# Patient Record
Sex: MALE | Race: WHITE | Employment: FULL TIME | URBAN - NONMETROPOLITAN AREA
[De-identification: names, ages, dates, MRNs, and addresses within clinical notes are randomized per-mention and may not be internally consistent; named-entity substitution may affect disease eponyms.]

---

## 2017-07-03 RX ORDER — ASPIRIN 81 MG
TABLET, DELAYED RELEASE (ENTERIC COATED) ORAL
Qty: 30 TABLET | Refills: 0 | Status: SHIPPED | OUTPATIENT
Start: 2017-07-03 | End: 2018-05-17 | Stop reason: SDUPTHER

## 2018-05-10 RX ORDER — LISINOPRIL 10 MG/1
TABLET ORAL
Qty: 90 TABLET | Refills: 0 | OUTPATIENT
Start: 2018-05-10

## 2018-05-10 RX ORDER — ATORVASTATIN CALCIUM 20 MG/1
TABLET, FILM COATED ORAL
Qty: 90 TABLET | Refills: 0 | OUTPATIENT
Start: 2018-05-10

## 2018-05-17 ENCOUNTER — OFFICE VISIT (OUTPATIENT)
Dept: FAMILY MEDICINE CLINIC | Age: 58
End: 2018-05-17
Payer: COMMERCIAL

## 2018-05-17 VITALS
RESPIRATION RATE: 12 BRPM | SYSTOLIC BLOOD PRESSURE: 150 MMHG | HEIGHT: 72 IN | WEIGHT: 267 LBS | DIASTOLIC BLOOD PRESSURE: 72 MMHG | HEART RATE: 91 BPM | BODY MASS INDEX: 36.16 KG/M2 | TEMPERATURE: 97.9 F

## 2018-05-17 DIAGNOSIS — Z12.5 SCREENING FOR PROSTATE CANCER: ICD-10-CM

## 2018-05-17 DIAGNOSIS — I10 ESSENTIAL HYPERTENSION: Primary | ICD-10-CM

## 2018-05-17 DIAGNOSIS — C43.59 MALIGNANT MELANOMA OF TORSO EXCLUDING BREAST (HCC): ICD-10-CM

## 2018-05-17 DIAGNOSIS — E78.00 PURE HYPERCHOLESTEROLEMIA: ICD-10-CM

## 2018-05-17 PROCEDURE — 4004F PT TOBACCO SCREEN RCVD TLK: CPT | Performed by: NURSE PRACTITIONER

## 2018-05-17 PROCEDURE — G8427 DOCREV CUR MEDS BY ELIG CLIN: HCPCS | Performed by: NURSE PRACTITIONER

## 2018-05-17 PROCEDURE — G8417 CALC BMI ABV UP PARAM F/U: HCPCS | Performed by: NURSE PRACTITIONER

## 2018-05-17 PROCEDURE — 3017F COLORECTAL CA SCREEN DOC REV: CPT | Performed by: NURSE PRACTITIONER

## 2018-05-17 PROCEDURE — 99213 OFFICE O/P EST LOW 20 MIN: CPT | Performed by: NURSE PRACTITIONER

## 2018-05-17 RX ORDER — LISINOPRIL 10 MG/1
TABLET ORAL
Qty: 90 TABLET | Refills: 3 | Status: SHIPPED | OUTPATIENT
Start: 2018-05-17 | End: 2019-02-12 | Stop reason: SDUPTHER

## 2018-05-17 RX ORDER — ATORVASTATIN CALCIUM 20 MG/1
TABLET, FILM COATED ORAL
Qty: 90 TABLET | Refills: 3 | Status: SHIPPED | OUTPATIENT
Start: 2018-05-17 | End: 2019-02-12 | Stop reason: SDUPTHER

## 2018-05-17 RX ORDER — ASPIRIN 81 MG/1
TABLET ORAL
Qty: 90 TABLET | Refills: 3 | Status: SHIPPED | OUTPATIENT
Start: 2018-05-17 | End: 2021-05-06 | Stop reason: SDUPTHER

## 2018-05-17 ASSESSMENT — PATIENT HEALTH QUESTIONNAIRE - PHQ9
SUM OF ALL RESPONSES TO PHQ QUESTIONS 1-9: 0
2. FEELING DOWN, DEPRESSED OR HOPELESS: 0
SUM OF ALL RESPONSES TO PHQ9 QUESTIONS 1 & 2: 0
1. LITTLE INTEREST OR PLEASURE IN DOING THINGS: 0

## 2018-05-17 ASSESSMENT — ENCOUNTER SYMPTOMS
COLOR CHANGE: 1
RESPIRATORY NEGATIVE: 1

## 2018-06-11 ENCOUNTER — TELEPHONE (OUTPATIENT)
Dept: FAMILY MEDICINE CLINIC | Age: 58
End: 2018-06-11

## 2018-06-11 NOTE — TELEPHONE ENCOUNTER
Concerning referral to gastroenterology -    [facility] have attempted to reach the patient on 5/21/18 to schedule colonoscopy. The also mailed a letter on 6/7/18 due to no c/b. Contacted patient today 6/11/18) and left an urgent message letting him know that [facility] is trying to reach him for scheduling.  Left phone numbers for [facility] for him to contact for an appt and also our number in case he wants to cancel referral.

## 2018-06-25 ENCOUNTER — TELEPHONE (OUTPATIENT)
Dept: FAMILY MEDICINE CLINIC | Age: 58
End: 2018-06-25

## 2018-07-10 ENCOUNTER — TELEPHONE (OUTPATIENT)
Dept: FAMILY MEDICINE CLINIC | Age: 58
End: 2018-07-10

## 2018-07-10 NOTE — TELEPHONE ENCOUNTER
2nd attempt to contact the pt re:overdue labs JS ordered on 5/17/18. HIPAA form is up to date, orders mailed.

## 2019-01-31 ENCOUNTER — TELEPHONE (OUTPATIENT)
Dept: FAMILY MEDICINE CLINIC | Age: 59
End: 2019-01-31

## 2019-02-12 ENCOUNTER — OFFICE VISIT (OUTPATIENT)
Dept: FAMILY MEDICINE CLINIC | Age: 59
End: 2019-02-12
Payer: COMMERCIAL

## 2019-02-12 VITALS
HEIGHT: 71 IN | DIASTOLIC BLOOD PRESSURE: 88 MMHG | TEMPERATURE: 98.4 F | WEIGHT: 260 LBS | SYSTOLIC BLOOD PRESSURE: 138 MMHG | BODY MASS INDEX: 36.4 KG/M2 | HEART RATE: 88 BPM | RESPIRATION RATE: 14 BRPM

## 2019-02-12 DIAGNOSIS — E78.00 PURE HYPERCHOLESTEROLEMIA: ICD-10-CM

## 2019-02-12 DIAGNOSIS — I10 ESSENTIAL HYPERTENSION: Primary | ICD-10-CM

## 2019-02-12 DIAGNOSIS — C43.8 MALIGNANT MELANOMA OF OVERLAPPING SITES (HCC): ICD-10-CM

## 2019-02-12 DIAGNOSIS — Z85.820 HISTORY OF MELANOMA: ICD-10-CM

## 2019-02-12 DIAGNOSIS — Z12.11 SCREENING FOR COLON CANCER: ICD-10-CM

## 2019-02-12 DIAGNOSIS — R35.1 NOCTURIA: ICD-10-CM

## 2019-02-12 DIAGNOSIS — R73.01 ELEVATED FASTING BLOOD SUGAR: ICD-10-CM

## 2019-02-12 LAB — HBA1C MFR BLD: 7.4 %

## 2019-02-12 PROCEDURE — 83036 HEMOGLOBIN GLYCOSYLATED A1C: CPT | Performed by: NURSE PRACTITIONER

## 2019-02-12 PROCEDURE — 99213 OFFICE O/P EST LOW 20 MIN: CPT | Performed by: NURSE PRACTITIONER

## 2019-02-12 RX ORDER — ATORVASTATIN CALCIUM 20 MG/1
TABLET, FILM COATED ORAL
Qty: 90 TABLET | Refills: 3 | Status: SHIPPED | OUTPATIENT
Start: 2019-02-12 | End: 2019-10-16 | Stop reason: SDUPTHER

## 2019-02-12 RX ORDER — LISINOPRIL 5 MG/1
TABLET ORAL
Qty: 90 TABLET | Refills: 3 | Status: SHIPPED | OUTPATIENT
Start: 2019-02-12 | End: 2019-10-16 | Stop reason: SDUPTHER

## 2019-02-12 ASSESSMENT — PATIENT HEALTH QUESTIONNAIRE - PHQ9
SUM OF ALL RESPONSES TO PHQ QUESTIONS 1-9: 0
1. LITTLE INTEREST OR PLEASURE IN DOING THINGS: 0
SUM OF ALL RESPONSES TO PHQ QUESTIONS 1-9: 0
SUM OF ALL RESPONSES TO PHQ9 QUESTIONS 1 & 2: 0
2. FEELING DOWN, DEPRESSED OR HOPELESS: 0

## 2019-02-12 ASSESSMENT — ENCOUNTER SYMPTOMS
NAUSEA: 0
BACK PAIN: 0
ABDOMINAL PAIN: 0
ABDOMINAL DISTENTION: 0
RESPIRATORY NEGATIVE: 1
VOMITING: 0
DIARRHEA: 0
CONSTIPATION: 0

## 2019-02-13 ENCOUNTER — TELEPHONE (OUTPATIENT)
Dept: FAMILY MEDICINE CLINIC | Age: 59
End: 2019-02-13

## 2019-02-13 DIAGNOSIS — R73.01 ELEVATED FASTING BLOOD SUGAR: Primary | ICD-10-CM

## 2019-02-13 DIAGNOSIS — E11.9 TYPE 2 DIABETES MELLITUS WITHOUT COMPLICATION, WITHOUT LONG-TERM CURRENT USE OF INSULIN (HCC): ICD-10-CM

## 2019-02-13 RX ORDER — GLUCOSAMINE HCL/CHONDROITIN SU 500-400 MG
CAPSULE ORAL
Qty: 180 STRIP | Refills: 3 | Status: SHIPPED | OUTPATIENT
Start: 2019-02-13 | End: 2019-10-16 | Stop reason: SDUPTHER

## 2019-02-13 RX ORDER — LANCETS
1 EACH MISCELLANEOUS 2 TIMES DAILY
Qty: 180 EACH | Refills: 3 | Status: SHIPPED | OUTPATIENT
Start: 2019-02-13 | End: 2019-10-16 | Stop reason: SDUPTHER

## 2019-03-05 ENCOUNTER — TELEPHONE (OUTPATIENT)
Dept: FAMILY MEDICINE CLINIC | Age: 59
End: 2019-03-05

## 2019-03-12 ENCOUNTER — TELEPHONE (OUTPATIENT)
Dept: FAMILY MEDICINE CLINIC | Age: 59
End: 2019-03-12

## 2019-09-26 ENCOUNTER — TELEPHONE (OUTPATIENT)
Dept: FAMILY MEDICINE CLINIC | Age: 59
End: 2019-09-26

## 2019-09-26 NOTE — TELEPHONE ENCOUNTER
1st attempt to contact pt about overdue lab from 2/12/19. HIPAA not UTD. Neither phone's VM is set up. Contact letter sent.

## 2019-10-16 ENCOUNTER — OFFICE VISIT (OUTPATIENT)
Dept: FAMILY MEDICINE CLINIC | Age: 59
End: 2019-10-16
Payer: COMMERCIAL

## 2019-10-16 ENCOUNTER — TELEPHONE (OUTPATIENT)
Dept: FAMILY MEDICINE CLINIC | Age: 59
End: 2019-10-16

## 2019-10-16 VITALS
BODY MASS INDEX: 36.26 KG/M2 | SYSTOLIC BLOOD PRESSURE: 101 MMHG | RESPIRATION RATE: 16 BRPM | HEIGHT: 71 IN | TEMPERATURE: 98.2 F | DIASTOLIC BLOOD PRESSURE: 70 MMHG | HEART RATE: 88 BPM

## 2019-10-16 DIAGNOSIS — I10 ESSENTIAL HYPERTENSION: ICD-10-CM

## 2019-10-16 DIAGNOSIS — R80.9 MICROALBUMINURIA: ICD-10-CM

## 2019-10-16 DIAGNOSIS — Z85.820 HISTORY OF MELANOMA: ICD-10-CM

## 2019-10-16 DIAGNOSIS — E78.00 PURE HYPERCHOLESTEROLEMIA: ICD-10-CM

## 2019-10-16 DIAGNOSIS — N52.9 ERECTILE DYSFUNCTION, UNSPECIFIED ERECTILE DYSFUNCTION TYPE: ICD-10-CM

## 2019-10-16 DIAGNOSIS — Z12.11 SCREENING FOR COLON CANCER: ICD-10-CM

## 2019-10-16 DIAGNOSIS — R73.01 ELEVATED FASTING BLOOD SUGAR: ICD-10-CM

## 2019-10-16 DIAGNOSIS — Z23 NEEDS FLU SHOT: ICD-10-CM

## 2019-10-16 DIAGNOSIS — E11.9 TYPE 2 DIABETES MELLITUS WITHOUT COMPLICATION, WITHOUT LONG-TERM CURRENT USE OF INSULIN (HCC): ICD-10-CM

## 2019-10-16 LAB
CREATININE URINE POCT: ABNORMAL
CREATININE, URINE: 100.6 MG/DL
HBA1C MFR BLD: 5.8 %
MICROALBUMIN UR-MCNC: 7.81 MG/DL
MICROALBUMIN/CREAT 24H UR: ABNORMAL MG/G{CREAT}
MICROALBUMIN/CREAT UR-RTO: 78 MG/G (ref 0–30)
MICROALBUMIN/CREAT UR-RTO: ABNORMAL

## 2019-10-16 PROCEDURE — 82044 UR ALBUMIN SEMIQUANTITATIVE: CPT | Performed by: NURSE PRACTITIONER

## 2019-10-16 PROCEDURE — 99214 OFFICE O/P EST MOD 30 MIN: CPT | Performed by: NURSE PRACTITIONER

## 2019-10-16 PROCEDURE — 83036 HEMOGLOBIN GLYCOSYLATED A1C: CPT | Performed by: NURSE PRACTITIONER

## 2019-10-16 RX ORDER — ATORVASTATIN CALCIUM 20 MG/1
TABLET, FILM COATED ORAL
Qty: 90 TABLET | Refills: 3 | Status: SHIPPED | OUTPATIENT
Start: 2019-10-16 | End: 2020-04-24 | Stop reason: SDUPTHER

## 2019-10-16 RX ORDER — SILDENAFIL 50 MG/1
TABLET, FILM COATED ORAL
Qty: 10 TABLET | Refills: 0 | Status: SHIPPED | OUTPATIENT
Start: 2019-10-16

## 2019-10-16 RX ORDER — LANCETS
1 EACH MISCELLANEOUS 2 TIMES DAILY
Qty: 180 EACH | Refills: 3 | Status: SHIPPED | OUTPATIENT
Start: 2019-10-16

## 2019-10-16 RX ORDER — LISINOPRIL 5 MG/1
TABLET ORAL
Qty: 90 TABLET | Refills: 3 | Status: SHIPPED | OUTPATIENT
Start: 2019-10-16 | End: 2020-04-24 | Stop reason: SDUPTHER

## 2019-10-16 RX ORDER — GLUCOSAMINE HCL/CHONDROITIN SU 500-400 MG
CAPSULE ORAL
Qty: 180 STRIP | Refills: 3 | Status: SHIPPED | OUTPATIENT
Start: 2019-10-16

## 2019-10-17 PROCEDURE — 90471 IMMUNIZATION ADMIN: CPT | Performed by: NURSE PRACTITIONER

## 2019-10-17 PROCEDURE — 90686 IIV4 VACC NO PRSV 0.5 ML IM: CPT | Performed by: NURSE PRACTITIONER

## 2019-10-21 ENCOUNTER — TELEPHONE (OUTPATIENT)
Dept: FAMILY MEDICINE CLINIC | Age: 59
End: 2019-10-21

## 2019-10-21 RX ORDER — TADALAFIL 5 MG/1
5 TABLET ORAL PRN
Qty: 30 TABLET | Refills: 3 | Status: ON HOLD | OUTPATIENT
Start: 2019-10-21 | End: 2021-09-17

## 2019-10-25 ENCOUNTER — NURSE ONLY (OUTPATIENT)
Dept: LAB | Age: 59
End: 2019-10-25

## 2019-10-25 DIAGNOSIS — E11.9 TYPE 2 DIABETES MELLITUS WITHOUT COMPLICATION, WITHOUT LONG-TERM CURRENT USE OF INSULIN (HCC): ICD-10-CM

## 2019-10-25 DIAGNOSIS — I10 ESSENTIAL HYPERTENSION: ICD-10-CM

## 2019-10-25 LAB
CHOLESTEROL, TOTAL: 163 MG/DL (ref 100–199)
HDLC SERPL-MCNC: 35 MG/DL
LDL CHOLESTEROL CALCULATED: 87 MG/DL
TRIGL SERPL-MCNC: 203 MG/DL (ref 0–199)

## 2019-10-28 ENCOUNTER — TELEPHONE (OUTPATIENT)
Dept: FAMILY MEDICINE CLINIC | Age: 59
End: 2019-10-28

## 2019-11-18 ENCOUNTER — TELEPHONE (OUTPATIENT)
Dept: FAMILY MEDICINE CLINIC | Age: 59
End: 2019-11-18

## 2020-04-16 ENCOUNTER — TELEPHONE (OUTPATIENT)
Dept: FAMILY MEDICINE CLINIC | Age: 60
End: 2020-04-16

## 2020-04-16 NOTE — TELEPHONE ENCOUNTER
Please contact pt and see if he wants to change his visit today to a video visit or doxy. me thanks!

## 2020-04-22 ENCOUNTER — VIRTUAL VISIT (OUTPATIENT)
Dept: FAMILY MEDICINE CLINIC | Age: 60
End: 2020-04-22
Payer: COMMERCIAL

## 2020-04-22 VITALS
BODY MASS INDEX: 34.17 KG/M2 | TEMPERATURE: 97.9 F | SYSTOLIC BLOOD PRESSURE: 132 MMHG | WEIGHT: 245 LBS | DIASTOLIC BLOOD PRESSURE: 72 MMHG | OXYGEN SATURATION: 98 % | HEART RATE: 71 BPM | RESPIRATION RATE: 16 BRPM

## 2020-04-22 PROCEDURE — 99213 OFFICE O/P EST LOW 20 MIN: CPT | Performed by: NURSE PRACTITIONER

## 2020-04-22 ASSESSMENT — ENCOUNTER SYMPTOMS
GASTROINTESTINAL NEGATIVE: 1
RESPIRATORY NEGATIVE: 1

## 2020-04-22 NOTE — PROGRESS NOTES
2020    TELEHEALTH EVALUATION -- Audio/Visual (During GTVOY-89 public health emergency)    HPI:  Visit conducted with pt at home and provider Madhavi Merrill CNP in home office. Pama Schilder (:  1960) has requested an audio/video evaluation for the following concern(s):    F/u    Diabetes controlled am fasting sugars in 113 range, last A1C 2019 5.9, following a diabetic diet    Taking cholesterol meds  Continues to go to [place] every 3 weeks for keytruda infusion for skin cancer. They draw his labs. We did review his  last labs, they are stable. Denies any concerns. Review of Systems   Constitutional: Negative for chills, fatigue and fever. Respiratory: Negative. Cardiovascular: Negative. Gastrointestinal: Negative. Skin: Negative. Neurological: Negative for dizziness, weakness and headaches. Psychiatric/Behavioral: Negative for self-injury, sleep disturbance and suicidal ideas. Prior to Visit Medications    Medication Sig Taking? Authorizing Provider   tadalafil (CIALIS) 5 MG tablet Take 1 tablet by mouth as needed for Erectile Dysfunction  Heddie Grams, APRN - CNP   ACCU-CHEK SOFTCLIX LANCETS MISC 1 Device by Does not apply route 2 times daily  Santiago Davidson APRN - CNP   atorvastatin (LIPITOR) 20 MG tablet TAKE 1 TABLET BY MOUTH EVERY DAY  Heddie Grams, APRN - CNP   blood glucose monitor kit and supplies Test once a day & as needed for symptoms of irregular blood glucose. Please dispense all supplies strips and lancets  Heddie Grams, APRN - CNP   blood glucose monitor strips Test bid & as needed for symptoms of irregular blood glucose.   Heddie Grams, APRN - CNP   lisinopril (PRINIVIL;ZESTRIL) 5 MG tablet TAKE 1 TABLET BY MOUTH EVERY DAY  Heddie Grams, APRN - CNP   metFORMIN (GLUCOPHAGE) 500 MG tablet Take 1 tablet by mouth 2 times daily (with meals)  Heddie Grams, APRN - CNP   sildenafil (VIAGRA) 50 MG tablet Take 1/2 tablet one hour prior to

## 2020-04-23 ENCOUNTER — TELEPHONE (OUTPATIENT)
Dept: FAMILY MEDICINE CLINIC | Age: 60
End: 2020-04-23

## 2020-04-24 RX ORDER — LISINOPRIL 5 MG/1
TABLET ORAL
Qty: 90 TABLET | Refills: 3 | Status: SHIPPED | OUTPATIENT
Start: 2020-04-24 | End: 2021-05-06 | Stop reason: SDUPTHER

## 2020-04-24 RX ORDER — ATORVASTATIN CALCIUM 20 MG/1
TABLET, FILM COATED ORAL
Qty: 90 TABLET | Refills: 3 | Status: SHIPPED | OUTPATIENT
Start: 2020-04-24 | End: 2021-05-06 | Stop reason: SDUPTHER

## 2020-06-16 ENCOUNTER — TELEPHONE (OUTPATIENT)
Dept: FAMILY MEDICINE CLINIC | Age: 60
End: 2020-06-16

## 2020-11-20 ENCOUNTER — TELEPHONE (OUTPATIENT)
Dept: FAMILY MEDICINE CLINIC | Age: 60
End: 2020-11-20

## 2020-11-20 ENCOUNTER — HOSPITAL ENCOUNTER (OUTPATIENT)
Age: 60
Setting detail: SPECIMEN
Discharge: HOME | End: 2020-11-20
Payer: COMMERCIAL

## 2020-11-20 PROCEDURE — U0003 INFECTIOUS AGENT DETECTION BY NUCLEIC ACID (DNA OR RNA); SEVERE ACUTE RESPIRATORY SYNDROME CORONAVIRUS 2 (SARS-COV-2) (CORONAVIRUS DISEASE [COVID-19]), AMPLIFIED PROBE TECHNIQUE, MAKING USE OF HIGH THROUGHPUT TECHNOLOGIES AS DESCRIBED BY CMS-2020-01-R: HCPCS

## 2020-11-20 NOTE — TELEPHONE ENCOUNTER
Patient calling states that his family would like for him to get tested for COVID. Patient has been exposed to son-in-law who tested + 2 weeks ago and 2 of his buddies who were both tested + 1.5 weeks ago.   Patient is experiencing runny nose and coughing    Please advise

## 2020-11-22 LAB — SARS-COV-2: DETECTED

## 2020-11-23 ENCOUNTER — TELEPHONE (OUTPATIENT)
Dept: FAMILY MEDICINE CLINIC | Age: 60
End: 2020-11-23

## 2020-11-23 NOTE — TELEPHONE ENCOUNTER
Pt notified and said he is feeling fine. Just a runny nose and slight cough but that's it.  He will let us know if symptoms get worse

## 2020-11-23 NOTE — TELEPHONE ENCOUNTER
----- Message from DEBBIE Oleary CNP sent at 11/23/2020  8:00 AM EST -----  Let pt know his covid test is positive. Ask how he is feeling, he does need to quarantine for at least 10 days, health dept should also be contacting him.

## 2021-03-24 ENCOUNTER — IMMUNIZATION (OUTPATIENT)
Dept: PRIMARY CARE CLINIC | Age: 61
End: 2021-03-24
Payer: COMMERCIAL

## 2021-03-24 PROCEDURE — 0001A COVID-19, PFIZER VACCINE 30MCG/0.3ML DOSE: CPT | Performed by: PHARMACIST

## 2021-03-24 PROCEDURE — 91300 COVID-19, PFIZER VACCINE 30MCG/0.3ML DOSE: CPT | Performed by: PHARMACIST

## 2021-04-14 ENCOUNTER — IMMUNIZATION (OUTPATIENT)
Dept: PRIMARY CARE CLINIC | Age: 61
End: 2021-04-14
Payer: COMMERCIAL

## 2021-04-14 PROCEDURE — 0002A COVID-19, PFIZER VACCINE 30MCG/0.3ML DOSE: CPT | Performed by: FAMILY MEDICINE

## 2021-04-14 PROCEDURE — 91300 COVID-19, PFIZER VACCINE 30MCG/0.3ML DOSE: CPT | Performed by: FAMILY MEDICINE

## 2021-05-04 DIAGNOSIS — E78.00 PURE HYPERCHOLESTEROLEMIA: ICD-10-CM

## 2021-05-04 DIAGNOSIS — I10 ESSENTIAL HYPERTENSION: ICD-10-CM

## 2021-05-04 RX ORDER — LISINOPRIL 5 MG/1
TABLET ORAL
Qty: 90 TABLET | Refills: 3 | OUTPATIENT
Start: 2021-05-04

## 2021-05-04 RX ORDER — ATORVASTATIN CALCIUM 20 MG/1
TABLET, FILM COATED ORAL
Qty: 90 TABLET | Refills: 3 | OUTPATIENT
Start: 2021-05-04

## 2021-05-06 ENCOUNTER — OFFICE VISIT (OUTPATIENT)
Dept: FAMILY MEDICINE CLINIC | Age: 61
End: 2021-05-06
Payer: COMMERCIAL

## 2021-05-06 VITALS
HEART RATE: 83 BPM | TEMPERATURE: 98.3 F | RESPIRATION RATE: 12 BRPM | WEIGHT: 249 LBS | HEIGHT: 71 IN | OXYGEN SATURATION: 97 % | DIASTOLIC BLOOD PRESSURE: 64 MMHG | BODY MASS INDEX: 34.86 KG/M2 | SYSTOLIC BLOOD PRESSURE: 122 MMHG

## 2021-05-06 DIAGNOSIS — Z12.11 SCREEN FOR COLON CANCER: ICD-10-CM

## 2021-05-06 DIAGNOSIS — I10 ESSENTIAL HYPERTENSION: Primary | ICD-10-CM

## 2021-05-06 DIAGNOSIS — E11.9 TYPE 2 DIABETES MELLITUS WITHOUT COMPLICATION, WITHOUT LONG-TERM CURRENT USE OF INSULIN (HCC): ICD-10-CM

## 2021-05-06 DIAGNOSIS — E78.00 PURE HYPERCHOLESTEROLEMIA: ICD-10-CM

## 2021-05-06 DIAGNOSIS — Z12.5 SCREENING FOR PROSTATE CANCER: ICD-10-CM

## 2021-05-06 LAB — HBA1C MFR BLD: 5.8 % (ref 4.3–5.7)

## 2021-05-06 PROCEDURE — 99214 OFFICE O/P EST MOD 30 MIN: CPT | Performed by: NURSE PRACTITIONER

## 2021-05-06 RX ORDER — LISINOPRIL 5 MG/1
TABLET ORAL
Qty: 90 TABLET | Refills: 4 | Status: SHIPPED | OUTPATIENT
Start: 2021-05-06 | End: 2022-05-09 | Stop reason: SDUPTHER

## 2021-05-06 RX ORDER — ATORVASTATIN CALCIUM 20 MG/1
TABLET, FILM COATED ORAL
Qty: 90 TABLET | Refills: 4 | Status: SHIPPED | OUTPATIENT
Start: 2021-05-06 | End: 2022-05-09 | Stop reason: SDUPTHER

## 2021-05-06 RX ORDER — ASPIRIN 81 MG/1
TABLET ORAL
Qty: 90 TABLET | Refills: 4 | OUTPATIENT
Start: 2021-05-06

## 2021-05-06 SDOH — ECONOMIC STABILITY: TRANSPORTATION INSECURITY
IN THE PAST 12 MONTHS, HAS LACK OF TRANSPORTATION KEPT YOU FROM MEETINGS, WORK, OR FROM GETTING THINGS NEEDED FOR DAILY LIVING?: NOT ASKED

## 2021-05-06 ASSESSMENT — ENCOUNTER SYMPTOMS
VOMITING: 0
SINUS PAIN: 0
COLOR CHANGE: 1
ABDOMINAL DISTENTION: 0
RESPIRATORY NEGATIVE: 1
CONSTIPATION: 0
ABDOMINAL PAIN: 0
SINUS PRESSURE: 0
DIARRHEA: 0
RHINORRHEA: 0

## 2021-05-06 ASSESSMENT — PATIENT HEALTH QUESTIONNAIRE - PHQ9: SUM OF ALL RESPONSES TO PHQ QUESTIONS 1-9: 0

## 2021-05-06 NOTE — PROGRESS NOTES
[facility]  [address] [place] [facility] [address]  Dept: [phone]  Dept Fax: [phone]  Loc: Koby Castro is a 61 y.o. O[place] presents today for his medical conditions/complaints as noted below. Jaquan Tomlinson c/o of Follow-up (with no concerns ) and Health Maintenance (colonoscopy )      HPI:      HTN    Does patient check BP regularly at home? - No  Current Medication regimen - lisinopril 5 mg daily   Tolerating medications well? - yes    Shortness of breath or chest pain? No  Headache or visual complaints? No  Neurologic changes like confusion? No  Extremity edema? No    BP Readings from Last 3 Encounters:  05/06/21 : 122/64  04/22/20 : 132/72  10/16/19 : 101/70    Pt with a history of melanoma, is in treatment with [facility] for this on keytruda, has had melanoma for 25 years off and on. Sees dermatology locally every 3 months      Gets up to two times a night to urinate. Diabetes Type 2    Glucose control:   Does patient check blood glucoses at home? No  Report of hypoglycemia: no  Lab Results       Component                Value               Date                       LABA1C                   5.8 (H)             05/06/2021            No results found for: EAG    Symptoms  Polyuria, Polydipsia or Polyphagia? No  Chest Pain, SOB, or Palpitations? -  No  New Vision complaints? No  Paresthesias of the extremities? No    Medications  Current medication were reviewed. Compliant with medications? yes  Medication side effects? No  On ACE-I or ARB? Yes  On antiplatelet therapy? Yes  On Statin? Yes    Last Diabetic Eye Exam: has been a year    Exercise  Exercise? Yes  Wt Readings from Last 3 Encounters:  05/06/21 : 249 lb (112.9 kg)  04/22/20 : 245 lb (111.1 kg)  02/12/19 : 260 lb (117.9 kg)      Diet discipline?:  Low salt, fat, sugar diet?   yes    Blood pressure control:  BP Readings from Last 3 Encounters: 05/06/21 : 122/64  04/22/20 : 132/72  10/16/19 : 101/70      Lab Results       Component                Value               Date                       LABMICR                  7.81                10/16/2019              Lab Results       Component                Value               Date                       LDLCALC                  87                  10/25/2019            Lab Results       Component                Value               Date                       CHOL                     163                 10/25/2019                 CHOL                     194                 09/22/2015                 CHOL                     189                 01/28/2014            Lab Results       Component                Value               Date                       TRIG                     203 (H)             10/25/2019                 TRIG                     89                  09/22/2015                 TRIG                     385 (H)             01/28/2014            Lab Results       Component                Value               Date                       HDL                      35                  10/25/2019                 HDL                      38                  09/22/2015                 HDL                      37                  01/28/2014            Lab Results       Component                Value               Date                       LDLCALC                  87                  10/25/2019                 LDLCALC                  138                 09/22/2015                 LDLCALC                  75                  01/28/2014            No results found for: LABVLDL, VLDL  No results found for: CHOLHDLRATIO will repeat labs            Current Outpatient Medications   Medication Sig Dispense Refill    aspirin (ASPIRIN LOW DOSE) 81 MG EC tablet TAKE 1 TABLET BY MOUTH DAILY 90 tablet 4    metFORMIN (GLUCOPHAGE) 500 MG tablet Take 1 tablet by mouth 2 times daily (with meals) 180 tablet 4    8/7/2010)    Flu vaccine (Season Ended) 09/01/2021    A1C test (Diabetic or Prediabetic)  05/06/2022    COVID-19 Vaccine  Completed    Hepatitis C screen  Addressed    HIV screen  Addressed    Hepatitis A vaccine  Aged Out    Hib vaccine  Aged Out    Meningococcal (ACWY) vaccine  Aged Out       Subjective:      Review of Systems   Constitutional: Negative for chills, fatigue and fever. HENT: Negative for congestion, rhinorrhea, sinus pressure and sinus pain. Respiratory: Negative. Cardiovascular: Negative. Gastrointestinal: Negative for abdominal distention, abdominal pain, constipation, diarrhea and vomiting. Genitourinary: Negative for difficulty urinating and dysuria. Musculoskeletal: Negative. Skin: Positive for color change. Melanoma, had an area removed form behind his left ear last week. Pathology pending    Neurological: Negative for dizziness, facial asymmetry, weakness and headaches. Psychiatric/Behavioral: Negative for self-injury, sleep disturbance and suicidal ideas. The patient is not nervous/anxious. Objective:      /64   Pulse 83   Temp 98.3 °F (36.8 °C) (Oral)   Resp 12   Ht 5' 11\" (1.803 m)   Wt 249 lb (112.9 kg)   SpO2 97%   BMI 34.73 kg/m²      Physical Exam  Vitals signs and nursing note reviewed. Constitutional:       Appearance: He is not ill-appearing. HENT:      Right Ear: Tympanic membrane, ear canal and external ear normal.      Left Ear: Tympanic membrane, ear canal and external ear normal.      Mouth/Throat:      Mouth: Mucous membranes are moist.   Cardiovascular:      Rate and Rhythm: Normal rate and regular rhythm. Pulses: Normal pulses. Heart sounds: Normal heart sounds. No murmur. Pulmonary:      Effort: Pulmonary effort is normal. No respiratory distress. Breath sounds: Normal breath sounds. No wheezing. Abdominal:      General: Abdomen is flat. Bowel sounds are normal. There is no distension. Palpations: Abdomen is soft. Tenderness: There is no abdominal tenderness. Musculoskeletal: Normal range of motion. Skin:     General: Skin is warm and dry. Capillary Refill: Capillary refill takes less than 2 seconds. Neurological:      General: No focal deficit present. Mental Status: He is alert. Psychiatric:         Mood and Affect: Mood normal.         Thought Content: Thought content normal.         Judgment: Judgment normal.          Assessment/Plan:           1. Essential hypertension  Controlled  Continue current meds  - aspirin (ASPIRIN LOW DOSE) 81 MG EC tablet; TAKE 1 TABLET BY MOUTH DAILY  Dispense: 90 tablet; Refill: 4  - CBC With Auto Differential; Future  - Lipid Panel; Future  - Comprehensive Metabolic Panel; Future  - lisinopril (PRINIVIL;ZESTRIL) 5 MG tablet; TAKE 1 TABLET BY MOUTH EVERY DAY  Dispense: 90 tablet; Refill: 4    2. Pure hypercholesterolemia    - atorvastatin (LIPITOR) 20 MG tablet; TAKE 1 TABLET BY MOUTH EVERY DAY  Dispense: 90 tablet; Refill: 4    3. Screening for prostate cancer    - PSA Prostatic Specific Antigen; Future    4. Screen for colon cancer    - [facility] - Francheska Blue MD, Gastroenterology, [facility]    5. Type II diabetes  Controlled   Continue current meds  Return in about 1 year (around 5/6/2022) for physical exam.    Reccommended tobaccocessation options including pharmacologic methods, counseled great than 3 minutesduring this visit:  Yes[]  No  []  Pt in agreement with plan      Patient given educational materials -see patient instructions. Discussed use, benefit, and side effects of prescribedmedications. All patient questions answered. Pt voiced understanding. Reviewedhealth maintenance. Instructed to continue current medications, diet and exercise. Patient agreed with treatment plan. Follow up as directed.        Electronicallysigned by DEBBIE Ahumada - CNP on 5/6/2021 at 3:37 PM

## 2021-06-02 ENCOUNTER — TELEPHONE (OUTPATIENT)
Dept: FAMILY MEDICINE CLINIC | Age: 61
End: 2021-06-02

## 2021-06-02 NOTE — TELEPHONE ENCOUNTER
EXTERNAL REFERRAL TO GASTROENTEROLOGY-    Referral placed 5/6/21. On 5/6/21 - Referral faxed to Dr Ken León scanned to chart and pt is aware they will contact her with appt details    On 6/2/21 - Per [facility], screening colonoscopy forms were mailed 5/10/21 and have not been returned.     Ok to cancel referral?

## 2021-08-04 LAB
ABSOLUTE BASO #: 0 X10E9/L (ref 0–0.2)
ABSOLUTE EOS #: 0.5 X10E9/L (ref 0–0.4)
ABSOLUTE LYMPH #: 1.4 X10E9/L (ref 1–3.5)
ABSOLUTE MONO #: 0.4 X10E9/L (ref 0–0.9)
ABSOLUTE NEUT #: 4.1 X10E9/L (ref 1.5–6.6)
ALBUMIN SERPL-MCNC: 4.6 G/DL (ref 3.2–5.3)
ALK PHOSPHATASE: 56 U/L (ref 39–130)
ALT SERPL-CCNC: 22 U/L (ref 0–40)
ANION GAP SERPL CALCULATED.3IONS-SCNC: 7 MMOL/L (ref 5–15)
AST SERPL-CCNC: 20 U/L (ref 0–41)
BASOPHILS RELATIVE PERCENT: 0.6 %
BILIRUB SERPL-MCNC: 0.7 MG/DL (ref 0.3–1.2)
BUN BLDV-MCNC: 15 MG/DL (ref 5–23)
CALCIUM SERPL-MCNC: 9.4 MG/DL (ref 8.5–10.5)
CHLORIDE BLD-SCNC: 103 MMOL/L (ref 98–109)
CHOLESTEROL/HDL RATIO: 4 (ref 1–5)
CHOLESTEROL: 136 MG/DL (ref 150–200)
CO2: 31 MMOL/L (ref 22–32)
CREAT SERPL-MCNC: 0.66 MG/DL (ref 0.6–1.3)
EGFR AFRICAN AMERICAN: >60 ML/MIN/1.73SQ.M
EGFR IF NONAFRICAN AMERICAN: >60 ML/MIN/1.73SQ.M
EOSINOPHILS RELATIVE PERCENT: 7.5 %
GLUCOSE: 101 MG/DL (ref 65–99)
HCT VFR BLD CALC: 40.8 % (ref 39–49)
HDLC SERPL-MCNC: 34 MG/DL
HEMOGLOBIN: 14.1 G/DL (ref 13–17)
LDL CHOLESTEROL CALCULATED: 63 MG/DL
LDL/HDL RATIO: 1.9
LYMPHOCYTE %: 21.6 %
MCH RBC QN AUTO: 29.5 PG (ref 27–34)
MCHC RBC AUTO-ENTMCNC: 34.5 G/DL (ref 32–36)
MCV RBC AUTO: 85 FL (ref 80–100)
MONOCYTES # BLD: 6 %
NEUTROPHILS RELATIVE PERCENT: 64.3 %
PDW BLD-RTO: 14 % (ref 11.5–15)
PLATELETS: 263 X10E9/L (ref 150–450)
PMV BLD AUTO: 7.6 FL (ref 7–12)
POTASSIUM SERPL-SCNC: 4.8 MMOL/L (ref 3.5–5)
PSA, ULTRASENSITIVE: 1.08 NG/ML (ref 0–4)
RBC: 4.79 X10E12/L (ref 4.1–5.7)
SODIUM BLD-SCNC: 141 MMOL/L (ref 134–146)
TOTAL PROTEIN: 7.3 G/DL (ref 6–8)
TRIGL SERPL-MCNC: 196 MG/DL (ref 27–150)
VLDLC SERPL CALC-MCNC: 39 MG/DL (ref 0–30)
WBC: 6.4 X10E9/L (ref 4–11)

## 2021-08-05 ENCOUNTER — TELEPHONE (OUTPATIENT)
Dept: FAMILY MEDICINE CLINIC | Age: 61
End: 2021-08-05

## 2021-08-05 NOTE — TELEPHONE ENCOUNTER
Called to inform pt of normal lab results from 8/4/21 he verbalized understanding. He also states that he has not had his colon screening done yet, he misplaced the paperwork but found it and sent it in a few days ago. He states he hasn't heard anything from gi yet.

## 2021-08-05 NOTE — TELEPHONE ENCOUNTER
----- Message from DEBBIE Carmona - CNP sent at 8/5/2021  8:38 AM EDT -----  Let pt  know all labs look good, continue current meds, also we placed a GI referral for him in May for screening colon did he have it done?  thanks

## 2021-08-31 ENCOUNTER — HOSPITAL ENCOUNTER (OUTPATIENT)
Age: 61
Discharge: HOME | End: 2021-08-31
Payer: COMMERCIAL

## 2021-08-31 ENCOUNTER — HOSPITAL ENCOUNTER (OUTPATIENT)
Dept: GENERAL RADIOLOGY | Age: 61
Discharge: HOME | End: 2021-08-31
Payer: COMMERCIAL

## 2021-08-31 DIAGNOSIS — Z01.818 PRE-OP TESTING: ICD-10-CM

## 2021-08-31 LAB
ANION GAP SERPL CALCULATED.3IONS-SCNC: 9 MEQ/L (ref 8–16)
BUN BLDV-MCNC: 15 MG/DL (ref 7–22)
CALCIUM SERPL-MCNC: 9.4 MG/DL (ref 8.5–10.5)
CHLORIDE BLD-SCNC: 100 MEQ/L (ref 98–111)
CO2: 31 MEQ/L (ref 23–33)
CREAT SERPL-MCNC: 0.6 MG/DL (ref 0.4–1.2)
EKG ATRIAL RATE: 79 BPM
EKG P AXIS: 60 DEGREES
EKG P-R INTERVAL: 198 MS
EKG Q-T INTERVAL: 384 MS
EKG QRS DURATION: 98 MS
EKG QTC CALCULATION (BAZETT): 440 MS
EKG R AXIS: 43 DEGREES
EKG T AXIS: 41 DEGREES
EKG VENTRICULAR RATE: 79 BPM
ERYTHROCYTE [DISTWIDTH] IN BLOOD BY AUTOMATED COUNT: 13.3 % (ref 11.5–14.5)
ERYTHROCYTE [DISTWIDTH] IN BLOOD BY AUTOMATED COUNT: 43.8 FL (ref 35–45)
GFR SERPL CREATININE-BSD FRML MDRD: > 90 ML/MIN/1.73M2
GLUCOSE BLD-MCNC: 143 MG/DL (ref 70–108)
HCT VFR BLD CALC: 44.6 % (ref 42–52)
HEMOGLOBIN: 14.8 GM/DL (ref 14–18)
MCH RBC QN AUTO: 29.8 PG (ref 26–33)
MCHC RBC AUTO-ENTMCNC: 33.2 GM/DL (ref 32.2–35.5)
MCV RBC AUTO: 89.7 FL (ref 80–94)
PLATELET # BLD: 253 THOU/MM3 (ref 130–400)
PMV BLD AUTO: 9.5 FL (ref 9.4–12.4)
POTASSIUM SERPL-SCNC: 4.6 MEQ/L (ref 3.5–5.2)
RBC # BLD: 4.97 MILL/MM3 (ref 4.7–6.1)
SODIUM BLD-SCNC: 140 MEQ/L (ref 135–145)
WBC # BLD: 6.1 THOU/MM3 (ref 4.8–10.8)

## 2021-08-31 PROCEDURE — 93010 ELECTROCARDIOGRAM REPORT: CPT | Performed by: NUCLEAR MEDICINE

## 2021-08-31 PROCEDURE — 80048 BASIC METABOLIC PNL TOTAL CA: CPT

## 2021-08-31 PROCEDURE — 85027 COMPLETE CBC AUTOMATED: CPT

## 2021-08-31 PROCEDURE — 93005 ELECTROCARDIOGRAM TRACING: CPT | Performed by: SPECIALIST

## 2021-08-31 PROCEDURE — 71046 X-RAY EXAM CHEST 2 VIEWS: CPT

## 2021-08-31 PROCEDURE — 36415 COLL VENOUS BLD VENIPUNCTURE: CPT

## 2021-09-09 NOTE — PROGRESS NOTES
EKG clearance form given to Dr. Lidya Maguire for review. OK to proceed with surgery as planned at surgery center.

## 2021-09-10 NOTE — PROGRESS NOTES
NPO after midnight except for sip of water with heart/BP meds  Follow instructions given by surgeon including medications to hold   Bring insurance card and photo ID  Shower morning of surgery with liquid antibacterial soap  Wear loose comfortable clothing  Remove jewelry and do not bring valuables  Bring list of medications with dosages and how often taken if not reviewed with PAT    needed at discharge at ase 25years old  Call PAT at [phone] for questions

## 2021-09-15 ENCOUNTER — OFFICE VISIT (OUTPATIENT)
Dept: UROLOGY | Age: 61
End: 2021-09-15
Payer: COMMERCIAL

## 2021-09-15 VITALS — HEIGHT: 72 IN | WEIGHT: 235 LBS | BODY MASS INDEX: 31.83 KG/M2 | RESPIRATION RATE: 16 BRPM

## 2021-09-15 DIAGNOSIS — R39.89 ABNORMAL PROSTATE EXAM: Primary | ICD-10-CM

## 2021-09-15 DIAGNOSIS — N52.9 ERECTILE DYSFUNCTION, UNSPECIFIED ERECTILE DYSFUNCTION TYPE: ICD-10-CM

## 2021-09-15 PROCEDURE — 99203 OFFICE O/P NEW LOW 30 MIN: CPT | Performed by: UROLOGY

## 2021-09-15 NOTE — PROGRESS NOTES
PAU Alexander MD        [facility] [address] [facility] [id number]  Dept: [phone]  Dept Fax: [phone]: [address] [address] Urology Office Note -     Patient:  Savanna Mcdonnell  YOB: 1960  Date: 9/15/2021    The patient is a 64 y.o. male who presents today for evaluation of the following problems:   Chief Complaint   Patient presents with    New Patient     abnormal prostate exam. last psa 1.08. denies family hx of prostate cancer. nocturia x1     Erectile Dysfunction     on viagra     referred/consultation requested by DEBBIE Bernal CNP. HISTORY OF PRESENT ILLNESS:     abnormal prostate exam  Had colonscopy a few weeks ago and Dr Lydia Barcenas noted abnormal prostate exam  Reviewed PSA nl    ED  Sildenafil PRN    BPH  Mild LUTS  Mild bother    Requested/reviewed records from DEBBIE Baez CNP office and/or outside physician/EMR    (Patient's old records have been requested, reviewed and pertinent findings summarized in today's note.)    Procedures Today: N/A      Last several PSA's:  No results found for: PSA    Last total testosterone:  No results found for: TESTOSTERONE    Urinalysis today:  No results found for this visit on 09/15/21. Last BUN and creatinine:  Lab Results   Component Value Date    BUN 15 08/31/2021     Lab Results   Component Value Date    CREATININE 0.6 08/31/2021         Imaging Reviewed during this Office Visit:   Garth Snell MD independently reviewed the images and verified the radiology reports from:    XR CHEST (2 VW)    Result Date: 8/31/2021  PROCEDURE: XR CHEST (2 VW) CLINICAL INFORMATION: Pre-op testing. COMPARISON: Chest x-ray 10/15/2013. TECHNIQUE: One PA and 2 lateral views of the chest were obtained. FINDINGS: Surgical clips project over the left axillary region.  There is a right-sided chest wall infusion port with the tip of the catheter in the SVC. The lungs are clear. The cardiac silhouette and pulmonary vasculature are within normal limits. There is no significant pleural effusion or pneumothorax. Visualized portions of the upper abdomen are within normal limits. The osseous structures are intact. No acute fractures or suspicious osseous lesions. There is no acute intrathoracic process. **This report has been created using voice recognition software. It may contain minor errors which are inherent in voice recognition technology. ** Final report electronically signed by Dr Vita Colin on 8/31/2021 12:31 PM      PAST MEDICAL, FAMILY AND SOCIAL HISTORY:  Past Medical History:   Diagnosis Date    Hyperlipidemia     Hypertension     Melanoma ([address]      Past Surgical History:   Procedure Laterality Date    DOPPLER ECHOCARDIOGRAPHY      MOHS SURGERY       Family History   Problem Relation Age of Onset    High Blood Pressure Mother     Cancer Father     Other Sister         MS    Cancer Brother     Other Brother         melanoma     Outpatient Medications Marked as Taking for the 9/15/21 encounter (Office Visit) with Miguel Parr MD   Medication Sig Dispense Refill    aspirin (ASPIRIN LOW DOSE) 81 MG EC tablet TAKE 1 TABLET BY MOUTH DAILY 90 tablet 4    metFORMIN (GLUCOPHAGE) 500 MG tablet Take 1 tablet by mouth 2 times daily (with meals) 180 tablet 4    lisinopril (PRINIVIL;ZESTRIL) 5 MG tablet TAKE 1 TABLET BY MOUTH EVERY DAY 90 tablet 4    atorvastatin (LIPITOR) 20 MG tablet TAKE 1 TABLET BY MOUTH EVERY DAY 90 tablet 4    ACCU-CHEK SOFTCLIX LANCETS MISC 1 Device by Does not apply route 2 times daily 180 each 3    blood glucose monitor kit and supplies Test once a day & as needed for symptoms of irregular blood glucose. Please dispense all supplies strips and lancets 1 kit 0    blood glucose monitor strips Test bid & as needed for symptoms of irregular blood glucose.  180 strip 3    sildenafil (VIAGRA) 50 MG tablet Take 1/2 tablet one hour prior to sexual activity 10 tablet 0    pembrolizumab (KEYTRUDA) 50 MG SOLR chemo injection Infuse intravenously         Patient has no known allergies. Social History     Tobacco Use   Smoking Status Never Smoker   Smokeless Tobacco Current User    Types: Chew      (If patient a smoker, smoking cessation counseling offered)   Social History     Substance and Sexual Activity   Alcohol Use Yes    Alcohol/week: 0.0 standard drinks    Comment: once per month       REVIEW OF SYSTEMS:  Constitutional: negative  Eyes: negative  Respiratory: negative  Cardiovascular: negative  Gastrointestinal: negative  Genitourinary: see HPI  Musculoskeletal: negative  Skin: negative   Neurological: negative  Hematological/Lymphatic: negative  Psychological: negative        Physical Exam:    This a 64 y.o. male  Vitals:    09/15/21 1524   Resp: 16     Body mass index is 31.87 kg/m². Constitutional: Patient in no acute distress;     Assessment and Plan        1. Abnormal prostate exam    2. Erectile dysfunction, unspecified erectile dysfunction type               Plan:        RACHEL today did not demonstrate any abnormalities-- will need annual PSA and RACHEL  Follow up next year for prostate check      Prescriptions Ordered:  No orders of the defined types were placed in this encounter. Orders Placed:  No orders of the defined types were placed in this encounter.            Do Wyatt MD

## 2021-09-17 ENCOUNTER — ANESTHESIA (OUTPATIENT)
Dept: OPERATING ROOM | Age: 61
End: 2021-09-17
Payer: COMMERCIAL

## 2021-09-17 ENCOUNTER — HOSPITAL ENCOUNTER (OUTPATIENT)
Age: 61
Setting detail: OUTPATIENT SURGERY
Discharge: HOME | End: 2021-09-17
Attending: SPECIALIST | Admitting: SPECIALIST
Payer: COMMERCIAL

## 2021-09-17 ENCOUNTER — ANESTHESIA EVENT (OUTPATIENT)
Dept: OPERATING ROOM | Age: 61
End: 2021-09-17
Payer: COMMERCIAL

## 2021-09-17 VITALS
BODY MASS INDEX: 32.59 KG/M2 | SYSTOLIC BLOOD PRESSURE: 104 MMHG | HEART RATE: 69 BPM | RESPIRATION RATE: 18 BRPM | HEIGHT: 72 IN | TEMPERATURE: 97 F | OXYGEN SATURATION: 95 % | WEIGHT: 240.6 LBS | DIASTOLIC BLOOD PRESSURE: 59 MMHG

## 2021-09-17 VITALS
TEMPERATURE: 96.8 F | DIASTOLIC BLOOD PRESSURE: 52 MMHG | SYSTOLIC BLOOD PRESSURE: 75 MMHG | OXYGEN SATURATION: 98 % | RESPIRATION RATE: 8 BRPM

## 2021-09-17 LAB — GLUCOSE BLD-MCNC: 120 MG/DL (ref 70–108)

## 2021-09-17 PROCEDURE — 3700000000 HC ANESTHESIA ATTENDED CARE: Performed by: SPECIALIST

## 2021-09-17 PROCEDURE — 6360000002 HC RX W HCPCS: Performed by: NURSE ANESTHETIST, CERTIFIED REGISTERED

## 2021-09-17 PROCEDURE — 7100000011 HC PHASE II RECOVERY - ADDTL 15 MIN: Performed by: SPECIALIST

## 2021-09-17 PROCEDURE — 6360000002 HC RX W HCPCS: Performed by: SPECIALIST

## 2021-09-17 PROCEDURE — 3600000012 HC SURGERY LEVEL 2 ADDTL 15MIN: Performed by: SPECIALIST

## 2021-09-17 PROCEDURE — 2580000003 HC RX 258: Performed by: SPECIALIST

## 2021-09-17 PROCEDURE — 2500000003 HC RX 250 WO HCPCS: Performed by: NURSE ANESTHETIST, CERTIFIED REGISTERED

## 2021-09-17 PROCEDURE — 2709999900 HC NON-CHARGEABLE SUPPLY: Performed by: SPECIALIST

## 2021-09-17 PROCEDURE — 2500000003 HC RX 250 WO HCPCS: Performed by: SPECIALIST

## 2021-09-17 PROCEDURE — 7100000010 HC PHASE II RECOVERY - FIRST 15 MIN: Performed by: SPECIALIST

## 2021-09-17 PROCEDURE — 3600000002 HC SURGERY LEVEL 2 BASE: Performed by: SPECIALIST

## 2021-09-17 PROCEDURE — 3700000001 HC ADD 15 MINUTES (ANESTHESIA): Performed by: SPECIALIST

## 2021-09-17 PROCEDURE — 82948 REAGENT STRIP/BLOOD GLUCOSE: CPT

## 2021-09-17 RX ORDER — ONDANSETRON 2 MG/ML
INJECTION INTRAMUSCULAR; INTRAVENOUS PRN
Status: DISCONTINUED | OUTPATIENT
Start: 2021-09-17 | End: 2021-09-17 | Stop reason: SDUPTHER

## 2021-09-17 RX ORDER — LIDOCAINE HYDROCHLORIDE AND EPINEPHRINE BITARTRATE 20; .01 MG/ML; MG/ML
INJECTION, SOLUTION SUBCUTANEOUS PRN
Status: DISCONTINUED | OUTPATIENT
Start: 2021-09-17 | End: 2021-09-17 | Stop reason: ALTCHOICE

## 2021-09-17 RX ORDER — PROPOFOL 10 MG/ML
INJECTION, EMULSION INTRAVENOUS CONTINUOUS PRN
Status: DISCONTINUED | OUTPATIENT
Start: 2021-09-17 | End: 2021-09-17 | Stop reason: SDUPTHER

## 2021-09-17 RX ORDER — LIDOCAINE HYDROCHLORIDE 20 MG/ML
INJECTION, SOLUTION EPIDURAL; INFILTRATION; INTRACAUDAL; PERINEURAL PRN
Status: DISCONTINUED | OUTPATIENT
Start: 2021-09-17 | End: 2021-09-17 | Stop reason: SDUPTHER

## 2021-09-17 RX ORDER — CEFAZOLIN SODIUM 2 G/100ML
2000 INJECTION, SOLUTION INTRAVENOUS
Status: COMPLETED | OUTPATIENT
Start: 2021-09-17 | End: 2021-09-17

## 2021-09-17 RX ORDER — PROPOFOL 10 MG/ML
INJECTION, EMULSION INTRAVENOUS PRN
Status: DISCONTINUED | OUTPATIENT
Start: 2021-09-17 | End: 2021-09-17 | Stop reason: SDUPTHER

## 2021-09-17 RX ORDER — SODIUM CHLORIDE 9 MG/ML
INJECTION, SOLUTION INTRAVENOUS CONTINUOUS
Status: DISCONTINUED | OUTPATIENT
Start: 2021-09-17 | End: 2021-09-17 | Stop reason: HOSPADM

## 2021-09-17 RX ORDER — FENTANYL CITRATE 50 UG/ML
INJECTION, SOLUTION INTRAMUSCULAR; INTRAVENOUS PRN
Status: DISCONTINUED | OUTPATIENT
Start: 2021-09-17 | End: 2021-09-17 | Stop reason: SDUPTHER

## 2021-09-17 RX ADMIN — FENTANYL CITRATE 100 MCG: 50 INJECTION, SOLUTION INTRAMUSCULAR; INTRAVENOUS at 08:49

## 2021-09-17 RX ADMIN — PROPOFOL 40 MG: 10 INJECTION, EMULSION INTRAVENOUS at 08:50

## 2021-09-17 RX ADMIN — PROPOFOL 70 MCG/KG/MIN: 10 INJECTION, EMULSION INTRAVENOUS at 08:54

## 2021-09-17 RX ADMIN — LIDOCAINE HYDROCHLORIDE 40 MG: 20 INJECTION, SOLUTION EPIDURAL; INFILTRATION; INTRACAUDAL; PERINEURAL at 08:50

## 2021-09-17 RX ADMIN — PHENYLEPHRINE HYDROCHLORIDE 100 MCG: 10 INJECTION INTRAVENOUS at 09:29

## 2021-09-17 RX ADMIN — PROPOFOL 20 MG: 10 INJECTION, EMULSION INTRAVENOUS at 09:02

## 2021-09-17 RX ADMIN — SODIUM CHLORIDE: 9 INJECTION, SOLUTION INTRAVENOUS at 08:45

## 2021-09-17 RX ADMIN — ONDANSETRON HYDROCHLORIDE 4 MG: 4 INJECTION, SOLUTION INTRAMUSCULAR; INTRAVENOUS at 09:31

## 2021-09-17 RX ADMIN — CEFAZOLIN SODIUM 2000 MG: 2 INJECTION, SOLUTION INTRAVENOUS at 08:55

## 2021-09-17 RX ADMIN — PROPOFOL 30 MG: 10 INJECTION, EMULSION INTRAVENOUS at 08:52

## 2021-09-17 ASSESSMENT — PULMONARY FUNCTION TESTS
PIF_VALUE: 0
PIF_VALUE: 1
PIF_VALUE: 0

## 2021-09-17 ASSESSMENT — PAIN - FUNCTIONAL ASSESSMENT: PAIN_FUNCTIONAL_ASSESSMENT: 0-10

## 2021-09-17 NOTE — ANESTHESIA POSTPROCEDURE EVALUATION
Department of Anesthesiology  Postprocedure Note    Patient: Lang Joseph  MRN: [id number]  YOB: 1960  Date of evaluation: 9/17/2021  Time:  12:16 PM     Procedure Summary     Date: 09/17/21 Room / Location: [address] [place] [address] [facility]    Anesthesia Start: Halbert Flood Anesthesia Stop: 5882    Procedure: MOHS REPAIR BCC RIGHT PARANASAL (Right Face) Diagnosis: [facility] RIGHT PARANASAL)    Surgeons: Brittany Magallanes MD Responsible Provider: Chito Grove MD    Anesthesia Type: MAC ASA Status: 2          Anesthesia Type: MAC    Walt Phase I:      Walt Phase II: Walt Score: 10    Last vitals: Reviewed and per EMR flowsheets.        Anesthesia Post Evaluation    Complications: no  Cardiovascular status: hemodynamically stable  Respiratory status: acceptable

## 2021-09-17 NOTE — ANESTHESIA PRE PROCEDURE
Department of Anesthesiology  Preprocedure Note       Name:  Dashawn Briggs   Age:  64 y.o.  :  1960                                          MRN:  [id number]         Date:  2021      Surgeon: Liana Ford):  Rene Hamilton MD    Procedure: Procedure(s):  MOHS REPAIR BCC RIGHT PARANASAL    Medications prior to admission:   Prior to Admission medications    Medication Sig Start Date End Date Taking? Authorizing Provider   metFORMIN (GLUCOPHAGE) 500 MG tablet Take 1 tablet by mouth 2 times daily (with meals) 21  Yes DEBBIE Rodriguez CNP   lisinopril (PRINIVIL;ZESTRIL) 5 MG tablet TAKE 1 TABLET BY MOUTH EVERY DAY 21  Yes DEBBIE Rodriguez CNP   atorvastatin (LIPITOR) 20 MG tablet TAKE 1 TABLET BY MOUTH EVERY DAY 21  Yes DEBBIE Rodriguez CNP   aspirin (ASPIRIN LOW DOSE) 81 MG EC tablet TAKE 1 TABLET BY MOUTH DAILY 21   DEBBIE Rodriguez CNP   ACCU-CHEK SOFTCLIX LANCETS MISC 1 Device by Does not apply route 2 times daily 10/16/19   DEBBIE Rodriguez CNP   blood glucose monitor kit and supplies Test once a day & as needed for symptoms of irregular blood glucose. Please dispense all supplies strips and lancets 10/16/19   DEBBIE Rodriguez CNP   blood glucose monitor strips Test bid & as needed for symptoms of irregular blood glucose.  10/16/19   DEBBIE Rodriguez CNP   sildenafil (VIAGRA) 50 MG tablet Take 1/2 tablet one hour prior to sexual activity 10/16/19   DEBBIE Rodriguez CNP   pembrolizumab [facility]) 50 MG SOLR chemo injection Infuse intravenously    Historical Provider, MD       Current medications:    Current Facility-Administered Medications   Medication Dose Route Frequency Provider Last Rate Last Admin    0.9 % sodium chloride infusion   IntraVENous Continuous Rene Hamilton MD        ceFAZolin (ANCEF) 2000 mg in dextrose 4 % 100 mL IVPB (premix)  2,000 mg IntraVENous 60 Min Pre-Op Rene Hamilton MD           Allergies:  No Known Allergies    Problem List:    Patient Active Problem List   Diagnosis Code    Hypertension I10    Hyperlipidemia E78.5    History of melanoma Z85.820       Past Medical History:        Diagnosis Date    Hyperlipidemia     Hypertension     Melanoma ([address]        Past Surgical History:        Procedure Laterality Date    DOPPLER ECHOCARDIOGRAPHY      MOHS SURGERY         Social History:    Social History     Tobacco Use    Smoking status: Never Smoker    Smokeless tobacco: Current User     Types: Chew   Substance Use Topics    Alcohol use: Yes     Alcohol/week: 0.0 standard drinks     Comment: once per month                                Ready to quit: Not Answered  Counseling given: Not Answered      Vital Signs (Current):   Vitals:    09/10/21 0950 09/17/21 0734 09/17/21 0738   BP:  (!) 140/85    Pulse:  82    Resp:  16    Temp:  97.8 °F (36.6 °C) 97.8 °F (36.6 °C)   TempSrc:  Temporal    SpO2:  97%    Weight: 235 lb (106.6 kg) 240 lb 9.6 oz (109.1 kg)    Height: 6' (1.829 m) 6' (1.829 m)                                               BP Readings from Last 3 Encounters:   09/17/21 (!) 140/85   05/06/21 122/64   04/22/20 132/72       NPO Status: Time of last liquid consumption: 1900                        Time of last solid consumption: 1900                        Date of last liquid consumption: 09/16/21                        Date of last solid food consumption: 09/16/21    BMI:   Wt Readings from Last 3 Encounters:   09/17/21 240 lb 9.6 oz (109.1 kg)   09/15/21 235 lb (106.6 kg)   05/06/21 249 lb (112.9 kg)     Body mass index is 32.63 kg/m².     CBC:   Lab Results   Component Value Date    WBC 6.1 08/31/2021    RBC 4.97 08/31/2021    RBC 4.79 08/04/2021    HGB 14.8 08/31/2021    HCT 44.6 08/31/2021    MCV 89.7 08/31/2021    RDW 14.0 08/04/2021     08/31/2021       CMP:   Lab Results   Component Value Date     08/31/2021    K 4.6 08/31/2021     08/31/2021    CO2 31 08/31/2021    BUN 15 08/31/2021    CREATININE 0.6 08/31/2021    LABGLOM >90 08/31/2021    GLUCOSE 143 08/31/2021    GLUCOSE 101 08/04/2021    PROT 7.3 08/04/2021    CALCIUM 9.4 08/31/2021    BILITOT 0.7 08/04/2021    ALKPHOS 56 08/04/2021    ALKPHOS 64 10/15/2013    AST 20 08/04/2021    ALT 22 08/04/2021       POC Tests: No results for input(s): POCGLU, POCNA, POCK, POCCL, POCBUN, POCHEMO, POCHCT in the last 72 hours. Coags: No results found for: PROTIME, INR, APTT    HCG (If Applicable): No results found for: PREGTESTUR, PREGSERUM, HCG, HCGQUANT     ABGs: No results found for: PHART, PO2ART, UED5PAV, UHS7LJJ, BEART, N0FGMPFR     Type & Screen (If Applicable):  No results found for: LABABO, LABRH    Drug/Infectious Status (If Applicable):  No results found for: HIV, HEPCAB    COVID-19 Screening (If Applicable):   Lab Results   Component Value Date    COVID19 Detected 11/20/2020           Anesthesia Evaluation    Airway: Mallampati: II        Dental:          Pulmonary:                              Cardiovascular:    (+) hypertension:,                   Neuro/Psych:               GI/Hepatic/Renal:             Endo/Other:                     Abdominal:             Vascular: Other Findings:             Anesthesia Plan      MAC     ASA 2             Anesthetic plan and risks discussed with patient. Plan discussed with CRNA.                   Mau Hugo MD   9/17/2021

## 2021-09-17 NOTE — OP NOTE
Operative Note    Patient name: Xavier Santiago             Medical Record Number: [id number]    Primary Care Physician: Loki IvisrraelDEBBIE - CNP     1960    Date of Procedure: 2021    Pre-operative Diagnosis: 2cm2 defect of right paranasal area s/p MOHS for basal cell carcinoma    Post-operative Diagnosis: Same    Procedure Performed: Repair of right paranasal defect with an adjacent tissue transfer (6 cm2) (CPT 86977)    Surgeons/Assistants: MD Tony Mckinney DPM    Estimated Blood Loss: 3ml     Complications: none immediately appreciated    Procedure: With the patient lying in the supine position and under adequate anesthesia per the anesthesia team, the area was anesthetized with a total of 11 ml of 1% Lidocaine 1:100,000 with epinephrine solution. The area was then prepped and draped in the standard surgical fashion. There was a 2cm2 defect, which could not be closed primarily without distortion of the right lower eyelid and nose. Therefore, a 6cm2 defect sum of defect/adjacent tissue transfer (advancement flap) was then designed, elevated, back cut and inset with 4-0 Monocryl suture placed in interrupted buried fashion. The Burrow's triangles were resected and final closure was completed using 5-0 fast absorbing suture with benzoin/steristrips. The patient tolerated the procedure quite well and remained hemodynamically stable throughout the procedure and was quite comfortable throughout the operative course.     Clinical staging for cancer cases:  Sofya Bey MD  Electronically signed by me on 2021 at 9:35 AM  Operative Note      Patient: Xavier Santiago  YOB: 1960  MRN: [id number]    Date of Procedure: 2021    Pre-Op Diagnosis: BCC RIGHT PARANASAL    Post-Op Diagnosis: Same       Procedure(s):  MOHS REPAIR BCC RIGHT PARANASAL    Surgeon(s):  Paradise Be MD    Assistant:   * No surgical staff found *    Anesthesia: Monitor Anesthesia Care    Estimated Blood Loss (mL): Minimal    Complications: None    Specimens:   * No specimens in log *    Implants:  * No implants in log *      Drains: * No LDAs found *    Findings: 2cm2 defect of right paranasal area s/p MOHS for basal cell carcinoma    Detailed Description of Procedure:    Repair of right paranasal defect with an adjacent tissue transfer (6 cm2) (CPT 13981)    Electronically signed by Hola Moore MD on 9/17/2021 at 9:35 AM

## 2022-05-09 ENCOUNTER — OFFICE VISIT (OUTPATIENT)
Dept: FAMILY MEDICINE CLINIC | Age: 62
End: 2022-05-09
Payer: COMMERCIAL

## 2022-05-09 VITALS
RESPIRATION RATE: 14 BRPM | SYSTOLIC BLOOD PRESSURE: 110 MMHG | BODY MASS INDEX: 32.94 KG/M2 | TEMPERATURE: 97.8 F | WEIGHT: 243.2 LBS | HEART RATE: 96 BPM | HEIGHT: 72 IN | DIASTOLIC BLOOD PRESSURE: 78 MMHG | OXYGEN SATURATION: 98 %

## 2022-05-09 DIAGNOSIS — E78.00 PURE HYPERCHOLESTEROLEMIA: ICD-10-CM

## 2022-05-09 DIAGNOSIS — Z13.6 SCREENING FOR CARDIOVASCULAR CONDITION: ICD-10-CM

## 2022-05-09 DIAGNOSIS — Z13.31 DEPRESSION SCREENING NEGATIVE: ICD-10-CM

## 2022-05-09 DIAGNOSIS — I10 ESSENTIAL HYPERTENSION: ICD-10-CM

## 2022-05-09 DIAGNOSIS — E11.9 TYPE 2 DIABETES MELLITUS WITHOUT COMPLICATION, WITHOUT LONG-TERM CURRENT USE OF INSULIN (HCC): ICD-10-CM

## 2022-05-09 DIAGNOSIS — Z00.00 ENCOUNTER FOR WELL ADULT EXAM WITHOUT ABNORMAL FINDINGS: Primary | ICD-10-CM

## 2022-05-09 LAB — HBA1C MFR BLD: 5.8 % (ref 4.3–5.7)

## 2022-05-09 PROCEDURE — G0446 INTENS BEHAVE THER CARDIO DX: HCPCS | Performed by: NURSE PRACTITIONER

## 2022-05-09 PROCEDURE — 99396 PREV VISIT EST AGE 40-64: CPT | Performed by: NURSE PRACTITIONER

## 2022-05-09 RX ORDER — ATORVASTATIN CALCIUM 20 MG/1
TABLET, FILM COATED ORAL
Qty: 90 TABLET | Refills: 4 | Status: SHIPPED | OUTPATIENT
Start: 2022-05-09

## 2022-05-09 RX ORDER — LISINOPRIL 5 MG/1
TABLET ORAL
Qty: 90 TABLET | Refills: 4 | Status: SHIPPED | OUTPATIENT
Start: 2022-05-09

## 2022-05-09 ASSESSMENT — PATIENT HEALTH QUESTIONNAIRE - PHQ9
SUM OF ALL RESPONSES TO PHQ QUESTIONS 1-9: 0
2. FEELING DOWN, DEPRESSED OR HOPELESS: 0
SUM OF ALL RESPONSES TO PHQ QUESTIONS 1-9: 0
1. LITTLE INTEREST OR PLEASURE IN DOING THINGS: 0
SUM OF ALL RESPONSES TO PHQ9 QUESTIONS 1 & 2: 0
SUM OF ALL RESPONSES TO PHQ QUESTIONS 1-9: 0
SUM OF ALL RESPONSES TO PHQ QUESTIONS 1-9: 0

## 2022-05-09 ASSESSMENT — ENCOUNTER SYMPTOMS
ABDOMINAL PAIN: 0
RESPIRATORY NEGATIVE: 1
ABDOMINAL DISTENTION: 0

## 2022-05-09 NOTE — PROGRESS NOTES
Well Adult Note  Name: Ulysses Blind Date: 2022   MRN: [id number] Sex: Male   Age: 64 y.o. Ethnicity: Non- / Non    : 1960 Race: White (non-)      Saran Argueta is here for well adult exam.  History:  Diabetes Type 2    Glucose control:   Does patient check blood glucoses at home? No  Report of hypoglycemia: no  Lab Results   Component Value Date    LABA1C 5.8 (H) 2022     No results found for: EAG    Symptoms  Polyuria, Polydipsia or Polyphagia? No  Chest Pain, SOB, or Palpitations? -  No  New Vision complaints? No  Paresthesias of the extremities? No    Medications  Current medication were reviewed. Compliant with medications? yes  Medication side effects? No  On ACE-I or ARB? Yes  On antiplatelet therapy? Yes  On Statin? Yes    Last Diabetic Eye Exam: in the last year   Lab Results   Component Value Date    LABA1C 5.8 (H) 2022     No results found for: EAG    Exercise  Exercise? No  Wt Readings from Last 3 Encounters:   22 243 lb 3.2 oz (110.3 kg)   21 240 lb 9.6 oz (109.1 kg)   09/15/21 235 lb (106.6 kg)       Diet discipline?:  Low salt, fat, sugar diet? Yes    Pt with history of melanoma, seeing Oncology at [address] [place] [address] symptoms controlled.      Contains abnormal data COMPREHENSIVE METABOLIC PANEL  Order: [id number]   Ref Range & Units 22 0913   Sodium 135 - 145 mmol/L 137    Potassium 3.5 - 5.0 mmol/L 3.7    Chloride 98 - 108 mmol/L 104    BUN 7 - 25 mg/dL 16    Creatinine 0.70 - 1.30 mg/dL 0.68 Low     Glucose 70 - 99 mg/dL 108 High     Bilirubin Total <1.5 mg/dL 0.9    Albumin 3.5 - 5.0 g/dL 4.3    Total Protein 6.4 - 8.3 g/dL 7.2    AST 10 - 39 U/L 21    ALP 32 - 126 U/L 61    Calcium 8.6 - 10.5 mg/dL 8.5 Low     CO2 21 - 31 mmol/L 26    ALT 10 - 52 U/L 26    Bun/Crea Ratio  24    Osmolality (Calculated) 278 - 305 mOsm/kg 288    Anion Gap 7 - 17 mmol/L 11    eGFR, CKD-EPI, Male >=60 mL/min/1.73m2 >90      CBC AND ELECTRONIC DIFF  Order: [id number]   Ref Range & Units 4/19/22 0913   WBC Count 3.73 - 10.10 K/uL 6.57    RBC Count 4.38 - 5.83 M/uL 4.91    Hemoglobin 13.4 - 16.8 g/dL 14.2    Hematocrit 39.6 - 48.8 % 42.6    Mean Cell Volume 79.0 - 94.5 fL 86.8    Mean Cell Hgb 26.1 - 33.3 pg 28.9    Mean Cell Hgb Conc 31.9 - 36.5 g/dL 33.3    RBC Distribution 10.9 - 14.3 % 14.2    Platelet Count 109 - 337 K/uL 262    Mean Platelet Volume 8.7 - 12.3 fL 9.0    DIFF STATUS  Electronic Differential    Segs + Bands Auto % 71.9    Immature Grans % % 0.3    Lymphocyte % Auto % 17.7    Monocyte % Auto % 9.0    Eosinophil % Auto % 0.3    Basophil % Auto % 0.8    Nucleated RBC <=0.2 /100 WBC 0.0    Segs + Bands,Absolute Auto 1.57 - 6.19 K/uL 4.73    Immature Grans Absolute <=0.08 K/uL <0.04    Abs Lymph Auto 0.83 - 3.57 K/uL 1.16    Abs Mono Auto 0.24 - 0.93 K/uL 0.59    Abs Eos Auto 0.00 - 0.48 K/uL <0.04    Abs Baso Auto 0.00 - 0.09 K/uL 0.05    Resulting Agency  [facility]          Blood pressure control:  BP Readings from Last 3 Encounters:   05/09/22 110/78   09/17/21 (!) 104/59   09/17/21 (!) 75/52       Lab Results   Component Value Date    LABMICR 7.81 10/16/2019       Lab Results   Component Value Date    LDLCALC 63 08/04/2021           Review of Systems   Constitutional: Negative for chills, fatigue and fever. HENT: Negative. Respiratory: Negative. Cardiovascular: Negative. Gastrointestinal: Negative for abdominal distention and abdominal pain. Genitourinary: Negative for difficulty urinating and dysuria. Musculoskeletal: Negative. Skin: Negative. Neurological: Negative for dizziness, facial asymmetry, weakness, light-headedness and headaches. Psychiatric/Behavioral: Negative for self-injury, sleep disturbance and suicidal ideas. No Known Allergies      Prior to Visit Medications    Medication Sig Taking?  Authorizing Provider   lisinopril (PRINIVIL;ZESTRIL) 5 MG tablet TAKE 1 TABLET BY MOUTH EVERY DAY Yes DEBBIE Gary CNP   atorvastatin (LIPITOR) 20 MG tablet TAKE 1 TABLET BY MOUTH EVERY DAY Yes DEBBIE Gary CNP   metFORMIN (GLUCOPHAGE) 500 MG tablet Take 1 tablet by mouth 2 times daily (with meals) Yes DEBBIE Gary CNP   aspirin (ASPIRIN LOW DOSE) 81 MG EC tablet TAKE 1 TABLET BY MOUTH DAILY Yes DEBBIE Gary CNP   ACCU-CHEK SOFTCLIX LANCETS MISC 1 Device by Does not apply route 2 times daily Yes DEBBIE Gary CNP   blood glucose monitor kit and supplies Test once a day & as needed for symptoms of irregular blood glucose. Please dispense all supplies strips and lancets Yes DEBBIE Gary CNP   blood glucose monitor strips Test bid & as needed for symptoms of irregular blood glucose. Yes DEBBIE Gary CNP   sildenafil (VIAGRA) 50 MG tablet Take 1/2 tablet one hour prior to sexual activity Yes DEBBIE Gary CNP   pembrolizumab (KEYTRUDA) 50 MG SOLR chemo injection Infuse intravenously Yes Historical Provider, MD         Past Medical History:   Diagnosis Date    Hyperlipidemia     Hypertension     Melanoma [facility])        Past Surgical History:   Procedure Laterality Date    DOPPLER ECHOCARDIOGRAPHY      MOHS SURGERY      MOHS SURGERY Right 9/17/2021    MOHS REPAIR BCC RIGHT PARANASAL performed by Oniel Cao MD at [address] [place] [address]         Family History   Problem Relation Age of Onset    High Blood Pressure Mother     Cancer Father     Other Sister         MS    Cancer Brother     Other Brother         melanoma       Social History     Tobacco Use    Smoking status: Never Smoker    Smokeless tobacco: Current User     Types: Chew   Vaping Use    Vaping Use: Never used   Substance Use Topics    Alcohol use:  Yes     Alcohol/week: 0.0 standard drinks     Comment: once per month    Drug use: No       Objective   /78   Pulse 96   Temp 97.8 °F (36.6 °C) (Oral)   Resp 14   Ht 6' (1.829 m)   Wt 243 lb 3.2 oz (110.3 kg)   SpO2 98%   BMI 32.98 kg/m²   Wt Readings from Last 3 Encounters:   05/09/22 243 lb 3.2 oz (110.3 kg)   09/17/21 240 lb 9.6 oz (109.1 kg)   09/15/21 235 lb (106.6 kg)     There were no vitals filed for this visit. Physical Exam  Vitals and nursing note reviewed. Constitutional:       Appearance: He is not ill-appearing. HENT:      Nose: Nose normal.      Mouth/Throat:      Mouth: Mucous membranes are moist.   Eyes:      Pupils: Pupils are equal, round, and reactive to light. Cardiovascular:      Rate and Rhythm: Normal rate and regular rhythm. Pulses: Normal pulses. Heart sounds: Normal heart sounds. No murmur heard. Pulmonary:      Effort: Pulmonary effort is normal. No respiratory distress. Breath sounds: Normal breath sounds. Abdominal:      General: Abdomen is flat. Bowel sounds are normal. There is no distension. Palpations: Abdomen is soft. Musculoskeletal:      Cervical back: Normal range of motion. Skin:     General: Skin is warm and dry. Capillary Refill: Capillary refill takes less than 2 seconds. Neurological:      General: No focal deficit present. Mental Status: He is alert. Psychiatric:         Mood and Affect: Mood normal.         Behavior: Behavior normal.         Thought Content: Thought content normal.         Judgment: Judgment normal.           Assessment   Plan   Encounter Diagnoses   Name Primary?     Encounter for well adult exam without abnormal findings Yes    Screening for cardiovascular condition     Depression screening negative     Essential hypertension     Pure hypercholesterolemia         Orders Placed This Encounter   Procedures    POCT glycosylated hemoglobin (Hb A1C)    [place] Intens behave ther cardio dx, 15 minutes []    [place] TOBACCO USE CESSATION INTERMEDIATE 3-10 MINUTES [94378]     Continue current meds    Personalized Preventive Plan   Current Health Maintenance Status  Immunization History Administered Date(s) Administered    COVID-19, Pfizer Purple top, DILUTE for use, 12+ yrs, 30mcg/0.3mL dose 03/24/2021, 04/14/2021, 01/03/2022    Influenza 10/24/2013    Influenza Virus Vaccine 10/21/2015, 10/11/2016    Influenza, Quadv, IM, PF (6 mo and older Fluzone, Flulaval, Fluarix, and 3 yrs and older Afluria) 10/17/2019    Tdap (Boostrix, Adacel) 09/05/2009        Health Maintenance   Topic Date Due    DTaP/Tdap/Td vaccine (2 - Td or Tdap) 09/05/2019    Depression Screen  05/06/2022    Shingles vaccine (1 of 2) 05/09/2023 (Originally 8/7/2010)    Lipids  08/04/2022    Flu vaccine (Season Ended) 09/01/2022    Potassium  04/19/2023    Creatinine  04/19/2023    A1C test (Diabetic or Prediabetic)  05/09/2023    Colorectal Cancer Screen  08/17/2031    COVID-19 Vaccine  Completed    Hepatitis C screen  Addressed    HIV screen  Addressed    Hepatitis A vaccine  Aged Out    Hepatitis B vaccine  Aged Out    Hib vaccine  Aged Out    Meningococcal (ACWY) vaccine  Aged Out    Pneumococcal 0-64 years Vaccine  Aged Out     Recommendations for Musations Due: see orders and patient instructions/AVS.    Return in about 1 year (around 5/9/2023) for physical exam.    Cardiovascular Disease Risk Counseling: Assessed the patient's risk to develop cardiovascular disease and reviewed main risk factors.    Reviewed steps to reduce disease risk including:   · Quitting tobacco use, reducing amount smoked, or not starting the habit  · Making healthy food choices  · Being physically active and gradualy increasing activity levels   · Reduce weight and determine a healthy BMI goal  · Monitor blood pressure and treat if higher than 140/90 mmHg  · Maintain blood total cholesterol levels under 5 mmol/l or 190 mg/dl  · Maintain LDL cholesterol levels under 3.0 mmol/l or 115 mg/dl   · Control blood glucose levels  · Consider taking aspirin (75 mg daily), once blood pressure is controlled   Provided a follow up plan.  Time spent (minutes): 9

## 2022-05-09 NOTE — PATIENT INSTRUCTIONS
Body Mass Index: Care Instructions  Your Care Instructions     Body mass index (BMI) can help you see if your weight is raising your risk for health problems. It uses a formula to compare how much you weigh with how tallyou are.  A BMI lower than 18.5 is considered underweight.  A BMI between 18.5 and 24.9 is considered healthy.  A BMI between 25 and 29.9 is considered overweight. A BMI of 30 or higher is considered obese. If your BMI is in the normal range, it means that you have a lower risk for weight-related health problems. If your BMI is in the overweight or obese range, you may be at increased risk for weight-related health problems, such as high blood pressure, heart disease, stroke, arthritis or joint pain, and diabetes. If your BMI is in the underweight range, you may be at increased risk for health problems such as fatigue, lower protection (immunity) againstillness, muscle loss, bone loss, hair loss, and hormone problems. BMI is just one measure of your risk for weight-related health problems. You may be at higher risk for health problems if you are not active, you eat anunhealthy diet, or you drink too much alcohol or use tobacco products. Follow-up care is a key part of your treatment and safety. Be sure to make and go to all appointments, and call your doctor if you are having problems. It's also a good idea to know your test results and keep alist of the medicines you take. How can you care for yourself at home?  Practice healthy eating habits. This includes eating plenty of fruits, vegetables, whole grains, lean protein, and low-fat dairy.  If your doctor recommends it, get more exercise. Walking is a good choice. Bit by bit, increase the amount you walk every day. Try for at least 30 minutes on most days of the week.  Do not smoke. Smoking can increase your risk for health problems. If you need help quitting, talk to your doctor about stop-smoking programs and medicines. These can increase your chances of quitting for good.  Limit alcohol to 2 drinks a day for men and 1 drink a day for women. Too much alcohol can cause health problems. If you have a BMI higher than 25   Your doctor may do other tests to check your risk for weight-related health problems. This may include measuring the distance around your waist. A waist measurement of more than 40 inches in men or 35 inches in women can increase the risk of weight-related health problems.  Talk with your doctor about steps you can take to stay healthy or improve your health. You may need to make lifestyle changes to lose weight and stay healthy, such as changing your diet and getting regular exercise. If you have a BMI lower than 18.5   Your doctor may do other tests to check your risk for health problems.  Talk with your doctor about steps you can take to stay healthy or improve your health. You may need to make lifestyle changes to gain or maintain weight and stay healthy, such as getting more healthy foods in your diet and doing exercises to build muscle. Where can you learn more? Go to https://Prescribe Wellnessdiana.Futuris.tk. org and sign in to your [employer] account. Enter S176 in the [employer] box to learn more about \"Body Mass Index: Care Instructions. \"     If you do not have an account, please click on the \"Sign Up Now\" link. Current as of: December 27, 2021               Content Version: 13.2  © 4219-1330 Healthwise, Incorporated. Care instructions adapted under license by [facility] ([facility]). If you have questions about a medical condition or this instruction, always ask your healthcare professional. [place] [address] any warranty or liability for your use of this information. Learning About Low-Carbohydrate Diets  What is a low-carbohydrate diet? A low-carbohydrate (or \"low-carb\") diet limits foods and drinks that have carbohydrates.  This includes grains, fruits, milk and yogurt, and starchy vegetables like potatoes, beans, and corn. It also avoids foods and drinks that have added sugar. Instead, low-carb diets include foods that are high inprotein and fat. Why might you follow a low-carb diet? Low-carb diets may be used for a variety of reasons, such as for weight loss. People who have diabetes may use a low-carb diet to help manage their bloodsugar levels. What should you do before you start the diet? Talk to your doctor before you try any diet. This is even more important if you have health problems like kidney disease, heart disease, or diabetes. Your doctor may suggest that you meet with a registered dietitian. A dietitian canhelp you make an eating plan that works for you. What foods do you eat on a low-carb diet? On a low-carb diet, you choose foods that are high in protein and fat. Examplesof these are:  [employer], poultry, and fish.  Eggs.  Nuts, such as walnuts, pecans, almonds, and peanuts.  Peanut butter and other nut butters.  Tofu.  Avocado.  Olives.  Non-starchy vegetables like broccoli, cauliflower, green beans, mushrooms, peppers, lettuce, and spinach.  Unsweetened non-dairy milks like almond milk and coconut milk.  Cheese, cottage cheese, and cream cheese. Where can you learn more? Go to https://VideoLenspeKLab.[employer]. org and sign in to your [employer] account. Enter C335 in the [facility] box to learn more about \"Learning About Low-Carbohydrate Diets. \"     If you do not have an account, please click on the \"Sign Up Now\" link. Current as of: September 8, 2021               Content Version: 13.2  © 2006-2022 Healthwise, Incorporated. Care instructions adapted under license by [facility] ([facility]). If you have questions about a medical condition or this instruction, always ask your healthcare professional. Chary Bethea any warranty or liability for your use of this information.            Well Visit, Men 48 to 72: Care Instructions  Overview     Well visits can help you stay healthy. Your doctor has checked your overall health and may have suggested ways to take good care of yourself. Your doctor also may have recommended tests. At home, you can help prevent illness withhealthy eating, regular exercise, and other steps. Follow-up care is a key part of your treatment and safety. Be sure to make and go to all appointments, and call your doctor if you are having problems. It's also a good idea to know your test results and keep alist of the medicines you take. How can you care for yourself at home?  Get screening tests that you and your doctor decide on. Screening helps find diseases before any symptoms appear.  Eat healthy foods. Choose fruits, vegetables, whole grains, protein, and low-fat dairy foods. Limit fat, especially saturated fat. Reduce salt in your diet.  Limit alcohol. Have no more than 2 drinks a day or 14 drinks a week.  Get at least 30 minutes of exercise on most days of the week. Walking is a good choice. You also may want to do other activities, such as running, swimming, cycling, or playing tennis or team sports.  Reach and stay at a healthy weight. This will lower your risk for many problems, such as obesity, diabetes, heart disease, and high blood pressure.  Do not smoke. Smoking can make health problems worse. If you need help quitting, talk to your doctor about stop-smoking programs and medicines. These can increase your chances of quitting for good.  Care for your mental health. It is easy to get weighed down by worry and stress. Learn strategies to manage stress, like deep breathing and mindfulness, and stay connected with your family and community. If you find you often feel sad or hopeless, talk with your doctor. Treatment can help.  Talk to your doctor about whether you have any risk factors for sexually transmitted infections (STIs).  You can help prevent STIs if you wait to have sex with a new partner (or partners) until you've each been tested for STIs. It also helps if you use condoms (male or female condoms) and if you limit your sex partners to one person who only has sex with you. Vaccines are available for some STIs.  If it's important to you to prevent pregnancy with your partner, talk with your doctor about birth control options that might be best for you.  If you think you may have a problem with alcohol or drug use, talk to your doctor. This includes prescription medicines (such as amphetamines and opioids) and illegal drugs (such as cocaine and methamphetamine). Your doctor can help you figure out what type of treatment is best for you.  Protect your skin from too much sun. When you're outdoors from 10 a.m. to 4 p.m., stay in the shade or cover up with clothing and a hat with a wide brim. Wear sunglasses that block UV rays. Even when it's cloudy, put broad-spectrum sunscreen (SPF 30 or higher) on any exposed skin.  See a dentist one or two times a year for checkups and to have your teeth cleaned.  Wear a seat belt in the car. When should you call for help? Watch closely for changes in your health, and be sure to contact your doctor if you have any problems or symptoms that concern you. Where can you learn more? Go to https://Relevance Mediadiana.healthAzoi. org and sign in to your [employer] account. Enter J122 in the [employer] box to learn more about \"Well Visit, Men 48 to 72: Care Instructions. \"     If you do not have an account, please click on the \"Sign Up Now\" link. Current as of: October 6, 2021               Content Version: 13.2  © 3468-3828 Healthwise, Incorporated. Care instructions adapted under license by [facility] ([facility]). If you have questions about a medical condition or this instruction, always ask your healthcare professional. [place] [address] any warranty or liability for your use of this information.       Patient Education Well Visit, Men 48 to 72: Care Instructions  Overview     Well visits can help you stay healthy. Your doctor has checked your overall health and may have suggested ways to take good care of yourself. Your doctor also may have recommended tests. At home, you can help prevent illness withhealthy eating, regular exercise, and other steps. Follow-up care is a key part of your treatment and safety. Be sure to make and go to all appointments, and call your doctor if you are having problems. It's also a good idea to know your test results and keep alist of the medicines you take. How can you care for yourself at home?  Get screening tests that you and your doctor decide on. Screening helps find diseases before any symptoms appear.  Eat healthy foods. Choose fruits, vegetables, whole grains, protein, and low-fat dairy foods. Limit fat, especially saturated fat. Reduce salt in your diet.  Limit alcohol. Have no more than 2 drinks a day or 14 drinks a week.  Get at least 30 minutes of exercise on most days of the week. Walking is a good choice. You also may want to do other activities, such as running, swimming, cycling, or playing tennis or team sports.  Reach and stay at a healthy weight. This will lower your risk for many problems, such as obesity, diabetes, heart disease, and high blood pressure.  Do not smoke. Smoking can make health problems worse. If you need help quitting, talk to your doctor about stop-smoking programs and medicines. These can increase your chances of quitting for good.  Care for your mental health. It is easy to get weighed down by worry and stress. Learn strategies to manage stress, like deep breathing and mindfulness, and stay connected with your family and community. If you find you often feel sad or hopeless, talk with your doctor. Treatment can help.  Talk to your doctor about whether you have any risk factors for sexually transmitted infections (STIs).  You can help prevent STIs if you wait to have sex with a new partner (or partners) until you've each been tested for STIs. It also helps if you use condoms (male or female condoms) and if you limit your sex partners to one person who only has sex with you. Vaccines are available for some STIs.  If it's important to you to prevent pregnancy with your partner, talk with your doctor about birth control options that might be best for you.  If you think you may have a problem with alcohol or drug use, talk to your doctor. This includes prescription medicines (such as amphetamines and opioids) and illegal drugs (such as cocaine and methamphetamine). Your doctor can help you figure out what type of treatment is best for you.  Protect your skin from too much sun. When you're outdoors from 10 a.m. to 4 p.m., stay in the shade or cover up with clothing and a hat with a wide brim. Wear sunglasses that block UV rays. Even when it's cloudy, put broad-spectrum sunscreen (SPF 30 or higher) on any exposed skin.  See a dentist one or two times a year for checkups and to have your teeth cleaned.  Wear a seat belt in the car. When should you call for help? Watch closely for changes in your health, and be sure to contact your doctor if you have any problems or symptoms that concern you. Where can you learn more? Go to https://henrietta.health-partners. org and sign in to your [employer] account. Enter O767 in the [facility] box to learn more about \"Well Visit, Men 48 to 72: Care Instructions. \"     If you do not have an account, please click on the \"Sign Up Now\" link. Current as of: October 6, 2021               Content Version: 13.2  © 8280-1223 Healthwise, Incorporated. Care instructions adapted under license by [facility] ([facility]).  If you have questions about a medical condition or this instruction, always ask your healthcare professional. [address] any warranty or liability for your use of this information.

## 2022-12-28 ENCOUNTER — HOSPITAL ENCOUNTER (INPATIENT)
Age: 62
LOS: 2 days | Discharge: ANOTHER HOSPITAL | End: 2022-12-30
Attending: PHYSICAL MEDICINE & REHABILITATION | Admitting: PHYSICAL MEDICINE & REHABILITATION
Payer: COMMERCIAL

## 2022-12-28 PROBLEM — R53.81 DEBILITY: Status: ACTIVE | Noted: 2022-12-28

## 2022-12-28 PROBLEM — E03.9 HYPOTHYROIDISM: Status: ACTIVE | Noted: 2022-12-28

## 2022-12-28 PROBLEM — C79.31 MALIGNANT MELANOMA METASTATIC TO BRAIN (HCC): Status: ACTIVE | Noted: 2022-12-28

## 2022-12-28 PROBLEM — C79.49 METASTASIS TO SPINAL CORD (HCC): Status: ACTIVE | Noted: 2022-12-28

## 2022-12-28 PROBLEM — M24.512 CONTRACTURE, LEFT SHOULDER: Status: ACTIVE | Noted: 2022-12-28

## 2022-12-28 PROBLEM — G54.0 BRACHIAL PLEXOPATHY: Status: ACTIVE | Noted: 2022-12-28

## 2022-12-28 PROBLEM — C43.9 METASTATIC MALIGNANT MELANOMA (HCC): Status: ACTIVE | Noted: 2022-12-28

## 2022-12-28 LAB
GLUCOSE BLD-MCNC: 151 MG/DL (ref 70–108)
GLUCOSE BLD-MCNC: 217 MG/DL (ref 70–108)
MRSA SCREEN RT-PCR: NEGATIVE
VANCOMYCIN RESISTANT ENTEROCOCCUS: POSITIVE

## 2022-12-28 PROCEDURE — 1180000000 HC REHAB R&B

## 2022-12-28 PROCEDURE — 87641 MR-STAPH DNA AMP PROBE: CPT

## 2022-12-28 PROCEDURE — 99222 1ST HOSP IP/OBS MODERATE 55: CPT | Performed by: PHYSICAL MEDICINE & REHABILITATION

## 2022-12-28 PROCEDURE — 82948 REAGENT STRIP/BLOOD GLUCOSE: CPT

## 2022-12-28 PROCEDURE — 6370000000 HC RX 637 (ALT 250 FOR IP): Performed by: PHYSICAL MEDICINE & REHABILITATION

## 2022-12-28 PROCEDURE — 87500 VANOMYCIN DNA AMP PROBE: CPT

## 2022-12-28 RX ORDER — BISACODYL 10 MG
10 SUPPOSITORY, RECTAL RECTAL DAILY PRN
Status: DISCONTINUED | OUTPATIENT
Start: 2022-12-28 | End: 2022-12-30 | Stop reason: HOSPADM

## 2022-12-28 RX ORDER — LIDOCAINE 4 G/G
1 PATCH TOPICAL EVERY 24 HOURS
Status: DISCONTINUED | OUTPATIENT
Start: 2022-12-28 | End: 2022-12-28

## 2022-12-28 RX ORDER — INSULIN LISPRO 100 [IU]/ML
0-4 INJECTION, SOLUTION INTRAVENOUS; SUBCUTANEOUS NIGHTLY
Status: DISCONTINUED | OUTPATIENT
Start: 2022-12-28 | End: 2022-12-30

## 2022-12-28 RX ORDER — OXYCODONE HYDROCHLORIDE 5 MG/1
2.5 TABLET ORAL EVERY 4 HOURS PRN
Status: DISCONTINUED | OUTPATIENT
Start: 2022-12-28 | End: 2022-12-30 | Stop reason: HOSPADM

## 2022-12-28 RX ORDER — LANOLIN ALCOHOL/MO/W.PET/CERES
6 CREAM (GRAM) TOPICAL NIGHTLY
Status: DISCONTINUED | OUTPATIENT
Start: 2022-12-28 | End: 2022-12-30 | Stop reason: HOSPADM

## 2022-12-28 RX ORDER — LIDOCAINE 4 G/G
1 PATCH TOPICAL EVERY 24 HOURS
Status: DISCONTINUED | OUTPATIENT
Start: 2022-12-29 | End: 2022-12-30 | Stop reason: HOSPADM

## 2022-12-28 RX ORDER — POTASSIUM CHLORIDE 20 MEQ/1
40 TABLET, EXTENDED RELEASE ORAL DAILY
Status: DISCONTINUED | OUTPATIENT
Start: 2022-12-29 | End: 2022-12-30 | Stop reason: HOSPADM

## 2022-12-28 RX ORDER — ATORVASTATIN CALCIUM 20 MG/1
20 TABLET, FILM COATED ORAL NIGHTLY
Status: DISCONTINUED | OUTPATIENT
Start: 2022-12-28 | End: 2022-12-30 | Stop reason: HOSPADM

## 2022-12-28 RX ORDER — ACETAMINOPHEN 325 MG/1
650 TABLET ORAL EVERY 4 HOURS PRN
Status: DISCONTINUED | OUTPATIENT
Start: 2022-12-28 | End: 2022-12-30 | Stop reason: HOSPADM

## 2022-12-28 RX ORDER — TRAZODONE HYDROCHLORIDE 50 MG/1
50 TABLET ORAL NIGHTLY PRN
Status: DISCONTINUED | OUTPATIENT
Start: 2022-12-28 | End: 2022-12-30 | Stop reason: HOSPADM

## 2022-12-28 RX ORDER — FLUDROCORTISONE ACETATE 0.1 MG/1
0.1 TABLET ORAL DAILY
Status: DISCONTINUED | OUTPATIENT
Start: 2022-12-29 | End: 2022-12-30 | Stop reason: HOSPADM

## 2022-12-28 RX ORDER — ASPIRIN 81 MG/1
81 TABLET, CHEWABLE ORAL DAILY
Status: DISCONTINUED | OUTPATIENT
Start: 2022-12-29 | End: 2022-12-30 | Stop reason: HOSPADM

## 2022-12-28 RX ORDER — SENNA PLUS 8.6 MG/1
2 TABLET ORAL 2 TIMES DAILY
Status: DISCONTINUED | OUTPATIENT
Start: 2022-12-28 | End: 2022-12-30 | Stop reason: HOSPADM

## 2022-12-28 RX ORDER — OXYCODONE HYDROCHLORIDE 5 MG/1
5 TABLET ORAL EVERY 4 HOURS PRN
Status: DISCONTINUED | OUTPATIENT
Start: 2022-12-28 | End: 2022-12-30 | Stop reason: HOSPADM

## 2022-12-28 RX ORDER — PREDNISONE 10 MG/1
10 TABLET ORAL DAILY
Status: DISCONTINUED | OUTPATIENT
Start: 2022-12-29 | End: 2022-12-30 | Stop reason: HOSPADM

## 2022-12-28 RX ORDER — SULFAMETHOXAZOLE AND TRIMETHOPRIM 800; 160 MG/1; MG/1
1 TABLET ORAL
Status: DISCONTINUED | OUTPATIENT
Start: 2022-12-30 | End: 2022-12-30 | Stop reason: HOSPADM

## 2022-12-28 RX ORDER — FLUORIDE TOOTHPASTE
TOOTHPASTE DENTAL PRN
Status: DISCONTINUED | OUTPATIENT
Start: 2022-12-28 | End: 2022-12-30 | Stop reason: HOSPADM

## 2022-12-28 RX ORDER — DEXTROSE MONOHYDRATE 100 MG/ML
INJECTION, SOLUTION INTRAVENOUS CONTINUOUS PRN
Status: DISCONTINUED | OUTPATIENT
Start: 2022-12-28 | End: 2022-12-30

## 2022-12-28 RX ORDER — OXYCODONE HYDROCHLORIDE 5 MG/1
5 TABLET ORAL EVERY 6 HOURS
Status: DISCONTINUED | OUTPATIENT
Start: 2022-12-28 | End: 2022-12-30 | Stop reason: HOSPADM

## 2022-12-28 RX ORDER — LEVOTHYROXINE SODIUM 0.1 MG/1
100 TABLET ORAL
Status: DISCONTINUED | OUTPATIENT
Start: 2022-12-29 | End: 2022-12-30 | Stop reason: HOSPADM

## 2022-12-28 RX ORDER — INSULIN LISPRO 100 [IU]/ML
0-8 INJECTION, SOLUTION INTRAVENOUS; SUBCUTANEOUS
Status: DISCONTINUED | OUTPATIENT
Start: 2022-12-28 | End: 2022-12-30

## 2022-12-28 RX ORDER — POLYETHYLENE GLYCOL 3350 17 G/17G
17 POWDER, FOR SOLUTION ORAL EVERY 12 HOURS
Status: DISCONTINUED | OUTPATIENT
Start: 2022-12-28 | End: 2022-12-28

## 2022-12-28 RX ORDER — POLYETHYLENE GLYCOL 3350 17 G/17G
17 POWDER, FOR SOLUTION ORAL DAILY PRN
Status: DISCONTINUED | OUTPATIENT
Start: 2022-12-28 | End: 2022-12-30 | Stop reason: HOSPADM

## 2022-12-28 RX ORDER — DOCUSATE SODIUM 100 MG/1
100 CAPSULE, LIQUID FILLED ORAL 2 TIMES DAILY
Status: DISCONTINUED | OUTPATIENT
Start: 2022-12-28 | End: 2022-12-30 | Stop reason: HOSPADM

## 2022-12-28 RX ORDER — ENOXAPARIN SODIUM 100 MG/ML
40 INJECTION SUBCUTANEOUS DAILY
Status: DISCONTINUED | OUTPATIENT
Start: 2022-12-29 | End: 2022-12-30 | Stop reason: HOSPADM

## 2022-12-28 RX ORDER — ALLOPURINOL 300 MG/1
300 TABLET ORAL DAILY
Status: DISCONTINUED | OUTPATIENT
Start: 2022-12-29 | End: 2022-12-30 | Stop reason: HOSPADM

## 2022-12-28 RX ADMIN — Medication 6 MG: at 22:26

## 2022-12-28 RX ADMIN — OXYCODONE 5 MG: 5 TABLET ORAL at 17:16

## 2022-12-28 RX ADMIN — OXYCODONE 5 MG: 5 TABLET ORAL at 22:04

## 2022-12-28 RX ADMIN — ATORVASTATIN CALCIUM 20 MG: 20 TABLET, FILM COATED ORAL at 21:56

## 2022-12-28 ASSESSMENT — ENCOUNTER SYMPTOMS
DIARRHEA: 0
SORE THROAT: 0
BACK PAIN: 1
SHORTNESS OF BREATH: 0
TROUBLE SWALLOWING: 1
CONSTIPATION: 0
COUGH: 0
EYE DISCHARGE: 0
EYE PAIN: 0
VOMITING: 0
WHEEZING: 0
NAUSEA: 0
RHINORRHEA: 0
ABDOMINAL PAIN: 0

## 2022-12-28 ASSESSMENT — PAIN DESCRIPTION - LOCATION
LOCATION: BACK
LOCATION: BACK

## 2022-12-28 ASSESSMENT — PAIN SCALES - GENERAL
PAINLEVEL_OUTOF10: 5
PAINLEVEL_OUTOF10: 5

## 2022-12-28 ASSESSMENT — PAIN DESCRIPTION - ORIENTATION: ORIENTATION: LOWER

## 2022-12-28 ASSESSMENT — PAIN DESCRIPTION - DESCRIPTORS
DESCRIPTORS: ACHING;DISCOMFORT
DESCRIPTORS: ACHING

## 2022-12-28 ASSESSMENT — PAIN - FUNCTIONAL ASSESSMENT: PAIN_FUNCTIONAL_ASSESSMENT: ACTIVITIES ARE NOT PREVENTED

## 2022-12-28 NOTE — H&P
Physical Medicine & Rehabilitation Admission History and Physical    Impression:  Debility/physical decondition secondary to extensive widespread metastatic malignant melanoma causing dysphagia, fatigue, generalized weakness, left hand sensory deficit and weakness  Left upper extremity edema with left hand weakness and sensory deficit due to tumor infiltrating and radiation induced brachial plexopathy  Left shoulder contracture  Improving dysphagia with malnutrition secondary to metastatic melanoma to  muscles  Widespread metastatic melanoma to brain, cervical spine, musculature of face and neck, bilateral scapula, thoracic and lumbar spine, intra-abdominal structures, and subcutaneous tissue  Adrenal insufficiency with hypotension  History of hypertension  Hyperlipidemia  Hypothyroidism  Tumor lysis syndrome      Plan:   Admit to the inpatient rehabilitation unit. The patient demonstrates good potential to participate in an inpatient rehabilitation program involving at least 3 hours per day, 5 days per week of intensive rehabilitation. Rehabilitation services will include PT, OT, and SLP/RT in order to improve functional status prior to discharge. Family education and training will be completed. Equipment evaluations and recommendations will be completed as appropriate. Rehabilitation nursing will be involved for bowel, bladder, skin, and pain management. Nursing will also provide education and training to patient and family. Prophylaxis:  DVT: Lovenox, JUAN A stocking, intermittent pneumatic compression device.   GI: Colace, Senokot, GlycoLax as needed, milk of magnesium as needed, Dulcolax suppository as needed; add Movantik  Pain: Lidocaine patch Tylenol as needed, scheduled oxycodone and as needed  Continue allopurinol for tumor lysis syndrome  Continue aspirin for cardioprotection  Continue Lipitor for hyperlipidemia  Continue prednisone  Continue Bactrim 3 times weekly for infection prevention  Continue trametinib & dabrafenib for metastatic cancer (melanoma)  Continue Florinef for hypotension secondary to adrenal insufficiency  Continue Humalog insulin coverage for hyperglycemia  Continue levothyroxine for hypothyroidism  Continue melatonin for insomnia; add trazodone as needed for insomnia  Nutrition:  Consultation to dietician for nutritional counseling and recommendations. Prealbumin will be checked on admission. Bladder: Monitoring signs or symptoms of UTI  Bowel: Monitoring signs or symptoms of constipation   and case management consultations for coordination of care and discharge planning    The main medical problem(s) and comorbidities being actively managed by the physicians and requiring 24 hour rehabilitation nursing care during this stay include hypertension/history of hypertension, hyperlipidemia, metastatic melanoma, hypothyroidism. The domains of functional impairment present in this patient which will require an intensive and interdisciplinary rehabilitation environment include self care, mobility, motor dysfunction, bowel/bladder management, pain management, safety, and cognitive function. Estimated length of stay for this admission : probably 2~3 weeks    Anticipated disposition: Home.   The potential to achieve that is good.    ==========================================================================================================================      Chief Complaint and Reason for Rehabilitation Admission:   Fatigue, generalized weakness, weight loss    History of Present Illness:  Deandra Driver  is a 58 y.o. right-handed  male with history of hypertension, hyperlipidemia, recurrent multiple primary melanomas ([id number] 2013) including a [id number] melanoma of the left flank s/p excision with left axillary lymph node dissection, is admitted to the inpatient rehabilitation unit on 12/28/2022 for impaired ADLs and ambulation due to debility/physical decondition secondary to extensive multiple metastatic melanoma to his brain, cervical spine, musculature, and brachial plexus. The patient presented to [address] on 12/3/2022 with progressive weakness, dysphagia and facial drooping developed about 3 weeks prior to his visit. He was found to be hypotensive and was admitted to MICU at the [facility] on 12/3/2022. He was suspected to have adrenal insufficiency. Endocrinology was consulted. Blood culture from chemotherapy port done on 12/3/2022 revealed Clostridium. Therefor the port was removed on 12/4/2022. He was treated IV fluid, antibiotic (Unasyn for 14 days) and steroid. MRI of the brain done on 12/8/2022 revealed no acute intracranial abnormality, old left thalamic insult with chronic small vessel disease, and enlarged bilateral extraocular muscle & left orbital superomedial soft tissue lesions suspicious for metastasis. MRI of cervical spine done on 12/16/2022 revealed diffuse osseous metastasis disease in cervical spine and upper thoracic spine, mild right ventral epidural tumor at C7 extending into right C7-T1 neuroforamen, intramedullary enhancing lesion within central cervical spinal cord at C5-6 and right C6-7 cervical spinal cord. Repeated MRI of brain done on 12/16/2022 revealed enhancing lesions in right frontal lobe and throughout the supratentorial and infratentorial brain, within the right  musculature, bilateral temporalis musculature, bilateral extraocular musculature, and within the calvarium and skull base. Because of extensive metastatic malignancy, oral chemotherapy (tafinlar + mekinist) was started on 12/8/2022. He was later transferred out of MICU to oncology service floor for further care.   Because of left upper extremity swelling, bilateral upper extremities venous duplex study was done on 12/16/2022 and showed no acute DVT but acute superficial venous thrombosis in right upper extremity. MRI of brachial plexus done on 12/22/2022 show extensive metastatic disease involving osseous structures, multiple muscles including bilateral scapular intrinsic muscles and paraspinal muscles. The left upper extremity weakness and edema was thought to be due to tumor infiltrating brachial plexus and radiation induced plexopathy. His hospital course was also complicated by tumor lysis syndrome, acute renal failure, and dysphagia with severe protein calorie malnutrition. At the present time the patient says he has fatigue with generalized weakness but it is better than early December this year. He says he is unable to extend his left hand fingers. He says his left hand and wrist has lost sensation since summer of 2022. He has slight difficulty swallowing but it is much improved. He says his sleep has been poor. He has significant weight loss. He also has history of chronic low back pain. He denies having fever, chill, shortness of breath, chest pain, neck pain, abdominal pain, nausea or vomiting, urinary or bowel incontinence, diarrhea or constipation, dysuria. Most Recent Rehabilitation Assessments:  PT:    (12/28/2022) : Balance:  Sitting Balance  Static Sitting-Level of Assistance: Independent  Dynamic Sitting-Level of Assistance: Independent  Sitting Balance Skilled Intervention/Details: Pt sat EOB x 1 min with good balance. Standing Balance  Static Standing-Level of Assistance: Contact guard  Dynamic Standing-Level of Assistance: Contact guard  Standing-Balance Support: Gait belt, 2 wheeled walker, Single point cane  Skilled Rationale: Verbal cues, Full extension to upright positioning/posture, Technique of activity  Standing Balance Skilled Intervention/Details: Pt stood at 2ww while performing LE standing exercises. See interventions.     Mobility Assessment/Intervention:  Supine to Sit Mobility  [place] Level: Supine->Sit: independent  Bed Features/Set-up: Supine->Sit: Head of bed elevated (slightly elevated)    Transfer Assessment/Intervention:  Sit to Stand Transfer  [place] Level: Sit->Stand: stand-by assist  Assistive Device: Sit->Stand: gait belt  Skilled Intervention/Details: Sit->Stand: From EOB    Stand to Sit Transfer  [place] Level: Stand->Sit: stand-by assist  Assistive Device: Stand->Sit: gait belt  Skilled Intervention/Details: Stand->Sit: To EOB    Gait/Functional Mobility Assessment/Intervention:  Gait Assessment  [place] Level: Gait: contact guard assist  Assistive Device: Gait: gait belt, straight cane  Ambulation Distance (Feet): 600  Gait Deviations Identified: decreased jenna, decreased gait speed, flexed posture  Gait Skilled Rationale: verbal, upright posture  Skilled Intervention/Details - Gait: Pt ambulated with slow jenna and variable step length. Pt demonstrated stiff knee posture on right LE with occasional right foot dragging. Pt reports old injury to right knee. Pt had 1 minor LOB requiring Zac to correct. OT:    (12/28/2022) :  Cognition  Overall Cognitive Status: Within Functional Limits  Arousal/Alertness: Appropriate responses to stimuli  Orientation Level: Oriented X4  Following Commands:  Follows all commands and directions without difficulty  Safety Judgment: Decreased awareness of need for safety  Awareness of Errors: Assistance required to identify errors made  Deficits: Fully aware of deficits  Attention Span: Appears intact  Memory: Appears intact  Problem Solving: Assistance required to identify errors made    ADL Assessment/Intervention:  ADLs:  ADL Assessment: Toileting Deficit, Grooming Deficit  Eating Assistance:     Grooming Assistance: Contact guard assist  Grooming Location: standing at sink, seated in chair  Grooming Deficit: Balance, Oral care  Grooming Skilled Rationale (Verbal/Tactile/Visual/Demonstration): Setup, Supervision  Grooming Intervention/Details: Pt completed oral care in standing at sink ~ 2mins and completed brushing hair upon sitting to chair at end of session with supervision. Bathing Assistance:     UE Dressing Assistance:     LE Dressing Assistance:      Toilet Assistance: Contact guard assist  Toileting Location: toilet  Toileting Deficit: Balance  Toilet Skilled Rationale (Verbal/Tactile/Visual/Demonstration): Setup, Cues for increased safety, Supervision  Toileting Intervention/Details: Pt urinated in standing with no UE support and was able to manage depends from hips down to knees and then from knees to around hips in standing with CGA    Balance:  Sitting Balance  Static Sitting-Level of Assistance: Independent  Dynamic Sitting-Level of Assistance: Supervision  Skilled Rationale: Verbal cues    Standing Balance  Static Standing-Level of Assistance: Contact guard  Dynamic Standing-Level of Assistance: Contact guard  Standing-Balance Support: Gait belt, 2 wheeled walker  Skilled Rationale: Verbal cues, Cues for increased safety, Upright gaze/neck extension    Mobility Assessment/Intervention:  Supine to Sit Mobility  [place] Level: Supine->Sit: supervision  Bed Features/Set-up: Supine->Sit: Head of bed elevated  Skilled Rationale: Verbal cues, Initiation and execution of task    Transfer Assessment/Intervention:  Sit to Stand Transfer  [place] Level: Sit->Stand: stand-by assist  Assistive Device: Sit->Stand: gait belt, 2 wheeled walker  Skilled Rationale: Verbal cues, Cues for increased safety, Initiation and execution of task  Skilled Intervention/Details: Sit->Stand: x1 EOB    Stand to Sit Transfer  [place] Level: Stand->Sit: stand-by assist  Assistive Device: Stand->Sit: gait belt, 2 wheeled walker, armed chair  Skilled Rationale: Verbal cues, Controlled descent for sitting, Cues for increased safety  Skilled Intervention/Details: Stand->Sit: x1 to chair with good eccentric control    Functional Mobility:  Functional Mobility  [place] Level: Functional Mobility/Gait: contact guard assist  Assistive Device: Functional Mobility/Gait: 2 wheeled walker, gait belt  Functional Mobility Distance: Distance needed to access BSC/chair, Distance needed to access restroom  Functional Mobility Deficits: Balance  Functional Mobility Skilled Rationale: Verbal cues, Upright gaze/neck extension, Cues for increased safety  Skilled Intervention/Details - Functional Mobility/Gait: no LOB, no SOB      ST:    (12/7/2022) :  Recommended Method of Nutrition: PO    Recommended Diet Grade: regular  *may consider liquid diet if pt requests    Recommended Liquid Consistency: liquid- thin (IDDSI 0)    Recommended Medication Administration (as appropriate per MD): Per patient preference     Clinical Impression:  Veronica Qureshi presents with symptoms warranting concern for possible pharyngeal or pharyngoesophageal dysphagia, unclear etiology given onset of symptoms ~3 weeks ago and pt without known acute or subacute dysphagia risk factors. Question if r/t chronic dysphagia risk factors (mets to lung/lymphadenopathy in mediastinum) and/or further c/b FTT/generalized weakness. At this time, presented functionally across all presented trials. As such, given pt is on room air and passed annamarie swallow screen, recommend PO diet prior to instrumental. Discussed instrumental options w/pt given his symptoms and he requested FEES at this time. Pt also requested regular diet with ability to self select items if necessary. Swallow prognosis is unknown.        Past Medical History:      Diagnosis Date    Hyperlipidemia     Hypertension     Hypothyroidism 12/28/2022    Melanoma ([facility] [address]     brain, spine, bone, muscle       Primary care provider: DEBBIE Kiran - [facility]       Past Surgical History:      Procedure Laterality Date    DOPPLER ECHOCARDIOGRAPHY      LYMPHADENECTOMY  11/15/2013    arm pit    MOHS SURGERY      MOHS SURGERY Right 09/17/2021    MOHS REPAIR BCC RIGHT PARANASAL performed by Viji Soriano MD at [address]:    No Known Allergies       Current Medications:    Current Facility-Administered Medications   Medication Dose Route Frequency Provider Last Rate Last Admin    [START ON 12/29/2022] allopurinol (ZYLOPRIM) tablet 300 mg  300 mg Oral Daily Peggy Bhandari MD        [START ON 12/29/2022] aspirin chewable tablet 81 mg  81 mg Oral Daily Peggy Bhandari MD        atorvastatin (LIPITOR) tablet 20 mg  20 mg Oral Nightly Peggy Bhandari MD        Dabrafenib Mesylate CAPS 150 mg  150 mg Oral Q12H Peggy Bhandari MD        [START ON 12/29/2022] enoxaparin (LOVENOX) injection 40 mg  40 mg SubCUTAneous Daily MD Leandra Agarwal ON 12/29/2022] fludrocortisone (FLORINEF) tablet 0.1 mg  0.1 mg Oral Daily Peggy Bhandari MD        [START ON 12/29/2022] levothyroxine (SYNTHROID) tablet 100 mcg  100 mcg Oral QAM AC Peggy Bhandari MD        lidocaine 4 % external patch 1 patch  1 patch TransDERmal Q24H Peggy Bhandari MD        polyethylene glycol [facility]) powder 17 g  17 g Oral Q12H Peggy Bhandari MD        [START ON 12/29/2022] potassium chloride (KLOR-CON M) extended release tablet 40 mEq  40 mEq Oral Daily Peggy Bhandari MD        oxyCODONE (ROXICODONE) immediate release tablet 5 mg  5 mg Oral Q4H PRN Peggy Bhandari MD        Or    oxyCODONE (ROXICODONE) immediate release tablet 2.5 mg  2.5 mg Oral Q4H PRN Peggy Bhandari MD        [START ON 12/29/2022] predniSONE (DELTASONE) tablet 10 mg  10 mg Oral Daily Peggy Bhandari MD        [facility]) tablet 17.2 mg  2 tablet Oral BID Peggy Bhandari MD        Trametinib Dimethyl Sulfoxide TABS 2 mg  2 mg Oral Nightly Peggy Bhandari MD        acetaminophen (TYLENOL) tablet 650 mg  650 mg Oral Q4H PRN Peggy Bhandari MD        oxyCODONE (ROXICODONE) immediate release tablet 5 mg  5 mg Oral Q6H Peggy Bhandari MD        melatonin tablet 6 mg  6 mg Oral Nightly Porter Pacheco MD        biotene dry mouth (ORAL BALANCE) MT gel   Mouth/Throat PRN Porter Pacheco MD        sulfamethoxazole-trimethoprim (BACTRIM DS;SEPTRA DS) 800-160 MG per tablet 1 tablet  1 tablet Oral Once per day on Mon Wed Fri Porter Pacheco MD        docusate sodium (COLACE) capsule 100 mg  100 mg Oral BID Porter Pacheco MD        bisacodyl (DULCOLAX) suppository 10 mg  10 mg Rectal Daily PRN Porter Pacheco MD        magnesium hydroxide (MILK OF MAGNESIA) 400 MG/5ML suspension 15 mL  15 mL Oral Daily PRN Porter Pacheco MD        traZODone (DESYREL) tablet 50 mg  50 mg Oral Nightly PRN Porter Pacheco MD        polyethylene glycol [facility]) packet 17 g  17 g Oral Daily PRN Porter Pacheco MD        insulin lispro (HUMALOG) injection vial 0-8 Units  0-8 Units SubCUTAneous TID WC Porter Pacheco MD        insulin lispro (HUMALOG) injection vial 0-4 Units  0-4 Units SubCUTAneous Nightly Porter Pacheco MD            Social History:  Social History     Socioeconomic History    Marital status:      Spouse name: Not on file    Number of children: Not on file    Years of education: Not on file    Highest education level: Not on file   Occupational History    Not on file   Tobacco Use    Smoking status: Never    Smokeless tobacco: Current     Types: Chew   Vaping Use    Vaping Use: Never used   Substance and Sexual Activity    Alcohol use:  Yes     Alcohol/week: 0.0 standard drinks     Comment: once per month    Drug use: No    Sexual activity: Not on file   Other Topics Concern    Not on file   Social History Narrative    Not on file     Social Determinants of Health     Financial Resource Strain: Not on file   Food Insecurity: Not on file   Transportation Needs: Not on file   Physical Activity: Not on file   Stress: Not on file   Social Connections: Not on file   Intimate Partner Violence: Not on file   Housing Stability: Not on file     Occupation: Last work on 12/1/2022 as a ; work requires significant amount of computer operation  Lives with: Alone; he plans to stay at his mother's home when he is discharge so his mother can provide assistance  Home setup: 1 level house with one-step outside front door without handrail; his mother home is also a 1 level house with a ramp outside front door, and 2 steps outside garage door with 1 side handrail  Prior functional status: Independent in all ADLs, community ambulation without using any assistive walking device, and driving      Family History:       Problem Relation Age of Onset    High Blood Pressure Mother     Cancer Father     Other Sister         MS    Cancer Brother     Melanoma Brother        Review of Systems:  Review of Systems   Constitutional:  Positive for fatigue. Negative for chills, diaphoresis and fever. HENT:  Positive for trouble swallowing. Negative for ear discharge, ear pain, hearing loss, rhinorrhea, sneezing, sore throat and tinnitus. Eyes:  Negative for pain, discharge and visual disturbance. Respiratory:  Negative for cough, shortness of breath and wheezing. Cardiovascular:  Negative for chest pain, palpitations and leg swelling. Gastrointestinal:  Negative for abdominal pain, constipation, diarrhea, nausea and vomiting. Endocrine: Negative for cold intolerance and heat intolerance. Genitourinary:  Negative for difficulty urinating and dysuria. Musculoskeletal:  Positive for back pain and gait problem. Negative for arthralgias, myalgias and neck pain. Skin:  Negative for rash. Allergic/Immunologic: Negative for food allergies. Neurological:  Positive for weakness (Generalized especially lower extremities, and left hand) and numbness (Left hand). Negative for dizziness, tremors, facial asymmetry, speech difficulty, light-headedness and headaches. Hematological:  Does not bruise/bleed easily. Psychiatric/Behavioral:  Positive for sleep disturbance.  Negative for dysphoric mood and hallucinations. The patient is not nervous/anxious.          Physical Exam:  /69   Pulse 100   Temp 97.9 °F (36.6 °C) (Oral)   Resp 16   SpO2 97%   General:  well-developed, well nourished  male; in no acute distress ; appropriate affect & mood; sitting on reclining chair comfortably  Eyes: pupil equally round ; extra-ocular motion intact bilaterally; impaired accommodation with seeing near object; mild dark skin discoloration/ecchymosis at left lower orbit  Head, Ear, Nose, Mouth & Throat : normocephalic ; no tenderness at the face or head scalp; no discharge from ears or nose ; mild facial muscle atrophy; no deformity ; no facial swelling ; oral mucosa pink   Neck :  supple ; no tenderness ; no muscle spasm  Cardiovascular : regular rate & rhythm ; normal S1 & S2 heart sound ; no murmur ; normal peripheral pulse at the bilateral upper extremities; significantly reduced pulse strength at the bilateral lower extremities ankle and foot secondary to edema  Pulmonary : Breath sounds present at bilateral lung field; no wheezing ; no rale; no crackle  Gastrointestinal : soft, flat abdomen without tenderness ; normal bowel sound present   Back : no tenderness; no muscle spasm  Skin: no skin lesion or rash ; no pitting edema at right extremity; 1+ to 2+ pitting edema at distal left forearm; presence of nonpitting swelling at left hand; 2+ pitting edema at bilateral ankles  Musculoskeletal : no limb asymmetry; no limb deformity; no tenderness at bilateral upper & lower extremities; no palpable mass at limbs ; no joints laxity or crepitation ; right shoulder flexion and abduction passive ROM reaching 150 degrees; left shoulder passive following reaching 90 degree in flexion and abduction, and 40 degrees in external rotation; ankle dorsiflexion passive ROM reaching 15 degrees bilaterally; normal functional joints ROM at the rest of bilateral upper & lower extremities  Cerebral :  alert ; awake ; oriented to place, person and time; follow 1-2 step verbal command; able to recall 3/3 items given immediately and 2/3 items about 3 minutes later; able to repeat series of 5 single digit numbers in right order forward but not backward; impaired abstract thinking; perform serial 7 subtraction test for 8 steps without making mistake ([id number]  Cerebellum : no dysmetria with right finger-to-nose test ; very mild dysmetria with left finger-to-nose test; mild dysmetria with bilateral heel-to-shin test; no dysdiadochokinesia with bilateral rapid supination/pronation  Cranial Nerves :  grossly intact CN II to XII function  Sensory : Reduced light touch and pinprick sensation at entire left hand compared to right side; intact light touch and pin prick sensation at bilateral lower extremities  Motor : normal tone at bilateral upper & lower extremities ; 4+/5 muscle strength at left shoulder abduction and flexion; 4+/5 to 5/5 muscle strength at left wrist extension; 1/5 to 2-/5 muscle strength at left 3rd-5th fingers extension; 3+/5 to 4-/5 muscle strength at left thumb and second finger extension; 2-/5 muscle strength at the left finger abduction; 4-/5 to 4+/5 muscle strength at the left finger flexion; 4-/5 muscle strength at the left hand ; 4+/5 muscle strength at right finger abduction and handgrip; 4-/5 muscle strength at the bilateral hip flexion, and adduction; 4+/5 muscle strength at bilateral hip abduction; 4-/5 muscle strength at bilateral knee flexion; 4+5 to 5/5 muscle strength at bilateral knee extension normal 5/5 muscle strength at the rest of bilateral upper & lower extremities  Reflex : 0 bilateral biceps, bilateral triceps, bilateral brachial radialis, bilateral knees and bilateral ankles reflexes   Pathological Reflex :  No Radha's sign ; no Babinski sign ; no ankle clonus  Gait : Not assessed      Diagnostics:  No results found for this or any previous visit (from the past 24 hour(s)). Ref Range & Units 12/28/2022 Comments   Sodium 135 - 145 mmol/L 134 Low      Potassium 3.5 - 5.0 mmol/L 3.7     Chloride 98 - 108 mmol/L 100     CO2 21 - 31 mmol/L 29     Glucose 70 - 99 mg/dL 84     BUN 7 - 25 mg/dL 9     Creatinine 0.70 - 1.30 mg/dL 0.34 Low      Bun/Crea Ratio  26     Osmolality (Calculated) 278 - 305 mOsm/kg 279     Anion Gap 7 - 17 mmol/L 9     eGFR, CKD-EPI, Male >=60 mL/min/1.73m2 >90          Ref Range & Units 12/28/2022   LD Total 100 - 190 U/L 938 High          Ref Range & Units 12/28/2022   WBC Count 3.73 - 10.10 K/uL 4.82    RBC Count 4.38 - 5.83 M/uL 3.21 Low     Hemoglobin 13.4 - 16.8 g/dL 8.4 Low     Hematocrit 39.6 - 48.8 % 26.9 Low     Mean Cell Volume 79.0 - 94.5 fL 83.8    Mean Cell Hgb 26.1 - 33.3 pg 26.2    Mean Cell Hgb Conc 31.9 - 36.5 g/dL 31.2 Low     RBC Distribution 10.9 - 14.3 % 19.7 High     Platelet Count 104 - 337 K/uL 221    Mean Platelet Volume 8.7 - 12.3 fL 8.4 Low     DIFF STATUS  Electronic Differential    Segs + Bands Auto % 67.3    Immature Grans % % 2.1    Lymphocyte % Auto % 16.8    Monocyte % Auto % 11.8    Eosinophil % Auto % 1.0    Basophil % Auto % 1.0    Nucleated RBC <=0.2 /100 WBC 0.0    Segs + Bands,Absolute Auto 1.57 - 6.19 K/uL 3.24    Immature Grans Absolute <=0.07 K/uL 0.10 High     Abs Lymph Auto 0.83 - 3.57 K/uL 0.81 Low     Abs Mono Auto 0.24 - 0.93 K/uL 0.57    Abs Eos Auto 0.00 - 0.48 K/uL 0.05    Abs Baso Auto 0.00 - 0.09 K/uL 0.05        MRI brain with and without contrast --  Imaging and [facility] [address] (10/12/2022)  Impression  1. No evidence of intracranial metastatic disease. 2.  No significant interval change compared to the prior MRI. MRI of cervical spine with and without contrast -- Imaging and [address] [place] [address] (10/12/2022) : Impression  1.   Punctate focus of enhancement is seen in the midline ventral cord at C5-C6 on the sagittal postcontrast images without definite correlate on the axial postcontrast images. This focus of enhancement was more apparent on the recent prior brachial plexus MRI. No significant edema around this focus of enhancement. The focus of enhancement is too small to characterize and is nonspecific in etiology. Recommend follow-up. 2.  Degenerative disc disease with multilevel neural foraminal narrowing that is severe on the right at C5-C6 and severe on the left at C6-C7, which could impinge on the exiting right C6 or left C7 nerve roots. CT of the chest with contrast --  [address] (11/26/2022) : Impression  1. Progressive mediastinal and hilar lymphadenopathy, and new borderline left supraclavicular lymph node, likely metastatic. 2.  New small pericardial effusion. 3.  New multiple subcutaneous nodules, concerning for metastatic disease. CT of abdomen and pelvis with contrast --  [address] (11/26/2022) : Impression  1. A few previously measured index lymph nodes have decreased in size, however the vast majority have increased with multiple newly enlarged lymph nodes concerning for disease progression. 2.  Innumerable new mesenteric and retroperitoneal deposits concerning for metastasis. 3.  Soft tissue thickening and nodularity along the course of the ureters appears new since the prior exam and likely relates to metastatic involvement. No hydroureteronephrosis. 4.  Interval heterogeneous enhancing mass in the left seminal vesicle likely metastatic new since the prior study   5. Multiple new subcutaneous nodules concerning for metastatic disease. 6.  Interval mild pelvic ascites with presacral edema and fluid   7. Additional ancillary findings as described above       CT of neck with contrast -- [address] (11/26/2022) :   Impression  Extensive new metastases within the neck as described including metastases that are subcutaneous/deep soft tissue in location, with intramuscular, paraglottic, and intraorbital metastases also seen, as well as metastatic adenopathy, as described in the body of the report. Notably there is a metastasis within the superomedial aspect of the left orbit that is inseparable from the globe. MRI of cervical spine perfusion with and without contrast -- Imaging and [address] [place] [address] (11/29/2022)  Impression  1. Diffuse osseous metastatic disease in the cervical spine, new from   previous examination. No evidence of epidural tumor in the cervical spine. Extraosseous extension of the C3 spinous process into the adjacent posterior paraspinal soft tissues. 2.  Focal central signal abnormality within the cervical spinal cord at the C5-C6 level with stable to decreased enhancement in this region. 3.  Multiple cutaneous and subcutaneous nodules throughout the neck, likely metastatic. MRI of lumbar spine with and without contrast -- Imaging and [address] [place] [address] (11/29/2022) : Impression  1. Diffuse osseous metastatic disease involving the lumbar spine and   visualized pelvic bones. Minimal right ventral epidural tumor at the L2 level without associated thecal sac stenosis. 2.  Multiple subcutaneous, intramuscular and retroperitoneal nodules, likely metastatic. 3.  Chronic L5 pars interarticularis defects with grade 2 anterolisthesis of L5 on S1 contributing to severe bilateral foraminal narrowing. Degenerative changes in the lumbar spine as described with severe central canal stenosis at L3-L4. MRI of thoracic spine with and without contrast --  Imaging and [address] [place] [address] (11/29/2022) : Impression  1. Diffuse osseous metastatic disease in the thoracic spine as well as   involving multiple ribs, manubrium and sternum.   2.  Extraosseous tumor at multiple levels as described with mild epidural tumor at numerous levels without associated thecal sac stenosis. No evidence of cord compression or abnormal thoracic spinal cord signal intensity. 3.  Multiple subcutaneous and intramuscular nodules likely metastatic. Echocardiogram -- [facility] (12/5/2022) :  Narrative  - Normal left ventricular size and function. Ejection fraction 55-60%. - Normal diastolic function.   - Normal right ventricular size and function.   - Normal atrial size.   - No hemodynamically significant valvular disease.   - Small pericardial effusion, no tamponade. - Large left pleural effusion. MRI of brain without contrast --[facility] (12/8/2022) : Impression  Only initial noncontrast sequences were only able to be obtained. 1.  No acute intracranial abnormality. No obvious pituitary lesion. 2.  Old left thalamic insults and chronic small vessel disease related   changes. 3.  Enlarged bilateral extraocular muscles and left orbital superomedial soft tissue lesion redemonstrated possibly metastases. 4.  Numerous cutaneous lesions and small cervical nodes. Heterogeneous T2 heterogeneity in the  space muscles. These findings may be related to underlying metastatic lesions. MRI of brachial plexus with and without contrast --[facility] (12/16/2022) : Impression  Study is limited due to motion artifact and diffuse edema   degrading assessment of the brachial plexus structures. Diffuse thickening and asymmetric edema of the left brachial plexus structures more pronounced involving the infraclavicular structures, with possible mild asymmetric enhancement. Findings may relate to early changes from prior radiation. MRI of cervical spine with and without contrast --[facility] (12/16/2022) : Impression  1. Diffuse osseous metastatic disease in the cervical spine and upper thoracic spine.  Increase in enhancement involving the lesions although similar in distribution, may relate to treatment-related changes. 2.  Mild right ventral epidural tumor at the C7 level extending into the right C7-T1 neural foramina, mildly decreased from prior examination. 3.  Unchanged small intramedullary enhancing lesion within the central cervical spinal cord at the this C5-C6 level. 4.  Apparent focal hyperintensity within the right cervical spinal cord at the C6-C7 level is only seen on axial T1 images and not confirmed on the remaining sequences. Findings may be artifactual and attention on follow-up is recommended. MRI of brain with and without contrast -- [facility] (12/16/2022) : Impression  1. Small enhancing lesions in the right frontal lobe and additional tiny enhancing lesions throughout the supratentorial and infratentorial brain, suspicious for metastatic disease. A few of these lesions could potentially relate to subacute infarcts. No associated edema or mass effect. Recommend short-term follow-up contrast-enhanced MRI brain. 2.  Enhancing nodules within the right  musculature, bilateral temporalis musculature and small adjacent nodules within the face and visualized upper neck, likely metastatic. 3.  Metastatic lesions involving the bilateral extraocular musculature. Additional smaller soft tissue nodules within the orbits, likely metastatic. 4.  Small enhancing lesions within the calvarium and skull base, suspicious for metastatic disease. Partially visualized metastatic lesions in the cervical spine. MRI of brachial plexus with and without contrast -- [facility] (12/22/2022)   Impression  Extensive metastatic disease involving the visualized osseous structures, multiple muscles including the intrinsic muscles of the scapula bilaterally, paraspinal muscles as well as the subcutaneous soft tissues.  There is prominent edema involving the left intrinsic muscles of the scapula. It is unclear whether this relates to vasogenic edema from the underlying lesions versus denervation injury or posttreatment changes. No large mass is identified along the brachial plexi, but small perineural spread is not excluded. Prominent degenerative changes of the bilateral glenohumeral joints. Left-sided pleural effusion. The post admission physician evaluation (MALCOM) is consistent with the pre-admission assessment. See above findings to reflect the elements required in the MALCOM. Patient's admitting condition is consistent with the findings of the preadmission assessment by the rehabilitation admissions coordinator.     Ziggy Colon MD

## 2022-12-28 NOTE — PROGRESS NOTES
[facility]  Acute Inpatient Rehab Preadmission Assessment    Patient Name: Glenys Su        Ethnicity:Not of , Linzie Zhanna, or Sami origin  Race:White  MRN: [id number]    : 1960  (64 y.o.)  Gender: male     Admitted from: [facility]  Initial Assessment    Date of admission to the hospital: 22    Date patient eligible for admission:2022    Primary Diagnosis: debility      Did patient have surgery?  no    Physicians: Michael Sinha MD, Dr Gilda Rawls for clinical complications/co-morbidities:   Past Medical History:   Diagnosis Date    Hyperlipidemia     Hypertension     Melanoma [facility])        Financial Information  Primary insurance:  CLARED.    Secondary Insurance:   Drug [employer] program    Has the patient had two or more falls in the past year or any fall with injury in the past year? no    Did the patient have major surgery during the 100 days prior to admission?   no    Precautions:   falls and seizures       Isolation Precautions: Contact       Physiatrist: Dr. Karen Garza    Patients Occupation: Employed full time  Reviewed Lab and Diagnostic reports from Current Admission: Yes    Patients Prior Functional  Level:  independant    Current functional status for upper extremity ADLs: minimal assist    Current functional status for lower extremity ADLs: minimal assist    Current functional status for bed, chair, wheelchair transfers: CGA x1    Current functional status for toilet transfers: CGA x1    Current functional status for locomotion: CGA with follow by w/c    Current functional status for bladder management: Minimal contact assistance    Current functional status for bowel management:Minimal contact assistance    Current functional status for comprehension: Supervision    Current functional status for expression: Supervision    Current functional status for social interaction: Supervision    Current functional status for problem solving: Supervision    Current functional status for memory: Supervision    Expected level of Improvement in Self-Care:  Modified independence    Expected level of Improvement in Sphincter Control:  Modified independence    Expected level of Improvement in Transfers: Modified independence    Expected level of Improvement in Locomotion:  Modified independence    Expected level of Improvement in Communication and Social Cognition: Modified independence    Expected length of time to achieve that level of improvement: 2 weeks    Current rehab issues: ADL dysfunction,bladder management,bowel management,carry over of therapy techniques, discharge planning, disease and co-morbidity management, gait/mobility dysfunction, medication management, nutrition and hydration management,Ongoing assessment of safety, Pain management, Patient and family education, Prevention of secondary complications, Skin Integrity, NWB,TTWB, PWB,cognitive impairment, communication impairment. Required therapy: Physical Therapy, Occupational Therapy and Speech Therapy 3 hours per day, 5-6 days per week. Recreational Therapy 1 hour per week. Expected Discharge Destination: Home    Expected Post Discharge Treatments: Home Care    Other information relevant to the care needs:        Acute Inpatient Rehabilitation Disclosure Statement provided to patient. Patient verbalized understanding. I have reviewed and concur with the findings and results of the pre-admission screening assessment completed by the Inpatient Rehabilitation Admissions Coordinator.     Roberto Davila MD

## 2022-12-28 NOTE — PROGRESS NOTES
Admitted to the Inpatient Rehabilitation Unit via wheelchair. Patient was then oriented to room and unit. Education provided on the rehabilitation routine: three hours of therapy five days per week. Explained patients right to have family, representative or physician notified of their admission. Patient has Declined for physician to be notified. Patient has Declined for family/representative to be notified. Admitting medication orders compared with acute stay medications; home medication list reviewed with patient/family. Medication issues identified No  Medication issue: n/a  If yes, physician notified Dr Salima Parra. Transportation:   Has transportation kept you from medical appointments, meetings, work, or from getting things needed for daily living? (Check all that apply)  No.      Health Literacy:   How often do you need to have someone help you when you read instructions, pamphlets, or other written material from your doctor or pharmacy? 0. - Never    Social Isolation:  How often do you feel lonely or isolated from those around you?  0. Never    Patient Mood Interview (PHQ-2 to 9) (from [email or web address])   Say to Patient: \"Over the last 2 weeks, have you been bothered by any of the following problems? \"   If symptom is present, enter yes in column 1 (Symptom Presence)  If yes in column 1, then ask the patient: About how often have you been bothered by this?  Indicate response in column 2, Symptom Frequency. Symptom Presence  No    Yes   9. No response  Symptom Frequency  Never or 1 day  2-6 days (several days)  7-11 days (half or more of the days)  12-14 days (nearly every day)    Symptom Presence Symptom Frequency   Little interest or pleasure in doing things 0. No 0. Never or 1 day   Feeling down, depressed, or hopeless 0. No 0. Never or 1 day   If either A or B above has symptom frequency coded 2 or 3, CONTINUE asking questions below.       If not, END the interview  and right click on next table to delete. Pain:  Pain Effect on Sleep    Ask patient: \"Over the past 5 days, how much of the time has pain made it hard for you to sleep at night?\" 1.  Rarely or not at all     If no pain is reported, Stop here. If pain is reported, continue with the additional questions. Pain Interference with Therapy Activities   Ask patient: Diane Puentes the past 5 days, how often have you limited your participation in rehabilitation therapy sessions due to pain?\" 1.  Rarely or not at all   Pain Interference with Day to Day Activities   Ask patient: Diane Puentes the past 5 days, how often have you limited your day to day activities (excluding rehabilitation therapy sessions) because of pain?\" 1.  Rarely or not at all         Bladder and Bowel Function Assessment:  Prior history of bladder problems: no problems with bladder  Number of pads used per day:  0  Frequency of night time voiding: once  Fluid intake volume and pattern: Adequate  Last BM: 12/28/22  Bowel problems (prior or current) No      Incontinence      Frequent diarrhea      No BM in 3 days this stay or history of constipation      Hemorrhoids      Diverticulitis      Bowel Surgery     Two nurse skin assessment performed by Tequila Hays LPN  and Molly Franco . Weight: 183 lbs 3 oz      Care plan was created with patient's input and goals were agreed upon. Admission folder provided with education regarding patients diagnoses, fall prevention, and skin care. \"Data Collection Information Summary for Patients in Inpatient Rehabilitation Facilities\" and \"Privacy Act Statement - Health Care Records\" provided. Please refer to the admission navigator for further information.

## 2022-12-29 LAB
ALBUMIN SERPL-MCNC: 3 G/DL (ref 3.5–5.1)
ALP BLD-CCNC: 184 U/L (ref 38–126)
ALT SERPL-CCNC: 19 U/L (ref 11–66)
ANION GAP SERPL CALCULATED.3IONS-SCNC: 12 MEQ/L (ref 8–16)
AST SERPL-CCNC: 31 U/L (ref 5–40)
BASOPHILS # BLD: 0.7 %
BASOPHILS ABSOLUTE: 0 THOU/MM3 (ref 0–0.1)
BILIRUB SERPL-MCNC: 0.3 MG/DL (ref 0.3–1.2)
BUN BLDV-MCNC: 10 MG/DL (ref 7–22)
CALCIUM SERPL-MCNC: 8.1 MG/DL (ref 8.5–10.5)
CHLORIDE BLD-SCNC: 100 MEQ/L (ref 98–111)
CHOLESTEROL, TOTAL: 136 MG/DL (ref 100–199)
CO2: 26 MEQ/L (ref 23–33)
CREAT SERPL-MCNC: 0.4 MG/DL (ref 0.4–1.2)
EOSINOPHIL # BLD: 1.7 %
EOSINOPHILS ABSOLUTE: 0.1 THOU/MM3 (ref 0–0.4)
ERYTHROCYTE [DISTWIDTH] IN BLOOD BY AUTOMATED COUNT: 19.8 % (ref 11.5–14.5)
ERYTHROCYTE [DISTWIDTH] IN BLOOD BY AUTOMATED COUNT: 61.1 FL (ref 35–45)
GFR SERPL CREATININE-BSD FRML MDRD: > 60 ML/MIN/1.73M2
GLUCOSE BLD-MCNC: 104 MG/DL (ref 70–108)
GLUCOSE BLD-MCNC: 175 MG/DL (ref 70–108)
GLUCOSE BLD-MCNC: 196 MG/DL (ref 70–108)
GLUCOSE BLD-MCNC: 95 MG/DL (ref 70–108)
GLUCOSE BLD-MCNC: 95 MG/DL (ref 70–108)
HCT VFR BLD CALC: 29.4 % (ref 42–52)
HDLC SERPL-MCNC: 30 MG/DL
HEMOGLOBIN: 9.3 GM/DL (ref 14–18)
IMMATURE GRANS (ABS): 0.11 THOU/MM3 (ref 0–0.07)
IMMATURE GRANULOCYTES: 2.6 %
LDL CHOLESTEROL CALCULATED: 77 MG/DL
LYMPHOCYTES # BLD: 16.6 %
LYMPHOCYTES ABSOLUTE: 0.7 THOU/MM3 (ref 1–4.8)
MCH RBC QN AUTO: 27.3 PG (ref 26–33)
MCHC RBC AUTO-ENTMCNC: 31.6 GM/DL (ref 32.2–35.5)
MCV RBC AUTO: 86.2 FL (ref 80–94)
MONOCYTES # BLD: 12.1 %
MONOCYTES ABSOLUTE: 0.5 THOU/MM3 (ref 0.4–1.3)
NUCLEATED RED BLOOD CELLS: 0 /100 WBC
PLATELET # BLD: 251 THOU/MM3 (ref 130–400)
PMV BLD AUTO: 8.6 FL (ref 9.4–12.4)
POTASSIUM REFLEX MAGNESIUM: 4.4 MEQ/L (ref 3.5–5.2)
PREALBUMIN: 11.5 MG/DL (ref 20–40)
RBC # BLD: 3.41 MILL/MM3 (ref 4.7–6.1)
SEG NEUTROPHILS: 66.3 %
SEGMENTED NEUTROPHILS ABSOLUTE COUNT: 2.8 THOU/MM3 (ref 1.8–7.7)
SODIUM BLD-SCNC: 138 MEQ/L (ref 135–145)
TOTAL PROTEIN: 6 G/DL (ref 6.1–8)
TRIGL SERPL-MCNC: 144 MG/DL (ref 0–199)
WBC # BLD: 4.2 THOU/MM3 (ref 4.8–10.8)

## 2022-12-29 PROCEDURE — 97130 THER IVNTJ EA ADDL 15 MIN: CPT

## 2022-12-29 PROCEDURE — 82948 REAGENT STRIP/BLOOD GLUCOSE: CPT

## 2022-12-29 PROCEDURE — 97110 THERAPEUTIC EXERCISES: CPT

## 2022-12-29 PROCEDURE — 80053 COMPREHEN METABOLIC PANEL: CPT

## 2022-12-29 PROCEDURE — 1180000000 HC REHAB R&B

## 2022-12-29 PROCEDURE — 97166 OT EVAL MOD COMPLEX 45 MIN: CPT

## 2022-12-29 PROCEDURE — 97162 PT EVAL MOD COMPLEX 30 MIN: CPT

## 2022-12-29 PROCEDURE — 92523 SPEECH SOUND LANG COMPREHEN: CPT

## 2022-12-29 PROCEDURE — 99232 SBSQ HOSP IP/OBS MODERATE 35: CPT | Performed by: PHYSICAL MEDICINE & REHABILITATION

## 2022-12-29 PROCEDURE — 97116 GAIT TRAINING THERAPY: CPT

## 2022-12-29 PROCEDURE — 97530 THERAPEUTIC ACTIVITIES: CPT

## 2022-12-29 PROCEDURE — 92610 EVALUATE SWALLOWING FUNCTION: CPT

## 2022-12-29 PROCEDURE — 6360000002 HC RX W HCPCS: Performed by: PHYSICAL MEDICINE & REHABILITATION

## 2022-12-29 PROCEDURE — 36415 COLL VENOUS BLD VENIPUNCTURE: CPT

## 2022-12-29 PROCEDURE — 84134 ASSAY OF PREALBUMIN: CPT

## 2022-12-29 PROCEDURE — 97535 SELF CARE MNGMENT TRAINING: CPT

## 2022-12-29 PROCEDURE — 97129 THER IVNTJ 1ST 15 MIN: CPT

## 2022-12-29 PROCEDURE — 80061 LIPID PANEL: CPT

## 2022-12-29 PROCEDURE — 85025 COMPLETE CBC W/AUTO DIFF WBC: CPT

## 2022-12-29 PROCEDURE — 6370000000 HC RX 637 (ALT 250 FOR IP): Performed by: PHYSICAL MEDICINE & REHABILITATION

## 2022-12-29 RX ADMIN — FLUDROCORTISONE ACETATE 0.1 MG: 0.1 TABLET ORAL at 07:57

## 2022-12-29 RX ADMIN — SENNOSIDES 17.2 MG: 8.6 TABLET, FILM COATED ORAL at 22:42

## 2022-12-29 RX ADMIN — POTASSIUM CHLORIDE 40 MEQ: 1500 TABLET, EXTENDED RELEASE ORAL at 07:56

## 2022-12-29 RX ADMIN — SENNOSIDES 17.2 MG: 8.6 TABLET, FILM COATED ORAL at 07:56

## 2022-12-29 RX ADMIN — OXYCODONE 5 MG: 5 TABLET ORAL at 11:03

## 2022-12-29 RX ADMIN — ATORVASTATIN CALCIUM 20 MG: 20 TABLET, FILM COATED ORAL at 22:42

## 2022-12-29 RX ADMIN — LEVOTHYROXINE SODIUM 100 MCG: 0.1 TABLET ORAL at 05:22

## 2022-12-29 RX ADMIN — ENOXAPARIN SODIUM 40 MG: 100 INJECTION SUBCUTANEOUS at 07:57

## 2022-12-29 RX ADMIN — Medication 6 MG: at 22:42

## 2022-12-29 RX ADMIN — ASPIRIN 81 MG: 81 TABLET, CHEWABLE ORAL at 07:57

## 2022-12-29 RX ADMIN — OXYCODONE 5 MG: 5 TABLET ORAL at 22:41

## 2022-12-29 RX ADMIN — DOCUSATE SODIUM 100 MG: 100 CAPSULE, LIQUID FILLED ORAL at 07:57

## 2022-12-29 RX ADMIN — OXYCODONE 5 MG: 5 TABLET ORAL at 05:22

## 2022-12-29 RX ADMIN — ALLOPURINOL 300 MG: 300 TABLET ORAL at 07:57

## 2022-12-29 RX ADMIN — PREDNISONE 10 MG: 10 TABLET ORAL at 08:30

## 2022-12-29 RX ADMIN — TRAZODONE HYDROCHLORIDE 50 MG: 50 TABLET ORAL at 22:42

## 2022-12-29 RX ADMIN — OXYCODONE 5 MG: 5 TABLET ORAL at 16:57

## 2022-12-29 ASSESSMENT — PAIN SCALES - GENERAL
PAINLEVEL_OUTOF10: 6
PAINLEVEL_OUTOF10: 0
PAINLEVEL_OUTOF10: 4
PAINLEVEL_OUTOF10: 6
PAINLEVEL_OUTOF10: 5
PAINLEVEL_OUTOF10: 5

## 2022-12-29 ASSESSMENT — 9 HOLE PEG TEST
TESTTIME_SECONDS: 33.4
TESTTIME_SECONDS: 0

## 2022-12-29 ASSESSMENT — PAIN DESCRIPTION - ORIENTATION
ORIENTATION: LOWER

## 2022-12-29 ASSESSMENT — ENCOUNTER SYMPTOMS
RHINORRHEA: 0
BACK PAIN: 0
WHEEZING: 0
SORE THROAT: 0
CONSTIPATION: 0
SHORTNESS OF BREATH: 0
ABDOMINAL PAIN: 0
VOMITING: 0
NAUSEA: 0
COUGH: 0
DIARRHEA: 0

## 2022-12-29 ASSESSMENT — PAIN DESCRIPTION - LOCATION
LOCATION: BACK

## 2022-12-29 ASSESSMENT — PAIN - FUNCTIONAL ASSESSMENT
PAIN_FUNCTIONAL_ASSESSMENT: ACTIVITIES ARE NOT PREVENTED

## 2022-12-29 ASSESSMENT — PAIN DESCRIPTION - DESCRIPTORS
DESCRIPTORS: DISCOMFORT
DESCRIPTORS: ACHING
DESCRIPTORS: ACHING

## 2022-12-29 NOTE — PROGRESS NOTES
Physical Medicine & Rehabilitation Progress Note    Chief Complaint:  Fatigue, generalized weakness, weight loss    Subjective:    Eleni Pineda is a 58 y.o. right-handed  male with history of history of hypertension, hyperlipidemia, recurrent multiple primary melanomas ([id number], 2013) including a [id number] melanoma of the left flank s/p excision with left axillary lymph node dissection, is admitted on 12/28/2022 for intensive inpatient management of impairment & disability secondary to debility/physical decondition secondary to extensive multiple metastatic melanoma to his brain, cervical spine, musculature, and brachial plexus. The patient presented to [address] on 12/3/2022 with progressive weakness, dysphagia and facial drooping developed about 3 weeks prior to his visit. He was found to be hypotensive and was admitted to MICU at the [facility] on 12/3/2022. He was suspected to have adrenal insufficiency. Endocrinology was consulted. Blood culture from chemotherapy port done on 12/3/2022 revealed Clostridium. Therefor the port was removed on 12/4/2022. He was treated IV fluid, antibiotic (Unasyn for 14 days) and steroid. MRI of the brain done on 12/8/2022 revealed no acute intracranial abnormality, old left thalamic insult with chronic small vessel disease, and enlarged bilateral extraocular muscle & left orbital superomedial soft tissue lesions suspicious for metastasis. MRI of cervical spine done on 12/16/2022 revealed diffuse osseous metastasis disease in cervical spine and upper thoracic spine, mild right ventral epidural tumor at C7 extending into right C7-T1 neuroforamen, intramedullary enhancing lesion within central cervical spinal cord at C5-6 and right C6-7 cervical spinal cord.   Repeated MRI of brain done on 12/16/2022 revealed enhancing lesions in right frontal lobe and throughout the supratentorial and infratentorial brain, within the right  musculature, bilateral temporalis musculature, bilateral extraocular musculature, and within the calvarium and skull base. Because of extensive metastatic malignancy, oral chemotherapy (tafinlar + mekinist) was started on 12/8/2022. He was later transferred out of MICU to oncology service floor for further care. Because of left upper extremity swelling, bilateral upper extremities venous duplex study was done on 12/16/2022 and showed no acute DVT but acute superficial venous thrombosis in right upper extremity. MRI of brachial plexus done on 12/22/2022 show extensive metastatic disease involving osseous structures, multiple muscles including bilateral scapular intrinsic muscles and paraspinal muscles. The left upper extremity weakness and edema was thought to be due to tumor infiltrating brachial plexus and radiation induced plexopathy. His hospital course was also complicated by tumor lysis syndrome, acute renal failure, and dysphagia with severe protein calorie malnutrition. The patient says he feels well. He had a another good night sleep last night. She denies having any painful symptom. His left hand still has reduced sensation with numbness. He is still unable to extend and abduct his left third, fourth and fifth fingers. Otherwise he denied having weakness or feeling fatigue. He denies having poor appetite. He says he tolerated intensive inpatient rehabilitation treatment well yesterday. Rehabilitation:  PT: Reviewed.     Bed Mobility:  Rolling to Left: Modified Independent   Rolling to Right: Modified Independent   Supine to Sit: Minimal Assistance  Sit to Supine: Stand By Assistance   *Patient performed on mat table with HOB flat and no railings     Transfers:  Sit to Stand: Stand By Assistance  Stand to Sit:Stand By Assistance  To/From Bed and Chair: Stand By Assistance without AD    Ambulation:  Stand By Assistance  Distance: 20 feet x2  Surface: Level Tile  Device:Cane  Gait Deviations: Forward Flexed Posture, Slow Gretchen, Decreased Step Length Bilaterally, Decreased Gait Speed, Decreased Heel Strike Bilaterally, and Narrow Base of Support  *Patient given education on use of cane when fatigued and for long distances at this time for safety with patient demonstrating understanding     Stand By Assistance, [employer] Assistance  Distance: 20 feet x4  Surface: Level Tile  Device:No Device  Gait Deviations: Forward Flexed Posture, Slow Gretchen, Decreased Step Length Bilaterally, Decreased Gait Speed, Decreased Heel Strike Bilaterally, and [employer] of Support    Stand By Assistance  Distance: 150 feet  Surface: Level Tile  Device: Cane  Gait Deviations: Forward Flexed Posture, Slow Gretchen, Decreased Step Length Bilaterally, Decreased Gait Speed, Decreased Heel Strike Bilaterally, Decreased Foot Clearance Right, Decreased Foot Clearance Left, and Narrow Base of Support     Balance:  Static Sitting Balance:  Modified Independent  Dynamic Sitting Balance: Supervision  Static Standing Balance: Stand By Assistance  Dynamic Standing Balance: Stand By Assistance, Contact Guard Assistance  *Patient instructed in standing SL cone tapping without UE support and CGA with cueing for slowed speed. Patient initially performed 1 tap and progressed to 2 cones. Patient demonstrated slight unsteadiness however able to self correct. Patient instructed in SL cone tapping in order to assist with SL dynamic balance. *Patient instructed in standing on airex pad initially with normalized RICHIE and progressed to narrowed RICHIE without UE support while reaching outside RICHIE for bean bags with CGA. Patient demonstrated unsteadiness however no LOB. Patient instructed in standing dynamic balance in order  to assist with safety with functional mobility.     Stairs:  Contact Guard Assistance  Number of Steps: 4  Height: 6\" step with Bilateral Handrails  *Patient required to perform with step two pattern with close CGA      OT: Reviewed. ADL:   EATING:Setup or clean-up assistance. Suele Moder CARE Score: 5. ORAL HYGIENE:Supervision or touching assistance. SBA standing at sink. CARE Score: 4. Grooming: Stand By Assistance. TOILETING HYGIENE:Supervision or touching assistance. SBA. CARE Score: 4. SHOWERING/BATHING:Supervision or touching assistance. SBA. CARE Score: 4.     UPPER BODY DRESSING:Supervision or touching assistance. SBA. CARE Score: 4. LOWER BODY DRESSING:Supervision or touching assistance. SBA. CARE Score: 4. FOOTWEAR:Supervision or touching assistance  SBA with increased time. CARE Score: 4. TOILET TRANSFER: Supervision or touching assistance. SBA. CARE Score: 4. SHOWER TRANSFER: stand by assistance   Toileting: Stand By Assistance. Sherryle Moder BALANCE:  Sitting Balance:  Modified Independent. Standing Balance: Stand By Assistance. TRANSFERS:  Sit to Stand:  Stand By Assistance. Stand to Sit: Stand By Assistance. FUNCTIONAL MOBILITY:  Assistive Device: Straight Cane  Assist Level:  Stand By Assistance. Distance: To and from bathroom      HAND ASSESSMENT  Hand Dominance: Right  Left Hand Strength -  (lbs)  Handle Setting 2: 5, 5, 6 avg 5 lbs  Right Hand Strength -  (lbs)  Handle Setting 2: 42, 39, 45 avg 42 lbs  Fine Motor Skills  Left 9 Hole Peg Test Time (secs): 0 (using pincer grasp and lateral key pinch, patient unable to place any pegs)  Right 9 Hole Peg Test Time (secs): 33.4     FUNCTIONAL MOBILITY:  Assistive Device: Straight Cane  Assist Level:  Stand By Assistance and Contact Guard Assistance. Distance: To and from bathroom and To and from shower room  Pt ambulated short distances with close SBA and no AD,        ST: Reviewed. Jose Cognitive Assessment [facility]) version 7.1 completed. Patient scored 24/30. Normal is greater than or equal to 26/30.     DIAGNOSTIC IMPRESSIONS:    Clinical Swallow Evaluation: Patient presents with oral phase of swallow function that is essentially [facility] with inability to fully discern potential presence of pharyngeal phase deficits without formal instrumentation. All labial/lingual structures intact and appear to be functioning appropriately at bedside. Oral phase highly unremarkable during consumption of hard/textured solids with patient demonstrating adequate mastication pattern for textural breakdown, cohesive bolus formation, and manipulation. Thin liquids consumed without overt difficulty and with suspected adequate bolus control/containment of fluid bolus. NO overt s/s aspiration exhibited across all consistencies/trials consumed, certainly not able to exclude pharyngeal phase dysfunction and/or airway invasion events in its entirety at bedside alone; however, patient's swallow physiology does appear appropriate to support PO intake without distress. Instrumental evaluation is not warranted at this time. Recommend continuation of regular diet with thin liquids. No further skilled ST services are warranted at this time r/t dysphagia management given baseline status of swallow function achieved; please re-consult should further needs be identified. Speech, Language, Cognitive Evaluation: Patient presents with a mild cognitive-linguistic impairment characterized by a score of 24/30 on the [facility] with deficits noted within delayed recall, high level executive functioning, and verbal reasoning. Expressive and receptive language appear [facility] for basic and complex daily communication. No dysarthria or dysphonia with speech intelligibility approximating 100%. Patient would greatly benefit from continued skilled ST services to address aforementioned deficits in order to resume ADL/IADL completion and work-related tasks independently upon discharge. Review of Systems:  Review of Systems   Constitutional:  Negative for chills, diaphoresis, fatigue and fever.    HENT: Negative for hearing loss, rhinorrhea, sneezing, sore throat and trouble swallowing. Eyes:  Negative for visual disturbance. Respiratory:  Negative for cough, shortness of breath and wheezing. Cardiovascular:  Negative for chest pain and palpitations. Gastrointestinal:  Negative for abdominal pain, constipation, diarrhea, nausea and vomiting. Genitourinary:  Negative for dysuria. Musculoskeletal:  Positive for gait problem. Negative for back pain. Skin:  Negative for rash. Neurological:  Positive for weakness (left hand) and numbness (Left hand). Negative for dizziness, tremors, facial asymmetry, speech difficulty, light-headedness and headaches. Psychiatric/Behavioral:  Negative for dysphoric mood, hallucinations and sleep disturbance. The patient is not nervous/anxious.          Objective:  /68   Pulse 88   Temp 99.3 °F (37.4 °C) (Oral)   Resp 14   Ht 6' 0.25\" (1.835 m)   Wt 183 lb 3 oz (83.1 kg)   SpO2 98%   BMI 24.67 kg/m²   Physical Exam   General:  well-developed, well nourished  male; in no acute distress ; appropriate affect & mood; sitting on reclining chair comfortably  Eyes: pupil equally round ; extra-ocular motion intact bilaterally; impaired accommodation with seeing near object; mild dark skin discoloration at left lower orbital skin area  Head, Ear, Nose, Mouth & Throat : normocephalic ; no discharge from ears or nose ; mild facial muscle atrophy; no deformity ; no facial swelling ; oral mucosa pink   Neck :  supple ; no tenderness ; no muscle spasm  Cardiovascular : regular rate & rhythm ; normal S1 & S2 heart sound ; no murmur ; normal peripheral pulse at the bilateral upper extremities; significantly reduced pulse strength at the bilateral lower extremities ankle and foot secondary to edema  Pulmonary : Breath sounds present at bilateral lung field; no wheezing ; no rale; no crackle  Gastrointestinal : soft, flat abdomen without tenderness ; normal bowel sound present   Back : no tenderness; no muscle spasm  Skin: no skin lesion or rash ; no pitting edema at right extremity; 1+ pitting edema at distal left forearm; presence of nonpitting swelling at left hand; 2+ to 1+ pitting edema at bilateral ankles  Musculoskeletal : no limb asymmetry; no limb deformity; no tenderness at bilateral upper & lower extremities; no palpable mass at limbs ; no joints laxity or crepitation ; right shoulder flexion and abduction passive ROM reaching 150 degrees; left shoulder passive following reaching 90 degree in flexion and abduction, and 40 degrees in external rotation; ankle dorsiflexion passive ROM reaching 15 degrees bilaterally; normal functional joints ROM at the rest of bilateral upper & lower extremities  Cerebral :  alert ; awake ; oriented to place, person and time; follow 1-2 step verbal command  Cerebellum : no dysmetria with right finger-to-nose test ; very mild dysmetria with left finger-to-nose test; mild dysmetria with bilateral heel-to-shin test  Cranial Nerves :  grossly intact CN II to XII function  Sensory : Reduced light touch and pinprick sensation at entire left hand compared to right side; intact light touch and pin prick sensation at bilateral lower extremities  Motor : normal tone at bilateral upper & lower extremities ; 1/5 to 2-/5 muscle strength at left 3rd-5th fingers extension; 3+/5 to 4-/5 muscle strength at left thumb and second finger extension; 1/5 to 2-/5 muscle strength at the left finger abduction; 4-/5 to 4+/5 muscle strength at the left finger flexion; 4-/5 muscle strength at the left hand ; 4+/5 muscle strength at right finger abduction and handgrip; 4-/5 to 4+/5 muscle strength at the bilateral hip flexion; 4+/5 muscle strength at bilateral knee flexion; normal 5/5 muscle strength at the rest of bilateral upper & lower extremities  Reflex : 0 bilateral biceps, bilateral brachial radialis, bilateral knees reflexes   Pathological Reflex : No Radha's sign ;  no ankle clonus  Gait : Not assessed      Diagnostics:   Recent Results (from the past 24 hour(s))   POCT glucose    Collection Time: 12/29/22 11:26 AM   Result Value Ref Range    POC Glucose 196 (H) 70 - 108 mg/dl   POCT glucose    Collection Time: 12/29/22  4:32 PM   Result Value Ref Range    POC Glucose 95 70 - 108 mg/dl   POCT glucose    Collection Time: 12/29/22  8:30 PM   Result Value Ref Range    POC Glucose 175 (H) 70 - 108 mg/dl   POCT glucose    Collection Time: 12/30/22  7:11 AM   Result Value Ref Range    POC Glucose 138 (H) 70 - 108 mg/dl         Impression:  Debility/physical decondition secondary to extensive widespread metastatic malignant melanoma causing dysphagia, fatigue, generalized weakness, left hand sensory deficit and weakness  Left upper extremity edema with left hand weakness and sensory deficit due to tumor infiltrating and radiation induced brachial plexopathy involving most severely at left ulnar nerve  Left shoulder contracture  Improving dysphagia with malnutrition secondary to metastatic melanoma to  muscles  Widespread metastatic melanoma to brain, cervical spine, musculature of face and neck, bilateral scapula, thoracic and lumbar spine, intra-abdominal structures, and subcutaneous tissue  Adrenal insufficiency with hypotension  History of hypertension  Hyperlipidemia  Hypothyroidism  Tumor lysis syndrome    The patient's condition remains stable. He continues to have left hand sensory deficit and weakness. His bilateral lower extremities weakness continue to persist but the muscle strength is improving. He is tolerating the intensive inpatient rehabilitation treatment well. His function begins to improve. Plan:  Continues intensive PT/OT/SLP/RT inpatient rehabilitation program at least 3 hours per day, 5 days per week in order to improve functional status prior to discharge. Family education and training will be completed.   Equipment evaluations and recommendations will be completed as appropriate. Rehabilitation nursing continues to be involved for bowel, bladder, skin, and pain management. Nursing will also provide education and training to patient and family. Prophylaxis:  DVT: Lovenox, JUAN A stocking, intermittent pneumatic compression device.   GI: Colace, Senokot, GlycoLax as needed, milk of magnesium as needed, Dulcolax suppository as needed; add Movantik  Pain: Lidocaine patch Tylenol as needed, scheduled oxycodone and as needed  Continue allopurinol for tumor lysis syndrome  Continue aspirin for cardioprotection  Continue Lipitor for hyperlipidemia  Continue prednisone  Continue Bactrim 3 times weekly for infection prevention  Continue trametinib & dabrafenib for metastatic cancer (melanoma) --  patient to take his own medications  Continue Florinef for hypotension secondary to adrenal insufficiency  Continue Humalog insulin coverage for hyperglycemia  Continue levothyroxine for hypothyroidism  Continue melatonin for insomnia; add trazodone as needed for insomnia  Nutrition: Continue current diet  Bladder: Monitoring signs or symptoms of UTI  Bowel: Monitoring signs or symptoms of constipation   and case management for coordination of care and discharge planning      Missed Therapy Time:  None      Fahad Munoz MD

## 2022-12-29 NOTE — PLAN OF CARE
Individualized Plan of [address] Rehabilitation Unit    Rehabilitation physician: Dr. Bill Fu Date: 12/28/2022     Rehabilitation Diagnosis: Debility [R53.81]      Rehabilitation impairments: self care, mobility, motor dysfunction, bowel/bladder management, safety, and cognitive function    Factors facilitating achievement of predicted outcomes: Family support, Motivated, Cooperative, and Pleasant  Barriers to the achievement of predicted outcomes: Limited safety awareness, Limited insight into deficits, Decreased endurance, Decreased sensation, Decreased proprioception, Upper extremity weakness, Lower extremity weakness, Medical complications, Stairs at home, Skin Care, and Medication managment    Patient Goals: Improve independence with mobility, Improvement of mobility at a wheelchair level, Increase overall strength and endurance, Increase balance, Increase endurance, Increase independence with activities of daily living, Improve cognition, Increase self-awareness, Increase safety awareness, Increase community integration, Increase socialization, Functional communication with caregivers, Integrate appropriate pain management plan, Assure adequate nutritional option for discharge, Continence of bowel and bladder, and Provide appropriate patient and family education      NURSING:  Nursing goals for Deandra Driver while on the rehabilitation unit will include:  Continence of bowel and bladder, Adequate number of bowel movements, Urinate with no urinary retention >300ml in bladder, Maintain O2 SATs at an acceptable level during stay, Effective pain management while on the rehabilitation unit, Establish adequate pain control plan for discharge, Absence of skin breakdown while on the rehabilitation unit, Improved skin integrity via assessments including wound measurements, Avoidance of any hospital acquired infections, No signs/symptoms of infection at the wound site, Freedom from injury during hospitalization, and Complete education with patient/family with understanding demonstrated regarding disease process and resultant impairment     In order to achieve these goals, nursing interventions may include bowel/bladder training, education for medical assistive devices, medication education, O2 saturation management, energy conservation, stress management techniques, fall prevention, alarms protocol, seating and positioning, skin/wound care, pressure relief instruction, dressing changes, infection protection, DVT prophylaxis, assistance with safe transfers , and/or assistance with bathroom activities and hygiene. PHYSICAL THERAPY:  Goals:        Short Term Goals  Time Frame for Short Term Goals: 1 week  Short Term Goal 1: Patient to perform bed mobility supine<>sit with Mod I with HOB flat and no railings in order to assist with getting into and out of bed. Short Term Goal 2: Patient to perform sit to stand transfers without AD  and Supervision from various surfaces in order to assist wtih safety with transfers in home. Short Term Goal 3: Patient to ambulate >/=75 feet without AD with Supervision in order to assist with safety with home mobility. Short Term Goal 4: Patient to ascend/descend 1 step without railings with SBA in order to assist with home entry. Short Term Goal 5: Patient to score >/=45/56 on the Flores Balance Test in order to assist with functional dynamic balance. Long Term Goals  Time Frame for Long Term Goals : 2 weeks from IPR evaluation  Long Term Goal 1: Patient to perform sit to stand transfers without AD with Mod I from various surfaces in order to assist with safety with home mobility. Long Term Goal 2: Patient to ambulate >/=150 feet without AD with Mod I in order to assist with home mobility.   Long Term Goal 3: Patient to ambulate over uneven surfaces and in unfamiliar environment with use of cane and Mod I in order to assist with community mobility. Long Term Goal 4: Patient to ascend/descend 1 step without handrails with Mod I in order to assist with home entry. Long Term Goal 5: Patient to perform car transfer with Mod I in order to assist with community mobility. Additional Goals?: Yes  Long term goal 6: Patient to score >/=14/16 on the Dynamic Gait Index in order to assist with functional dynamic balance. Plan of Care: Patient to be seen by physical therapy services  (5x/wk 60 min; 1x/wk 30 min)       Anticipated interventions may include therapeutic exercises, gait training, neuromuscular re-ed, transfer training, community reintegration, bed mobility, w/c mobility and training. OCCUPATIONAL THERAPY:  Goals:             Short Term Goals  Time Frame for Short Term Goals: 1 week  Short Term Goal 1: Pt will improve L hand AROM to 4+/5 to improve indep with using his keyboard with vanessa hands. Short Term Goal 2: Pt will don and doff his socks and shoes with supervision using adaptive techniques prn to improve indep with donning his work boots or tennis shoes. Short Term Goal 3: Pt will demonstrate one handed adaptive IADL techniques with supervision to be able to return to living independently. Short Term Goal 4: Pt will complete simple meal prep tasks with supervision and 0 vcs for LUE safety/ placement to improve indep with self care. Long Term Goals  Time Frame for Long Term Goals : 2 weeks from IPR evaluation  Long Term Goal 1: Pt will complete BADL routine with mod I and adaptations to improve indep with self care. Long Term Goal 2: Pt will improve L [facility] AEB completion of 9 hole peg test in <1 minute to improve [facility] needed for IADLs. Long Term Goal 3: Pt will demonstrate SROM techinques of LUE to preserve movement for ease of ADLs.     Plan of Care: Patient to be seen by occupational therapy services 5x/wk for 90 min and 1x/wk for 30 min     Anticipated interventions may include ADL and IADL retraining, strengthening, safety education and training, patient/caregiver education and training, equipment evaluation/ training/procurement, neuromuscular reeducation, wheelchair mobility training. SPEECH THERAPY:   Short Term Goals  Time Frame for Short Term Goals: 1 week  Goal 1: Patient will complete high level/complex executive functioning and verbal reasoning tasks (medications, finances, work-related tasks) with 85% accuracy given min cues to resume ADL/IADLs and work-related tasks independently  Goal 2: Patient will complete recall tasks (immediate, delayed, working) with 80% accuracy given min cues to improve recall of pertinent daily and medical information. Plan of Care: Pt to be seen by speech therapy services 30 minutes per day Monday through Friday . Anticipated interventions may include speech/language/communication therapy, cognitive training, group therapy, education, and/or dysphagia therapy based on the above goals. CASE MANAGEMENT:  Goals:   Assist patient/family with discharge planning, patient/family counseling,  and coordination with insurance during the inpatient rehabilitation stay. Other members of the multidisciplinary rehabilitation team that will be involved in the patient's plan of care include recreational therapy, dietary, respiratory therapy, and neuropsychology.     Medical issues being managed closely and that require 24 hour availability of a physician:  Swallowing precautions, Bowel/Bladder function, Wound care, Pain management, Infection protection, DVT prophylaxis, Fall precautions, Fluid/Electrolyte balance, Nutritional status, and Anemia                                           Physician anticipated functional outcomes: Improved independence with functional measures   Estimated length of stay for this admission : probably 2~3 weeks  Medical Prognosis: Fair  Anticipated disposition: Home. The potential to achieve the above medical and rehabilitative goals is good. This plan of care has been developed with the assistance and input of the multidisciplinary rehabilitation team.  The plan was reviewed with the patient. The patient has had the opportunity to provide input to the therapy team.    I have reviewed this Individualized Plan of Care and agree with its contents. Above documentation has been expanded, modified, adjusted to reflect the findings of my evaluations and goals for the patient.     Physician:  Corinna Solorio MD

## 2022-12-29 NOTE — PROGRESS NOTES
[address] [address]  INPATIENT PHYSICAL THERAPY  Daily Note  [facility]    Time In: 1330  Time Out: 1400  Timed Code Treatment Minutes: 30 Minutes  Minutes: 30          Date: 2022  Patient Name: Maria Teresa Roblero,  Gender:  male        MRN: [id number]  : 1960  (58 y.o.)     Referring Practitioner: Pan Aguilar MD  Diagnosis: Debility  Additional Pertinent Hx: Per H&P Maria Teresa oRblero  is a 58 y.o. 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 The patient presented to [address] on 12/3/2022 with progressive weakness, dysphagia and facial drooping developed about 3 weeks prior to his visit. He was found to be hypotensive and was admitted to MICU at the [facility] on 12/3/2022. 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 MRI of cervical spine done on 2022 revealed diffuse osseous metastasis disease in cervical spine and upper thoracic spine, mild right ventral epidural tumor at C7 extending into right C7-T1 neuroforamen, intramedullary enhancing lesion within central cervical spinal cord at C5-6 and right C6-7 cervical spinal cord.   Repeated MRI of brain done on 2022 revealed enhancing lesions in right frontal lobe and throughout the supratentorial and infratentorial brain, within the right  musculature, bilateral temporalis musculature, bilateral extraocular musculature, and within the calvarium and skull base. Because of extensive metastatic malignancy, oral chemotherapy (tafinlar + mekinist) was started on 12/8/2022. Because of left upper extremity swelling, bilateral upper extremities venous duplex study was done on 12/16/2022 and showed no acute DVT but acute superficial venous thrombosis in right upper extremity. MRI of brachial plexus done on 12/22/2022 show extensive metastatic disease involving osseous structures, multiple muscles including bilateral scapular intrinsic muscles and paraspinal muscles. The left upper extremity weakness and edema was thought to be due to tumor infiltrating brachial plexus and radiation induced plexopathy. His hospital course was also complicated by tumor lysis syndrome, acute renal failure, and dysphagia with severe protein calorie malnutrition. \"     Prior Level of Function:  Lives With: Alone  Type of Home: House  Home Layout: One level  Home Access: Stairs to enter without rails  Entrance Stairs - Number of Steps: 1 (Patient reports its a 7inch step)  Home Equipment:  (Patient reports his mother has a RW and cane however he does not)   Bathroom Shower/Tub: Walk-in shower  Bathroom Toilet: Handicap height  Bathroom Equipment:  (suction cup grab bar in back bathroom shower)    Receives Help From: Family  ADL Assistance: Independent  Homemaking Assistance: Independent  Ambulation Assistance: Independent  Transfer Assistance: Independent  Active : Yes  Additional Comments: Patient reports prior to hospital admission patient was independent with all ADL's/IADL's without use of AD for mobility. 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  Patient reports he plans to go to his mothers house for a few weeks( 1 story home, walk in shower, handicap toilets, grab bars in bathroom) after discharge. Patient reports his mother is able to help with tasks in home. Patient reports his mother has a shower chair in her shower. Restrictions/Precautions:  Restrictions/Precautions: Contact Precautions, Fall Risk     SUBJECTIVE: Pt. Seated on his BS chair and pleasantly agrees to therapy session. PAIN: 0/10    Vitals:   Patient Vitals for the past 8 hrs:   BP Temp Temp src Pulse Resp   12/29/22 1426 92/67 98.2 °F (36.8 °C) Oral 96 17   12/29/22 1103 -- -- -- -- 17        OBJECTIVE:  Bed Mobility:  Not Tested    Transfers:  Sit to Stand: Stand By Assistance  Stand to Sit:Stand By Assistance    Ambulation:  Stand By Assistance  Distance: 150 feet  Surface: Level Tile  Device: Cane  Gait Deviations: Forward Flexed Posture, Slow Gretchen, Decreased Step Length Bilaterally, Decreased Gait Speed, Decreased Heel Strike Bilaterally, Decreased Foot Clearance Right, Decreased Foot Clearance Left, and Narrow Base of Support    Stairs:  None    Balance:  Dynamic Standing Balance: Stand By Assistance, Contact Guard Assistance  *Patient instructed in standing SL cone tapping without UE support and CGA with cueing for slowed speed. Patient initially performed 1 tap and progressed to 2 cones. Patient demonstrated slight unsteadiness however able to self correct. Patient instructed in SL cone tapping in order to assist with SL dynamic balance. *Patient instructed in standing on airex pad initially with normalized RICHIE and progressed to narrowed RICHIE without UE support while reaching outside RICHIE for bean bags with CGA. Patient demonstrated unsteadiness however no LOB. Patient instructed in standing dynamic balance in order  to assist with safety with functional mobility. Neuromuscular Re-education  None    Exercise:  Patient was guided in 1 set(s) 10 reps of exercises: Standing heel/toe raises, Standing marches, Standing hip abduction/adduction, Standing hip extension.   Exercises were completed for increased independence with functional mobility. Functional Outcome Measures:   Not completed    ASSESSMENT:  Assessment: Patient progressing toward established goals. Activity Tolerance:  Patient tolerance of  treatment: good. Equipment Recommendations:Equipment Needed: Yes (Patient will require cane for home)  Discharge Recommendations: Continue to assess pending progress, Patient would benefit from continued therapy after discharge Continue to assess pending progress and Patient would benefit from continued PT at discharge    Plan: Current Treatment Recommendations: Strengthening, ROM, Balance training, Functional mobility training, Transfer training, Endurance training, Gait training, Stair training, Neuromuscular re-education, Safety education & training, Patient/Caregiver education & training, Equipment evaluation, education, & procurement, Therapeutic activities  General Plan:  (5x/wk 60 min; 1x/wk 30 min)    Patient Education  Patient Education: Plan of Care, Functional Mobility, Reviewed Prior Education, Health Promotion and Wellness Education, Safety, Verbal Exercise Instruction,  - Patient Verbalized Understanding, - Patient Requires Continued Education    Goals:  Patient Goals : None Stated  Short Term Goals  Time Frame for Short Term Goals: 1 week  Short Term Goal 1: Patient to perform bed mobility supine<>sit with Mod I with HOB flat and no railings in order to assist with getting into and out of bed. Short Term Goal 2: Patient to perform sit to stand transfers without AD  and Supervision from various surfaces in order to assist wtih safety with transfers in home. Short Term Goal 3: Patient to ambulate >/=75 feet without AD with Supervision in order to assist with safety with home mobility. Short Term Goal 4: Patient to ascend/descend 1 step without railings with SBA in order to assist with home entry.   Short Term Goal 5: Patient to score >/=45/56 on the Flores Balance Test in order to assist with functional dynamic balance. Long Term Goals  Time Frame for Long Term Goals : 2 weeks from IPR evaluation  Long Term Goal 1: Patient to perform sit to stand transfers without AD with Mod I from various surfaces in order to assist with safety with home mobility. Long Term Goal 2: Patient to ambulate >/=150 feet without AD with Mod I in order to assist with home mobility. Long Term Goal 3: Patient to ambulate over uneven surfaces and in unfamiliar environment with use of cane and Mod I in order to assist with community mobility. Long Term Goal 4: Patient to ascend/descend 1 step without handrails with Mod I in order to assist with home entry. Long Term Goal 5: Patient to perform car transfer with Mod I in order to assist with community mobility. Additional Goals?: Yes  Long term goal 6: Patient to score >/=14/16 on the Dynamic Gait Index in order to assist with functional dynamic balance. Following session, patient left in safe position with all fall risk precautions in place.

## 2022-12-29 NOTE — PROGRESS NOTES
[facility] [place]  Speech - Language - Cognitive Evaluation + Clinical Swallow Evaluation    SLP Individual Minutes  Time In: 1000  Time Out: 1100  Minutes: 60  Timed Code Treatment Minutes: 30 Minutes     Speech, Language, Cognitive Evaluation: 20 minutes   Clinical Swallow Evaluation: 10 minutes  Cognitive tx: 30 minutes    Date: 2022  Patient Name: Baldo De León      CSN: [id number]   : 1960  (58 y.o.)  Gender: male   Referring Physician:  Papi Schuster MD  Diagnosis: Debility  Precautions: Fall Risk, Aspiration Risk   History of Present Illness/Injury: Patient admitted with above diagnosis. Per chart review, Baldo De León is a 58 y.o. right-handed  male with history of history of hypertension, hyperlipidemia, recurrent multiple primary melanomas (719,7048, ) including a [id number] melanoma of the left flank s/p excision with left axillary lymph node dissection, is admitted on 2022 for intensive inpatient management of impairment & disability secondary to debility/physical decondition secondary to extensive multiple metastatic melanoma to his brain, cervical spine, musculature, and brachial plexus. The patient presented to [address] on 12/3/2022 with progressive weakness, dysphagia and facial drooping developed about 3 weeks prior to his visit. He was found to be hypotensive and was admitted [facility]. He was suspected to have adrenal insufficiency. Endocrinology was consulted. Blood culture from chemotherapy port done on 12/3/2022 revealed Clostridium. Therefor the port was removed on 2022. He was treated IV fluid, antibiotic (Unasyn for 14 days) and steroid.   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 MRI of cervical spine done on 12/16/2022 revealed diffuse osseous metastasis disease in cervical spine and upper thoracic spine, mild right ventral epidural tumor at C7 extending into right C7-T1 neuroforamen, intramedullary enhancing lesion within central cervical spinal cord at C5-6 and right C6-7 cervical spinal cord. Repeated MRI of brain done on 12/16/2022 revealed enhancing lesions in right frontal lobe and throughout the supratentorial and infratentorial brain, within the right  musculature, bilateral temporalis musculature, bilateral extraocular musculature, and within the calvarium and skull base. Because of extensive metastatic malignancy, oral chemotherapy (tafinlar + mekinist) was started on 12/8/2022. He was later transferred out of MICU to oncology service floor for further care. Because of left upper extremity swelling, bilateral upper extremities venous duplex study was done on 12/16/2022 and showed no acute DVT but acute superficial venous thrombosis in right upper extremity. MRI of brachial plexus done on 12/22/2022 show extensive metastatic disease involving osseous structures, multiple muscles including bilateral scapular intrinsic muscles and paraspinal muscles. The left upper extremity weakness and edema was thought to be due to tumor infiltrating brachial plexus and radiation induced plexopathy. His hospital course was also complicated by tumor lysis syndrome, acute renal failure, and dysphagia with severe protein calorie malnutrition. The patient says he feels well this morning. He had a good sleep last night. He still has fatigue with generalized weakness but not severe in degree. His left hand still has numbness and tingling feeling. His left hand fingers is also still weak.   He is unable to extend or abduct his left hand third, fourth and fifth fingers. He denies having any painful symptom this morning. He denies having diarrhea, nausea or vomiting. He is starting the intensive inpatient rehabilitation treatment today. The patient yesterday informed me that additional chemotherapy medications ([address]) will be mailed to his mother's home. \"     ST consulted to complete clinical swallow evaluation and [id number] evaluation to assess current function and update POC as clinically indicated. Past Medical History:   Diagnosis Date    Hyperlipidemia     In 2010s    Hypertension 2014    Hypothyroidism 2017    Melanoma ([facility] [address]     brain, spine, bone, muscle       Pain: 3/10 - Pain location: low back - patient not requesting pain medication at this time. Subjective:  Patient awake in recliner upon ST arrival. Patient agreeable to skilled ST services. Pleasant, cooperative, and engaged throughout. SOCIAL HISTORY:   Living Arrangements: home, alone - girlfriend, 3 daughters, 8 grandchildren, mother, and friends in the area willing to provide support. 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    Hobbies: Outdoors, being around people   Vision Status: reading glasses   Hearing: mild hearing loss, no assistive hearing devices     Type of Home: House  Home Layout: One level  Home Access: Stairs to enter without rails  Entrance Stairs - Number of Steps: 1 (Patient reports its a 7inch step)  Home Equipment:  (Patient reports his mother has a RW and cane however he does not)    SPEECH / VOICE:  Speech and Voice appear to be grossly intact for basic and complex daily communication    LANGUAGE:  Receptive:  Receptive language skills appear to be grossly intact for basic and complex daily communication. Expressive:  Expressive language skills appear to be grossly intact for basic and complex daily communication. COGNITION:  [place] Cognitive Assessment [facility]) version 7.1 completed. Patient scored 24/30. Normal is greater than or equal to 26/30. Orientation:   Immediate Recall: Trial 1 & 2:  independently    Short-Term Recall:  independently, 3/ given FO2   Divergent Namin words/minute (target = 11 words/minute)   Reasonin/2  Thought Organization:   Attention:   Math Computation: 3/3  Executive Functionin/5     SWALLOWING:    Respiratory Status: Room Air      Behavioral Observation: Alert and Oriented    CRANIAL NERVE ASSESSMENT   CN V (Trigeminal) Closes and Opens Mandible WFL    Rotary Jaw Movement WFL      CN VII (Facial) Cheeks Hold Food out of Sulci WFL    Opens, Closes/Seals, Protrudes, Retracts Lips WFL    General Appearance WFL    Sensation WFL      CN X (Vagus - Pharyngeal) Raises Back of Tongue WFL      CN XI (Accessory) Lifts Soft Palate WFL      CN XII (Hypoglossal) Elevates Tongue Up and Back WFL    Protrusion   WFL    Lateralizes Tongue WFL    Sensation Not Tested      Other Observations Dentition WFL     Vocal Quality WFL     Cough WFL      PATIENT WAS EVALUATED USING:  Thin Liquids, Puree, and Coarse Solids    ORAL PHASE:  WFL    PHARYNGEAL PHASE:  WFL:  Pharyngeal phase appears WFL but cannot rule out pharyngeal phase deficits from a bedside swallowing evaluation alone. SIGNS AND SYMPTOMS OF LARYNGEAL PENETRATION / ASPIRATION:  No signs/symptoms of aspiration evident in this evaluation, but cannot rule out silent aspiration. INSTRUMENTAL EVALUATION: Instrumental evaluation not indicated at this time.     DIET RECOMMENDATIONS:  Regular and Thin Liquids     STRATEGIES: Full Upright Position, Small Bite/Sip, Medications Whole with Thin, and Alternate Solids and Liquids     Comprehension: 7 - Patient understands complex ideas (math/planning)  Expression: 7 - Patient expresses complex ideas/needs  Social Interaction: 7 - Patient has appropriate behavior/relations 100% of the time  Problem Solvin - Patient able to solve simple/routine tasks  Memory: 5 - Patient requires prompting with stress/unfamiliar situations    RECOMMENDATIONS/ASSESSMENT:  DIAGNOSTIC IMPRESSIONS:    Clinical Swallow Evaluation: Patient presents with oral phase of swallow function that is essentially [facility] with inability to fully discern potential presence of pharyngeal phase deficits without formal instrumentation. All labial/lingual structures intact and appear to be functioning appropriately at bedside. Oral phase highly unremarkable during consumption of hard/textured solids with patient demonstrating adequate mastication pattern for textural breakdown, cohesive bolus formation, and manipulation. Thin liquids consumed without overt difficulty and with suspected adequate bolus control/containment of fluid bolus. NO overt s/s aspiration exhibited across all consistencies/trials consumed, certainly not able to exclude pharyngeal phase dysfunction and/or airway invasion events in its entirety at bedside alone; however, patient's swallow physiology does appear appropriate to support PO intake without distress. Instrumental evaluation is not warranted at this time. Recommend continuation of regular diet with thin liquids. No further skilled ST services are warranted at this time r/t dysphagia management given baseline status of swallow function achieved; please re-consult should further needs be identified. Speech, Language, Cognitive Evaluation: Patient presents with a mild cognitive-linguistic impairment characterized by a score of 24/30 on the [address] [place] [address] with deficits noted within delayed recall, high level executive functioning, and verbal reasoning. Expressive and receptive language appear [facility] for basic and complex daily communication.  No dysarthria or dysphonia with speech intelligibility approximating 100%. Patient would greatly benefit from continued skilled ST services to address aforementioned deficits in order to resume ADL/IADL completion and work-related tasks independently upon discharge. Rehabilitation Potential: excellent  Discharge Recommendations: Continue to Assess Pending Progress    EDUCATION:  Learner: Patient  Education:  Reviewed results and recommendations of this evaluation, Reviewed diet and strategies, Reviewed ST goals and Plan of Care, and Education Related to [employer] and Wellness  Evaluation of Education: [employer] understanding    PLAN:  Skilled SLP intervention on IP Rehab 30 minutes per day 5 days per week. Specific interventions for next session may include: recall, finances, medication     PATIENT GOAL:    Return to prior level of function. SHORT TERM GOALS:  Short Term Goals  Time Frame for Short Term Goals: 1 week  Goal 1: Patient will complete high level/complex executive functioning and verbal reasoning tasks (medications, finances, work-related tasks) with 85% accuracy given min cues to resume ADL/IADLs and work-related tasks independently  INTERVENTIONS:   Medication Management - Pill box review   5/7 independently, 2/7 given min cues    Money Management - [employer]   5/5 independently     Goal 2: Patient will complete recall tasks (immediate, delayed, working) with 80% accuracy given min cues to improve recall of pertinent daily and medical information. INTERVENTIONS:   Immediate/Delayed Recall - Appointment   ST completed review of STM strategies using the acronym WRAP--Write it down, Repeat it, Associate it, and Pictures it.    Wednesday 5/10/23 at 2:00 PM Yearly physical with Dr. Ana Laura Brady     Immediate Recall: 6/6 independently   Delayed Recall (20 minutes): 6/6 independently       LONG TERM GOALS:  Long Term Goals  Time Frame for Long Term Goals: 2 weeks from evaluation  Goal 1: Patient will improve cognitive-linguistic skills to an independent level in order to resume ADL/IADLs and work-related tasks independently      [facility]) [address]

## 2022-12-29 NOTE — PROGRESS NOTES
[address] [address]  Acute Inpatient Rehab Preadmission Assessment     Patient Name: Jorene Olszewski        Ethnicity:Not of , Irine Nadia, or Wolof origin  Race:White  MRN:   [id number]    : 1960  (64 y.o.)  Gender: male      Admitted from: [facility]  Initial Assessment     Date of admission to the hospital: 22    Date patient eligible for admission:2022     Primary Diagnosis: debility      Did patient have surgery?  no     Physicians: Francy Mcgee MD, Dr Ross Sears for clinical complications/co-morbidities:   Past Medical History        Past Medical History:   Diagnosis Date    Hyperlipidemia      Hypertension      Melanoma [facility])              Financial Information  Primary insurance:  [employer]:   Drug Advoc[employer] program     Has the patient had two or more falls in the past year or any fall with injury in the past year? no     Did the patient have major surgery during the 100 days prior to admission?   no     Precautions:   falls and seizures       Isolation Precautions: Contact                  Physiatrist: Dr. Iliana Bernabe     Patients Occupation: Employed full time  Reviewed Lab and Diagnostic reports from Current Admission: Yes     Patients Prior Functional  Level:  independant     Current functional status for upper extremity ADLs: minimal assist     Current functional status for lower extremity ADLs: minimal assist     Current functional status for bed, chair, wheelchair transfers: CGA x1     Current functional status for toilet transfers: CGA x1     Current functional status for locomotion: CGA with follow by w/c     Current functional status for bladder management: Minimal contact assistance     Current functional status for bowel management:Minimal contact assistance     Current functional status for comprehension: Supervision     Current functional status for expression: Supervision     Current functional status for social interaction: Supervision     Current functional status for problem solving: Supervision     Current functional status for memory: Supervision     Expected level of Improvement in Self-Care:  Modified independence     Expected level of Improvement in Sphincter Control:  Modified independence     Expected level of Improvement in Transfers: Modified independence     Expected level of Improvement in Locomotion:  Modified independence     Expected level of Improvement in Communication and Social Cognition: Modified independence     Expected length of time to achieve that level of improvement: 2 weeks     Current rehab issues: ADL dysfunction,bladder management,bowel management,carry over of therapy techniques, discharge planning, disease and co-morbidity management, gait/mobility dysfunction, medication management, nutrition and hydration management,Ongoing assessment of safety, Pain management, Patient and family education, Prevention of secondary complications, Skin Integrity, NWB,TTWB, PWB,cognitive impairment, communication impairment. Required therapy: Physical Therapy, Occupational Therapy and Speech Therapy 3 hours per day, 5-6 days per week. Recreational Therapy 1 hour per week. Expected Discharge Destination: Home     Expected Post Discharge Treatments: Home Care     Other information relevant to the care needs:      Acute Inpatient Rehabilitation Disclosure Statement provided to patient. Patient verbalized understanding. I have reviewed and concur with the findings and results of the pre-admission screening assessment completed by the Inpatient Rehabilitation Admissions Coordinator.      Kar Aranda MD                 Revision History  Date/Time User Provider Type Action   12/28/2022  3:08 PM Larey Eisenmenger, MD Physician Sign   12/28/2022  1:28 PM Tommie Mak RN Registered Nurse Sign    View Details Report

## 2022-12-29 NOTE — PROGRESS NOTES
Physical Medicine & Rehabilitation Progress Note    Chief Complaint:  Fatigue, generalized weakness, weight loss    Subjective:    Baldo De León is a 58 y.o. right-handed  male with history of history of hypertension, hyperlipidemia, recurrent multiple primary melanomas (2611,6288, 2013) including a [id number] melanoma of the left flank s/p excision with left axillary lymph node dissection, is admitted on 12/28/2022 for intensive inpatient management of impairment & disability secondary to debility/physical decondition secondary to extensive multiple metastatic melanoma to his brain, cervical spine, musculature, and brachial plexus. The patient presented to [address] on 12/3/2022 with progressive weakness, dysphagia and facial drooping developed about 3 weeks prior to his visit. He was found to be hypotensive and was admitted to MICU at the [facility] on 12/3/2022. He was suspected to have adrenal insufficiency. Endocrinology was consulted. Blood culture from chemotherapy port done on 12/3/2022 revealed Clostridium. Therefor the port was removed on 12/4/2022. He was treated IV fluid, antibiotic (Unasyn for 14 days) and steroid. MRI of the brain done on 12/8/2022 revealed no acute intracranial abnormality, old left thalamic insult with chronic small vessel disease, and enlarged bilateral extraocular muscle & left orbital superomedial soft tissue lesions suspicious for metastasis. MRI of cervical spine done on 12/16/2022 revealed diffuse osseous metastasis disease in cervical spine and upper thoracic spine, mild right ventral epidural tumor at C7 extending into right C7-T1 neuroforamen, intramedullary enhancing lesion within central cervical spinal cord at C5-6 and right C6-7 cervical spinal cord.   Repeated MRI of brain done on 12/16/2022 revealed enhancing lesions in right frontal lobe and throughout the supratentorial and infratentorial brain, within the right  musculature, bilateral temporalis musculature, bilateral extraocular musculature, and within the calvarium and skull base. Because of extensive metastatic malignancy, oral chemotherapy (tafinlar + mekinist) was started on 12/8/2022. He was later transferred out of MICU to oncology service floor for further care. Because of left upper extremity swelling, bilateral upper extremities venous duplex study was done on 12/16/2022 and showed no acute DVT but acute superficial venous thrombosis in right upper extremity. MRI of brachial plexus done on 12/22/2022 show extensive metastatic disease involving osseous structures, multiple muscles including bilateral scapular intrinsic muscles and paraspinal muscles. The left upper extremity weakness and edema was thought to be due to tumor infiltrating brachial plexus and radiation induced plexopathy. His hospital course was also complicated by tumor lysis syndrome, acute renal failure, and dysphagia with severe protein calorie malnutrition. The patient says he feels well this morning. He had a good sleep last night. He still has fatigue with generalized weakness but not severe in degree. His left hand still has numbness and tingling feeling. His left hand fingers is also still weak. He is unable to extend or abduct his left hand third, fourth and fifth fingers. He denies having any painful symptom this morning. He denies having diarrhea, nausea or vomiting. He is starting the intensive inpatient rehabilitation treatment today. The patient yesterday informed me that additional chemotherapy medications ([facility]) will be mailed to his mother's home. Rehabilitation:  PT: Reviewed. Initial evaluation in progress and pending      OT: Reviewed. Initial evaluation in progress and pending      ST: Reviewed.     Initial evaluation in progress and pending      Review of Systems:  Review of Systems Constitutional:  Positive for fatigue. Negative for chills, diaphoresis and fever. HENT:  Positive for trouble swallowing. Negative for hearing loss, rhinorrhea, sneezing and sore throat. Eyes:  Negative for visual disturbance. Respiratory:  Negative for cough, shortness of breath and wheezing. Cardiovascular:  Negative for chest pain and palpitations. Gastrointestinal:  Negative for abdominal pain, constipation, diarrhea, nausea and vomiting. Genitourinary:  Negative for dysuria. Musculoskeletal:  Positive for gait problem. Negative for back pain. Skin:  Negative for rash. Neurological:  Positive for weakness (Generalized especially bilateral lower extremities and left hand) and numbness (Left hand). Negative for dizziness, tremors, facial asymmetry, speech difficulty, light-headedness and headaches. Psychiatric/Behavioral:  Negative for dysphoric mood, hallucinations and sleep disturbance. The patient is not nervous/anxious.          Objective:  BP 92/67   Pulse 100   Temp 98.2 °F (36.8 °C) (Oral)   Resp 16   Ht 6' 0.25\" (1.835 m)   Wt 183 lb 3 oz (83.1 kg)   SpO2 98%   BMI 24.67 kg/m²   Physical Exam   General:  well-developed, well nourished  male; in no acute distress ; appropriate affect & mood; lying on bed comfortably  Eyes: pupil equally round ; extra-ocular motion intact bilaterally; impaired accommodation with seeing near object; mild dark skin discoloration/ecchymosis at left lower orbit  Head, Ear, Nose, Mouth & Throat : normocephalic ; no discharge from ears or nose ; mild facial muscle atrophy; no deformity ; no facial swelling ; oral mucosa pink   Neck :  supple ; no tenderness ; no muscle spasm  Cardiovascular : regular rate & rhythm ; normal S1 & S2 heart sound ; no murmur ; normal peripheral pulse at the bilateral upper extremities; significantly reduced pulse strength at the bilateral lower extremities ankle and foot secondary to edema  Pulmonary : Breath sounds present at bilateral lung field; no wheezing ; no rale; no crackle  Gastrointestinal : soft, flat abdomen without tenderness ; normal bowel sound present   Back : no tenderness; no muscle spasm  Skin: no skin lesion or rash ; no pitting edema at right extremity; 1+ to 2+ pitting edema at distal left forearm; presence of nonpitting swelling at left hand; 2+ pitting edema at bilateral ankles  Musculoskeletal : no limb asymmetry; no limb deformity; no tenderness at bilateral upper & lower extremities; no palpable mass at limbs ; no joints laxity or crepitation ; right shoulder flexion and abduction passive ROM reaching 150 degrees; left shoulder passive following reaching 90 degree in flexion and abduction, and 40 degrees in external rotation; ankle dorsiflexion passive ROM reaching 15 degrees bilaterally; normal functional joints ROM at the rest of bilateral upper & lower extremities  Cerebral :  alert ; awake ; oriented to place, person and time; follow 1-2 step verbal command  Cerebellum : no dysmetria with right finger-to-nose test ; very mild dysmetria with left finger-to-nose test; mild dysmetria with bilateral heel-to-shin test  Cranial Nerves :  grossly intact CN II to XII function  Sensory : Reduced light touch and pinprick sensation at entire left hand compared to right side; intact light touch and pin prick sensation at bilateral lower extremities  Motor : normal tone at bilateral upper & lower extremities ; 4+/5 to 5/5 muscle strength at bilateral shoulder abduction and flexion; 1/5 to 2-/5 muscle strength at left 3rd-5th fingers extension; 3+/5 to 4-/5 muscle strength at left thumb and second finger extension; 2-/5 muscle strength at the left finger abduction; 4-/5 to 4+/5 muscle strength at the left finger flexion; 4-/5 muscle strength at the left hand ; 4+/5 muscle strength at right finger abduction and handgrip; 4-/5 muscle strength at the bilateral hip flexion, and adduction; 4+/5 muscle strength at bilateral hip abduction; 4-/5 muscle strength at bilateral knee flexion; 4+5 to 5/5 muscle strength at bilateral knee extension normal 5/5 muscle strength at the rest of bilateral upper & lower extremities  Reflex : 0 bilateral biceps, bilateral brachial radialis, bilateral knees reflexes   Pathological Reflex :  No Radha's sign ;  no ankle clonus  Gait : Not assessed      Diagnostics:   Recent Results (from the past 24 hour(s))   POCT glucose    Collection Time: 12/28/22  4:34 PM   Result Value Ref Range    POC Glucose 151 (H) 70 - 108 mg/dl   MRSA by PCR    Collection Time: 12/28/22  4:56 PM    Specimen: Nares   Result Value Ref Range    MRSA SCREEN RT-PCR NEGATIVE    VRE Screen by PCR    Collection Time: 12/28/22  4:56 PM    Specimen: Rectal Swab   Result Value Ref Range    Vancomycin Resistant Enterococcus POSITIVE (A)    POCT glucose    Collection Time: 12/28/22  9:09 PM   Result Value Ref Range    POC Glucose 217 (H) 70 - 108 mg/dl   Comprehensive Metabolic Panel w/ Reflex to MG    Collection Time: 12/29/22  6:21 AM   Result Value Ref Range    Glucose 95 70 - 108 mg/dL    Creatinine 0.4 0.4 - 1.2 mg/dL    BUN 10 7 - 22 mg/dL    Sodium 138 135 - 145 meq/L    Potassium reflex Magnesium 4.4 3.5 - 5.2 meq/L    Chloride 100 98 - 111 meq/L    CO2 26 23 - 33 meq/L    Calcium 8.1 (L) 8.5 - 10.5 mg/dL    AST 31 5 - 40 U/L    Alkaline Phosphatase 184 (H) 38 - 126 U/L    Total Protein 6.0 (L) 6.1 - 8.0 g/dL    Albumin 3.0 (L) 3.5 - 5.1 g/dL    Total Bilirubin 0.3 0.3 - 1.2 mg/dL    ALT 19 11 - 66 U/L   Prealbumin    Collection Time: 12/29/22  6:21 AM   Result Value Ref Range    Prealbumin 11.5 (L) 20.0 - 40.0 mg/dl   CBC auto differential    Collection Time: 12/29/22  6:21 AM   Result Value Ref Range    WBC 4.2 (L) 4.8 - 10.8 thou/mm3    RBC 3.41 (L) 4.70 - 6.10 mill/mm3    Hemoglobin 9.3 (L) 14.0 - 18.0 gm/dl    Hematocrit 29.4 (L) 42.0 - 52.0 %    MCV 86.2 80.0 - 94.0 fL    MCH 27.3 26.0 - 33.0 pg MCHC 31.6 (L) 32.2 - 35.5 gm/dl    RDW-CV 19.8 (H) 11.5 - 14.5 %    RDW-SD 61.1 (H) 35.0 - 45.0 fL    Platelets 947 469 - 940 thou/mm3    MPV 8.6 (L) 9.4 - 12.4 fL    Seg Neutrophils 66.3 %    Lymphocytes 16.6 %    Monocytes 12.1 %    Eosinophils 1.7 %    Basophils 0.7 %    Immature Granulocytes 2.6 %    Segs Absolute 2.8 1.8 - 7.7 thou/mm3    Lymphocytes Absolute 0.7 (L) 1.0 - 4.8 thou/mm3    Monocytes Absolute 0.5 0.4 - 1.3 thou/mm3    Eosinophils Absolute 0.1 0.0 - 0.4 thou/mm3    Basophils Absolute 0.0 0.0 - 0.1 thou/mm3    Immature Grans (Abs) 0.11 (H) 0.00 - 0.07 thou/mm3    nRBC 0 /100 wbc   Lipid Panel    Collection Time: 12/29/22  6:21 AM   Result Value Ref Range    Cholesterol, Total 136 100 - 199 mg/dL    Triglycerides 144 0 - 199 mg/dL    HDL 30 mg/dL    LDL Calculated 77 mg/dL   Anion Gap    Collection Time: 12/29/22  6:21 AM   Result Value Ref Range    Anion Gap 12.0 8.0 - 16.0 meq/L   Glomerular Filtration Rate, Estimated    Collection Time: 12/29/22  6:21 AM   Result Value Ref Range    Est, Glom Filt Rate >60 >60 ml/min/1.73m2   POCT glucose    Collection Time: 12/29/22  7:28 AM   Result Value Ref Range    POC Glucose 104 70 - 108 mg/dl         Impression:  Debility/physical decondition secondary to extensive widespread metastatic malignant melanoma causing dysphagia, fatigue, generalized weakness, left hand sensory deficit and weakness  Left upper extremity edema with left hand weakness and sensory deficit due to tumor infiltrating and radiation induced brachial plexopathy  Left shoulder contracture  Improving dysphagia with malnutrition secondary to metastatic melanoma to  muscles  Widespread metastatic melanoma to brain, cervical spine, musculature of face and neck, bilateral scapula, thoracic and lumbar spine, intra-abdominal structures, and subcutaneous tissue  Adrenal insufficiency with hypotension  History of hypertension  Hyperlipidemia  Hypothyroidism  Tumor lysis syndrome    The patient's condition is stable. He was admitted to rehab yesterday late afternoon. He is starting the intensive inpatient rehabilitation treatment today. He continues to have significant left hand weakness and loss of sensation. He also has generalized weakness especially the lower extremities. Plan:  Continues intensive PT/OT/SLP/RT inpatient rehabilitation program at least 3 hours per day, 5 days per week in order to improve functional status prior to discharge. Family education and training will be completed. Equipment evaluations and recommendations will be completed as appropriate. Rehabilitation nursing continues to be involved for bowel, bladder, skin, and pain management. Nursing will also provide education and training to patient and family. Prophylaxis:  DVT: Lovenox, JUAN A stocking, intermittent pneumatic compression device. GI: Colace, Senokot, GlycoLax as needed, milk of magnesium as needed, Dulcolax suppository as needed; add Movantik  Pain: Lidocaine patch Tylenol as needed, scheduled oxycodone and as needed  Continue allopurinol for tumor lysis syndrome  Continue aspirin for cardioprotection  Continue Lipitor for hyperlipidemia  Continue prednisone  Continue Bactrim 3 times weekly for infection prevention  Continue trametinib & dabrafenib for metastatic cancer (melanoma) --  patient to take his own medications  Continue Florinef for hypotension secondary to adrenal insufficiency  Continue Humalog insulin coverage for hyperglycemia  Continue levothyroxine for hypothyroidism  Continue melatonin for insomnia; add trazodone as needed for insomnia  Nutrition:  Consultation to dietician for nutritional counseling and recommendations.    Bladder: Monitoring signs or symptoms of UTI  Bowel: Monitoring signs or symptoms of constipation   and case management consultations for coordination of care and discharge planning      Missed Therapy Time:  None      Yanely Ambrocio MD

## 2022-12-29 NOTE — PLAN OF CARE
Problem: Safety - Adult  Goal: Free from fall injury  Outcome: Progressing  Flowsheets (Taken 12/29/2022 1326)  Free From Fall Injury: Instruct family/caregiver on patient safety     Problem: ABCDS Injury Assessment  Goal: Absence of physical injury  Outcome: Progressing  Flowsheets (Taken 12/29/2022 1326)  Absence of Physical Injury: Implement safety measures based on patient assessment     Problem: Skin/Tissue Integrity  Goal: Absence of new skin breakdown  Description: 1. Monitor for areas of redness and/or skin breakdown  2. Assess vascular access sites hourly  3. Every 4-6 hours minimum:  Change oxygen saturation probe site  4. Every 4-6 hours:  If on nasal continuous positive airway pressure, respiratory therapy assess nares and determine need for appliance change or resting period.   Outcome: Progressing     Problem: Pain  Goal: Verbalizes/displays adequate comfort level or baseline comfort level  Outcome: Progressing

## 2022-12-29 NOTE — PROGRESS NOTES
[facility]      Date:  12/29/2022            Patient Name: Zaida Drake           MRN: [id number]  Acct: [de-identified]          YOB: 1960 (64 y.o.)       Gender: male   Diagnosis: Debility  Physician: Referring Practitioner: Dr. Lidya Hummel:  Pt visiting with several family members this afternoon-will evaluate for RT tomorrow     [place], [place]    12/29/2022

## 2022-12-29 NOTE — PROGRESS NOTES
[address] [facility] PHYSICAL THERAPY  EVALUATION  [facility]    Time In: 08  Time Out: 0935  Timed Code Treatment Minutes: 53 Minutes  Minutes: 75          Date: 2022  Patient Name: Radha Bryant,  Gender:  male        MRN: [id number]  : 1960  (58 y.o.)      Referring Practitioner: Romelia Lowe MD  Diagnosis: Debility  Additional Pertinent Hx: Per H&P Radha Bryant  is a 58 y.o. 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 The patient presented to [address] on 12/3/2022 with progressive weakness, dysphagia and facial drooping developed about 3 weeks prior to his visit. He was found to be hypotensive and was admitted to MICU at the [facility] on 12/3/2022. 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 MRI of cervical spine done on 2022 revealed diffuse osseous metastasis disease in cervical spine and upper thoracic spine, mild right ventral epidural tumor at C7 extending into right C7-T1 neuroforamen, intramedullary enhancing lesion within central cervical spinal cord at C5-6 and right C6-7 cervical spinal cord.   Repeated MRI of brain done on 2022 revealed enhancing lesions in right frontal lobe and throughout the supratentorial and infratentorial brain, within the right  musculature, bilateral temporalis musculature, bilateral extraocular musculature, and within the calvarium and skull base. Because of extensive metastatic malignancy, oral chemotherapy (tafinlar + mekinist) was started on 12/8/2022. Because of left upper extremity swelling, bilateral upper extremities venous duplex study was done on 12/16/2022 and showed no acute DVT but acute superficial venous thrombosis in right upper extremity. MRI of brachial plexus done on 12/22/2022 show extensive metastatic disease involving osseous structures, multiple muscles including bilateral scapular intrinsic muscles and paraspinal muscles. The left upper extremity weakness and edema was thought to be due to tumor infiltrating brachial plexus and radiation induced plexopathy. His hospital course was also complicated by tumor lysis syndrome, acute renal failure, and dysphagia with severe protein calorie malnutrition. \"     Restrictions/Precautions:  Restrictions/Precautions: Contact Precautions, Fall Risk    Subjective:  Chart Reviewed: Yes  Patient assessed for rehabilitation services?: Yes  Family / Caregiver Present: No  Subjective: Patient seated in recliner upon arrival. Patient is pleasant and agreeable to therapy.     General:     Vision: Impaired  Vision Exceptions: Wears glasses for reading  Hearing: Exceptions to [facility]  Hearing Exceptions: Hard of hearing/hearing concerns       Pain: 0/10    Vitals: Vitals not assessed per clinical judgement, see nursing flowsheet    Social/Functional History:    Lives With: Alone  Type of Home: House  Home Layout: One level  Home Access: Stairs to enter without rails  Entrance Stairs - Number of Steps: 1 (Patient reports its a 7inch step)  Home Equipment:  (Patient reports his mother has a RW and cane however he does not)     Bathroom Shower/Tub: Walk-in shower  Bathroom Toilet: Handicap height  Bathroom Equipment:  (suction cup grab bar in back bathroom shower)    Receives Help From: Family  ADL Assistance: Independent  Homemaking Assistance: Independent  Ambulation Assistance: Independent  Transfer Assistance: Independent    Active : Yes  Occupation: Full time employment  Type of Occupation: Shop  (computer work) at [employer]  Additional Comments: Patient reports prior to hospital admission patient was independent with all ADL's/IADL's without use of AD for mobility. 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 Patient reports he plans to go to his mothers house for a few weeks( 1 story home, walk in shower, handicap toilets, grab bars in bathroom) after discharge. Patient reports his mother is able to help with tasks in home. Patient reports his mother has a shower chair in her shower. OBJECTIVE:  Range of Motion:  Right Lower Extremity: WFL  Left Lower Extremity: WFL    Strength:  Right Lower Extremity: Impaired - Ankle DF/PF 4/5, Quadriceps/ Hamstrings 4-/5, Hip Flexor 3+/5  Left Lower Extremity: Impaired - Ankle DF/PF 4/5, Quadriceps/Hamstrings 4-/5, Hip Flexor 3+/5    Balance:  Static Sitting Balance:  Modified Independent  Dynamic Sitting Balance: Supervision  Static Standing Balance: Stand By Assistance  Dynamic Standing Balance: Stand By Assistance, Contact Guard Assistance  *Patient instructed in Constantin Keller Balance Test (see score below)  *Patient instructed in stepping through ladder without UE support with SBA and increased time required to weight shift laterally with slight unsteadiness however no LOB.     Bed Mobility:  Rolling to Left: Modified Independent   Rolling to Right: Modified Independent   Supine to Sit: Minimal Assistance  Sit to Supine: Stand By Assistance   *Patient performed on mat table with HOB flat and no railings     Transfers:  Sit to Stand: Stand By Assistance  Stand to Sit:Stand By Assistance  *Patient instructed in sit to stand transfers from various surfaces including: mat table and wheelchair without AD. To/From Bed and Chair: Stand By Assistance without AD    Ambulation:  Stand By Assistance  Distance: 20 feet x2  Surface: Level Tile  Device:Cane  Gait Deviations: Forward Flexed Posture, Slow Gretchen, Decreased Step Length Bilaterally, Decreased Gait Speed, Decreased Heel Strike Bilaterally, and Narrow Base of Support  *Patietn given education on use of cane when fatigued and for long distances at this time for safety with patient demonstrating understanding    Stand By Assistance, [facility] Assistance  Distance: 20 feet x4  Surface: Level Tile  Device:No Device  Gait Deviations: Forward Flexed Posture, Slow Gretchen, Decreased Step Length Bilaterally, Decreased Gait Speed, Decreased Heel Strike Bilaterally, and Narrow Base of Support    Stairs:  Contact Guard Assistance  Number of Steps: 4  Height: 6\" step with Bilateral Handrails  *Patient required to perform with step two pattern with close CGA      Exercise:  *Patient instructed in 6inch forward and lateral step ups x10 reps each leg each direction in order to assist with functional mobility. Functional Outcome Measures: Completed  Coleman Balance Score: 38  COLEMAN BALANCE TEST SCORING   Score of < 45 indicates a greater risk of falling  41-56= low fall risk  21-40= medium fall risk (recommendation of walking with assist at all times)  0-20= high fall risk    Dynamic Gait Total Score: 8  DGI Interpretation:  < 19/24 = predictive of falls in the elderly  > 22/24 = safe ambulators       ASSESSMENT:  Activity Tolerance:  Patient tolerance of  treatment: good. Treatment Initiated: Treatment and education initiated within context of evaluation.   Evaluation time included review of current medical information, gathering information related to past medical, social and functional history, completion of standardized testing, formal and informal observation of tasks, assessment of data and development of plan of care and goals. Treatment time included skilled education and facilitation of tasks to increase safety and independence with functional mobility for improved independence and quality of life. Assessment: Body Structures, Functions, Activity Limitations Requiring Skilled Therapeutic Intervention: Decreased functional mobility , Decreased safe awareness, Decreased ROM, Decreased endurance, Decreased body mechanics, Decreased strength, Decreased balance, Decreased posture  Assessment: Patient is a 58 y.o male who presents after prolonged hospitalization with weakness in LUE and generalized weakness resulting in increased difficulty with functional mobility. Patient requires SBA for sit to supine and Minimal assistance for supine to sit to the left due to decreased core  and LUE strength. Patient requires SBA for sit to stand transfers and SBA for ambulation with cane and SBA to [facility] for ambulation without AD with slowed gait speed. Patient scored a 38/56 on the Flores balance test and 8/24 on the Dynamic Gait index indicating he is a moderate risk for falls. Patient overall can benefit from skilled PT treatment in order to assist with BLE strengthening, gait training, transfer training, bed mobility ,core strengthening, dynamic balance and endurance training for increased functional mobility. Therapy Prognosis: Good         Discharge Recommendations:  Discharge Recommendations: Continue to assess pending progress, Patient would benefit from continued therapy after discharge    Patient Education:  Education  Education Given To: Patient  Education Provided: Role of Therapy, Mobility Training, Plan of Care, Transfer Training, Precautions, Safety  Education Method: Demonstration, Verbal  Barriers to Learning: None  Education Outcome: Verbalized understanding, Demonstrated understanding, Continued education needed   .             Equipment Recommendations:  Equipment Needed: Yes (Patient will require cane for home)    Plan:  Current Treatment Recommendations: Strengthening, ROM, Balance training, Functional mobility training, Transfer training, Endurance training, Gait training, Stair training, Neuromuscular re-education, Safety education & training, Patient/Caregiver education & training, Equipment evaluation, education, & procurement, Therapeutic activities  General Plan:  (5x/wk 60 min; 1x/wk 30 min)    Goals:  Patient Goals : None Stated  Short Term Goals  Time Frame for Short Term Goals: 1 week  Short Term Goal 1: Patient to perform bed mobility supine<>sit with Mod I with HOB flat and no railings in order to assist with getting into and out of bed. Short Term Goal 2: Patient to perform sit to stand transfers without AD  and Supervision from various surfaces in order to assist wtih safety with transfers in home. Short Term Goal 3: Patient to ambulate >/=75 feet without AD with Supervision in order to assist with safety with home mobility. Short Term Goal 4: Patient to ascend/descend 1 step without railings with SBA in order to assist with home entry. Short Term Goal 5: Patient to score >/=45/56 on the Flores Balance Test in order to assist with functional dynamic balance. Long Term Goals  Time Frame for Long Term Goals : 2 weeks from IPR evaluation  Long Term Goal 1: Patient to perform sit to stand transfers without AD with Mod I from various surfaces in order to assist with safety with home mobility. Long Term Goal 2: Patient to ambulate >/=150 feet without AD with Mod I in order to assist with home mobility. Long Term Goal 3: Patient to ambulate over uneven surfaces and in unfamiliar environment with use of cane and Mod I in order to assist with community mobility. Long Term Goal 4: Patient to ascend/descend 1 step without handrails with Mod I in order to assist with home entry. Long Term Goal 5: Patient to perform car transfer with Mod I in order to assist with community mobility.   Additional Goals?: Yes  Long term goal 6: Patient to score >/=14/16 on the Dynamic Gait Index in order to assist with functional dynamic balance. Following session, patient left in safe position with all fall risk precautions in place.

## 2022-12-29 NOTE — PROGRESS NOTES
[facility]  EVALUATION    Time:    Time In: 1130  Time Out: 1230  Timed Code Treatment Minutes: 44 Minutes  Minutes: 60          Date: 2022  Patient Name: Abby Oakes,   Gender: male      MRN: [id number]  : 1960  (58 y.o.)  Referring Practitioner: Dr. Hu Chahal  Diagnosis: Debility  Additional Pertinent Hx: Abby Oakes is a 58 y.o. 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 The patient presented to [address] [place] [address] clinic on 12/3/2022 with progressive weakness, dysphagia and facial drooping. He was suspected to have adrenal insufficiency. Endocrinology was consulted. Blood culture from chemotherapy port done on 12/3/2022 revealed Clostridium. Therefor the port was removed on 2022. 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 MRI of cervical spine done on 2022 revealed diffuse osseous metastasis disease in cervical spine and upper thoracic spine, mild right ventral epidural tumor at C7 extending into right C7-T1 neuroforamen, intramedullary enhancing lesion within central cervical spinal cord at C5-6 and right C6-7 cervical spinal cord.   Repeated MRI of brain done on 2022 revealed enhancing lesions in right frontal lobe and throughout the supratentorial and infratentorial brain, within the right  musculature, bilateral temporalis musculature, bilateral extraocular musculature, and within the calvarium and skull base. Because of extensive metastatic malignancy, oral chemotherapywas started on 12/8/2022. venous duplex study was done on 12/16/2022 and showed no acute DVT but acute superficial venous thrombosis in RUE. MRI of brachial plexus done on 12/22/2022 show extensive metastatic disease involving osseous structures, multiple muscles including bilateral scapular intrinsic muscles and paraspinal muscles. The LUE weakness and edema was thought to be due to tumor infiltrating brachial plexus and radiation induced plexopathy. His hospital course was also complicated by tumor lysis syndrome, acute renal failure, and dysphagia with severe protein calorie malnutrition. Restrictions/Precautions:  Restrictions/Precautions: Contact Precautions, Fall Risk    Subjective  Chart Reviewed: Yes, Orders, Progress Notes, History and Physical, Previous Admission  Patient assessed for rehabilitation services?: Yes    Subjective: Pt pleasant and cooperative, motivated to shower.      Pain: None stated /10:       Vitals: Nurse checked vitals prior to session    Social/Functional History:  Lives With: Alone  Type of Home: House  Home Layout: One level  Home Access: Stairs to enter without rails  Entrance Stairs - Number of Steps: 1 (Patient reports its a 7inch step)  Home Equipment:  (Patient reports his mother has a RW and cane however he does not)   Bathroom Shower/Tub: Walk-in shower  Bathroom Toilet: Handicap height  Bathroom Equipment:  (suction cup grab bar in back bathroom shower)    Receives Help From: Family  ADL Assistance: Independent  Homemaking Assistance: Independent  Ambulation Assistance: Independent  Transfer Assistance: Independent    Active : Yes  Occupation: Full time employment  Type of Occupation: Shop  (computer work) at [employer]  Additional Comments: Patient reports prior to hospital admission patient was independent with all ADL's/IADL's without use of AD for mobility. 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 Patient reports he plans to go to his mothers house for a few weeks( 1 story home, walk in shower, handicap toilets, grab bars in bathroom) after discharge. Patient reports his mother is able to help with tasks in home. Patient reports his mother has a shower chair in her shower. VISION:WFL. Pt denies changes to vision,  \"Its actually gotten better. \"     HEARING:  Slight hard of hearing     COGNITION: WFL for basic ADLs. RANGE OF MOTION:  Right Upper Extremity: WFL  Left Upper Extremity:  Impaired - L shoulder flexion to approx 90 degrees scaption, tightness felt throughout scap and armpit,   Pt tends to keep L3, 4, and 5 flexed at MPs in loose composite grasp. Pt reports difficulty with L hand grasp and increased weakness since last summer. Tightness fell through L3, L4 and L5.       STRENGTH:  Right Upper Extremity: WFL  Left Upper Extremity:  Impaired - moderate edema throughout LUE/ especially forearm     SENSATION:    Left hand numbness,     ADL:   EATING:Setup or clean-up assistance. Raymond Gutnathalie CARE Score: 5. ORAL HYGIENE:Supervision or touching assistance. SBA standing at sink. CARE Score: 4. TOILETING HYGIENE:Supervision or touching assistance. SBA. CARE Score: 4. SHOWERING/BATHING:Supervision or touching assistance. SBA. CARE Score: 4.     UPPER BODY DRESSING:Supervision or touching assistance. SBA. CARE Score: 4. LOWER BODY DRESSING:Supervision or touching assistance. SBA. CARE Score: 4. FOOTWEAR:Supervision or touching assistance  SBA with increased time. CARE Score: 4. TOILET TRANSFER: Supervision or touching assistance. SBA. CARE Score: 4. SHOWER TRANSFER: stand by assistance     BALANCE:  Sitting Balance:  Modified Independent.     Standing Balance: Stand By Assistance. TRANSFERS:  Sit to Stand:  Stand By Assistance. Stand to Sit: Stand By Assistance. FUNCTIONAL MOBILITY:  Assistive Device: Straight Cane  Assist Level:  Stand By Assistance and Contact Guard Assistance. Distance: To and from bathroom and To and from shower room  Pt ambulated short distances with close SBA and no AD,      Activity Tolerance:  Patient tolerance of  treatment: good. Assessment:  Pt presents to occupational therapy following lengthy hospital admission with patient complaints of LUE edema, weakness, numbness, decreased [address] impacting patient's ability to complete self care at prior level of functioning. Due to patient's performance deficits, patient would greatly benefit from continued occupational therapy for ADL remediation, endurance building, strengthening and adaptive strategies to return to prior level of functioning and safe return to home environment. Performance deficits / Impairments: Decreased functional mobility , Decreased ADL status, Decreased strength, Decreased fine motor control, Decreased high-level IADLs, Decreased balance, Decreased endurance, Decreased sensation  Prognosis: Fair  Decision Making: Medium Complexity    Treatment Initiated: Treatment and education initiated within context of evaluation. Evaluation time included review of current medical information, gathering information related to past medical, social and functional history, completion of standardized testing, formal and informal observation of tasks, assessment of data and development of plan of care and goals. Treatment time included skilled education and facilitation of tasks to increase safety and independence with ADL's for improved functional independence and quality of life.     Discharge Recommendations:  Outpatient OT    Patient Education:  Patient Education  Education Given To: Patient  Education Provided: Role of Therapy, Precautions, ADL Adaptive Strategies, Transfer Training  Education Method: Demonstration, Verbal  Barriers to Learning: None  Education Outcome: Verbalized understanding, Demonstrated understanding    Equipment Recommendations: Other: Pt has access to a shower chair. Continue to assess needs    Plan:  Times Per Week: 5x/wk for 90 min and 1x/wk for 30 min  Times Per Day: Once a day  Current Treatment Recommendations: Strengthening, ROM, Balance training, Endurance training, Neuromuscular re-education, Equipment evaluation, education, & procurement, Patient/Caregiver education & training, Safety education & training, Self-Care / ADL. See long-term goal time frame for expected duration of plan of care. If no long-term goals established, a short length of stay is anticipated. Goals:  Patient goals : To return home again  Short Term Goals  Time Frame for Short Term Goals: 1 week  Short Term Goal 1: Pt will improve L hand AROM to 4+/5 to improve indep with using his keyboard with vanessa hands. Short Term Goal 2: Pt will don and doff his socks and shoes with supervision using adaptive techniques prn to improve indep with donning his work boots or tennis shoes. Short Term Goal 3: Pt will demonstrate one handed adaptive IADL techniques with supervision to be able to return to living independently. Short Term Goal 4: Pt will complete simple meal prep tasks with supervision and 0 vcs for LUE safety/ placement to improve indep with self care. Long Term Goals  Time Frame for Long Term Goals : 2 weeks from IPR evaluation  Long Term Goal 1: Pt will complete BADL routine with mod I and adaptations to improve indep with self care. Long Term Goal 2: Pt will improve L [facility] AEB completion of 9 hole peg test in <1 minute to improve [facility] needed for IADLs. Long Term Goal 3: Pt will demonstrate SROM techinques of LUE to preserve movement for ease of ADLs. Following session, patient left in safe position with all fall risk precautions in place.

## 2022-12-29 NOTE — PROGRESS NOTES
[facility]  [facility]  Occupational Therapy  Daily Note  Time:    Time In: 1400  Time Out: 1430  Timed Code Treatment Minutes: 30 Minutes  Minutes: 30          Date: 2022  Patient Name: Kinga Poe,   Gender: male      Room: [facility]68/068-A  MRN: [id number]  : 1960  (58 y.o.)  Referring Practitioner: Dr. Jain Adjutant  Diagnosis: Debility  Additional Pertinent Hx: Kinga Poe is a 58 y.o. 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 The patient presented to [address] [place] [address] clinic on 12/3/2022 with progressive weakness, dysphagia and facial drooping. He was suspected to have adrenal insufficiency. Endocrinology was consulted. Blood culture from chemotherapy port done on 12/3/2022 revealed Clostridium. Therefor the port was removed on 2022. 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 MRI of cervical spine done on 2022 revealed diffuse osseous metastasis disease in cervical spine and upper thoracic spine, mild right ventral epidural tumor at C7 extending into right C7-T1 neuroforamen, intramedullary enhancing lesion within central cervical spinal cord at C5-6 and right C6-7 cervical spinal cord.   Repeated MRI of brain done on 2022 revealed enhancing lesions in right frontal lobe and throughout the supratentorial and infratentorial brain, within the right  musculature, bilateral temporalis musculature, bilateral extraocular musculature, and within the calvarium and skull base. Because of extensive metastatic malignancy, oral chemotherapywas started on 12/8/2022. venous duplex study was done on 12/16/2022 and showed no acute DVT but acute superficial venous thrombosis in RUE. MRI of brachial plexus done on 12/22/2022 show extensive metastatic disease involving osseous structures, multiple muscles including bilateral scapular intrinsic muscles and paraspinal muscles. The LUE weakness and edema was thought to be due to tumor infiltrating brachial plexus and radiation induced plexopathy. His hospital course was also complicated by tumor lysis syndrome, acute renal failure, and dysphagia with severe protein calorie malnutrition. Restrictions/Precautions:  Restrictions/Precautions: Contact Precautions, Fall Risk      SUBJECTIVE: Pt finished with PT and agreeable to OT. Pt with multiple visitors at conclusion of session     PAIN:  None stated /10:      Vitals: Nurse checked vitals prior to session    COGNITION: Inattention    ADL:   Grooming: Stand By Assistance. Toileting: Stand By Assistance. Marvin Power BALANCE:  Standing Balance: Stand By Assistance. TRANSFERS:  Sit to Stand:  Stand By Assistance. Stand to Sit: Stand By Assistance. FUNCTIONAL MOBILITY:  Assistive Device: Straight Cane  Assist Level:  Stand By Assistance. Distance:  To and from bathroom     HAND ASSESSMENT  Hand Dominance: Right  Left Hand Strength -  (lbs)  Handle Setting 2: 5, 5, 6 avg 5 lbs  Right Hand Strength -  (lbs)  Handle Setting 2: 42, 39, 45 avg 42 lbs  Fine Motor Skills  Left 9 Hole Peg Test Time (secs): 0 (using pincer grasp and lateral key pinch, patient unable to place any pegs)  Right 9 Hole Peg Test Time (secs): 33.4    EXERCISES:   BUETable top slides x10 reps x2 sets in   Sh flexion  Scaption   Horiz abduction with rest break in between each. Exercises completed to improve ROM for applying deodorant     ASSESSMENT:  Activity Tolerance:  Patient tolerance of  treatment: good. Discharge Recommendations: Home with Outpatient OT  Equipment Recommendations: Other: Pt has access to a shower chair. Continue to assess needs  Plan: Times Per Week: 5x/wk for 90 min and 1x/wk for 30 min  Times Per Day: Once a day  Current Treatment Recommendations: Strengthening, ROM, Balance training, Endurance training, Neuromuscular re-education, Equipment evaluation, education, & procurement, Patient/Caregiver education & training, Safety education & training, Self-Care / ADL    Patient Education  Patient Education: ADL's, Home Exercise Program, and Hemibody weakness/ Awareness/Attention    Goals  Short Term Goals  Time Frame for Short Term Goals: 1 week  Short Term Goal 1: Pt will improve L hand AROM to 4+/5 to improve indep with using his keyboard with vanessa hands. Short Term Goal 2: Pt will don and doff his socks and shoes with supervision using adaptive techniques prn to improve indep with donning his work boots or tennis shoes. Short Term Goal 3: Pt will demonstrate one handed adaptive IADL techniques with supervision to be able to return to living independently. Short Term Goal 4: Pt will complete simple meal prep tasks with supervision and 0 vcs for LUE safety/ placement to improve indep with self care. Long Term Goals  Time Frame for Long Term Goals : 2 weeks from IPR evaluation  Long Term Goal 1: Pt will complete BADL routine with mod I and adaptations to improve indep with self care. Long Term Goal 2: Pt will improve L [facility] AEB completion of 9 hole peg test in <1 minute to improve [address] needed for IADLs. Long Term Goal 3: Pt will demonstrate SROM techinques of LUE to preserve movement for ease of ADLs. Following session, patient left in safe position with all fall risk precautions in place.

## 2022-12-29 NOTE — PROGRESS NOTES
[address]  Individualized Disclosure Statement      Patient: Abby Fire      Scope of Service  [facility] provides 24 hour individualized service to patients with functional limitations due to, but not limited to: stroke, brain injury, spinal cord injury, major multiple trauma, fractures, amputation, and neurological disorders. The [address] [place] [address] provides rehabilitative nursing and medical services as well as physical, occupational, speech, and recreation therapies. [address] [facility] is fully accredited by the Commission on Accreditation of Rehabilitation Facilities (CARF) as a comprehensive provider of rehabilitation services. Patients admitted to the [address] [place] [address] receive a minimum of three hours of therapy per day, at least six days per week, with a revised therapy schedule on weekends and holidays. Physical therapy, occupational therapy, and speech therapy are provided seven days per week including holidays. Other therapeutic services are available on weekends and evenings as needed or scheduled. Intensity of Treatment  Your treatment program will consist of Nursing Care and:  1.5 hours of Physical Therapy, per day  1.5 hours of Occupational Therapy, per day   30-60 minutes of Speech Therapy, per day  1 hour of Recreational Therapy, per week    [facility]  maintains contracts with most insurance plans. Depending on the type of coverage, the insurance may impose limits on the coverage for rehabilitation care. Coverage is based on the premise that you are able to fully participate in the rehabilitation program and show continued progress. Please verify your own insurance information A copy of this was given to the patient/ family on this date.   Insurance Coverage  Your insurance company has made the following determination relative to the length of your stay:   Your estimated length of stay is 14 days   Your insurance Coverage has been verified as follows:    Primary Insurance: Payor: Deepali Tylerp /  /  /    Deductible:   $1,000.00 Coverage: Active  Secondary Insurance: None  secondary insurance policies often cover co-pay amounts, but to ensure payment please contact your insurance company.     Alternative Resources: Please ask the  for more information [phone]

## 2022-12-29 NOTE — PLAN OF CARE
Problem: Discharge Planning  Goal: Discharge to home or other facility with appropriate resources  Note:   [facility]  Physical Medicine Case Management Assessment    [] Inpatient Rehabilitation Unit    Patient Name: Radu Peña        MRN: [id number]    : 1960  (58 y.o.)  Gender: male   Date of Admission: 2022  3:49 PM    Family/Social/Home Environment:   Prior to admission, patient was living alone. Patient was independent at home. Patient was completing his ADLs, housekeeping, meal prep, finances, errands and driving. Patient was working full time. Patient reports having multiple family members who are supportive and able to assist with needed. Patient's main support comes from his mother, Jairo Ignacio. Patient plans to be discharge to her home until he feels comfortable to be on his own. Patient's family physician is DEBBIE Deal. Patient prefers [facility] on [address]. Patient is motivated to participate in therapy.     Social/Functional History  Lives With: Alone  Type of Home: House  Home Layout: One level  Home Access: Stairs to enter without rails  Entrance Stairs - Number of Steps: 1 (Patient reports its a 7inch step)  Bathroom Shower/Tub: Walk-in shower  Bathroom Toilet: Handicap height  Bathroom Equipment:  (suction cup grab bar in back bathroom shower)  Home Equipment:  (Patient reports his mother has a RW and cane however he does not)  Has the patient had two or more falls in the past year or any fall with injury in the past year?: No  Receives Help From: Family  ADL Assistance: Independent  Homemaking Assistance: Independent  Ambulation Assistance: Independent  Transfer Assistance: Independent  Active : Yes  Occupation: Full time employment  Type of Occupation: Shop  (computer work) at [employer]  Additional Comments: Patient reports prior to hospital admission patient was independent with all ADL's/IADL's without use of AD for mobility. 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 Patient reports he plans to go to his mothers house for a few weeks( 1 story home, walk in shower, handicap toilets, grab bars in bathroom) after discharge. Patient reports his mother is able to help with tasks in home. Patient reports his mother has a shower chair in her shower. Contact/Guardian Information: Kaylee Son (mother) [phone]    Community Resources Utilized: Patient was not using community resources prior to admission. Sexuality/Intimacy: Patient did not disclose sexuality/intimacy concerns during SW assessment. Complementary Health Approaches: Patient did not disclose desires towards complementary health approaches during SW assessment. Anticipated Needs/Discharge Plans: SW will follow and maintain involvement in discharge planning. SW met with patient and family on this date to introduce self, complete SW assessment and initiate discharge planning. Prior to admission, patient was living alone. Patient was independent at home. Patient was completing his ADLs, housekeeping, meal prep, finances, errands and driving. Patient was working full time. Patient reports having multiple family members who are supportive and able to assist with needed. Patient's main support comes from his mother, Micky Washington. Patient plans to be discharge to her home until he feels comfortable to be on his own. Patient's family physician is DEBBIE Aj. Patient prefers [facility] on [address]. Patient is motivated to participate in therapy. SW educated patient on Tuesday, 1/3 care conference. SW will follow and maintain involvement in discharge planning.        Read-Only, Retired: Discharge Planning  Living Arrangements: [address] [place] [address] Drive: Spouse/Significant Other, Parent, Children  Potential Assistance Needed: Outpatient PT/OT, Home Care  Potential Assistance Purchasing Medications: No  Type of Home Care Services: OT, PT, Nursing Services  Patient expects to be discharged to[de-identified] House  Expected Discharge Date:  (Undetermined)  Follow Up Appointment: Best Day/Time : Tuesday PM      SHANNON Munguia 12/29/2022 2:42 PM

## 2022-12-29 NOTE — PLAN OF CARE
Problem: Safety - Adult  Goal: Free from fall injury  Outcome: Progressing  Uses the call light appropriately and waits for staff assistance. Bed Alarm is activated.     Problem: ABCDS Injury Assessment  Goal: Absence of physical injury  Outcome: Progressing

## 2022-12-30 ENCOUNTER — HOSPITAL ENCOUNTER (INPATIENT)
Age: 62
LOS: 9 days | Discharge: HOME | DRG: 274 | End: 2023-01-08
Attending: INTERNAL MEDICINE | Admitting: INTERNAL MEDICINE
Payer: COMMERCIAL

## 2022-12-30 VITALS
HEIGHT: 72 IN | DIASTOLIC BLOOD PRESSURE: 73 MMHG | WEIGHT: 183.19 LBS | BODY MASS INDEX: 24.81 KG/M2 | OXYGEN SATURATION: 100 % | HEART RATE: 112 BPM | TEMPERATURE: 98.8 F | RESPIRATION RATE: 16 BRPM | SYSTOLIC BLOOD PRESSURE: 98 MMHG

## 2022-12-30 DIAGNOSIS — I48.92 ATRIAL FLUTTER, UNSPECIFIED TYPE (HCC): Primary | ICD-10-CM

## 2022-12-30 PROBLEM — E44.0 MODERATE MALNUTRITION (HCC): Status: ACTIVE | Noted: 2022-12-30

## 2022-12-30 LAB
ANION GAP SERPL CALCULATED.3IONS-SCNC: 12 MEQ/L (ref 8–16)
BASOPHILS # BLD: 1 %
BASOPHILS ABSOLUTE: 0 THOU/MM3 (ref 0–0.1)
BUN BLDV-MCNC: 16 MG/DL (ref 7–22)
CALCIUM SERPL-MCNC: 7.8 MG/DL (ref 8.5–10.5)
CHLORIDE BLD-SCNC: 96 MEQ/L (ref 98–111)
CO2: 25 MEQ/L (ref 23–33)
CREAT SERPL-MCNC: 0.4 MG/DL (ref 0.4–1.2)
EKG ATRIAL RATE: 300 BPM
EKG P AXIS: 50 DEGREES
EKG Q-T INTERVAL: 334 MS
EKG QRS DURATION: 100 MS
EKG QTC CALCULATION (BAZETT): 472 MS
EKG R AXIS: 14 DEGREES
EKG T AXIS: -13 DEGREES
EKG VENTRICULAR RATE: 120 BPM
EOSINOPHIL # BLD: 0.5 %
EOSINOPHILS ABSOLUTE: 0 THOU/MM3 (ref 0–0.4)
ERYTHROCYTE [DISTWIDTH] IN BLOOD BY AUTOMATED COUNT: 19.6 % (ref 11.5–14.5)
ERYTHROCYTE [DISTWIDTH] IN BLOOD BY AUTOMATED COUNT: 62.1 FL (ref 35–45)
GFR SERPL CREATININE-BSD FRML MDRD: > 60 ML/MIN/1.73M2
GLUCOSE BLD-MCNC: 113 MG/DL (ref 70–108)
GLUCOSE BLD-MCNC: 138 MG/DL (ref 70–108)
GLUCOSE BLD-MCNC: 147 MG/DL (ref 70–108)
GLUCOSE BLD-MCNC: 204 MG/DL (ref 70–108)
HCT VFR BLD CALC: 29.2 % (ref 42–52)
HEMOGLOBIN: 8.8 GM/DL (ref 14–18)
IMMATURE GRANS (ABS): 0.11 THOU/MM3 (ref 0–0.07)
IMMATURE GRANULOCYTES: 2.6 %
LD: 1139 U/L (ref 100–190)
LYMPHOCYTES # BLD: 19.5 %
LYMPHOCYTES ABSOLUTE: 0.8 THOU/MM3 (ref 1–4.8)
MAGNESIUM: 2 MG/DL (ref 1.6–2.4)
MCH RBC QN AUTO: 26.3 PG (ref 26–33)
MCHC RBC AUTO-ENTMCNC: 30.1 GM/DL (ref 32.2–35.5)
MCV RBC AUTO: 87.2 FL (ref 80–94)
MONOCYTES # BLD: 12.4 %
MONOCYTES ABSOLUTE: 0.5 THOU/MM3 (ref 0.4–1.3)
NUCLEATED RED BLOOD CELLS: 0 /100 WBC
PLATELET # BLD: 259 THOU/MM3 (ref 130–400)
PMV BLD AUTO: 8.4 FL (ref 9.4–12.4)
POTASSIUM SERPL-SCNC: 4.6 MEQ/L (ref 3.5–5.2)
RBC # BLD: 3.35 MILL/MM3 (ref 4.7–6.1)
SEG NEUTROPHILS: 64 %
SEGMENTED NEUTROPHILS ABSOLUTE COUNT: 2.7 THOU/MM3 (ref 1.8–7.7)
SODIUM BLD-SCNC: 133 MEQ/L (ref 135–145)
TROPONIN T: < 0.01 NG/ML
TROPONIN T: < 0.01 NG/ML
TSH SERPL DL<=0.05 MIU/L-ACNC: 2.69 UIU/ML (ref 0.4–4.2)
WBC # BLD: 4.2 THOU/MM3 (ref 4.8–10.8)

## 2022-12-30 PROCEDURE — 6370000000 HC RX 637 (ALT 250 FOR IP): Performed by: PHYSICAL MEDICINE & REHABILITATION

## 2022-12-30 PROCEDURE — 97535 SELF CARE MNGMENT TRAINING: CPT

## 2022-12-30 PROCEDURE — 97110 THERAPEUTIC EXERCISES: CPT

## 2022-12-30 PROCEDURE — 97112 NEUROMUSCULAR REEDUCATION: CPT

## 2022-12-30 PROCEDURE — 99223 1ST HOSP IP/OBS HIGH 75: CPT | Performed by: PHYSICIAN ASSISTANT

## 2022-12-30 PROCEDURE — 2580000003 HC RX 258: Performed by: PHYSICIAN ASSISTANT

## 2022-12-30 PROCEDURE — 82948 REAGENT STRIP/BLOOD GLUCOSE: CPT

## 2022-12-30 PROCEDURE — 84484 ASSAY OF TROPONIN QUANT: CPT

## 2022-12-30 PROCEDURE — 83615 LACTATE (LD) (LDH) ENZYME: CPT

## 2022-12-30 PROCEDURE — 2140000000 HC CCU INTERMEDIATE R&B

## 2022-12-30 PROCEDURE — 80048 BASIC METABOLIC PNL TOTAL CA: CPT

## 2022-12-30 PROCEDURE — 97530 THERAPEUTIC ACTIVITIES: CPT

## 2022-12-30 PROCEDURE — 93005 ELECTROCARDIOGRAM TRACING: CPT | Performed by: FAMILY MEDICINE

## 2022-12-30 PROCEDURE — 36415 COLL VENOUS BLD VENIPUNCTURE: CPT

## 2022-12-30 PROCEDURE — 99232 SBSQ HOSP IP/OBS MODERATE 35: CPT | Performed by: PHYSICAL MEDICINE & REHABILITATION

## 2022-12-30 PROCEDURE — 97129 THER IVNTJ 1ST 15 MIN: CPT

## 2022-12-30 PROCEDURE — 97116 GAIT TRAINING THERAPY: CPT

## 2022-12-30 PROCEDURE — 97130 THER IVNTJ EA ADDL 15 MIN: CPT

## 2022-12-30 PROCEDURE — 84443 ASSAY THYROID STIM HORMONE: CPT

## 2022-12-30 PROCEDURE — 85025 COMPLETE CBC W/AUTO DIFF WBC: CPT

## 2022-12-30 PROCEDURE — 6360000002 HC RX W HCPCS: Performed by: PHYSICAL MEDICINE & REHABILITATION

## 2022-12-30 PROCEDURE — 83735 ASSAY OF MAGNESIUM: CPT

## 2022-12-30 RX ORDER — INSULIN LISPRO 100 [IU]/ML
0-8 INJECTION, SOLUTION INTRAVENOUS; SUBCUTANEOUS
Status: DISCONTINUED | OUTPATIENT
Start: 2022-12-31 | End: 2023-01-08 | Stop reason: HOSPADM

## 2022-12-30 RX ORDER — BISACODYL 10 MG
10 SUPPOSITORY, RECTAL RECTAL DAILY PRN
Status: CANCELLED | OUTPATIENT
Start: 2022-12-30

## 2022-12-30 RX ORDER — TRAZODONE HYDROCHLORIDE 50 MG/1
50 TABLET ORAL NIGHTLY PRN
Status: DISCONTINUED | OUTPATIENT
Start: 2022-12-30 | End: 2023-01-08 | Stop reason: HOSPADM

## 2022-12-30 RX ORDER — ALLOPURINOL 300 MG/1
300 TABLET ORAL DAILY
Status: DISCONTINUED | OUTPATIENT
Start: 2022-12-31 | End: 2023-01-08 | Stop reason: HOSPADM

## 2022-12-30 RX ORDER — OXYCODONE HYDROCHLORIDE 5 MG/1
5 TABLET ORAL EVERY 6 HOURS
Status: DISCONTINUED | OUTPATIENT
Start: 2022-12-30 | End: 2023-01-08 | Stop reason: HOSPADM

## 2022-12-30 RX ORDER — ALLOPURINOL 300 MG/1
300 TABLET ORAL DAILY
Status: CANCELLED | OUTPATIENT
Start: 2022-12-31

## 2022-12-30 RX ORDER — LANOLIN ALCOHOL/MO/W.PET/CERES
6 CREAM (GRAM) TOPICAL NIGHTLY
Status: CANCELLED | OUTPATIENT
Start: 2022-12-31

## 2022-12-30 RX ORDER — ENOXAPARIN SODIUM 100 MG/ML
40 INJECTION SUBCUTANEOUS DAILY
Status: DISCONTINUED | OUTPATIENT
Start: 2022-12-31 | End: 2023-01-04

## 2022-12-30 RX ORDER — FLUORIDE TOOTHPASTE
10 TOOTHPASTE DENTAL PRN
Status: DISCONTINUED | OUTPATIENT
Start: 2022-12-30 | End: 2023-01-08 | Stop reason: HOSPADM

## 2022-12-30 RX ORDER — OXYCODONE HYDROCHLORIDE 5 MG/1
5 TABLET ORAL EVERY 4 HOURS PRN
Status: CANCELLED | OUTPATIENT
Start: 2022-12-30

## 2022-12-30 RX ORDER — PREDNISONE 10 MG/1
10 TABLET ORAL DAILY
Status: CANCELLED | OUTPATIENT
Start: 2022-12-31

## 2022-12-30 RX ORDER — FLUDROCORTISONE ACETATE 0.1 MG/1
0.1 TABLET ORAL DAILY
Status: CANCELLED | OUTPATIENT
Start: 2022-12-31

## 2022-12-30 RX ORDER — SODIUM CHLORIDE 9 MG/ML
INJECTION, SOLUTION INTRAVENOUS CONTINUOUS
Status: DISCONTINUED | OUTPATIENT
Start: 2022-12-30 | End: 2023-01-01

## 2022-12-30 RX ORDER — ATORVASTATIN CALCIUM 20 MG/1
20 TABLET, FILM COATED ORAL NIGHTLY
Status: CANCELLED | OUTPATIENT
Start: 2022-12-31

## 2022-12-30 RX ORDER — SENNA PLUS 8.6 MG/1
2 TABLET ORAL 2 TIMES DAILY
Status: DISCONTINUED | OUTPATIENT
Start: 2022-12-31 | End: 2023-01-08 | Stop reason: HOSPADM

## 2022-12-30 RX ORDER — LIDOCAINE 4 G/G
1 PATCH TOPICAL EVERY 24 HOURS
Status: DISCONTINUED | OUTPATIENT
Start: 2022-12-31 | End: 2023-01-08 | Stop reason: HOSPADM

## 2022-12-30 RX ORDER — ATORVASTATIN CALCIUM 20 MG/1
20 TABLET, FILM COATED ORAL NIGHTLY
Status: DISCONTINUED | OUTPATIENT
Start: 2022-12-31 | End: 2023-01-08 | Stop reason: HOSPADM

## 2022-12-30 RX ORDER — TRAZODONE HYDROCHLORIDE 50 MG/1
50 TABLET ORAL NIGHTLY PRN
Status: CANCELLED | OUTPATIENT
Start: 2022-12-30

## 2022-12-30 RX ORDER — FLUDROCORTISONE ACETATE 0.1 MG/1
0.1 TABLET ORAL DAILY
Status: DISCONTINUED | OUTPATIENT
Start: 2022-12-31 | End: 2023-01-01

## 2022-12-30 RX ORDER — SULFAMETHOXAZOLE AND TRIMETHOPRIM 800; 160 MG/1; MG/1
1 TABLET ORAL
Status: CANCELLED | OUTPATIENT
Start: 2023-01-02

## 2022-12-30 RX ORDER — INSULIN LISPRO 100 [IU]/ML
0-4 INJECTION, SOLUTION INTRAVENOUS; SUBCUTANEOUS NIGHTLY
Status: DISCONTINUED | OUTPATIENT
Start: 2022-12-30 | End: 2023-01-08 | Stop reason: HOSPADM

## 2022-12-30 RX ORDER — LEVOTHYROXINE SODIUM 0.1 MG/1
100 TABLET ORAL
Status: DISCONTINUED | OUTPATIENT
Start: 2022-12-31 | End: 2023-01-08 | Stop reason: HOSPADM

## 2022-12-30 RX ORDER — ACETAMINOPHEN 325 MG/1
650 TABLET ORAL EVERY 4 HOURS PRN
Status: CANCELLED | OUTPATIENT
Start: 2022-12-30

## 2022-12-30 RX ORDER — ENOXAPARIN SODIUM 100 MG/ML
40 INJECTION SUBCUTANEOUS DAILY
Status: CANCELLED | OUTPATIENT
Start: 2022-12-31

## 2022-12-30 RX ORDER — LANOLIN ALCOHOL/MO/W.PET/CERES
6 CREAM (GRAM) TOPICAL NIGHTLY
Status: DISCONTINUED | OUTPATIENT
Start: 2022-12-30 | End: 2023-01-08 | Stop reason: HOSPADM

## 2022-12-30 RX ORDER — ASPIRIN 81 MG/1
81 TABLET, CHEWABLE ORAL DAILY
Status: CANCELLED | OUTPATIENT
Start: 2022-12-31

## 2022-12-30 RX ORDER — LIDOCAINE 4 G/G
1 PATCH TOPICAL EVERY 24 HOURS
Status: CANCELLED | OUTPATIENT
Start: 2022-12-31

## 2022-12-30 RX ORDER — ASPIRIN 81 MG/1
81 TABLET, CHEWABLE ORAL DAILY
Status: DISCONTINUED | OUTPATIENT
Start: 2022-12-31 | End: 2023-01-05

## 2022-12-30 RX ORDER — DOCUSATE SODIUM 100 MG/1
100 CAPSULE, LIQUID FILLED ORAL 2 TIMES DAILY
Status: CANCELLED | OUTPATIENT
Start: 2022-12-31

## 2022-12-30 RX ORDER — OXYCODONE HYDROCHLORIDE 5 MG/1
5 TABLET ORAL EVERY 4 HOURS PRN
Status: DISCONTINUED | OUTPATIENT
Start: 2022-12-30 | End: 2023-01-08 | Stop reason: HOSPADM

## 2022-12-30 RX ORDER — PREDNISONE 10 MG/1
10 TABLET ORAL DAILY
Status: DISCONTINUED | OUTPATIENT
Start: 2022-12-31 | End: 2023-01-08 | Stop reason: HOSPADM

## 2022-12-30 RX ORDER — DEXTROSE MONOHYDRATE 100 MG/ML
INJECTION, SOLUTION INTRAVENOUS CONTINUOUS PRN
Status: DISCONTINUED | OUTPATIENT
Start: 2022-12-30 | End: 2023-01-08 | Stop reason: HOSPADM

## 2022-12-30 RX ORDER — OXYCODONE HYDROCHLORIDE 5 MG/1
5 TABLET ORAL EVERY 6 HOURS
Status: CANCELLED | OUTPATIENT
Start: 2022-12-30

## 2022-12-30 RX ORDER — POTASSIUM CHLORIDE 20 MEQ/1
40 TABLET, EXTENDED RELEASE ORAL DAILY
Status: DISCONTINUED | OUTPATIENT
Start: 2022-12-31 | End: 2023-01-08 | Stop reason: HOSPADM

## 2022-12-30 RX ORDER — POLYETHYLENE GLYCOL 3350 17 G/17G
17 POWDER, FOR SOLUTION ORAL DAILY PRN
Status: DISCONTINUED | OUTPATIENT
Start: 2022-12-30 | End: 2023-01-08 | Stop reason: HOSPADM

## 2022-12-30 RX ORDER — LEVOTHYROXINE SODIUM 0.1 MG/1
100 TABLET ORAL
Status: CANCELLED | OUTPATIENT
Start: 2022-12-31

## 2022-12-30 RX ORDER — SULFAMETHOXAZOLE AND TRIMETHOPRIM 800; 160 MG/1; MG/1
1 TABLET ORAL
Status: DISCONTINUED | OUTPATIENT
Start: 2023-01-02 | End: 2023-01-08 | Stop reason: HOSPADM

## 2022-12-30 RX ORDER — BISACODYL 10 MG
10 SUPPOSITORY, RECTAL RECTAL DAILY PRN
Status: DISCONTINUED | OUTPATIENT
Start: 2022-12-30 | End: 2023-01-08 | Stop reason: HOSPADM

## 2022-12-30 RX ORDER — FLUORIDE TOOTHPASTE
10 TOOTHPASTE DENTAL PRN
Status: CANCELLED | OUTPATIENT
Start: 2022-12-30

## 2022-12-30 RX ORDER — OXYCODONE HYDROCHLORIDE 5 MG/1
2.5 TABLET ORAL EVERY 4 HOURS PRN
Status: DISCONTINUED | OUTPATIENT
Start: 2022-12-30 | End: 2023-01-08 | Stop reason: HOSPADM

## 2022-12-30 RX ORDER — ACETAMINOPHEN 325 MG/1
650 TABLET ORAL EVERY 4 HOURS PRN
Status: DISCONTINUED | OUTPATIENT
Start: 2022-12-30 | End: 2023-01-08 | Stop reason: HOSPADM

## 2022-12-30 RX ORDER — SENNA PLUS 8.6 MG/1
2 TABLET ORAL 2 TIMES DAILY
Status: CANCELLED | OUTPATIENT
Start: 2022-12-31

## 2022-12-30 RX ORDER — POTASSIUM CHLORIDE 20 MEQ/1
40 TABLET, EXTENDED RELEASE ORAL DAILY
Status: CANCELLED | OUTPATIENT
Start: 2022-12-31

## 2022-12-30 RX ORDER — OXYCODONE HYDROCHLORIDE 5 MG/1
2.5 TABLET ORAL EVERY 4 HOURS PRN
Status: CANCELLED | OUTPATIENT
Start: 2022-12-30

## 2022-12-30 RX ORDER — DOCUSATE SODIUM 100 MG/1
100 CAPSULE, LIQUID FILLED ORAL 2 TIMES DAILY
Status: DISCONTINUED | OUTPATIENT
Start: 2022-12-31 | End: 2023-01-08 | Stop reason: HOSPADM

## 2022-12-30 RX ORDER — POLYETHYLENE GLYCOL 3350 17 G/17G
17 POWDER, FOR SOLUTION ORAL DAILY PRN
Status: CANCELLED | OUTPATIENT
Start: 2022-12-30

## 2022-12-30 RX ADMIN — ATORVASTATIN CALCIUM 20 MG: 20 TABLET, FILM COATED ORAL at 21:23

## 2022-12-30 RX ADMIN — ACETAMINOPHEN 650 MG: 325 TABLET ORAL at 21:15

## 2022-12-30 RX ADMIN — POTASSIUM CHLORIDE 40 MEQ: 1500 TABLET, EXTENDED RELEASE ORAL at 08:10

## 2022-12-30 RX ADMIN — NALOXEGOL OXALATE 12.5 MG: 12.5 TABLET, FILM COATED ORAL at 05:26

## 2022-12-30 RX ADMIN — PREDNISONE 10 MG: 10 TABLET ORAL at 08:10

## 2022-12-30 RX ADMIN — Medication 6 MG: at 21:16

## 2022-12-30 RX ADMIN — OXYCODONE 5 MG: 5 TABLET ORAL at 05:26

## 2022-12-30 RX ADMIN — OXYCODONE 5 MG: 5 TABLET ORAL at 22:52

## 2022-12-30 RX ADMIN — ASPIRIN 81 MG: 81 TABLET, CHEWABLE ORAL at 08:11

## 2022-12-30 RX ADMIN — FLUDROCORTISONE ACETATE 0.1 MG: 0.1 TABLET ORAL at 08:11

## 2022-12-30 RX ADMIN — OXYCODONE 5 MG: 5 TABLET ORAL at 10:57

## 2022-12-30 RX ADMIN — ENOXAPARIN SODIUM 40 MG: 100 INJECTION SUBCUTANEOUS at 08:10

## 2022-12-30 RX ADMIN — SENNOSIDES 17.2 MG: 8.6 TABLET, FILM COATED ORAL at 08:10

## 2022-12-30 RX ADMIN — SULFAMETHOXAZOLE AND TRIMETHOPRIM 1 TABLET: 800; 160 TABLET ORAL at 10:57

## 2022-12-30 RX ADMIN — SODIUM CHLORIDE: 9 INJECTION, SOLUTION INTRAVENOUS at 22:49

## 2022-12-30 RX ADMIN — LEVOTHYROXINE SODIUM 100 MCG: 0.1 TABLET ORAL at 05:26

## 2022-12-30 RX ADMIN — ALLOPURINOL 300 MG: 300 TABLET ORAL at 08:11

## 2022-12-30 RX ADMIN — OXYCODONE 5 MG: 5 TABLET ORAL at 17:24

## 2022-12-30 ASSESSMENT — PAIN SCALES - GENERAL
PAINLEVEL_OUTOF10: 6
PAINLEVEL_OUTOF10: 6
PAINLEVEL_OUTOF10: 5
PAINLEVEL_OUTOF10: 6
PAINLEVEL_OUTOF10: 6

## 2022-12-30 ASSESSMENT — PAIN DESCRIPTION - ORIENTATION
ORIENTATION: LOWER

## 2022-12-30 ASSESSMENT — ENCOUNTER SYMPTOMS: TROUBLE SWALLOWING: 0

## 2022-12-30 ASSESSMENT — PAIN DESCRIPTION - LOCATION
LOCATION: BACK

## 2022-12-30 ASSESSMENT — PAIN DESCRIPTION - DESCRIPTORS
DESCRIPTORS: ACHING
DESCRIPTORS: ACHING;DISCOMFORT
DESCRIPTORS: DULL
DESCRIPTORS: ACHING

## 2022-12-30 ASSESSMENT — PAIN - FUNCTIONAL ASSESSMENT
PAIN_FUNCTIONAL_ASSESSMENT: ACTIVITIES ARE NOT PREVENTED
PAIN_FUNCTIONAL_ASSESSMENT: ACTIVITIES ARE NOT PREVENTED

## 2022-12-30 NOTE — PROGRESS NOTES
[address] [address]  INPATIENT PHYSICAL THERAPY  Daily Note  [facility]    Time In: 1330  Time Out: 1430  Timed Code Treatment Minutes: 60 Minutes  Minutes: 60          Date: 2022  Patient Name: Abby Oakes,  Gender:  male        MRN: [id number]  : 1960  (58 y.o.)     Referring Practitioner: Cayla Crouch MD  Diagnosis: Debility  Additional Pertinent Hx: Per H&P Abby Oakes  is a 58 y.o. 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 The patient presented to [address] on 12/3/2022 with progressive weakness, dysphagia and facial drooping developed about 3 weeks prior to his visit. He was found to be hypotensive and was admitted to MICU at the [facility] on 12/3/2022. 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 MRI of cervical spine done on 2022 revealed diffuse osseous metastasis disease in cervical spine and upper thoracic spine, mild right ventral epidural tumor at C7 extending into right C7-T1 neuroforamen, intramedullary enhancing lesion within central cervical spinal cord at C5-6 and right C6-7 cervical spinal cord.   Repeated MRI of brain done on 2022 revealed enhancing lesions in right frontal lobe and throughout the supratentorial and infratentorial brain, within the right  musculature, bilateral temporalis musculature, bilateral extraocular musculature, and within the calvarium and skull base. Because of extensive metastatic malignancy, oral chemotherapy (tafinlar + mekinist) was started on 12/8/2022. Because of left upper extremity swelling, bilateral upper extremities venous duplex study was done on 12/16/2022 and showed no acute DVT but acute superficial venous thrombosis in right upper extremity. MRI of brachial plexus done on 12/22/2022 show extensive metastatic disease involving osseous structures, multiple muscles including bilateral scapular intrinsic muscles and paraspinal muscles. The left upper extremity weakness and edema was thought to be due to tumor infiltrating brachial plexus and radiation induced plexopathy. His hospital course was also complicated by tumor lysis syndrome, acute renal failure, and dysphagia with severe protein calorie malnutrition. \"     Prior Level of Function:  Lives With: Alone  Type of Home: House  Home Layout: One level  Home Access: Stairs to enter without rails  Entrance Stairs - Number of Steps: 1 (Patient reports its a 7inch step)  Home Equipment:  (Patient reports his mother has a RW and cane however he does not)   Bathroom Shower/Tub: Walk-in shower  Bathroom Toilet: Handicap height  Bathroom Equipment:  (suction cup grab bar in back bathroom shower)    Receives Help From: Family  ADL Assistance: Independent  Homemaking Assistance: Independent  Ambulation Assistance: Independent  Transfer Assistance: Independent  Active : Yes  Additional Comments: Patient reports prior to hospital admission patient was independent with all ADL's/IADL's without use of AD for mobility. 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  Patient reports he plans to go to his mothers house for a few weeks( 1 story home, walk in shower, handicap toilets, grab bars in bathroom) after discharge. Patient reports his mother is able to help with tasks in home. Patient reports his mother has a shower chair in her shower. Restrictions/Precautions:  Restrictions/Precautions: Contact Precautions, Fall Risk     SUBJECTIVE: Pt. Seated on his BS chair and pleasantly agrees to therapy session. Pt. Motivated for therapy session. Mother present at end of session. PAIN: None indicated    Vitals:   Patient Vitals for the past 8 hrs:   BP Patient Position Temp Temp src Pulse Resp SpO2 O2 Device   12/30/22 1057 -- -- -- -- -- 14 -- --   12/30/22 0930 100/65 Up in chair 99.6 °F (37.6 °C) Oral (!) 113 16 100 % None (Room air)        OBJECTIVE:  Bed Mobility:  Not Tested    Transfers:  Sit to Stand: Supervision  Stand to Sit:Supervision    Ambulation:  Stand By Assistance  Distance: 150' x 2, multiple shorter distances with cane, 65' x 1 and multiple shorter distances with no AD   Surface: Level Tile  Device: Cane  Gait Deviations: Forward Flexed Posture, Decreased Step Length Bilaterally, Decreased Weight Shift Bilaterally, Decreased Gait Speed, Decreased Heel Strike Bilaterally, Decreased Foot Clearance Right, Decreased Foot Clearance Left, and Decreased Terminal Knee Extension    Stairs:  None    Balance:  Pt. Completed standing dynamic balance activity: tossing ball back and forth with Normal RICHIE on level tile and No UE support with Stand By Assistance. Activity completed to improve balance, enhance functional mobility, and reduce risk of falls. Neuromuscular Re-education  Pt. Completed dynamic gait and multi-tasking activities using No UE support to improve coordination, lateral weight shifting, and Environmental awareness for improved functional mobility.      Exercise:  Patient was guided in 1 set(s) 10 reps of exercises: Standing heel/toe raises, Standing marches, Standing hip abduction/adduction, Standing hip extension, Standing hamstring curls, Mini squats, and heel taps from 6\" step (8x each LE) . Exercises were completed for increased independence with functional mobility. Pt. Completed 8 minutes on Nu-Step machine on L4 utilizing Bilateral Upper Extremities and Bilateral Lower Extremities to improve strength and endurance for improved functional mobility. Functional Outcome Measures:   Not completed    ASSESSMENT:  Assessment: Patient progressing toward established goals. Activity Tolerance:  Patient tolerance of  treatment: good. Equipment Recommendations:Equipment Needed: Yes (Patient will require cane for home)  Discharge Recommendations: Continue to assess pending progress, Patient would benefit from continued therapy after discharge     Plan: Current Treatment Recommendations: Strengthening, ROM, Balance training, Functional mobility training, Transfer training, Endurance training, Gait training, Stair training, Neuromuscular re-education, Safety education & training, Patient/Caregiver education & training, Equipment evaluation, education, & procurement, Therapeutic activities  General Plan:  (5x/wk 60 min; 1x/wk 30 min)    Patient Education  Patient Education: Plan of Care, Functional Mobility, Reviewed Prior Education, Health Promotion and Wellness Education, Safety, Verbal Exercise Instruction    Goals:  Patient Goals : None Stated  Short Term Goals  Time Frame for Short Term Goals: 1 week  Short Term Goal 1: Patient to perform bed mobility supine<>sit with Mod I with HOB flat and no railings in order to assist with getting into and out of bed. Short Term Goal 2: Patient to perform sit to stand transfers without AD  and Supervision from various surfaces in order to assist wtih safety with transfers in home. Short Term Goal 3: Patient to ambulate >/=75 feet without AD with Supervision in order to assist with safety with home mobility. Short Term Goal 4: Patient to ascend/descend 1 step without railings with SBA in order to assist with home entry.   Short Term Goal 5: Patient to score >/=45/56 on the Flores Balance Test in order to assist with functional dynamic balance. Long Term Goals  Time Frame for Long Term Goals : 2 weeks from IPR evaluation  Long Term Goal 1: Patient to perform sit to stand transfers without AD with Mod I from various surfaces in order to assist with safety with home mobility. Long Term Goal 2: Patient to ambulate >/=150 feet without AD with Mod I in order to assist with home mobility. Long Term Goal 3: Patient to ambulate over uneven surfaces and in unfamiliar environment with use of cane and Mod I in order to assist with community mobility. Long Term Goal 4: Patient to ascend/descend 1 step without handrails with Mod I in order to assist with home entry. Long Term Goal 5: Patient to perform car transfer with Mod I in order to assist with community mobility. Additional Goals?: Yes  Long term goal 6: Patient to score >/=14/16 on the Dynamic Gait Index in order to assist with functional dynamic balance. Following session, patient left in safe position with all fall risk precautions in place.

## 2022-12-30 NOTE — PROGRESS NOTES
[address] NOTE    Conference Date: 1/3/2023  Admit Date:  2022  3:49 PM  Patient Name: Deandra Driver    MRN: [id number]    : 1960  (58 y.o.)  Rehabilitation Admitting Diagnosis:  Debility [R53.81]  Referring Practitioner: Mariposa Ortiz MD      CASE MANAGEMENT  Current issues/needs regarding patient and family discharge status: Prior to admission, patient was living alone. Patient was independent at home. Patient was completing his ADLs, housekeeping, meal prep, finances, errands and driving. Patient was working full time. Patient reports having multiple family members who are supportive and able to assist with needed. Patient's main support comes from his mother, Ashlyn Mccollum. Patient plans to be discharge to her home until he feels comfortable to be on his own. Patient's family physician is DEBBIE Chowdary. Patient prefers [facility] on [address]. Patient is motivated to participate in therapy. PHYSICAL THERAPY     Equipment Needed: Yes (Patient will require cane for home)    SPEECH THERAPY       OCCUPATIONAL THERAPY  ***  Other: Pt has access to a shower chair. Continue to assess needs    RECREATIONAL THERAPY  Patient has been offered participation in recreational therapy activities and participates as able. NUTRITION  Weight: 183 lb 3 oz (83.1 kg) / Body mass index is 24.67 kg/m². Current diet: ADULT DIET; Regular  ADULT ORAL NUTRITION SUPPLEMENT; Lunch; Standard 4 oz Oral Supplement  Please see nutrition note for details.     NURSING  Continent of Bowel: {NURSING CONFERENCE:43078}  Continent of Bladder: {NURSING CONFERENCE:50335}  Pain is Managed:  {NURSING  KSCD:03470}  Sleep: {NURSING MYLJV:06671}  Signs and Symptoms of Infection:  {NURSING INFECTION:10879}  Signs and Symptoms of Skin Breakdown:  {NURSING SKIN BREAKDOWN:81514}  Injury and/or Falls during Inpatient Rehabilitation Admission: {YES / KZ:63435}  Anticoagulants: ***  Diabetic: {NURSING DIABETES:70710}  Consultations/Labs/X-rays: ***  Oxygen while on IP Rehab:  {YES / NO:} Currently using  *** liters per ***  . Home oxygen: {YES / AS:49827}  Patient/Family Education Focus: ***   Barriers to Education: ***    Recent Labs     22  1632 22  2030 22  0711   POCGLU 95 175* 138*       Lab Results   Component Value Date    LDLCALC 77 2022         Vitals:    22 2311 22 0526 22 0556 22 0930   BP:    100/65   Pulse: 88   (!) 113   Resp: 12 14 14 16   Temp:    99.6 °F (37.6 °C)   TempSrc:    Oral   SpO2:    100%   Weight:       Height:              Family Education: {Blank single:39879::\"Family available and participating in education \",\"No family available for education\",\"Need to make contact with family to initiate education\"}  Fall Risk:  {Blank single:::\"No\",\"Falling star program initiated\",\"Ultra high fall risk program initiated\"}  Is the patient appropriate for a stay in the functional apartment? {YES/NO:}    Discharge Plan   Estimated Discharge Date: {ambiguous abbreviation:0417944::\"Continue to assess\",\"***\"}   Destination: {Settin}  Services at Discharge: {FOLLOW-UP SERVICES:}  Is patient appropriate for an outpatient driving evaluation? {YES/NO:}  Equipment at Discharge: Other: Pt has access to a shower chair. Continue to assess needs  Factors facilitating achievement of predicted outcomes: {Patient Strengths:546169349}  Barriers to the achievement of predicted outcomes: {BARRIERS:048559648}  Follow up with physiatrist? {YES/NO:}  If yes, what timeframe?  ***    Team Members Present at Conference:  :{IRF CONFERENCE ATTENDANCE - TJ:34168}  Occupational Therapist:{IRF CONFERENCE ATTENDANCE - DQ:82774}  Physical Therapist:{IRF CONFERENCE ATTENDANCE - WS:14107}  Speech Therapist:{IRF CONFERENCE ATTENDANCE - VO:48533}  Nurse:{IRF CONFERENCE ATTENDANCE - EBSJV:40006}  Psychologist: Wen Vazquez, PhD.    I approve the established interdisciplinary plan of care as documented within the medical record of Adrián Walsh.     SHANNON Granado

## 2022-12-30 NOTE — PROGRESS NOTES
[facility]  [address] [place]  DAILY NOTE    TIME   SLP Individual Minutes  Time In: 1130  Time Out: 1200  Minutes: 30  Timed Code Treatment Minutes: 30 Minutes       Date: 2022  Patient Name: Eleni Pineda      CSN: [id number]   : 1960  (58 y.o.)  Gender: male   Referring Physician:  Vivek Mason MD  Diagnosis: Debility  Precautions: Fall Risk, Aspiration Risk   Current Diet: Regular and Thin Liquids   Swallowing Strategies: Standard Universal Swallow Precautions  Date of Last MBS/FEES: Not Applicable    Pain:   - Pain location: low back - RN aware and recently provided pain medication    Subjective:  Patient awake in recliner and agreeable to skilled ST services. Pleasant and cooperative throughout. Short-Term Goals:  SHORT TERM GOAL #1:  Goal 1: Patient will complete high level/complex executive functioning and verbal reasoning tasks (medications, finances, work-related tasks) with 85% accuracy given min cues to resume ADL/IADLs and work-related tasks independently  INTERVENTIONS:  Medication Management - Pill Box Organization   Prescription 1 - take 1 tablet in the evening   Comprehension: independently   Completion: independently     Prescription 2 - take 1 tablet daily in the AM   Comprehension: independent  Completion: independent    Prescription 3 - take 1 tablet 3x/daily for 5 days  Comprehension: independent  Completion: independent     Prescription 4 - take 2 tablets twice daily   Comprehension: independent   Completion: min cues x1 error     Prescription 5 - take 1 tablet every 6-8 hours as needed for pain   Comprehension: independently identified PRN medication would not be organized into pill box. *excellent problem solving to utilizing bowl to hold pills into in order to organize given decreased hand dexterity.      Time Management - Word Problem   10/10 independently     Deductive Reasoning Puzzle # 3  10/15 independently, 3/15 given min cues, 2/15 given mod cues       SHORT TERM GOAL #2:  Goal 2: Patient will complete recall tasks (immediate, delayed, working) with 80% accuracy given min cues to improve recall of pertinent daily and medical information. INTERVENTIONS:  Delayed recall of Doctor's Appointment (~24 hours): 6/6 independently WITHOUT utilization of written list    Long-Term Goals:  Time Frame for Long Term Goals: 2 weeks from evaluation    LONG TERM GOAL #1:  Goal 1: Patient will improve cognitive-linguistic skills to an independent level in order to resume ADL/IADLs and work-related tasks independently      Comprehension: 7 - Patient understands complex ideas (math/planning)  Expression: 7 - Patient expresses complex ideas/needs  Social Interaction: 7 - Patient has appropriate behavior/relations 100% of the time  Problem Solvin - Patient able to solve simple/routine tasks  Memory: 5 - Patient requires prompting with stress/unfamiliar situations    EDUCATION:  Learner: Patient  Education:  Reviewed ST goals and Plan of Care and Education Related to [employer] and Wellness  Evaluation of Education: [employer] understanding and Needs further instruction    ASSESSMENT/PLAN:  Activity Tolerance:  Patient tolerance of  treatment: good. Assessment/Plan: Patient progressing toward established goals. Continues to require skilled care of licensed speech pathologist to progress toward achievement of established goals and plan of care.     Plan for Next Session: Complex Scheduling task, Excel Invoice/Payroll task, Recall  Discharge Recommendations: Home with [facility] ([facility] [place] [address]) LASHAWN Harry, [address]

## 2022-12-30 NOTE — PLAN OF CARE
Problem: Discharge Planning  Goal: Discharge to home or other facility with appropriate resources  12/30/2022 0215 by Misha Sidhu LPN  Outcome: Progressing  Flowsheets (Taken 12/29/2022 2237)  Discharge to home or other facility with appropriate resources: Identify barriers to discharge with patient and caregiver. Problem: Safety - Adult  Goal: Free from fall injury  12/30/2022 0215 by Misha Sidhu LPN  Outcome: Progressing. No falls sustained at this time. Patient alert to call light and uses appropriately to alert staff to needs. Bed/chair alarms in use. Problem: Skin/Tissue Integrity  Goal: Absence of new skin breakdown  Description: 1. Monitor for areas of redness and/or skin breakdown  12/30/2022 0215 by Misha Sidhu LPN  Outcome: Progressing. No new skin issues noted   Care plan reviewed with patient. Patient verbalize understanding of the plan of care and contribute to goal setting.

## 2022-12-30 NOTE — PLAN OF CARE
Care plan reviewed with patient and verbalize understanding of the plan of care and contribute to goal setting. Problem: Discharge Planning  Goal: Discharge to home or other facility with appropriate resources  12/30/2022 0944 by Bailey Alfaro LPN  Outcome: Progressing  Flowsheets  Taken 12/30/2022 0944  Discharge to home or other facility with appropriate resources:   Identify barriers to discharge with patient and caregiver   Identify discharge learning needs (meds, wound care, etc)  Taken 12/30/2022 0930  Discharge to home or other facility with appropriate resources: Identify barriers to discharge with patient and caregiver     Problem: Safety - Adult  Goal: Free from fall injury  12/30/2022 0944 by Bailey Alfaro LPN  Outcome: Progressing  Flowsheets  Taken 12/30/2022 0944  Free From Fall Injury: Instruct family/caregiver on patient safety  Taken 12/30/2022 0943  Free From Fall Injury: Instruct family/caregiver on patient safety     Problem: Skin/Tissue Integrity  Goal: Absence of new skin breakdown  Description: 1. Monitor for areas of redness and/or skin breakdown  2. Assess vascular access sites hourly  3. Every 4-6 hours minimum:  Change oxygen saturation probe site  4. Every 4-6 hours:  If on nasal continuous positive airway pressure, respiratory therapy assess nares and determine need for appliance change or resting period. 12/30/2022 0944 by Bailey Alfaro LPN  Outcome: Progressing  Note: Blanchable redness on coccyx, using pink foam pad for protection. Will continue to monitor.       Problem: Pain  Goal: Verbalizes/displays adequate comfort level or baseline comfort level  Outcome: Progressing  Flowsheets  Taken 12/30/2022 0944 by Bailey Alfaro LPN  Verbalizes/displays adequate comfort level or baseline comfort level:   Encourage patient to monitor pain and request assistance   Assess pain using appropriate pain scale   Administer analgesics based on type and severity of pain and evaluate response  Taken 12/30/2022 0930 by Priscila Cantu LPN  Verbalizes/displays adequate comfort level or baseline comfort level: Encourage patient to monitor pain and request assistance  Taken 12/29/2022 2241 by Carla Hirsch LPN  Verbalizes/displays adequate comfort level or baseline comfort level: Encourage patient to monitor pain and request assistance

## 2022-12-30 NOTE — PROGRESS NOTES
[address]  Occupational Therapy  Daily Note  Time:    Time In: 1100  Time Out: 1130  Timed Code Treatment Minutes: 30 Minutes  Minutes: 30          Date: 2022  Patient Name: Deandra Driver,   Gender: male      Room: [facility]68/068-A  MRN: [id number]  : 1960  (58 y.o.)  Referring Practitioner: Dr. Angel Milan  Diagnosis: Debility  Additional Pertinent Hx: Deandra Driver is a 58 y.o. 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 The patient presented to [address] [place] [address] clinic on 12/3/2022 with progressive weakness, dysphagia and facial drooping. He was suspected to have adrenal insufficiency. Endocrinology was consulted. Blood culture from chemotherapy port done on 12/3/2022 revealed Clostridium. Therefor the port was removed on 2022. 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 MRI of cervical spine done on 2022 revealed diffuse osseous metastasis disease in cervical spine and upper thoracic spine, mild right ventral epidural tumor at C7 extending into right C7-T1 neuroforamen, intramedullary enhancing lesion within central cervical spinal cord at C5-6 and right C6-7 cervical spinal cord.   Repeated MRI of brain done on 2022 revealed enhancing lesions in right frontal lobe and throughout the supratentorial and infratentorial brain, within the right  musculature, bilateral temporalis musculature, bilateral extraocular musculature, and within the calvarium and skull base. Because of extensive metastatic malignancy, oral chemotherapywas started on 12/8/2022. venous duplex study was done on 12/16/2022 and showed no acute DVT but acute superficial venous thrombosis in RUE. MRI of brachial plexus done on 12/22/2022 show extensive metastatic disease involving osseous structures, multiple muscles including bilateral scapular intrinsic muscles and paraspinal muscles. The LUE weakness and edema was thought to be due to tumor infiltrating brachial plexus and radiation induced plexopathy. His hospital course was also complicated by tumor lysis syndrome, acute renal failure, and dysphagia with severe protein calorie malnutrition. Restrictions/Precautions:  Restrictions/Precautions: Contact Precautions, Fall Risk      SUBJECTIVE: Pt pleasant and cooperative. PAIN:  0 /10:      Vitals: Nurse checked vitals prior to session    COGNITION: Impaired Attention     ADL:   Grooming: Stand By Assistance. Standing at sink   Toileting: Stand By Assistance. Toilet Transfer: Stand By Assistance. Standard toilet . BALANCE:  Sitting Balance:  Supervision. Dynamic sitting balance, indep with static   Standing Balance: Stand By Assistance. TRANSFERS:  Sit to Stand:  Stand By Assistance. Stand to Sit: Stand By Assistance. FUNCTIONAL MOBILITY:  Assistive Device: Straight Cane  Assist Level:  Stand By Assistance. Distance:  To and from therapy gym     EXERCISES:   Tightness felt and flexed L3, L4 and L5.   10 minute Soft tissue massage completed to L hand with focus on stretching and ROM  Noticeable difference following soft tissue massage with digits resting more open  Cross friction massage completed to left wrist retinaculum  Lymphatic massage completed to LUE   Pt instructed in SROM of L hand for digit and wrist extension and returned demonstration with no cues required    ASSESSMENT:  Activity Tolerance:  Patient tolerance of  treatment: good. Discharge Recommendations: Home with Outpatient OT  Equipment Recommendations: Other: Pt has access to a shower chair. Continue to assess needs  Plan: Times Per Week: 5x/wk for 90 min and 1x/wk for 30 min  Times Per Day: Once a day  Current Treatment Recommendations: Strengthening, ROM, Balance training, Endurance training, Neuromuscular re-education, Equipment evaluation, education, & procurement, Patient/Caregiver education & training, Safety education & training, Self-Care / ADL    Patient Education  Patient Education: Home Exercise Program and use of elastic shoelaces for adaptive dressing,     Goals  Short Term Goals  Time Frame for Short Term Goals: 1 week  Short Term Goal 1: Pt will improve L hand AROM to 4+/5 to improve indep with using his keyboard with vanessa hands. Short Term Goal 2: Pt will don and doff his socks and shoes with supervision using adaptive techniques prn to improve indep with donning his work boots or tennis shoes. Short Term Goal 3: Pt will demonstrate one handed adaptive IADL techniques with supervision to be able to return to living independently. Short Term Goal 4: Pt will complete simple meal prep tasks with supervision and 0 vcs for LUE safety/ placement to improve indep with self care. Long Term Goals  Time Frame for Long Term Goals : 2 weeks from IPR evaluation  Long Term Goal 1: Pt will complete BADL routine with mod I and adaptations to improve indep with self care. Long Term Goal 2: Pt will improve L [facility] AEB completion of 9 hole peg test in <1 minute to improve [facility] needed for IADLs. Long Term Goal 3: Pt will demonstrate SROM techinques of LUE to preserve movement for ease of ADLs. Following session, patient left in safe position with all fall risk precautions in place.

## 2022-12-30 NOTE — CONSULTS
Comprehensive Nutrition Assessment    Type and Reason for Visit:  Initial, Consult    Nutrition Recommendations/Plan:   Continue current diet  Consider daily MVI  ONS; Ensure Enlive BID     Malnutrition Assessment:  Malnutrition Status:  Moderate malnutrition (12/30/22 1427)    Context:  Chronic Illness     Findings of the 6 clinical characteristics of malnutrition:  Energy Intake:  Mild decrease in energy intake (Comment)  Weight Loss:   (24.7% in the past 7 months)     Body Fat Loss:  Mild body fat loss Orbital, Buccal region   Muscle Mass Loss:  Mild muscle mass loss Temples (temporalis)  Fluid Accumulation:  No significant fluid accumulation     Strength:  Not Performed    Nutrition Assessment:     Pt. moderately malnourished AEB criteria as listed above.  At risk for further nutrition compromise r/t debility d/t multiple metastatic melanoma and underlying medical condition (Hx; HLD, HTN, Hypothyroidism, Melanoma- brain, spine, bone, muscle).        Nutrition Related Findings:    Pt. Report/Treatments/Miscellaneous: Pt seen- reported good appetite and intake; denied trying/receiving Ensure Compact; Discussed ONS use and good po intake- pt stated that he would like to gain weight; Discussed increasing ONS- pt agreed; Ordered Ensure Enlive BID; Encourage po intake and ONS use.   GI Status: No BM   Pertinent Labs: 12/29; BUN 10, Cr 0.4, A1C 5.8 (5/9/22), POC Glucose 138, 147 12/30  Pertinent Meds: Colace, Humalog, Synthroid, Movantik, Deltasone Senokot, Abx     Wound Type:  (wound coccyx)       Current Nutrition Intake & Therapies:    Average Meal Intake: 1-25%, 26-50%, %  Average Supplements Intake:  (pt denies trying ONS)  ADULT DIET; Regular  ADULT ORAL NUTRITION SUPPLEMENT; Lunch; Standard 4 oz Oral Supplement    Anthropometric Measures:  Height: 6' 0.25\" (183.5 cm)  Ideal Body Weight (IBW): 180 lbs (82 kg)    Admission Body Weight: 183 lb 3 oz (83.1 kg) (12/28; BLE non-pitting)  Current Body Weight:  183 lb 3 oz (83.1 kg) (12/28; BLE non-pitting), 101.8 % IBW. Weight Source: Bed Scale  Current BMI (kg/m2): 24.7  Usual Body Weight:  (Per EMR 9/17/21 235 lb, 5/9/22 243 lb 3 oz)     Weight Adjustment For: No Adjustment                 BMI Categories: Normal Weight (BMI 18.5-24. 9)    Estimated Daily Nutrient Needs:  Energy Requirements Based On: Kcal/kg  Weight Used for Energy Requirements: Current (83.1 kg)  Energy (kcal/day): 0506-4188 (28-32 kcal/kg)  Weight Used for Protein Requirements: Current (83.1 kg)  Protein (g/day): 837232 (1.2-1.4 g/kg)         Nutrition Diagnosis:   Moderate malnutrition related to catabolic illness as evidenced by Criteria as identified in malnutrition assessment    Nutrition Interventions:   Food and/or Nutrient Delivery: Continue Current Diet, Modify Oral Nutrition Supplement  Nutrition Education/Counseling: Education initiated (discussed po intake and ONS)  Coordination of Nutrition Care: Continue to monitor while inpatient       Goals:     Goals: PO intake 75% or greater, by next RD assessment       Nutrition Monitoring and Evaluation:   Behavioral-Environmental Outcomes: None Identified  Food/Nutrient Intake Outcomes: Food and Nutrient Intake, Supplement Intake  Physical Signs/Symptoms Outcomes: Biochemical Data, GI Status, Fluid Status or Edema, Weight, Skin, Nutrition Focused Physical Findings    Discharge Planning:     Too soon to determine     Susy Garcia N [address] [place] [address]  Contact: [phone]

## 2022-12-30 NOTE — PROGRESS NOTES
[facility]  [address] [facility]  Occupational Therapy  Daily Note  Time:    Time In: 0830  Time Out: 0930  Timed Code Treatment Minutes: 60 Minutes  Minutes: 60          Date: 2022  Patient Name: Abby Oakes,   Gender: male      Room: [facility]68/068-A  MRN: [id number]  : 1960  (58 y.o.)  Referring Practitioner: Dr. Hu Chahal  Diagnosis: Debility  Additional Pertinent Hx: Abby Oakes is a 58 y.o. 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 The patient presented to [address] [place] [address] clinic on 12/3/2022 with progressive weakness, dysphagia and facial drooping. He was suspected to have adrenal insufficiency. Endocrinology was consulted. Blood culture from chemotherapy port done on 12/3/2022 revealed Clostridium. Therefor the port was removed on 2022. 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 MRI of cervical spine done on 2022 revealed diffuse osseous metastasis disease in cervical spine and upper thoracic spine, mild right ventral epidural tumor at C7 extending into right C7-T1 neuroforamen, intramedullary enhancing lesion within central cervical spinal cord at C5-6 and right C6-7 cervical spinal cord.   Repeated MRI of brain done on 2022 revealed enhancing lesions in right frontal lobe and throughout the supratentorial and infratentorial brain, within the right  musculature, bilateral temporalis musculature, bilateral extraocular musculature, and within the calvarium and skull base. Because of extensive metastatic malignancy, oral chemotherapywas started on 12/8/2022. venous duplex study was done on 12/16/2022 and showed no acute DVT but acute superficial venous thrombosis in RUE. MRI of brachial plexus done on 12/22/2022 show extensive metastatic disease involving osseous structures, multiple muscles including bilateral scapular intrinsic muscles and paraspinal muscles. The LUE weakness and edema was thought to be due to tumor infiltrating brachial plexus and radiation induced plexopathy. His hospital course was also complicated by tumor lysis syndrome, acute renal failure, and dysphagia with severe protein calorie malnutrition. Restrictions/Precautions:  Restrictions/Precautions: Contact Precautions, Fall Risk      SUBJECTIVE: Pt pleasant and cooperative. PAIN:  0 /10:      Vitals: Nurse checked vitals prior to session    COGNITION: Impaired Attention     ADL:   Grooming: Stand By Assistance. Standing at sink   Lower Extremity Dressing: Stand By Assistance. Donning slip on shoes. Pt reports inability to tie shoes since last summer. Pt provided with elastic shoe laces, and OT applied elastic shoelaces to his tennis shoes, pt donned tennis shoes with SBA. \"I like these! \"   Toileting: Stand By Assistance. Toilet Transfer: Stand By Assistance. Standard toilet  . BALANCE:  Sitting Balance:  Supervision. Dynamic sitting balance, indep with static   Standing Balance: Stand By Assistance. TRANSFERS:  Sit to Stand:  Stand By Assistance. Stand to Sit: Stand By Assistance. FUNCTIONAL MOBILITY:  Assistive Device: Straight Cane  Assist Level:  Stand By Assistance. Distance:  To and from bathroom and To and from therapy gym     EXERCISES:   Standing RUE sh flexion overhead with emphasis on scapular stretch 2 sets of 10 reps each  Standing RUE horiz abduction completed at 90 degrees with emphasis on stretch and scapular strengthening  Seated BUE Table top slides sh flexion x10 reps x2 sets in   Seated LUE Sh flexion 2 sets of 10 reps each   Seated LUE Scaption  2 sets of 10 reps each  Seated LUE Horiz abduction 2 sets of 10 reps each  with rest break in between each. Exercises completed to improve ROM for applying deodorant     ASSESSMENT:  Activity Tolerance:  Patient tolerance of  treatment: good. Discharge Recommendations: Home with Outpatient OT  Equipment Recommendations: Other: Pt has access to a shower chair. Continue to assess needs  Plan: Times Per Week: 5x/wk for 90 min and 1x/wk for 30 min  Times Per Day: Once a day  Current Treatment Recommendations: Strengthening, ROM, Balance training, Endurance training, Neuromuscular re-education, Equipment evaluation, education, & procurement, Patient/Caregiver education & training, Safety education & training, Self-Care / ADL    Patient Education  Patient Education: Home Exercise Program and use of elastic shoelaces for adaptive dressing,     Goals  Short Term Goals  Time Frame for Short Term Goals: 1 week  Short Term Goal 1: Pt will improve L hand AROM to 4+/5 to improve indep with using his keyboard with vanessa hands. Short Term Goal 2: Pt will don and doff his socks and shoes with supervision using adaptive techniques prn to improve indep with donning his work boots or tennis shoes. Short Term Goal 3: Pt will demonstrate one handed adaptive IADL techniques with supervision to be able to return to living independently. Short Term Goal 4: Pt will complete simple meal prep tasks with supervision and 0 vcs for LUE safety/ placement to improve indep with self care.   Long Term Goals  Time Frame for Long Term Goals : 2 weeks from IPR evaluation  Long Term Goal 1: Pt will complete BADL routine with mod I and adaptations to improve indep with self care.  Long Term Goal 2: Pt will improve [facility] AEB completion of 9 hole peg test in <1 minute to improve [address] needed for IADLs. Long Term Goal 3: Pt will demonstrate SROM techinques of LUE to preserve movement for ease of ADLs. Following session, patient left in safe position with all fall risk precautions in place.

## 2022-12-30 NOTE — PROGRESS NOTES
Focus Note  Full Admission Note Completed: yes, 12/28  Discharge Pain Assessment may be completed between *** and *** (2 days prior to discharge or day of discharge)     Education Focus: {Educational topics reviewed with patient:31644}  {[facility] Education Learner Outcome:[id number]}                               Medication education. Why take what meds. Wound: yes, buttock   Last date of picture/measure: ***   Family member to be included in wound care education: patient. Voiced and demonstrated knowkledge of importance of repositioning. Bowel Management: Continent: yes, last bm 12/29   Management: Senna Movantik, and colace    Bladder Management: Continent: yes,     Medication Education:   Home med list and hospital med list compared for new meds: {YES/NO:87414}  New medication identified: ***  Diabetes management education: ***    Pain Management:    Non-pharmacological strategies for pain: heat, and ice alternating.      Discharge Needs:   Caregiver/Support identified: ***   Equipment needs: ***   Barriers expressed by patient or family: ***   Follow up appointments needed: ***

## 2022-12-30 NOTE — PROGRESS NOTES
[facility]  Recreational Therapy  Inpatient Rehabilitation Evaluation        Time Spent with Patient: 30 minutes    Date:  12/30/2022       Patient Name: Elbert Pompa      MRN: [id number]       YOB: 1960 (62 y.o.)       Gender: male  Diagnosis: Debility  Referring Practitioner: Dr. CORTEZ Aburto    RESTRICTIONS/PRECAUTIONS:  Restrictions/Precautions: Contact Precautions, Fall Risk     Hearing: Exceptions to WFL  Hearing Exceptions: Hard of hearing/hearing concerns    PAIN: 0-states very little pain-sometimes in his low back    SUBJECTIVE:  pt lives alone-he has 4 children and 8 grandchildren     VISION:  Glasses to read     HEARING: Hard of Hearing    LEISURE INTERESTS:   Pt was working for [employer] -states he enjoys spending time with his children and grandchildren, he enjoys riding his motorcycle, enjoys going to [place] with family, plays cards -is going to go to his mom's home at discharge for a while and hopes to gain some of the weight he lost-very pleasant and social-likes to read the newspaper-his mom is bringing in word puzzles and reading materials for him today-no leisure needs at this time-Dr. Aburto gave the ok for pt to be able to get off the floor via w/c with family when they visit    BARRIERS TO LEISURE INTERESTS:    Decreased endurance           Patient Education  New Education Provided: Importance of Leisure, RT Plan of Care    Plan:  Continue to follow patient through this admission  See patient individually    Electronically signed by: Edwige Leung, CTRS  Date: 12/30/2022

## 2022-12-31 ENCOUNTER — APPOINTMENT (OUTPATIENT)
Dept: GENERAL RADIOLOGY | Age: 62
DRG: 274 | End: 2022-12-31
Attending: INTERNAL MEDICINE
Payer: COMMERCIAL

## 2022-12-31 ENCOUNTER — APPOINTMENT (OUTPATIENT)
Dept: CT IMAGING | Age: 62
DRG: 274 | End: 2022-12-31
Attending: INTERNAL MEDICINE
Payer: COMMERCIAL

## 2022-12-31 PROBLEM — E27.1 ADRENAL INSUFFICIENCY (ADDISON'S DISEASE) (HCC): Status: ACTIVE | Noted: 2022-12-31

## 2022-12-31 LAB
GLUCOSE BLD-MCNC: 164 MG/DL (ref 70–108)
GLUCOSE BLD-MCNC: 164 MG/DL (ref 70–108)
GLUCOSE BLD-MCNC: 231 MG/DL (ref 70–108)
GLUCOSE BLD-MCNC: 84 MG/DL (ref 70–108)
LV EF: 63 %
LVEF MODALITY: NORMAL
TROPONIN T: < 0.01 NG/ML
TSH SERPL DL<=0.05 MIU/L-ACNC: 3.24 UIU/ML (ref 0.4–4.2)

## 2022-12-31 PROCEDURE — 71045 X-RAY EXAM CHEST 1 VIEW: CPT

## 2022-12-31 PROCEDURE — 84484 ASSAY OF TROPONIN QUANT: CPT

## 2022-12-31 PROCEDURE — 93306 TTE W/DOPPLER COMPLETE: CPT

## 2022-12-31 PROCEDURE — 2140000000 HC CCU INTERMEDIATE R&B

## 2022-12-31 PROCEDURE — 97166 OT EVAL MOD COMPLEX 45 MIN: CPT

## 2022-12-31 PROCEDURE — 6370000000 HC RX 637 (ALT 250 FOR IP): Performed by: INTERNAL MEDICINE

## 2022-12-31 PROCEDURE — 2580000003 HC RX 258: Performed by: PHYSICIAN ASSISTANT

## 2022-12-31 PROCEDURE — 36415 COLL VENOUS BLD VENIPUNCTURE: CPT

## 2022-12-31 PROCEDURE — 6360000004 HC RX CONTRAST MEDICATION: Performed by: PHYSICIAN ASSISTANT

## 2022-12-31 PROCEDURE — 6370000000 HC RX 637 (ALT 250 FOR IP): Performed by: PHYSICAL MEDICINE & REHABILITATION

## 2022-12-31 PROCEDURE — 99222 1ST HOSP IP/OBS MODERATE 55: CPT | Performed by: PHYSICAL MEDICINE & REHABILITATION

## 2022-12-31 PROCEDURE — 99254 IP/OBS CNSLTJ NEW/EST MOD 60: CPT | Performed by: INTERNAL MEDICINE

## 2022-12-31 PROCEDURE — 97530 THERAPEUTIC ACTIVITIES: CPT

## 2022-12-31 PROCEDURE — 99233 SBSQ HOSP IP/OBS HIGH 50: CPT | Performed by: INTERNAL MEDICINE

## 2022-12-31 PROCEDURE — 84443 ASSAY THYROID STIM HORMONE: CPT

## 2022-12-31 PROCEDURE — 82948 REAGENT STRIP/BLOOD GLUCOSE: CPT

## 2022-12-31 PROCEDURE — 71275 CT ANGIOGRAPHY CHEST: CPT

## 2022-12-31 RX ORDER — DIGOXIN 250 MCG
250 TABLET ORAL EVERY 4 HOURS
Status: COMPLETED | OUTPATIENT
Start: 2022-12-31 | End: 2022-12-31

## 2022-12-31 RX ADMIN — DIGOXIN 250 MCG: 250 TABLET ORAL at 11:20

## 2022-12-31 RX ADMIN — IOPAMIDOL 80 ML: 755 INJECTION, SOLUTION INTRAVENOUS at 10:45

## 2022-12-31 RX ADMIN — DIGOXIN 250 MCG: 250 TABLET ORAL at 15:06

## 2022-12-31 RX ADMIN — ASPIRIN 81 MG 81 MG: 81 TABLET ORAL at 09:06

## 2022-12-31 RX ADMIN — NALOXEGOL OXALATE 12.5 MG: 12.5 TABLET, FILM COATED ORAL at 04:47

## 2022-12-31 RX ADMIN — SODIUM CHLORIDE: 9 INJECTION, SOLUTION INTRAVENOUS at 09:05

## 2022-12-31 RX ADMIN — DOCUSATE SODIUM 100 MG: 100 CAPSULE, LIQUID FILLED ORAL at 21:34

## 2022-12-31 RX ADMIN — ATORVASTATIN CALCIUM 20 MG: 20 TABLET, FILM COATED ORAL at 21:43

## 2022-12-31 RX ADMIN — OXYCODONE 5 MG: 5 TABLET ORAL at 17:31

## 2022-12-31 RX ADMIN — PREDNISONE 10 MG: 10 TABLET ORAL at 09:06

## 2022-12-31 RX ADMIN — LEVOTHYROXINE SODIUM 100 MCG: 0.1 TABLET ORAL at 04:47

## 2022-12-31 RX ADMIN — OXYCODONE 5 MG: 5 TABLET ORAL at 04:47

## 2022-12-31 RX ADMIN — POTASSIUM CHLORIDE 40 MEQ: 1500 TABLET, EXTENDED RELEASE ORAL at 09:13

## 2022-12-31 RX ADMIN — FLUDROCORTISONE ACETATE 0.1 MG: 0.1 TABLET ORAL at 09:06

## 2022-12-31 RX ADMIN — OXYCODONE 5 MG: 5 TABLET ORAL at 11:20

## 2022-12-31 RX ADMIN — SENNOSIDES 17.2 MG: 8.6 TABLET, FILM COATED ORAL at 21:33

## 2022-12-31 RX ADMIN — Medication 6 MG: at 21:34

## 2022-12-31 RX ADMIN — ALLOPURINOL 300 MG: 300 TABLET ORAL at 09:06

## 2022-12-31 ASSESSMENT — PAIN SCALES - GENERAL
PAINLEVEL_OUTOF10: 8
PAINLEVEL_OUTOF10: 5

## 2022-12-31 NOTE — PROGRESS NOTES
Dr Simran Gates and this nurse was in the patients room when Dr. Simran Gates gave Andreas Salomon and his significant other a paper documentation to review the chemotherapy medication side effects and symptoms of Trametinib Dimethyl. Dr Simran Gates also called [facility] and spoke to the oncology doctor while on the speaker phone about the med error the LPN gave the above mentioned chemotherapy drug to the patient this morning instead of tonight. The oncology doctor stated not to give this medication to the patient until tomorrow morning med pass. Andreas Salomon and his significant other were calm and appeared to be in agreement that they were satisfied with the documentation and the oncology doctors reassurance the medication would not cause the patient to go into atrial flutter or cause any of his current symptoms.

## 2022-12-31 NOTE — PLAN OF CARE
Problem: Discharge Planning  Goal: Discharge to home or other facility with appropriate resources  12/31/2022 1524 by Lashonda Velazquez RN  Outcome: [id number] return to rehab.  12/31/2022 0437 by Be Sullivan RN  Outcome: Progressing  Flowsheets (Taken 12/31/2022 4059)  Discharge to home or other facility with appropriate resources:   Identify barriers to discharge with patient and caregiver   Arrange for needed discharge resources and transportation as appropriate     Problem: Skin/Tissue Integrity  Goal: Absence of new skin breakdown  Description: 1. Monitor for areas of redness and/or skin breakdown  2. Assess vascular access sites hourly  3. Every 4-6 hours minimum:  Change oxygen saturation probe site  4. Every 4-6 hours:  If on nasal continuous positive airway pressure, respiratory therapy assess nares and determine need for appliance change or resting period. 12/31/2022 1524 by Lashonda Velazquez RN  Outcome: Progressing  12/31/2022 0437 by Be Sullivan RN  Outcome: Progressing  Note: Ongoing assessment & interventions provided throughout shift. Skin assessments provided. Encouraging/assisting patient to turn as needed. Problem: Safety - Adult  Goal: Free from fall injury  12/31/2022 1524 by Lashonda Velazquez RN  Outcome: Progressing  12/31/2022 0437 by Be Sullivan RN  Outcome: Progressing  Flowsheets (Taken 12/31/2022 0437)  Free From Fall Injury: Instruct family/caregiver on patient safety  Note: Bed locked & in low position, call light in reach, side-rails up x2, bed/chair alarm utilized, non-slip socks on when ambulating, reminded patient to use call light to call for assistance.        Problem: ABCDS Injury Assessment  Goal: Absence of physical injury  12/31/2022 1524 by Lashonda Velazquez RN  Outcome: Progressing  12/31/2022 0437 by Be Sullivan RN  Outcome: Progressing  Flowsheets (Taken 12/31/2022 0437)  Absence of Physical Injury: Implement safety measures based on patient assessment

## 2022-12-31 NOTE — CONSULTS
Department of Family Practice  Consult Note        Reason for Consult:  Medical management while on the Inpatient Rehab unit. Requesting Physician:  Dr Haleigh Maradiaga:   The need to continue the intensive time with therapies following the acute hospital stay. History Obtained From:  patient, spouse, EMR    HISTORY OF PRESENT ILLNESS:              The patient is a 58 y.o. male with significant past medical history of       Diagnosis Date    Hyperlipidemia     In 2010s    Hypertension 2014    Hypothyroidism 2017    Melanoma ([address]     brain, spine, bone, muscle     who presents with being a transfer from [facility] where he was admitted with metastatic melanoma with sepsis. He eventually became medically stable and so was transferred here to the IPR unit for strengthening prior to the return home. This evening while making rounds I noticed that his rate was irregular and so an EKG was ordered. The EKG showed a flutter with RVR. He denied CP, SOB or new fatigue. He denied past known arrhythmias. I could not pull up the EKG from [facility] but the transfer summary does not mention any arhythmia. At the same time that this was being evaluated it was found that his Mekinist was given this AM instead of tonight. I spoke with Dr Radha Reid who felt that the patient should be moved to a monitored bed and IV meds begun to hopefully correct the rhythm. We discussed the new finding at [facility] of likely brain mets and that it would make the decision to fully anticoagulate more difficult. He felt that the discussion could be done by he or other medical staff, with the patient following transferred to a monitored bed. I spoke to the [facility] cancer nurse line as that was the contact number for Dr Keyur Olivo his oncologist. The nurse felt that the med should not be given tonight but wait till tomorrow night for the next dose. I then asked to speak to the attending on call for Dr Keyur Olivo and he had the same advice.  I asked him too whether he was aware of rhythm problems from an early dose of the Mekinist and he stated that as long as there were no QT prolongation the patient could continue the two oral chemo meds. The discussions with the cancer nurse on call and the attending covering for Dr Bhavesh Melgar were on speaker phone in the presence of the patient, his wife and his nurse. I spoke to admitting and got a 3B bed for him and spoke to [facility] about his history and the need to transfer him. I told her too of the new brain mets and how Dr Sallie Duncan wanted to hold full anticoagulation for right now. I spoke to Dr Salima Parra to inform him of the events of this evening and he will do the discharge/ readmit orders.     Past Medical History:        Diagnosis Date    Hyperlipidemia     In 2010s    Hypertension 2014    Hypothyroidism 2017    Melanoma [address] [address]     brain, spine, bone, muscle     Past Surgical History:        Procedure Laterality Date    DOPPLER ECHOCARDIOGRAPHY      LYMPHADENECTOMY Right 11/15/2013    arm pit    MOHS SURGERY      MOHS SURGERY Right 09/17/2021    MOHS REPAIR BCC RIGHT PARANASAL performed by Creta Prader, MD at [address] [facility]     Current Medications:   Current Facility-Administered Medications: Dabrafenib Mesylate CAPS 150 mg (Patient Supplied), 150 mg, Oral, Q12H  allopurinol (ZYLOPRIM) tablet 300 mg, 300 mg, Oral, Daily  aspirin chewable tablet 81 mg, 81 mg, Oral, Daily  atorvastatin (LIPITOR) tablet 20 mg, 20 mg, Oral, Nightly  enoxaparin (LOVENOX) injection 40 mg, 40 mg, SubCUTAneous, Daily  fludrocortisone (FLORINEF) tablet 0.1 mg, 0.1 mg, Oral, Daily  levothyroxine (SYNTHROID) tablet 100 mcg, 100 mcg, Oral, QAM AC  potassium chloride (KLOR-CON M) extended release tablet 40 mEq, 40 mEq, Oral, Daily  oxyCODONE (ROXICODONE) immediate release tablet 5 mg, 5 mg, Oral, Q4H PRN **OR** oxyCODONE (ROXICODONE) immediate release tablet 2.5 mg, 2.5 mg, Oral, Q4H PRN  predniSONE (DELTASONE) tablet 10 mg, 10 mg, Oral, Daily  senna (SENOKOT) tablet 17.2 mg, 2 tablet, Oral, BID  Trametinib Dimethyl Sulfoxide TABS 2 mg (PATIENT SUPPLIED) (Patient Supplied), 2 mg, Oral, Nightly  acetaminophen (TYLENOL) tablet 650 mg, 650 mg, Oral, Q4H PRN  oxyCODONE (ROXICODONE) immediate release tablet 5 mg, 5 mg, Oral, Q6H  melatonin tablet 6 mg, 6 mg, Oral, Nightly  biotene oral solution, , Mouth/Throat, PRN  sulfamethoxazole-trimethoprim (BACTRIM DS;SEPTRA DS) 800-160 MG per tablet 1 tablet, 1 tablet, Oral, Once per day on Mon Wed Fri  docusate sodium (COLACE) capsule 100 mg, 100 mg, Oral, BID  bisacodyl (DULCOLAX) suppository 10 mg, 10 mg, Rectal, Daily PRN  magnesium hydroxide (MILK OF MAGNESIA) 400 MG/5ML suspension 15 mL, 15 mL, Oral, Daily PRN  traZODone (DESYREL) tablet 50 mg, 50 mg, Oral, Nightly PRN  polyethylene glycol (GLYCOLAX) packet 17 g, 17 g, Oral, Daily PRN  lidocaine 4 % external patch 1 patch, 1 patch, TransDERmal, Q24H  naloxegol (MOVANTIK) tablet 12.5 mg, 12.5 mg, Oral, QAM AC  Allergies:  Patient has no known allergies.     Social History:   MARITAL STATUS:      Family History:       Problem Relation Age of Onset    High Blood Pressure Mother     High Cholesterol Mother     Lung Cancer Father     Mult Sclerosis Sister     Melanoma Brother      REVIEW OF SYSTEMS:    CONSTITUTIONAL:  positive for  fatigue  EYES:  negative for  eye discharge  HEENT:  positive for  nasal congestion  RESPIRATORY:  negative for  dyspnea  CARDIOVASCULAR:  negative for  chest pain  GASTROINTESTINAL:  negative for diarrhea  GENITOURINARY:  negative for dysuria  INTEGUMENT/BREAST:  negative for rash  HEMATOLOGIC/LYMPHATIC:  negative for petechiae  ALLERGIC/IMMUNOLOGIC:  negative for anaphylaxis  ENDOCRINE:  negative for diabetic symptoms including polydipsia  MUSCULOSKELETAL:  positive for  myalgias and arthralgias  NEUROLOGICAL:  positive for coordination problems, gait problems, and weakness  BEHAVIOR/PSYCH:  negative for increased agitation  PHYSICAL EXAM: Vitals:    BP 98/73   Pulse (!) 112   Temp 98.8 °F (37.1 °C) (Oral)   Resp 16   Ht 6' 0.25\" (1.835 m)   Wt 183 lb 3 oz (83.1 kg)   SpO2 100%   BMI 24.67 kg/m²     Well developed well nourished white male who is awake alert and cooperative  Skin warm and dry  Membranes moist  Head normocephalic  Neck without mass  Chest symmetrical expansion  Heart S1S2 without murmur but occasional irregular beats  Lungs CTA  Abd soft, non tender, normoactive BS and no mass  Ext with 1+ edema bilaterally in the lower legs and trace to 1+ in the left arm  Neuro weak especially in the left arm  Psy pleasant    IMPRESSION/RECOMMENDATIONS:      Active Hospital Problems    Diagnosis Date Noted    Moderate malnutrition ([address] [E44.0] 12/30/2022     Priority: Medium     Class: Chronic    Atrial flutter with rapid ventricular response ([address] [I48.92] 12/30/2022     Priority: Medium    Hypothyroidism [E03.9] 12/28/2022     Priority: Medium    Debility [R53.81] 12/28/2022     Priority: Medium    Metastatic malignant melanoma ([address] [C43.9] 12/28/2022     Priority: Medium    Malignant melanoma metastatic to brain [facility]) [C79.31] 12/28/2022     Priority: Medium    Metastasis to spinal cord ([address] [C79.49] 12/28/2022     Priority: Medium    Brachial plexopathy, left [G54.0] 12/28/2022     Priority: Medium    Contracture, 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

## 2022-12-31 NOTE — CONSULTS
Physical Medicine & Rehabilitation Consultation Note      Admitting Physician: Kalli Land MD    Primary Care Provider: DEBBIE Martinez CNP     Reason for Consult:  Rehab evaluation    History of Present Illness:  Maricruz Dodson is a 58 y.o. right-handed  male with history of hypertension, hyperlipidemia, recurrent multiple primary melanomas ([id number] 2013) including a [id number] melanoma of the left flank s/p excision with left axillary lymph node dissection, was discharged from inpatient rehab unit and admitted to [facility] on 12/30/2022 due to atrial flutter. The patient originally presented to [address] on 12/3/2022 with progressive weakness, dysphagia and facial drooping developed about 3 weeks prior to his visit. He was found to be hypotensive and was admitted to MICU at the [facility] on 12/3/2022. He was suspected to have adrenal insufficiency. Endocrinology was consulted. Blood culture from chemotherapy port done on 12/3/2022 revealed Clostridium. Therefor the port was removed on 12/4/2022. He was treated IV fluid, antibiotic (Unasyn for 14 days) and steroid. MRI of the brain done on 12/8/2022 revealed no acute intracranial abnormality, old left thalamic insult with chronic small vessel disease, and enlarged bilateral extraocular muscle & left orbital superomedial soft tissue lesions suspicious for metastasis. MRI of cervical spine done on 12/16/2022 revealed diffuse osseous metastasis disease in cervical spine and upper thoracic spine, mild right ventral epidural tumor at C7 extending into right C7-T1 neuroforamen, intramedullary enhancing lesion within central cervical spinal cord at C5-6 and right C6-7 cervical spinal cord.   Repeated MRI of brain done on 12/16/2022 revealed enhancing lesions in right frontal lobe and throughout the supratentorial and infratentorial brain, within the right  musculature, bilateral temporalis musculature, bilateral extraocular musculature, and within the calvarium and skull base. Because of extensive metastatic malignancy, oral chemotherapy (tafinlar + mekinist) was started on 12/8/2022. He was later transferred out of MICU to oncology service floor for further care. Because of left upper extremity swelling, bilateral upper extremities venous duplex study was done on 12/16/2022 and showed no acute DVT but acute superficial venous thrombosis in right upper extremity. MRI of brachial plexus done on 12/22/2022 show extensive metastatic disease involving osseous structures, multiple muscles including bilateral scapular intrinsic muscles and paraspinal muscles. The left upper extremity weakness and edema was thought to be due to tumor infiltrating brachial plexus and radiation induced plexopathy. His hospital course was also complicated by tumor lysis syndrome, acute renal failure, and dysphagia with severe protein calorie malnutrition. The patient was admitted to [facility] inpatient rehabilitation service on 12/28/2022 for intensive inpatient management of impairment & disability secondary to debility/physical decondition secondary to extensive multiple metastatic melanoma to his brain, cervical spine, musculature, and brachial plexus. The patient continued taking his own chemotherapy medications [trametinib (Mekinist) & dabrafenib (Tafinlar)] after he was admitted to inpatient rehab unit. He tolerated the intensity inpatient rehab intervention well. On 12/30/2022 evening the patient was found to have irregular heartbeat with heart rate up to 112/min. EKG was done and revealed atrial flutter with variable A-V block with rated of 120/min ventricular rate. Cardiology was consulted. The patient then was discharged from rehab unit and admitted to acute hospital for medical stabilization. The patient was given fluid bolus.   For anticoagulation was not recommended due to new brain metastasis. Echocardiogram and CTA of the chest were ordered. CTA of chest revealed no evidence of pulmonary embolus but presence of 1.9 cm pleural-based nodule in left upper lobe anteriorly, and prominent mediastinotomy adenopathy and bilateral hilar lymphadenopathy. Echocardiogram is still waiting to be performed. Digoxin is started today for heart rate control. The patient says he feels well. He denies having dizziness, lightheadedness, shortness of breath, chest pain, nausea vomiting, abdominal pain. His left hand still has significant weakness and numbness. He denies having fatigue or weakness at other part of his body.       Most Recent Rehabilitation Assessments:  PT:    Reevaluation pending      OT:    Reevaluation pending      ST:    Reevaluation pending      Past Medical History:        Diagnosis Date    Hyperlipidemia     In 2010s    Hypertension 2014    Hypothyroidism 2017    Melanoma ([facility] [address]     brain, spine, bone, muscle       Past Surgical History:        Procedure Laterality Date    DOPPLER ECHOCARDIOGRAPHY      LYMPHADENECTOMY Right 11/15/2013    arm pit    MOHS SURGERY      MOHS SURGERY Right 09/17/2021    MOHS REPAIR BCC RIGHT PARANASAL performed by Suleman Aguayo MD at [address] [facility]:    No Known Allergies       Current Medications:   Current Facility-Administered Medications   Medication Dose Route Frequency Provider Last Rate Last Admin    digoxin (LANOXIN) tablet 250 mcg  250 mcg Oral Q4H Erica Grider MD   250 mcg at 12/31/22 1120    allopurinol (ZYLOPRIM) tablet 300 mg  300 mg Oral Daily Shanta Beavers MD   300 mg at 12/31/22 4148    aspirin chewable tablet 81 mg  81 mg Oral Daily Shanta Beavers MD   81 mg at 12/31/22 0906    atorvastatin (LIPITOR) tablet 20 mg  20 mg Oral Nightly Shanta Beavers MD        enoxaparin (LOVENOX) injection 40 mg  40 mg SubCUTAneous Daily Shanta Beavers MD        fludrocortisone (FLORINEF) tablet 0.1 mg  0.1 mg Oral Daily Shanta Beavers MD   0.1 mg at 12/31/22 6471    levothyroxine (SYNTHROID) tablet 100 mcg  100 mcg Oral QAM AC Shanta Beavers MD   100 mcg at 12/31/22 0447    potassium chloride (KLOR-CON M) extended release tablet 40 mEq  40 mEq Oral Daily Shanta Beavers MD   40 mEq at 12/31/22 0913    oxyCODONE (ROXICODONE) immediate release tablet 5 mg  5 mg Oral Q4H PRN Shanta Beavers MD        Or    oxyCODONE (ROXICODONE) immediate release tablet 2.5 mg  2.5 mg Oral Q4H PRN Shanta Beavers MD        predniSONE (DELTASONE) tablet 10 mg  10 mg Oral Daily Shanta Beavers MD   10 mg at 12/31/22 4713    senna (SENOKOT) tablet 17.2 mg  2 tablet Oral BID Shanta Beavers MD        Trametinib Dimethyl Sulfoxide TABS 2 mg (Patient Supplied)  2 mg Oral Nightly Shanta Beavers MD        acetaminophen (TYLENOL) tablet 650 mg  650 mg Oral Q4H PRN Shanta Beavers MD        oxyCODONE (ROXICODONE) immediate release tablet 5 mg  5 mg Oral Q6H Shanta Beavers MD   5 mg at 12/31/22 1120    melatonin tablet 6 mg  6 mg Oral Nightly Shanta Beavers MD        [START ON 1/2/2023] sulfamethoxazole-trimethoprim (BACTRIM DS;SEPTRA DS) 800-160 MG per tablet 1 tablet  1 tablet Oral Once per day on Mon Wed Fri Shanta Beavers MD        docusate sodium (COLACE) capsule 100 mg  100 mg Oral BID Shanta Beavers MD        bisacodyl (DULCOLAX) suppository 10 mg  10 mg Rectal Daily PRN Shanta Beavers MD        magnesium hydroxide (MILK OF MAGNESIA) 400 MG/5ML suspension 15 mL  15 mL Oral Daily PRN Shanta Beavers MD        traZODone (DESYREL) tablet 50 mg  50 mg Oral Nightly PRN Shanta Beavers MD        polyethylene glycol [facility]) packet 17 g  17 g Oral Daily PRN Shanta Beavers MD        lidocaine 4 % external patch 1 patch  1 patch TransDERmal Q24H Shanta Beavers MD   1 patch at 12/31/22 0907    naloxegol (MOVANTIK) tablet 12.5 mg  12.5 mg Oral QAM AC Shanta Beavers MD   12.5 mg at 12/31/22 5630 Dabrafenib Mesylate CAPS 150 mg (Patient Supplied)  150 mg Oral Q12H Verna Villatoro MD        biotene oral solution  10 mL Swish & Spit PRN Verna Villatoro MD        insulin lispro (HUMALOG) injection vial 0-8 Units  0-8 Units SubCUTAneous TID WC Maggy Weinstein PA-C        insulin lispro (HUMALOG) injection vial 0-4 Units  0-4 Units SubCUTAneous Nightly Maggy SHERWIN Weinstein PA-C        glucose chewable tablet 16 g  4 tablet Oral PRN Maggy SHERWIN Kwan PA-C        dextrose bolus 10% 125 mL  125 mL IntraVENous PRN Maggy Kathryn Miranda PA-C        Or    dextrose bolus 10% 250 mL  250 mL IntraVENous PRN Maggy Weinstein PA-C        glucagon (rDNA) injection 1 mg  1 mg SubCUTAneous PRN Maggy SHERWIN Weinstein, PA-C        dextrose 10 % infusion   IntraVENous Continuous PRN Maggy Weinstein, PA-C        0.9 % sodium chloride infusion   IntraVENous Continuous Karina Vaughn MD 75 mL/hr at 12/31/22 1123 Rate Change at 12/31/22 1123        Social History:  Social History     Socioeconomic History    Marital status:      Spouse name: Not on file    Number of children: Not on file    Years of education: Not on file    Highest education level: Not on file   Occupational History    Not on file   Tobacco Use    Smoking status: Never    Smokeless tobacco: Former     Types: Chew, Snuff     Quit date: 09/2022   Vaping Use    Vaping Use: Never used   Substance and Sexual Activity    Alcohol use: Yes     Comment: once per month drinking 2 to 3 cans of beer    Drug use: No    Sexual activity: Not on file   Other Topics Concern    Not on file   Social History Narrative    Not on file     Social Determinants of Health     Financial Resource Strain: Not on file   Food Insecurity: Not on file   Transportation Needs: Not on file   Physical Activity: Not on file   Stress: Not on file   Social Connections: Not on file   Intimate Partner Violence: Not on file   Housing Stability: Not on file     Occupation: Last work on 12/1/2022 as a ; work requires significant amount of computer operation  Lives with: Alone; he plans to stay at his mother's home when he is discharge so his mother can provide assistance  Home setup: 1 level house with one-step outside front door without handrail; his mother home is also a 1 level house with a ramp outside front door, and 2 steps outside garage door with 1 side handrail  Prior functional status: Independent in all ADLs, community ambulation without using any assistive walking device, and driving      Family History:       Problem Relation Age of Onset    High Blood Pressure Mother     High Cholesterol Mother     Lung Cancer Father     Mult Sclerosis Sister     Melanoma Brother        Review of Systems:  Review of Systems   Constitutional:  Negative for chills, diaphoresis, fatigue and fever. HENT:  Negative for ear discharge, ear pain, hearing loss, rhinorrhea, sneezing, sore throat, tinnitus and trouble swallowing. Eyes:  Negative for pain, discharge and visual disturbance. Respiratory:  Negative for cough, shortness of breath and wheezing. Cardiovascular:  Negative for chest pain, palpitations and leg swelling. Gastrointestinal:  Negative for abdominal pain, constipation, diarrhea, nausea and vomiting. Endocrine: Negative for cold intolerance and heat intolerance. Genitourinary:  Negative for difficulty urinating and dysuria. Musculoskeletal:  Positive for gait problem. Negative for arthralgias, back pain, myalgias and neck pain. Skin:  Negative for rash. Allergic/Immunologic: Negative for food allergies. Neurological:  Positive for weakness (Left hand) and numbness (Left hand). Negative for dizziness, tremors, seizures, facial asymmetry, speech difficulty, light-headedness and headaches. Hematological:  Does not bruise/bleed easily. Psychiatric/Behavioral:  Negative for dysphoric mood, hallucinations and sleep disturbance. The patient is not nervous/anxious.         Physical Exam:  BP 105/62   Pulse (!) 117   Temp 97.6 °F (36.4 °C) (Oral)   Resp 18   Wt 190 lb 11.2 oz (86.5 kg)   SpO2 98%   BMI 25.68 kg/m²   Physical Exam  General:  well-developed, well nourished  male; in no acute distress ; appropriate affect & mood; lying on bed comfortably  Eyes: pupil equally round ; extra-ocular motion intact bilaterally; mild dark skin discoloration at left lower orbital skin area  Head, Ear, Nose, Mouth & Throat : normocephalic ; no tenderness at the face or head scalp; no discharge from ears or nose ; mild facial muscle atrophy; no deformity ; no facial swelling ; oral mucosa pink   Neck :  supple ; no tenderness ; no muscle spasm  Cardiovascular : regular rate & rhythm ; normal S1 & S2 heart sound ; no murmur ; normal peripheral pulse at the bilateral upper extremities; reduced pulse strength at the bilateral lower extremities ankle and foot secondary to edema  Pulmonary : Breath sounds present at bilateral lung field; no wheezing ; no rale; no crackle  Gastrointestinal : soft, flat abdomen without tenderness ; normal bowel sound present   Back : no tenderness; no muscle spasm  Skin: no skin lesion or rash ; no pitting edema at right extremity; 1+ pitting edema at distal left forearm; presence of nonpitting swelling at left hand; 1+ pitting edema at bilateral ankles  Musculoskeletal : no limb asymmetry; no limb deformity; no tenderness at bilateral upper & lower extremities; no palpable mass at limbs ; no joints laxity or crepitation ; right shoulder flexion and abduction passive ROM reaching 150 degrees; left shoulder passive following reaching 90 degree in flexion and abduction, and 40 degrees in external rotation; ankle dorsiflexion passive ROM reaching 15 degrees bilaterally; normal functional joints ROM at the rest of bilateral upper & lower extremities  Cerebral :  alert ; awake ; oriented to place, person and time; follow 1-2 step verbal command  Cerebellum : no dysmetria with right finger-to-nose test ; very mild dysmetria with left finger-to-nose test; mild dysmetria with bilateral heel-to-shin test  Cranial Nerves :  grossly intact CN II to XII function  Sensory : Reduced light touch and pinprick sensation at entire left hand compared to right side; intact light touch and pin prick sensation at bilateral lower extremities compared to ipsilateral face  Motor : normal tone at bilateral upper & lower extremities ; 4-/5 muscle strength at the left shoulder flexion and abduction; 4+/5 muscle strength at left elbow extension; 1/5 to 2-/5 muscle strength at left 3rd-5th fingers extension and abduction; 3+/5 to 4-/5 muscle strength at left thumb and second finger extension; 2-/5 muscle strength at the left thumb and second finger abduction; 4-/5 muscle strength at the left finger flexion; 4-/5 muscle strength at the left hand ; 4+/5 muscle strength at right finger abduction and handgrip; 4-/5 muscle strength at the left hip flexion; 4+/5 muscle strength at right hip flexion; normal 5/5 muscle strength at the rest of bilateral upper & lower extremities  Reflex : 0 bilateral biceps, bilateral brachioradialis, bilateral triceps, bilateral knees and bilateral ankles reflexes   Pathological Reflex :  No Radha's sign ;  no ankle clonus; no Babinski sign  Gait : Not assessed      Diagnostics:  Recent Results (from the past 24 hour(s))   POCT glucose    Collection Time: 12/30/22 12:10 PM   Result Value Ref Range    POC Glucose 147 (H) 70 - 108 mg/dl   Lactate Dehydrogenase    Collection Time: 12/30/22  5:32 PM   Result Value Ref Range    LD 1139 (H) 100 - 190 U/L   EKG 12 Lead    Collection Time: 12/30/22  6:15 PM   Result Value Ref Range    Ventricular Rate 120 BPM    Atrial Rate 300 BPM    QRS Duration 100 ms    Q-T Interval 334 ms    QTc Calculation (Bazett) 472 ms    P Axis 50 degrees    R Axis 14 degrees    T Axis -13 degrees   CBC with Auto Differential    Collection Time: 12/30/22  7:01 PM   Result Value Ref Range    WBC 4.2 (L) 4.8 - 10.8 thou/mm3    RBC 3.35 (L) 4.70 - 6.10 mill/mm3    Hemoglobin 8.8 (L) 14.0 - 18.0 gm/dl    Hematocrit 29.2 (L) 42.0 - 52.0 %    MCV 87.2 80.0 - 94.0 fL    MCH 26.3 26.0 - 33.0 pg    MCHC 30.1 (L) 32.2 - 35.5 gm/dl    RDW-CV 19.6 (H) 11.5 - 14.5 %    RDW-SD 62.1 (H) 35.0 - 45.0 fL    Platelets 288 361 - 382 thou/mm3    MPV 8.4 (L) 9.4 - 12.4 fL    Seg Neutrophils 64.0 %    Lymphocytes 19.5 %    Monocytes 12.4 %    Eosinophils 0.5 %    Basophils 1.0 %    Immature Granulocytes 2.6 %    Segs Absolute 2.7 1.8 - 7.7 thou/mm3    Lymphocytes Absolute 0.8 (L) 1.0 - 4.8 thou/mm3    Monocytes Absolute 0.5 0.4 - 1.3 thou/mm3    Eosinophils Absolute 0.0 0.0 - 0.4 thou/mm3    Basophils Absolute 0.0 0.0 - 0.1 thou/mm3    Immature Grans (Abs) 0.11 (H) 0.00 - 0.07 thou/mm3    nRBC 0 /100 wbc   TSH with Reflex    Collection Time: 12/30/22  7:01 PM   Result Value Ref Range    TSH 2.690 0.400 - 4.200 uIU/mL   Magnesium    Collection Time: 12/30/22  7:01 PM   Result Value Ref Range    Magnesium 2.0 1.6 - 2.4 mg/dL   Basic Metabolic Panel    Collection Time: 12/30/22  7:01 PM   Result Value Ref Range    Sodium 133 (L) 135 - 145 meq/L    Potassium 4.6 3.5 - 5.2 meq/L    Chloride 96 (L) 98 - 111 meq/L    CO2 25 23 - 33 meq/L    Glucose 204 (H) 70 - 108 mg/dL    BUN 16 7 - 22 mg/dL    Creatinine 0.4 0.4 - 1.2 mg/dL    Calcium 7.8 (L) 8.5 - 10.5 mg/dL   Troponin    Collection Time: 12/30/22  7:01 PM   Result Value Ref Range    Troponin T < 0.010 ng/ml   Anion Gap    Collection Time: 12/30/22  7:01 PM   Result Value Ref Range    Anion Gap 12.0 8.0 - 16.0 meq/L   Glomerular Filtration Rate, Estimated    Collection Time: 12/30/22  7:01 PM   Result Value Ref Range    Est, Glom Filt Rate >60 >60 ml/min/1.73m2   Troponin    Collection Time: 12/30/22 10:28 PM   Result Value Ref Range    Troponin T < 0.010 ng/ml   POCT Glucose    Collection Time: 12/30/22 10:37 PM   Result Value Ref Range    POC Glucose 113 (H) 70 - 108 mg/dl   Troponin    Collection Time: 12/31/22  7:09 AM   Result Value Ref Range    Troponin T < 0.010 ng/ml   TSH with Reflex    Collection Time: 12/31/22  7:09 AM   Result Value Ref Range    TSH 3.240 0.400 - 4.200 uIU/mL   POCT Glucose    Collection Time: 12/31/22  7:36 AM   Result Value Ref Range    POC Glucose 84 70 - 108 mg/dl        Latest Reference Range & Units 8/31/21 12:30 12/29/22 06:21 12/30/22 19:01   Sodium 135 - 145 meq/L 140 138 133 (L)   Potassium 3.5 - 5.2 meq/L 4.6 4.4 4.6   Chloride 98 - 111 meq/L 100 100 96 (L)   CO2 23 - 33 meq/L 31 26 25   BUN,BUNPL 7 - 22 mg/dL 15 10 16   Creatinine 0.4 - 1.2 mg/dL 0.6 0.4 0.4   Anion Gap 8.0 - 16.0 meq/L 9.0 12.0 12.0   Est, Glom Filt Rate >60 ml/min/1.73m2 >90 >60 >60   Magnesium 1.6 - 2.4 mg/dL   2.0   Glucose, Random 70 - 108 mg/dL 143 (H) 95 204 (H)   CALCIUM, SERUM, 389133 8.5 - 10.5 mg/dL 9.4 8.1 (L) 7.8 (L)   (L): Data is abnormally low  (H): Data is abnormally high       Latest Reference Range & Units 12/30/22 07:11 12/30/22 12:10 12/30/22 22:37 12/31/22 07:36   POC Glucose 70 - 108 mg/dl 138 (H) 147 (H) 113 (H) 84   (H): Data is abnormally high       Latest Reference Range & Units 12/29/22 06:21 12/30/22 19:01   WBC 4.8 - 10.8 thou/mm3 4.2 (L) 4.2 (L)   RBC 4.70 - 6.10 mill/mm3 3.41 (L) 3.35 (L)   Hemoglobin Quant 14.0 - 18.0 gm/dl 9.3 (L) 8.8 (L)   Hematocrit 42.0 - 52.0 % 29.4 (L) 29.2 (L)   MCV 80.0 - 94.0 fL 86.2 87.2   MCH 26.0 - 33.0 pg 27.3 26.3   MCHC 32.2 - 35.5 gm/dl 31.6 (L) 30.1 (L)   MPV 9.4 - 12.4 fL 8.6 (L) 8.4 (L)   RDW-CV 11.5 - 14.5 % 19.8 (H) 19.6 (H)   RDW-SD 35.0 - 45.0 fL 61.1 (H) 62.1 (H)   Platelet Count 772 - 400 thou/mm3 251 259   Lymphocytes Absolute 1.0 - 4.8 thou/mm3 0.7 (L) 0.8 (L)   Monocytes Absolute 0.4 - 1.3 thou/mm3 0.5 0.5   Eosinophils Absolute 0.0 - 0.4 thou/mm3 0.1 0.0   Basophils Absolute 0.0 - 0.1 thou/mm3 0.0 0.0   Seg Neutrophils % 66.3 64.0   Segs Absolute 1.8 - 7.7 thou/mm3 2.8 2.7   Lymphocytes % 16.6 19.5   Monocytes % 12.1 12.4   Eosinophils % 1.7 0.5   Basophils % 0.7 1.0   Immature Grans (Abs) 0.00 - 0.07 thou/mm3 0.11 (H) 0.11 (H)   Immature Granulocytes % 2.6 2.6   (L): Data is abnormally low  (H): Data is abnormally high      Portable chest x-ray (12/31/2022) : Impression   Stable radiographic appearance of the chest. No evidence of an acute process. CTA of the chest with and without contrast (12/31/2022) : Impression   1. No evidence of pulmonary bolus. 2. 1.9 cm pleural-based nodule in the left upper lobe anteriorly. This is concerning for malignancy. Consider PET/CT for further evaluation. 3. Prominent mediastinal adenopathy and bilateral hilar lymphadenopathy. Impression:  Debility/physical decondition secondary to extensive widespread metastatic malignant melanoma causing dysphagia, fatigue, generalized weakness, left hand sensory deficit and weakness  Left upper extremity edema with left hand weakness and sensory deficit due to tumor infiltrating and radiation induced brachial plexopathy  New onset atrial flutter  Left shoulder contracture  Improving dysphagia with malnutrition secondary to metastatic melanoma to  muscles  Widespread metastatic melanoma to brain, cervical spine, musculature of face and neck, bilateral scapula, thoracic and lumbar spine, intra-abdominal structures, and subcutaneous tissue  Adrenal insufficiency with hypotension  History of hypertension  Hyperlipidemia  Hypothyroidism  Tumor lysis syndrome    We are waiting for patient to reevaluated by PT, OT and speech therapist to restart rehabilitation treatment. Insurance approval will be needed for readmission to inpatient rehab service. We will initiate insurance pre-CERT process after the patient has been reevaluated by the therapist.  We will continue following the patient to monitor his response to further rehabilitation intervention.       Recommendations:  Waiting for patient to be evaluated by PT, OT and speech therapist to restart rehabilitation intervention  Plan to initiate insurance pre-cert process for inpatient rehabilitation readmission after the patient is reevaluated by members of rehab team  Plan to readmit the patient to inpatient rehab service when insurance approval for inpatient rehabilitation readmission is obtained, all medically necessary diagnostic test and treatment are completed, and the patient is medically stable and cleared to be discharged from acute hospital.      It was my pleasure to evaluate [place] today. Please call with questions.     Humaira Villanueva MD

## 2022-12-31 NOTE — PROGRESS NOTES
Pt admitted to  [phone] in a wheelchair. Complaints: None. IV site free of s/s of infection or infiltration. Vital signs obtained. Assessment and data collection initiated. Two nurse skin assessment performed by this RN and [employer]. Oriented to room. Policies and procedures for 3B explained. This RN discussed hourly rounding with patient addressing 5 P's. Fall prevention and safety brochure discussed with patient. Bed alarm on. Call light in reach. Explained patients right to have family, representative or physician notified of their admission. Patient has Declined for physician to be notified. Patient has Declined for family/representative to be notified. All questions answered with no further questions at this time.

## 2022-12-31 NOTE — DISCHARGE SUMMARY
Physical Medicine & Rehabilitation   Discharge Summary     Patient Identification:  Baldo De León  : 1960  Admit date: 2022  Discharge date: 2022   Attending provider: Papi Schuster MD        Primary care provider: DEBBIE Bernard - CNP     Discharge Diagnoses:   Debility/physical decondition secondary to extensive widespread metastatic malignant melanoma causing dysphagia, fatigue, generalized weakness, left hand sensory deficit and weakness  Left upper extremity edema with left hand weakness and sensory deficit due to tumor infiltrating and radiation induced brachial plexopathy involving most severely at left ulnar nerve  Left shoulder contracture  Improving dysphagia with malnutrition secondary to metastatic melanoma to  muscles  Widespread metastatic melanoma to brain, cervical spine, musculature of face and neck, bilateral scapula, thoracic and lumbar spine, intra-abdominal structures, and subcutaneous tissue  Adrenal insufficiency with hypotension  History of hypertension  Hyperlipidemia  Hypothyroidism  Tumor lysis syndrome      Discharge Functional Status:    Physical therapy:  Transfers:  Sit to Stand: Supervision  Stand to Sit:Supervision     Ambulation:  Stand By Assistance  Distance: 150' x 2, multiple shorter distances with cane, 65' x 1 and multiple shorter distances with no AD   Surface: Level Tile  Device: Cane  Gait Deviations: Forward Flexed Posture, Decreased Step Length Bilaterally, Decreased Weight Shift Bilaterally, Decreased Gait Speed, Decreased Heel Strike Bilaterally, Decreased Foot Clearance Right, Decreased Foot Clearance Left, and Decreased Terminal Knee Extension    Balance:  Pt. Completed standing dynamic balance activity: tossing ball back and forth with Normal RICHIE on level tile and No UE support with Stand By Assistance. Activity completed to improve balance, enhance functional mobility, and reduce risk of falls.       Neuromuscular Re-education  Pt. Completed dynamic gait and multi-tasking activities using No UE support to improve coordination, lateral weight shifting, and Environmental awareness for improved functional mobility. PT Equipment Recommendations  Equipment Needed: Yes (Patient will require cane for home), Assessment: Patient is a 58 y.o male who presents after prolonged hospitalization with weakness in LUE and generalized weakness resulting in increased difficulty with functional mobility. Patient requires SBA for sit to supine and Minimal assistance for supine to sit to the left due to decreased core  and LUE strength. Patient requires SBA for sit to stand transfers and SBA for ambulation with cane and SBA to CGA for ambulation without AD with slowed gait speed. Patient scored a 38/56 on the Flores balance test and 8/24 on the Dynamic Gait index indicating he is a moderate risk for falls. Patient overall can benefit from skilled PT treatment in order to assist with BLE strengthening, gait training, transfer training, bed mobility ,core strengthening, dynamic balance and endurance training for increased functional mobility. Occupational therapy:   ADL:   Grooming: Stand By Assistance. Standing at sink   Lower Extremity Dressing: Stand By Assistance. Donning slip on shoes. Pt reports inability to tie shoes since last summer. Pt provided with elastic shoe laces, and OT applied elastic shoelaces to his tennis shoes, pt donned tennis shoes with SBA. \"I like these! \"   Toileting: Stand By Assistance. Toilet Transfer: Stand By Assistance. Standard toilet . BALANCE:  Sitting Balance:  Supervision. Dynamic sitting balance, indep with static   Standing Balance: Stand By Assistance. TRANSFERS:  Sit to Stand:  Stand By Assistance. Stand to Sit: Stand By Assistance. FUNCTIONAL MOBILITY:  Assistive Device: Straight Cane  Assist Level:  Stand By Assistance. Distance:  To and from bathroom and To and from therapy gym     Assistive Device: Straight Cane  Assist Level:  Stand By Assistance. Distance: To and from therapy gym     Assessment:       Speech therapy:  Medication Management - Pill Box Organization   Prescription 1 - take 1 tablet in the evening   Comprehension: independently   Completion: independently      Prescription 2 - take 1 tablet daily in the AM   Comprehension: independent  Completion: independent     Prescription 3 - take 1 tablet 3x/daily for 5 days  Comprehension: independent  Completion: independent      Prescription 4 - take 2 tablets twice daily   Comprehension: independent   Completion: min cues x1 error      Prescription 5 - take 1 tablet every 6-8 hours as needed for pain   Comprehension: independently identified PRN medication would not be organized into pill box. *excellent problem solving to utilizing bowl to hold pills into in order to organize given decreased hand dexterity.       Time Management - Word Problem   10/10 independently      Deductive Reasoning Puzzle # 3  10/15 independently, 3/15 given min cues, 2/15 given mod cues     Delayed recall of Doctor's Appointment (~24 hours): 6/6 independently WITHOUT utilization of written list      Comprehension: 7 - Patient understands complex ideas (math/planning)  Expression: 7 - Patient expresses complex ideas/needs  Social Interaction: 7 - Patient has appropriate behavior/relations 100% of the time  Problem Solvin - Patient able to solve simple/routine tasks  Memory: 5 - Patient requires prompting with stress/unfamiliar situations      Inpatient Rehabilitation Course:   Sebastian Sesay is a 58 y.o. right-handed  male with history of history of hypertension, hyperlipidemia, recurrent multiple primary melanomas (8566,1824, 2013) including a [id number] melanoma of the left flank s/p excision with left axillary lymph node dissection, was admitted to inpatient rehabilitation on 2022 for intensive inpatient management of impairment & disability secondary to debility/physical decondition secondary to extensive multiple metastatic melanoma to his brain, cervical spine, musculature, and brachial plexus.     The patient participated in an aggressive multidisciplinary inpatient rehabilitation program involving 3 hours per day, 5 days per week of rehabilitation.      Appropriate DVT prophylaxis options were considered throughout rehabilitation stay.    Dr Becerril followed during the IPR stay for medical management    The patient continued taking his own cancer medications [trametinib (Mekinist) & dabrafenib (Tafinlar)] after he was admitted to inpatient rehab unit.  He tolerated the intensity inpatient rehab intervention well.  On 12/30/2022 evening the patient was found to have irregular heartbeat with heart rate up to 112. EKG was done and revealed atrial flutter with variable A-V block with rated of 120/min ventricular rate.  Cardiology was consulted.  The patient then was discharged from rehab unit and admitted to acute hospital for medical stabilization.    Patient was discharged  to acute hospital for medical stabilization  in  guarded  condition.      Consults:   cardiology      Significant Diagnostics:   CBC with Differential:    Lab Results   Component Value Date/Time    WBC 4.2 12/30/2022 07:01 PM    RBC 3.35 12/30/2022 07:01 PM    RBC 4.79 08/04/2021 07:00 AM    HGB 8.8 12/30/2022 07:01 PM    HCT 29.2 12/30/2022 07:01 PM     12/30/2022 07:01 PM    MCV 87.2 12/30/2022 07:01 PM    MCH 26.3 12/30/2022 07:01 PM    MCHC 30.1 12/30/2022 07:01 PM    RDW 14.0 08/04/2021 07:00 AM    NRBC 0 12/30/2022 07:01 PM    SEGSPCT 64.0 12/30/2022 07:01 PM    LYMPHOPCT 21.6 08/04/2021 07:00 AM    MONOPCT 12.4 12/30/2022 07:01 PM    EOSPCT 7.5 08/04/2021 07:00 AM    BASOPCT 0.6 08/04/2021 07:00 AM    MONOSABS 0.5 12/30/2022 07:01 PM    LYMPHSABS 0.8 12/30/2022 07:01 PM    EOSABS 0.0 12/30/2022 07:01 PM    BASOSABS 0.0 12/30/2022 07:01 PM  CMP:    Lab Results   Component Value Date/Time     12/30/2022 07:01 PM    K 4.6 12/30/2022 07:01 PM    K 4.4 12/29/2022 06:21 AM    CL 96 12/30/2022 07:01 PM    CO2 25 12/30/2022 07:01 PM    BUN 16 12/30/2022 07:01 PM    CREATININE 0.4 12/30/2022 07:01 PM    LABGLOM >60 12/30/2022 07:01 PM    GLUCOSE 204 12/30/2022 07:01 PM    GLUCOSE 101 08/04/2021 07:00 AM    PROT 6.0 12/29/2022 06:21 AM    LABALBU 3.0 12/29/2022 06:21 AM    CALCIUM 7.8 12/30/2022 07:01 PM    BILITOT 0.3 12/29/2022 06:21 AM    ALKPHOS 184 12/29/2022 06:21 AM    AST 31 12/29/2022 06:21 AM    ALT 19 12/29/2022 06:21 AM     BMP:    Lab Results   Component Value Date/Time     12/30/2022 07:01 PM    K 4.6 12/30/2022 07:01 PM    K 4.4 12/29/2022 06:21 AM    CL 96 12/30/2022 07:01 PM    CO2 25 12/30/2022 07:01 PM    BUN 16 12/30/2022 07:01 PM    LABALBU 3.0 12/29/2022 06:21 AM    CREATININE 0.4 12/30/2022 07:01 PM    CALCIUM 7.8 12/30/2022 07:01 PM    LABGLOM >60 12/30/2022 07:01 PM    GLUCOSE 204 12/30/2022 07:01 PM    GLUCOSE 101 08/04/2021 07:00 AM        Recent Labs     12/29/22  2030 12/30/22  0711 12/30/22  1210   POCGLU 175* 138* 147*       Cholesterol Panel:   Results in Past 30 Days  Result Component Current Result Ref Range Previous Result Ref Range   Cholesterol, Total 136 (12/29/2022) 100 - 199 mg/dL Not in Time Range    HDL 30 (12/29/2022) mg/dL Not in Time Range    LDL Calculated 77 (12/29/2022) mg/dL Not in Time Range    Triglycerides 144 (12/29/2022) 0 - 199 mg/dL Not in Time Range        Patient Instructions:       Follow-up visits: See after visit summary from hospitalization      Discharge Medications:  Current Discharge Medication List             Details   lisinopril (PRINIVIL;ZESTRIL) 5 MG tablet TAKE 1 TABLET BY MOUTH EVERY DAY  Qty: 90 tablet, Refills: 4    Associated Diagnoses: Essential hypertension      atorvastatin (LIPITOR) 20 MG tablet TAKE 1 TABLET BY MOUTH EVERY DAY  Qty: 90 tablet, Refills: 4    Associated Diagnoses: Pure hypercholesterolemia      metFORMIN (GLUCOPHAGE) 500 MG tablet Take 1 tablet by mouth 2 times daily (with meals)  Qty: 180 tablet, Refills: 4    Associated Diagnoses: Type 2 diabetes mellitus without complication, without long-term current use of insulin (Formerly [facility])      aspirin (ASPIRIN LOW DOSE) 81 MG EC tablet TAKE 1 TABLET BY MOUTH DAILY  Qty: 90 tablet, Refills: 4    Associated Diagnoses: Essential hypertension      ACCU-CHEK SOFTCLIX LANCETS MISC 1 Device by Does not apply route 2 times daily  Qty: 180 each, Refills: 3    Associated Diagnoses: Elevated fasting blood sugar; Type 2 diabetes mellitus without complication, without long-term current use of insulin ([facility])      blood glucose monitor kit and supplies Test once a day & as needed for symptoms of irregular blood glucose. Please dispense all supplies strips and lancets  Qty: 1 kit, Refills: 0    Comments: Brand per patient preference. May round up to next available package size. Associated Diagnoses: Elevated fasting blood sugar      blood glucose monitor strips Test bid & as needed for symptoms of irregular blood glucose. Qty: 180 strip, Refills: 3    Comments: Brand per patient preference. May round up to next available package size. Associated Diagnoses: Type 2 diabetes mellitus without complication, without long-term current use of insulin ([facility])      sildenafil (VIAGRA) 50 MG tablet Take 1/2 tablet one hour prior to sexual activity  Qty: 10 tablet, Refills: 0    Associated Diagnoses: Erectile dysfunction, unspecified erectile dysfunction type      pembrolizumab (KEYTRUDA) 50 MG SOLR chemo injection Infuse intravenously              Controlled substances monitoring: not applicable.      40 minutes spent preparing the patient for discharge    Yanely Ambrocio MD

## 2022-12-31 NOTE — PROGRESS NOTES
[address]      Date: 2022  Patient Name: Pam Hallman,  Gender:  male        MRN: [id number]  : 1960  (58 y.o.)     Referring Practitioner: Jay Milligan MD  Diagnosis: Debility  Additional Pertinent Hx: Per H&P Pam Hallman  is a 58 y.o. right-handed  male with history of hypertension, hyperlipidemia, recurrent multiple primary melanomas (0015,88, ) including a [id number] melanoma of the left flank s/p excision with left axillary lymph node dissection, is admitted to the inpatient rehabilitation unit on 2022 for impaired ADLs and ambulation due to debility/physical decondition secondary to extensive multiple metastatic melanoma to his brain, cervical spine, musculature, and brachial plexus. The patient presented to [address] on 12/3/2022 with progressive weakness, dysphagia and facial drooping developed about 3 weeks prior to his visit. He was found to be hypotensive and was admitted to MICU at the [facility] (formerly [facility]) on 12/3/2022. 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 MRI of cervical spine done on 2022 revealed diffuse osseous metastasis disease in cervical spine and upper thoracic spine, mild right ventral epidural tumor at C7 extending into right C7-T1 neuroforamen, intramedullary enhancing lesion within central cervical spinal cord at C5-6 and right C6-7 cervical spinal cord.   Repeated MRI of brain done on 2022 revealed enhancing lesions in right frontal lobe and throughout the supratentorial and infratentorial brain, within the right  musculature, bilateral temporalis musculature, bilateral extraocular musculature, and within the calvarium and skull base. Because of extensive metastatic malignancy, oral chemotherapy (tafinlar + mekinist) was started on 12/8/2022. Because of left upper extremity swelling, bilateral upper extremities venous duplex study was done on 12/16/2022 and showed no acute DVT but acute superficial venous thrombosis in right upper extremity. MRI of brachial plexus done on 12/22/2022 show extensive metastatic disease involving osseous structures, multiple muscles including bilateral scapular intrinsic muscles and paraspinal muscles. The left upper extremity weakness and edema was thought to be due to tumor infiltrating brachial plexus and radiation induced plexopathy. His hospital course was also complicated by tumor lysis syndrome, acute renal failure, and dysphagia with severe protein calorie malnutrition. \"     Restrictions/Precautions:  Restrictions/Precautions: Contact Precautions, Fall Risk                      Social/Functional:  Type of Home: House  Home Layout: One level  Home Access: Stairs to enter without rails  Entrance Stairs - Number of Steps: 1 (Patient reports its a 7inch step)  Home Equipment:  (Patient reports his mother has a RW and cane however he does not)     Subjective:  Patient discharged to acute hospital on 12/30 due to decline in medical status. Assessment:  Assessment: Patient overall was making progress with skilled PT treatment however limited with progress due to short length of stay with PT evaluation on 12/29/22. Patient requires supervision for transfers and SBA for ambulation with cane for long distances and no AD for short distance ambulation. Patient demonstrates generalized weakness in BLE's and decreased endurance which limits his functional mobility. Patient demonstrates decreased standing dynamic balance resulting in increased risk for falls. Patient can continue to benefit from continued PT once medically cleared. Patient to be discharged from PT at this time. Equipment Recommendations:  Equipment Needed: Yes (Patient will require cane for home)    Plan:  Discharge to acute hospital for further medical treatment     Goals:  Short Term Goals  Time Frame for Short Term Goals: 1 week  Short Term Goal 1: Patient to perform bed mobility supine<>sit with Mod I with HOB flat and no railings in order to assist with getting into and out of bed.- GOAL NOT MET  Short Term Goal 2: Patient to perform sit to stand transfers without AD  and Supervision from various surfaces in order to assist wtih safety with transfers in home. - GOAL MET  Short Term Goal 3: Patient to ambulate >/=75 feet without AD with Supervision in order to assist with safety with home mobility.- GOAL NOT MET  Short Term Goal 4: Patient to ascend/descend 1 step without railings with SBA in order to assist with home entry. -  GOAL NOT MET  Short Term Goal 5: Patient to score >/=45/56 on the Flores Balance Test in order to assist with functional dynamic balance. - GOAL NOT MET    Long Term Goals  Time Frame for Long Term Goals : 2 weeks from IPR evaluation  Long Term Goal 1: Patient to perform sit to stand transfers without AD with Mod I from various surfaces in order to assist with safety with home mobility.- GOAL NOT MET  Long Term Goal 2: Patient to ambulate >/=150 feet without AD with Mod I in order to assist with home mobility.- GOAL NOT MET  Long Term Goal 3: Patient to ambulate over uneven surfaces and in unfamiliar environment with use of cane and Mod I in order to assist with community mobility. -GOAL NOT MET  Long Term Goal 4: Patient to ascend/descend 1 step without handrails with Mod I in order to assist with home entry. - GOAL NOT MET  Long Term Goal 5: Patient to perform car transfer with Mod I in order to assist with community mobility.- GOAL NOT MET  Additional Goals?: Yes  Long term goal 6: Patient to score >/=14/16 on the Dynamic Gait Index in order to assist with functional dynamic balance.- GOAL NOT MET

## 2022-12-31 NOTE — CARE COORDINATION
Case Management Assessment  Initial Evaluation    Date/Time of Evaluation: 12/31/2022 2:12 PM  Assessment Completed by: Sonal Schultz RN    If patient is discharged prior to next notation, then this note serves as note for discharge by case management. Patient Name: James Novoa                   YOB: 1960  Diagnosis: Atrial flutter [facility]) [I48.92]                   Date / Time: 12/30/2022 10:12 PM  Location: [address] [place] [address]     Patient Admission Status: Inpatient   Readmission Risk (Low < 19, Mod (19-27), High > 27): Readmission Risk Score: 15.9    Current PCP: Penny Zeng, DEBBIE - CNP  PCP verified by CM? Yes    Chart Reviewed: Yes      History Provided by: Patient  Patient Orientation: Alert and Oriented    Patient Cognition: Alert    Hospitalization in the last 30 days (Readmission):  Yes    If yes, Readmission Assessment in CM Navigator will be completed. Advance Directives:      Code Status: Full Code   Patient's Primary Decision Maker is: Legal Next of Kin      Discharge Planning:    Patient lives with: Alone Type of Home: House  Primary Care Giver: Self  Patient Support Systems include: Parent, Family Members, Friends/Neighbors   Current Financial resources: Other (Comment) (commercial)  Current community resources: None  Current services prior to admission: Other (Comment) (Inpatient rehab)            Current DME:              Type of Home Care services:  None    ADLS  Prior functional level: Independent in ADLs/IADLs (prior to hospitatlization at [facility])  Current functional level: Assistance with the following: (was receiving therapy due to needs)    Family can provide assistance at DC: Yes  Would you like Case Management to discuss the discharge plan with any other family members/significant others, and if so, who?  No  Plans to Return to Present Housing: Unknown at present  Other Identified Issues/Barriers to RETURNING to current housing: no  Potential Assistance needed at discharge: Other (Comment) (inpatient rehab)            Potential DME:    Patient expects to discharge to: Rehabilitation facility ([facility])  Plan for transportation at discharge: Family    Financial    Payor: Az Penny / Plan: Az Penny / Product Type: *No Product type* /     Does insurance require precert for SNF: Yes    Potential assistance Purchasing Medications: No  Meds-to-Beds request:        [facility]/pharmacy [id number] - [address] [facility] [phone]  [address] [place], [place] [address]alex Reynaga  Phone: [phone] Fax: [phone]      Notes:    Factors facilitating achievement of predicted outcomes: Family support, Motivated, Cooperative, Pleasant, and Sense of humor    Barriers to discharge: Decreased endurance and Lower extremity weakness    Additional Case Management Notes: Admit from inpatient rehab after having irregular HR up to 120s. Consult to Cardiology. Procedure:   12/31 CTA Chest: No evidence of pulmonary bolus. 1.9 cm pleural-based nodule in the left upper lobe anteriorly. This is concerning for malignancy. Consider PET/CT for further evaluation. Prominent mediastinal adenopathy and bilateral hilar lymphadenopathy. 12/31 ECHO: completed      The Plan for Transition of Care is related to the following treatment goals of Atrial flutter [facility]) [I48.92]    Patient Goals/Plan/Treatment Preferences: Patient is planning to return to inpatient rehab once medically stable. Transportation/Food Security/Housekeeping Addressed: No issues identified.      Gretchen Pete RN  Case Management Department

## 2022-12-31 NOTE — PROGRESS NOTES
Patient discharged in stable condition as per order of attending physician to [id number] per staff at Time: 2[address] Wheelchair to ICU stepdown 3B room # 33. AVS provided by RN at time of discharge, which includes all necessary medical information pertaining to the patients current course of illness, treatment, medications, post-discharge goals of care, and treatment preferences. Patient/ family verbalize understanding of discharge plan and are in agreement with goal/plan/treatment preferences. Belongings including  belongings  sent with patient. Home medications sent home with patient yes    Availability of \"My Chart\" offered to patient as a tool for updated health record.   Steps for activation discussed with patient as mentioned on AVS.

## 2022-12-31 NOTE — H&P
Hospitalist - History & Physical      Patient: Jaja Jackson    Unit/Bed:3B-33/033-A  YOB: 1960  MRN: [id number]   Acct: [de-identified]   PCP: DEBBIE Francis CNP      Assessment and Plan:        New onset atrial flutter:   Atrial flutter with RVR while on 7E  Rate is a little better controlled with fluid bolus  2D ECHO and CTA chest are pending to rule out other etiologies of dysrhythmia  Full anticoagulation is not recommended due to new brain metastasis  Metastatic melanoma:   Currently on treatment, following at [address] [facility] [address]:   Recently admitted to Inpatient rehab for rehab following #4  Recent clostridium sepsis:   Resolved  Very weak following hospitalization at [address] [place] [address]      CC:  new atrial flutter    HPI: Patient is transferred from Inpatient Rehab Unit for further evaluation and treatment of new atrial flutter. The patient was found to be markedly tachycardic with some orthostatic symptoms during rounding today on IPR. Patient was found to have atrial flutter. Patient is not a candidate for full anticoagulation due to a recent diagnosis of brain metastasis but admission was requested for further evaluation in the inpatient setting. ROS: Review of Systems   Constitutional:  Positive for activity change and fatigue. HENT: Negative. Eyes: Negative. Respiratory:  Positive for shortness of breath. Cardiovascular:  Positive for palpitations. Gastrointestinal: Negative. Endocrine: Negative. Genitourinary: Negative. Musculoskeletal: Negative. Skin: Negative. Allergic/Immunologic: Negative. Neurological:  Positive for weakness and light-headedness. Hematological: Negative. Psychiatric/Behavioral: Negative.        PMH:    Past Medical History:   Diagnosis Date    Hyperlipidemia     In 2010s    Hypertension 2014    Hypothyroidism 2017    Melanoma ([facility] [address]     brain, spine, bone, muscle     SHX:    Social History     Socioeconomic History    Marital status:      Spouse name: Not on file    Number of children: Not on file    Years of education: Not on file    Highest education level: Not on file   Occupational History    Not on file   Tobacco Use    Smoking status: Never    Smokeless tobacco: Former     Types: Chew, Snuff     Quit date: 09/2022   Vaping Use    Vaping Use: Never used   Substance and Sexual Activity    Alcohol use: Yes     Comment: once per month drinking 2 to 3 cans of beer    Drug use: No    Sexual activity: Not on file   Other Topics Concern    Not on file   Social History Narrative    Not on file     Social Determinants of Health     Financial Resource Strain: Not on file   Food Insecurity: Not on file   Transportation Needs: Not on file   Physical Activity: Not on file   Stress: Not on file   Social Connections: Not on file   Intimate Partner Violence: Not on file   Housing Stability: Not on file     FHX:   Family History   Problem Relation Age of Onset    High Blood Pressure Mother     High Cholesterol Mother     Lung Cancer Father     Mult Sclerosis Sister     Melanoma Brother      Allergies: No Known Allergies  Medications:     dextrose      sodium chloride 100 mL/hr at 12/30/22 2249      allopurinol  300 mg Oral Daily    aspirin  81 mg Oral Daily    atorvastatin  20 mg Oral Nightly    enoxaparin  40 mg SubCUTAneous Daily    fludrocortisone  0.1 mg Oral Daily    levothyroxine  100 mcg Oral QAM AC    potassium chloride  40 mEq Oral Daily    predniSONE  10 mg Oral Daily    senna  2 tablet Oral BID    Trametinib Dimethyl Sulfoxide  2 mg Oral Nightly    oxyCODONE  5 mg Oral Q6H    melatonin  6 mg Oral Nightly    [START ON 1/2/2023] sulfamethoxazole-trimethoprim  1 tablet Oral Once per day on Mon Wed Fri    docusate sodium  100 mg Oral BID    lidocaine  1 patch TransDERmal Q24H    naloxegol  12.5 mg Oral QAM AC    Dabrafenib Mesylate  150 mg Oral Q12H    insulin lispro  0-8 Units SubCUTAneous TID WC    insulin lispro  0-4 Units SubCUTAneous Nightly     oxyCODONE, 5 mg, Q4H PRN   Or  oxyCODONE, 2.5 mg, Q4H PRN  acetaminophen, 650 mg, Q4H PRN  bisacodyl, 10 mg, Daily PRN  magnesium hydroxide, 15 mL, Daily PRN  traZODone, 50 mg, Nightly PRN  polyethylene glycol, 17 g, Daily PRN  biotene, 10 mL, PRN  glucose, 4 tablet, PRN  dextrose bolus, 125 mL, PRN   Or  dextrose bolus, 250 mL, PRN  glucagon (rDNA), 1 mg, PRN  dextrose, , Continuous PRN        Labs:   Recent Results (from the past 24 hour(s))   POCT glucose    Collection Time: 12/30/22  7:11 AM   Result Value Ref Range    POC Glucose 138 (H) 70 - 108 mg/dl   POCT glucose    Collection Time: 12/30/22 12:10 PM   Result Value Ref Range    POC Glucose 147 (H) 70 - 108 mg/dl   Lactate Dehydrogenase    Collection Time: 12/30/22  5:32 PM   Result Value Ref Range    LD 1139 (H) 100 - 190 U/L   EKG 12 Lead    Collection Time: 12/30/22  6:15 PM   Result Value Ref Range    Ventricular Rate 120 BPM    Atrial Rate 300 BPM    QRS Duration 100 ms    Q-T Interval 334 ms    QTc Calculation (Bazett) 472 ms    P Axis 50 degrees    R Axis 14 degrees    T Axis -13 degrees   CBC with Auto Differential    Collection Time: 12/30/22  7:01 PM   Result Value Ref Range    WBC 4.2 (L) 4.8 - 10.8 thou/mm3    RBC 3.35 (L) 4.70 - 6.10 mill/mm3    Hemoglobin 8.8 (L) 14.0 - 18.0 gm/dl    Hematocrit 29.2 (L) 42.0 - 52.0 %    MCV 87.2 80.0 - 94.0 fL    MCH 26.3 26.0 - 33.0 pg    MCHC 30.1 (L) 32.2 - 35.5 gm/dl    RDW-CV 19.6 (H) 11.5 - 14.5 %    RDW-SD 62.1 (H) 35.0 - 45.0 fL    Platelets 259 130 - 400 thou/mm3    MPV 8.4 (L) 9.4 - 12.4 fL    Seg Neutrophils 64.0 %    Lymphocytes 19.5 %    Monocytes 12.4 %    Eosinophils 0.5 %    Basophils 1.0 %    Immature Granulocytes 2.6 %    Segs Absolute 2.7 1.8 - 7.7 thou/mm3    Lymphocytes Absolute 0.8 (L) 1.0 - 4.8 thou/mm3    Monocytes Absolute 0.5 0.4 - 1.3 thou/mm3    Eosinophils Absolute 0.0 0.0 - 0.4 thou/mm3    Basophils Absolute 0.0 0.0 - 0.1 thou/mm3    Immature Grans (Abs)  0.11 (H) 0.00 - 0.07 thou/mm3    nRBC 0 /100 wbc   TSH with Reflex    Collection Time: 12/30/22  7:01 PM   Result Value Ref Range    TSH 2.690 0.400 - 4.200 uIU/mL   Magnesium    Collection Time: 12/30/22  7:01 PM   Result Value Ref Range    Magnesium 2.0 1.6 - 2.4 mg/dL   Basic Metabolic Panel    Collection Time: 12/30/22  7:01 PM   Result Value Ref Range    Sodium 133 (L) 135 - 145 meq/L    Potassium 4.6 3.5 - 5.2 meq/L    Chloride 96 (L) 98 - 111 meq/L    CO2 25 23 - 33 meq/L    Glucose 204 (H) 70 - 108 mg/dL    BUN 16 7 - 22 mg/dL    Creatinine 0.4 0.4 - 1.2 mg/dL    Calcium 7.8 (L) 8.5 - 10.5 mg/dL   Troponin    Collection Time: 12/30/22  7:01 PM   Result Value Ref Range    Troponin T < 0.010 ng/ml   Anion Gap    Collection Time: 12/30/22  7:01 PM   Result Value Ref Range    Anion Gap 12.0 8.0 - 16.0 meq/L   Glomerular Filtration Rate, Estimated    Collection Time: 12/30/22  7:01 PM   Result Value Ref Range    Est, Glom Filt Rate >60 >60 ml/min/1.73m2   Troponin    Collection Time: 12/30/22 10:28 PM   Result Value Ref Range    Troponin T < 0.010 ng/ml   POCT Glucose    Collection Time: 12/30/22 10:37 PM   Result Value Ref Range    POC Glucose 113 (H) 70 - 108 mg/dl         Vital Signs: T: 98F P: 102 RR: 18 B/P: 98/59: FiO2: RA: O2 Sat:98%: I/O: No intake or output data in the 24 hours ending 12/31/22 0019      General:   no acute distress  HEENT:  normocephalic and atraumatic. No scleral icterus. PEARLA, mucous membranes moist  Neck: supple. Trachea midline. No JVD. Full ROM, no meningismus. Lungs: clear to auscultation. No retractions, no accessory muscle use. Cardiac: irregular, tachycardic, no murmur, 2+ pulses  Abdomen: soft. Nontender. Bowel sounds active  Extremities:  No clubbing, cyanosis x 4, no edema    Vasculature: capillary refill < 3 seconds. Skin:  warm and dry. no visible rashes  Psych:  Alert and oriented x3. Affect appropriate  Lymph:  No supraclavicular adenopathy.   Neurologic:  CN II-XII grossly intact. No focal deficit. Data: (All radiographs, tracings, PFTs, and imaging are personally viewed and interpreted unless otherwise noted).   EKG: rhythm: atrial flutter with variable:1 block, gbdu=460 bpm, pr=. ms, hbu=644 ms, mr=487 ms, axis=50 degrees        Electronically signed by  Estuardo Bolden PA-C

## 2022-12-31 NOTE — CONSULTS
[address] ) [address] [facility]  Dept: [phone]  Cardiac Electrophysiology: Inpatient Consultation Note  Patient's demographics:  Date:   12/31/2022  Patient name:              Karina Holt  YOB: 1960  Sex:    male   MRN:   [id number]    Primary Care Physician:  Ramon Lopez APRN - CNP    Referring Physician:  Divine Hewitt MD    Reason for Consultation:  [place] flutter. Clinical Summary:  62/M was diagnosed with have atrial flutter during his routine physical visit with his primary care physician few days ago. He was noted to have rapid ventricular rate was admitted to the hospital for further management. The patient did not report any symptoms and has no prior history of atrial arrhythmias. Soft blood pressure, systolic 80 to 90 mmHg precluding the use of beta-blockers and calcium blockers for rate control. He is currently undergoing oral distalization. History of brain mets anticoagulation was not deemed safe. He has history of melanoma diagnosed in 1996 treated with surgical resection. Due to weight loss of about 40 pounds he was diagnosed few months ago to have diffuse metastasis, likely melanoma in his brain as well as bones. He is currently on chemotherapy. Denies chest pain, palpitations, shortness of breath. Does report weakness especially with physical activity. He walks with the help of a cane. He is . Denies smoking or alcohol use. Medcial Hx: HTN, HPL, hx of dermal melanoma dx 1996 => surgical resection and recently noted to have multiple metastasis on chemotherapy at [address] [place] [address], bicytopenia (anemia an leukopenia), debility undergoing rehab and hx of clostridium difficile infection. Review of systems:  Constitutional: Negative for chills and fever  HENT: Negative for congestion, sinus pressure, sneezing and sore throat. Eyes: Negative for pain, discharge, redness and itching. Respiratory: Negative for apnea, cough  Gastrointestinal: Negative for blood in stool, constipation, diarrhea   Endocrine: Negative for cold intolerance, heat intolerance, polydipsia. Genitourinary: Negative for dysuria, enuresis, flank pain and hematuria. Musculoskeletal: Negative for arthralgias, joint swelling and neck pain. Neurological: Negative for numbness and headaches. Psychiatric/Behavioral: Negative for agitation, confusion, decreased concentration and dysphoric mood.       Past Medical History[de-identified]  Past Medical History:   Diagnosis Date    Hyperlipidemia     In 2010s    Hypertension 2014    Hypothyroidism 2017    Melanoma ([facility] [address]     brain, spine, bone, muscle       Past Surgical History:  Past Surgical History:   Procedure Laterality Date    DOPPLER ECHOCARDIOGRAPHY      LYMPHADENECTOMY Right 11/15/2013    arm pit    MOHS SURGERY      MOHS SURGERY Right 09/17/2021    MOHS REPAIR BCC RIGHT PARANASAL performed by Duane Beasley MD at [address] [facility]       Family History:  Family History   Problem Relation Age of Onset    High Blood Pressure Mother     High Cholesterol Mother     Lung Cancer Father     Mult Sclerosis Sister     Melanoma Brother         Social History:  Social History     Socioeconomic History    Marital status:    Tobacco Use    Smoking status: Never    Smokeless tobacco: Former     Types: Chew, Snuff     Quit date: 09/2022   Vaping Use    Vaping Use: Never used   Substance and Sexual Activity    Alcohol use: Yes     Comment: once per month drinking 2 to 3 cans of beer    Drug use: No        Allergies:  No Known Allergies     Medications:  Current Facility-Administered Medications   Medication Dose Route Frequency Provider Last Rate Last Admin    digoxin (LANOXIN) tablet 250 mcg  250 mcg Oral Q4H Shannan Mariee MD   250 mcg at 12/31/22 1120    allopurinol (ZYLOPRIM) tablet 300 mg  300 mg Oral Daily Cedric Wolff MD   300 mg at 12/31/22 8307    aspirin chewable tablet 81 mg  81 mg Oral Daily Celine Power MD   81 mg at 12/31/22 0508    atorvastatin (LIPITOR) tablet 20 mg  20 mg Oral Nightly Celine Power MD        enoxaparin (LOVENOX) injection 40 mg  40 mg SubCUTAneous Daily Celine Power MD        fludrocortisone (FLORINEF) tablet 0.1 mg  0.1 mg Oral Daily Celine Power MD   0.1 mg at 12/31/22 2260    levothyroxine (SYNTHROID) tablet 100 mcg  100 mcg Oral QAM AC Celine Power MD   100 mcg at 12/31/22 0447    potassium chloride (KLOR-CON M) extended release tablet 40 mEq  40 mEq Oral Daily Celine Power MD   40 mEq at 12/31/22 0913    oxyCODONE (ROXICODONE) immediate release tablet 5 mg  5 mg Oral Q4H PRN Celine Power MD        Or    oxyCODONE (ROXICODONE) immediate release tablet 2.5 mg  2.5 mg Oral Q4H PRN Celine Power MD        predniSONE (DELTASONE) tablet 10 mg  10 mg Oral Daily Celine Pwoer MD   10 mg at 12/31/22 8156    senna (SENOKOT) tablet 17.2 mg  2 tablet Oral BID Celine Power MD        Trametinib Dimethyl Sulfoxide TABS 2 mg (Patient Supplied)  2 mg Oral Nightly Celine Power MD        acetaminophen (TYLENOL) tablet 650 mg  650 mg Oral Q4H PRN Celine Power MD        oxyCODONE (ROXICODONE) immediate release tablet 5 mg  5 mg Oral Q6H Celine Power MD   5 mg at 12/31/22 1120    melatonin tablet 6 mg  6 mg Oral Nightly Celine Power MD        [START ON 1/2/2023] sulfamethoxazole-trimethoprim (BACTRIM DS;SEPTRA DS) 800-160 MG per tablet 1 tablet  1 tablet Oral Once per day on Mon Wed Fri Celine Power MD        docusate sodium (COLACE) capsule 100 mg  100 mg Oral BID Celine Power MD        bisacodyl (DULCOLAX) suppository 10 mg  10 mg Rectal Daily PRN Celine Power MD        magnesium hydroxide (MILK OF MAGNESIA) 400 MG/5ML suspension 15 mL  15 mL Oral Daily PRN Celine Power MD        traZODone (DESYREL) tablet 50 mg  50 mg Oral Nightly PRN Celine Power MD        polyethylene glycol [facility]) packet 17 g 17 g Oral Daily PRN Vahid Phelps MD        lidocaine 4 % external patch 1 patch  1 patch TransDERmal Q24H Vahid Phelps MD   1 patch at 12/31/22 1943    naloxegol (MOVANTIK) tablet 12.5 mg  12.5 mg Oral QAM AC Vahid Phelps MD   12.5 mg at 12/31/22 0447    Dabrafenib Mesylate CAPS 150 mg (Patient Supplied)  150 mg Oral Q12H Vahid Phelps MD        biotene oral solution  10 mL Swish & Spit PRN Vahid Phelps MD        insulin lispro (HUMALOG) injection vial 0-8 Units  0-8 Units SubCUTAneous TID WC RAMONA James-OLENA        insulin lispro (HUMALOG) injection vial 0-4 Units  0-4 Units SubCUTAneous Nightly Maggy Weinstein PA-C        glucose chewable tablet 16 g  4 tablet Oral PRN RAMONA Frost-C        dextrose bolus 10% 125 mL  125 mL IntraVENous PRN RAMONA Frost-C        Or    dextrose bolus 10% 250 mL  250 mL IntraVENous PRN RAMONA James-OLENA        glucagon (rDNA) injection 1 mg  1 mg SubCUTAneous PRN Maggy Weinstein PA-C        dextrose 10 % infusion   IntraVENous Continuous PRN RAMONA Frost-C        0.9 % sodium chloride infusion   IntraVENous Continuous Nikhil Cerrato MD 75 mL/hr at 12/31/22 1123 Rate Change at 12/31/22 1123       Physical Examination:  /62   Pulse (!) 117   Temp 97.6 °F (36.4 °C) (Oral)   Resp 18   Wt 190 lb 11.2 oz (86.5 kg)   SpO2 98%   BMI 25.68 kg/m²     Intake/Output Summary (Last 24 hours) at 12/31/2022 1204  Last data filed at 12/31/2022 0919  Gross per 24 hour   Intake --   Output 1500 ml   Net -1500 ml     Patient Vitals for the past 96 hrs (Last 3 readings):   Weight   12/31/22 0431 190 lb 11.2 oz (86.5 kg)     GENERAL: Alert and oriented. No distress. EYES: No pallor or icterus. ENT: No cyanosis. No thyromegaly or cervical LAP. VESSELS: No jugular venous distension or carotid bruits. HEART: Irregular S1/S2. No murmur, rub or gallop. LUNGS: Clear to auscultation. ABDOMEN: Soft and non-tender. EXTREMITIES: No lower extremity edema.  Feet are warm. NEUROLOGICAL: Grossly normal.     Laboratory And Diagnostic Data  I have personally reviewed and interpreted the results of the following diagnostic testing    Lab Results   Component Value Date    WBC 4.2 (L) 12/30/2022    HGB 8.8 (L) 12/30/2022    HCT 29.2 (L) 12/30/2022     12/30/2022    CHOL 136 12/29/2022    TRIG 144 12/29/2022    HDL 30 12/29/2022    ALT 19 12/29/2022    AST 31 12/29/2022     (L) 12/30/2022    K 4.6 12/30/2022    CL 96 (L) 12/30/2022    CREATININE 0.4 12/30/2022    BUN 16 12/30/2022    CO2 25 12/30/2022    TSH 3.240 12/31/2022    LABA1C 5.8 (H) 05/09/2022    LABMICR 7.81 10/16/2019       Echocardiogram Pending. ECG 12/20/22: Atrial flutter with RVR (120 BPM). Stress test NA  Telemetry interrogation atrial flutter with variable AV conduction, RVR. Impression:  New atrial flutter with RVR, morphology appears typical.  [id number] score 1 (HTN). Hypertension, controlled  Metastatic melanoma on treatment. 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 Assessment And Recommendations: The patient does not report palpitation. I am not sure how much atrial flutter is accounting for his symptoms of fatigue and weakness. Due to soft blood pressure, beta-blockers and calcium from blockers cannot be used for rate control. He is currently undergoing oral digital loading. Low risk for thromboembolism. Discussed management options including oral digoxin for rate control versus rhythm control. The patient prefers to have rhythm control. He will require short-term anticoagulation for rhythm control. There is a concern of increased risk of intracranial bleed due to brain mets. We will review his MRI images with the neurologist and if deemed safe for anticoagulation, will consider rhythm control with electrocardioversion or catheter ablation. We will follow-up on his echocardiogram.  Discussed plan with the patient and his family.   Questions answered    **This report has been created using voice recognition software. It may contain minor errors which are inherent in voice recognition technology. **       Electronically signed by Tylor Kerr MD, MRCP, Claudio Quan on 12/31/2022 at 12:04 PM

## 2022-12-31 NOTE — PROGRESS NOTES
Inder Bojorquez 60  INPATIENT OCCUPATIONAL THERAPY  [facility] CCU-STEPDOWN 3B  EVALUATION    Time:   Time In:   Time Out:   Timed Code Treatment Minutes: 10 Minutes  Minutes: 22          Date: 2022  Patient Name: Marta Johnson,   Gender: male      MRN: [id number]  : 1960  (58 y.o.)  Referring Practitioner: Dr. Girish Villa  Diagnosis: atrial flutter  Additional Pertinent Hx: Marta Johnson is a 58 y.o. 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 The patient presented to [address] [place] [address] clinic on 12/3/2022 with progressive weakness, dysphagia and facial drooping. 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 MRI of cervical spine done on 2022 revealed diffuse osseous metastasis disease in cervical spine and upper thoracic spine, mild right ventral epidural tumor at C7 extending into right C7-T1 neuroforamen, intramedullary enhancing lesion within central cervical spinal cord at C5-6 and right C6-7 cervical spinal cord. Repeated MRI of brain done on 2022 revealed enhancing lesions in right frontal lobe and throughout the supratentorial and infratentorial brain, within the right  musculature, bilateral temporalis musculature, bilateral extraocular musculature, and within the calvarium and skull base.   Because of extensive metastatic malignancy, oral chemotherapywas started on 12/8/2022. venous duplex study was done on 12/16/2022 and showed no acute DVT but acute superficial venous thrombosis in RUE. MRI of brachial plexus done on 12/22/2022 show extensive metastatic disease involving osseous structures, multiple muscles including bilateral scapular intrinsic muscles and paraspinal muscles. The LUE weakness and edema was thought to be due to tumor infiltrating brachial plexus and radiation induced plexopathy. His hospital course was also complicated by tumor lysis syndrome, acute renal failure, and dysphagia with severe protein calorie malnutrition. Pt [facility] unit to [facility] on 12/30 due to atrial fibrillation. Restrictions/Precautions:  Restrictions/Precautions: Contact Precautions, Fall Risk    Subjective  Chart Reviewed: Yes, Orders, Progress Notes, History and Physical, Previous Admission  Patient assessed for rehabilitation services?: Yes  Family / Caregiver Present: No    Subjective: Pt seated in bed upon arrival, agreeable to OT session. Pt requesting to go for a walk. **RN ok'd session, reporting pt received 1st dose of med to assist with HR control earlier this date.      Pain: 0/10:     Vitals: Heart Rate: 110s on arrival, increased to 131 bpm at highest during functional mobility, primarily ranged in 120s    Social/Functional History:  Lives With: Alone  Type of Home: House  Home Layout: One level  Home Access: Stairs to enter without rails  Entrance Stairs - Number of Steps: 1  Home Equipment: Cane   Bathroom Shower/Tub: Walk-in shower  Bathroom Toilet: Handicap height  Bathroom Equipment: Grab bars in shower (suction cup grab bar in shower)    Receives Help From: Family  ADL Assistance: Independent  Homemaking Assistance: Independent  Ambulation Assistance: Independent  Transfer Assistance: Independent    Active : Yes  Occupation: Full time employment  Type of Occupation: Shop  (computer work) at [employer]  Additional Comments: Patient reports prior to hospital admission patient was independent with all ADL's/IADL's without use of AD for mobility. 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 Patient reports he plans to go to his mothers house for a few weeks( 1 story home, walk in shower, handicap toilets, grab bars in bathroom) after discharge. Patient reports his mother is able to help with tasks in home. Patient reports his mother has a shower chair in her shower. VISION:WFL    HEARING:  WFL    COGNITION: WFL    RANGE OF MOTION:  Bilateral Upper Extremity:  WFL    STRENGTH:  Bilateral Upper Extremity:  WFL, although slightly deconditioned. L hand slightly weaker than R.    ADL:   Grooming: Stand By Assistance. Washing/drying hands standing at sink  Toileting: Supervision. For hygiene and clothing mgmt  Toilet Transfer: Stand By Assistance. Ashok Au BALANCE:  Sitting Balance:  Independent. Standing Balance: Stand By Assistance. BED MOBILITY:  Supine to Sit: Stand By Assistance    Scooting: Modified Independent      TRANSFERS:  Sit to Stand:  Stand By Assistance. Stand to Sit: Stand By Assistance. FUNCTIONAL MOBILITY:  Assistive Device: Straight Cane  Assist Level:  Stand By Assistance. Distance: To and from bathroom and and household distance  Steady, no LOB or rest break required. Functional mobility completed to increase overall activity tolerance needed for ADL tasks. Activity Tolerance:  Patient tolerance of  treatment: good. Assessment:  Assessment: Pt presents requiring increased assistance for ADLs, transfers, and functional mobility compared to PLOF. Pt will continue to benefit from OT services to improve independence with these tasks, in addition to overall strength/endurance to facilitate return to PLOF.      Performance deficits / Impairments: Decreased functional mobility , Decreased ADL status, Decreased strength, Decreased fine motor control, Decreased high-level IADLs, Decreased balance, Decreased endurance, Decreased sensation  Prognosis: Fair  REQUIRES OT FOLLOW-UP: Yes  Decision Making: Medium Complexity    Treatment Initiated: Treatment and education initiated within context of evaluation. Evaluation time included review of current medical information, gathering information related to past medical, social and functional history, completion of standardized testing, formal and informal observation of tasks, assessment of data and development of plan of care and goals. Treatment time included skilled education and facilitation of tasks to increase safety and independence with ADL's for improved functional independence and quality of life. Discharge Recommendations:  Patient would benefit from continued therapy after discharge, Continue to assess pending progress    Patient Education:     Patient Education  Education Given To: Patient  Education Provided: Role of Therapy, Transfer Training, Precautions, Plan of Care (walking in halls 3x/day with staff)  Education Method: Demonstration, Verbal  Barriers to Learning: None  Education Outcome: Verbalized understanding, Demonstrated understanding    Equipment Recommendations: Other: Pt has access to a shower chair. Continue to assess needs    Plan:  Times Per Week: 3-5x  Current Treatment Recommendations: Strengthening, ROM, Balance training, Endurance training, Neuromuscular re-education, Equipment evaluation, education, & procurement, Patient/Caregiver education & training, Safety education & training, Self-Care / ADL, Functional mobility training. See long-term goal time frame for expected duration of plan of care. If no long-term goals established, a short length of stay is anticipated. Goals:     Short Term Goals  Time Frame for Short Term Goals: by discharge  Short Term Goal 1: Pt will complete LB ADLs with supervision to increase independence with self care tasks.   Short Term Goal 2: Pt will complete IADL task with supervision to increase ability to return to independent living. Short Term Goal 3: Pt will tolerate standing X10 minutes with supervision in prep for IADL completion. Following session, patient left in safe position with all fall risk precautions in place.

## 2022-12-31 NOTE — PROGRESS NOTES
[address]  DISCHARGE NOTE    Date: 2022  Patient Name: Fredi Pate,   Gender: male      MRN: [id number]  : 1960  (58 y.o.)  Referring Practitioner: Dr. Iván Little  Diagnosis: Debility  Additional Pertinent Hx: Fredi Pate is a 58 y.o. 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 The patient presented to [address] [place] [address] clinic on 12/3/2022 with progressive weakness, dysphagia and facial drooping. He was suspected to have adrenal insufficiency. Endocrinology was consulted. Blood culture from chemotherapy port done on 12/3/2022 revealed Clostridium. Therefor the port was removed on 2022. 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 MRI of cervical spine done on 2022 revealed diffuse osseous metastasis disease in cervical spine and upper thoracic spine, mild right ventral epidural tumor at C7 extending into right C7-T1 neuroforamen, intramedullary enhancing lesion within central cervical spinal cord at C5-6 and right C6-7 cervical spinal cord.   Repeated MRI of brain done on 2022 revealed enhancing lesions in right frontal lobe and throughout the supratentorial and infratentorial brain, within the right  musculature, bilateral temporalis musculature, bilateral extraocular musculature, and within the calvarium and skull base. Because of extensive metastatic malignancy, oral chemotherapywas started on 12/8/2022. venous duplex study was done on 12/16/2022 and showed no acute DVT but acute superficial venous thrombosis in RUE. MRI of brachial plexus done on 12/22/2022 show extensive metastatic disease involving osseous structures, multiple muscles including bilateral scapular intrinsic muscles and paraspinal muscles. The LUE weakness and edema was thought to be due to tumor infiltrating brachial plexus and radiation induced plexopathy. His hospital course was also complicated by tumor lysis syndrome, acute renal failure, and dysphagia with severe protein calorie malnutrition. Restrictions/Precautions:  Restrictions/Precautions: Contact Precautions, Fall Risk                      Past Medical History:   Diagnosis Date    Hyperlipidemia     In 2010s    Hypertension 2014    Hypothyroidism 2017    Melanoma ([facility] [address]     brain, spine, bone, muscle     Past Surgical History:   Procedure Laterality Date    DOPPLER ECHOCARDIOGRAPHY      LYMPHADENECTOMY Right 11/15/2013    arm pit    MOHS SURGERY      MOHS SURGERY Right 09/17/2021    MOHS REPAIR BCC RIGHT PARANASAL performed by Courtney Mendez MD at [facility]       Assessment:  Patient did not meet any therapy goals due to short stay on [facility] before being discharged back to acute due to decrease in medical status. Patient demonstrated with good potential in meeting goals. Pt presented to occupational therapy following lengthy hospital admission with patient complaints of LUE edema, weakness, numbness, decreased [address] impacting patient's ability to complete self care at prior level of functioning.  Due to patient's performance deficits, patient would greatly benefit from continued occupational therapy on acute care for ADL remediation, endurance building, strengthening and adaptive strategies to return to prior level of functioning and safe return to home environment. Equipment Recommendations: Other: Pt has access to a shower chair. Continue to assess needs    Plan:  Discharge patient to acute care due to decrease in medical status    Goals:  Short Term Goals  Time Frame for Short Term Goals: 1 week  Short Term Goal 1: Pt will improve L hand AROM to 4+/5 to improve indep with using his keyboard with vanessa hands. - GOAL NOT MET   Short Term Goal 2: Pt will don and doff his socks and shoes with supervision using adaptive techniques prn to improve indep with donning his work boots or tennis shoes.- GOAL NOT MET   Short Term Goal 3: Pt will demonstrate one handed adaptive IADL techniques with supervision to be able to return to living independently. - GOAL NOT MET   Short Term Goal 4: Pt will complete simple meal prep tasks with supervision and 0 vcs for LUE safety/ placement to improve indep with self care. - GOAL NOT MET     Long Term Goals  Time Frame for Long Term Goals : 2 weeks from IPR evaluation  Long Term Goal 1: Pt will complete BADL routine with mod I and adaptations to improve indep with self care. - GOAL NOT MET   Long Term Goal 2: Pt will improve L [facility] AEB completion of 9 hole peg test in <1 minute to improve [facility] needed for IADLs.- GOAL NOT MET   Long Term Goal 3: Pt will demonstrate SROM techinques of LUE to preserve movement for ease of ADLs.- GOAL NOT MET

## 2022-12-31 NOTE — PROGRESS NOTES
Assessment and Plan:        Atrial flutter; irreg pulse noted in IPR; no sxs:  consult cardio, dig. For rate control  Hypoadrenal- continue meds  Hyperglycemia- trend  Hypothyroid- on med; check levels        CC:  irregular pulse  HPI: pt with metastatic melanoma, long stay at [facility] for sepsis; aflutter noted in IPR. No prior hx of heart. Echo recently ok at [address] (12 point review of systems completed. Pertinent positives noted.  Otherwise ROS is negative) :   PMH:  Per HPI  SHX:  , nonsmoker, little etoh  FHX: hbp, lung ca, multiple sclerosis  Allergies: See above    Medications:     dextrose      sodium chloride 100 mL/hr at 12/31/22 0905      digoxin  250 mcg Oral Q4H    allopurinol  300 mg Oral Daily    aspirin  81 mg Oral Daily    atorvastatin  20 mg Oral Nightly    enoxaparin  40 mg SubCUTAneous Daily    fludrocortisone  0.1 mg Oral Daily    levothyroxine  100 mcg Oral QAM AC    potassium chloride  40 mEq Oral Daily    predniSONE  10 mg Oral Daily    senna  2 tablet Oral BID    Trametinib Dimethyl Sulfoxide  2 mg Oral Nightly    oxyCODONE  5 mg Oral Q6H    melatonin  6 mg Oral Nightly    [START ON 1/2/2023] sulfamethoxazole-trimethoprim  1 tablet Oral Once per day on Mon Wed Fri    docusate sodium  100 mg Oral BID    lidocaine  1 patch TransDERmal Q24H    naloxegol  12.5 mg Oral QAM AC    Dabrafenib Mesylate  150 mg Oral Q12H    insulin lispro  0-8 Units SubCUTAneous TID WC    insulin lispro  0-4 Units SubCUTAneous Nightly       Vital Signs:   /62   Pulse (!) 117   Temp 97.6 °F (36.4 °C) (Oral)   Resp 18   Wt 190 lb 11.2 oz (86.5 kg)   SpO2 98%   BMI 25.68 kg/m²      Intake/Output Summary (Last 24 hours) at 12/31/2022 0940  Last data filed at 12/31/2022 0919  Gross per 24 hour   Intake --   Output 1500 ml   Net -1500 ml        Physical Examination: General appearance - chronically ill appearing  Mental status - alert, oriented to person, place, and time  Neck - supple, no significant adenopathy, no JVD, or carotid bruits  Chest - clear to auscultation, no wheezes, rales or rhonchi, symmetric air entry  Heart - irregularly irregular rhythm with rate 100  Abdomen - soft, nontender, nondistended, no masses or organomegaly  Neurological - alert, oriented, normal speech, no focal findings or movement disorder noted  Musculoskeletal - no joint tenderness, deformity or swelling  Extremities - peripheral pulses normal, no pedal edema, no clubbing or cyanosis  Skin - normal coloration and turgor, no rashes, no suspicious skin lesions noted    Data: (All radiographs, tracings, PFTs, and imaging are personally viewed and interpreted unless otherwise noted).    Reviewed labs, ekg      Electronically signed by Janeth Carson MD on 12/31/2022 at 9:40 AM

## 2022-12-31 NOTE — PLAN OF CARE
Problem: Discharge Planning  Goal: Discharge to home or other facility with appropriate resources  Outcome: Progressing  Flowsheets (Taken 12/31/2022 0437)  Discharge to home or other facility with appropriate resources:   Identify barriers to discharge with patient and caregiver   Arrange for needed discharge resources and transportation as appropriate     Problem: Skin/Tissue Integrity  Goal: Absence of new skin breakdown  Description: 1. Monitor for areas of redness and/or skin breakdown  2. Assess vascular access sites hourly  3. Every 4-6 hours minimum:  Change oxygen saturation probe site  4. Every 4-6 hours:  If on nasal continuous positive airway pressure, respiratory therapy assess nares and determine need for appliance change or resting period. Outcome: Progressing  Note: Ongoing assessment & interventions provided throughout shift. Skin assessments provided. Encouraging/assisting patient to turn as needed. Problem: Safety - Adult  Goal: Free from fall injury  Outcome: Progressing  Flowsheets (Taken 12/31/2022 0437)  Free From Fall Injury: Instruct family/caregiver on patient safety  Note: Bed locked & in low position, call light in reach, side-rails up x2, bed/chair alarm utilized, non-slip socks on when ambulating, reminded patient to use call light to call for assistance. Problem: ABCDS Injury Assessment  Goal: Absence of physical injury  Outcome: Progressing  Flowsheets (Taken 12/31/2022 0437)  Absence of Physical Injury: Implement safety measures based on patient assessment     Care plan reviewed with patient. Patient verbalizes understanding of the care plan and contributed to goal setting.

## 2022-12-31 NOTE — CARE COORDINATION
12/31/22 1408   Readmission Assessment   Number of Days since last admission? 1-7 days   Previous Disposition Acute Rehab  (admitted to [facility] 3 days ago from [facility])   Who is being Interviewed Patient   What was the patient's/caregiver's perception as to why they think they needed to return back to the hospital? Other (Comment)  (A flutter)   Did you visit your Primary Care Physician after you left the hospital, before you returned this time? Yes  (n/a at [facility])   Who advised the patient to return to the hospital? Physician   Does the patient report anything that got in the way of taking their medications? No   In our efforts to provide the best possible care to you and others like you, can you think of anything that we could have done to help you after you left the hospital the first time, so that you might not have needed to return so soon?  Other (Comment)  (no)

## 2023-01-01 LAB
GLUCOSE BLD-MCNC: 171 MG/DL (ref 70–108)
GLUCOSE BLD-MCNC: 196 MG/DL (ref 70–108)
GLUCOSE BLD-MCNC: 231 MG/DL (ref 70–108)
GLUCOSE BLD-MCNC: 90 MG/DL (ref 70–108)
LD: 915 U/L (ref 100–190)

## 2023-01-01 PROCEDURE — 2580000003 HC RX 258: Performed by: INTERNAL MEDICINE

## 2023-01-01 PROCEDURE — 83615 LACTATE (LD) (LDH) ENZYME: CPT

## 2023-01-01 PROCEDURE — 6370000000 HC RX 637 (ALT 250 FOR IP): Performed by: INTERNAL MEDICINE

## 2023-01-01 PROCEDURE — 6370000000 HC RX 637 (ALT 250 FOR IP): Performed by: PHYSICAL MEDICINE & REHABILITATION

## 2023-01-01 PROCEDURE — 82948 REAGENT STRIP/BLOOD GLUCOSE: CPT

## 2023-01-01 PROCEDURE — 99232 SBSQ HOSP IP/OBS MODERATE 35: CPT | Performed by: PHYSICIAN ASSISTANT

## 2023-01-01 PROCEDURE — 6370000000 HC RX 637 (ALT 250 FOR IP): Performed by: PHYSICIAN ASSISTANT

## 2023-01-01 PROCEDURE — 2140000000 HC CCU INTERMEDIATE R&B

## 2023-01-01 PROCEDURE — 99233 SBSQ HOSP IP/OBS HIGH 50: CPT | Performed by: INTERNAL MEDICINE

## 2023-01-01 PROCEDURE — 36415 COLL VENOUS BLD VENIPUNCTURE: CPT

## 2023-01-01 RX ORDER — FLUDROCORTISONE ACETATE 0.1 MG/1
0.2 TABLET ORAL DAILY
Status: DISCONTINUED | OUTPATIENT
Start: 2023-01-02 | End: 2023-01-08 | Stop reason: HOSPADM

## 2023-01-01 RX ORDER — WARFARIN SODIUM 5 MG/1
TABLET ORAL
Qty: 40 TABLET | Refills: 1 | OUTPATIENT
Start: 2023-01-01

## 2023-01-01 RX ORDER — FLUDROCORTISONE ACETATE 0.1 MG/1
0.1 TABLET ORAL ONCE
Status: COMPLETED | OUTPATIENT
Start: 2023-01-01 | End: 2023-01-01

## 2023-01-01 RX ORDER — DIGOXIN 250 MCG
250 TABLET ORAL DAILY
Status: DISCONTINUED | OUTPATIENT
Start: 2023-01-01 | End: 2023-01-06

## 2023-01-01 RX ORDER — MIDODRINE HYDROCHLORIDE 5 MG/1
5 TABLET ORAL EVERY 8 HOURS
Status: DISCONTINUED | OUTPATIENT
Start: 2023-01-01 | End: 2023-01-06

## 2023-01-01 RX ORDER — MIDODRINE HYDROCHLORIDE 10 MG/1
TABLET ORAL
Qty: 270 TABLET | Refills: 1 | OUTPATIENT
Start: 2023-01-01

## 2023-01-01 RX ORDER — ALLOPURINOL 300 MG/1
TABLET ORAL
Qty: 90 TABLET | Refills: 1 | OUTPATIENT
Start: 2023-01-01

## 2023-01-01 RX ORDER — PANTOPRAZOLE SODIUM 40 MG/1
TABLET, DELAYED RELEASE ORAL
Qty: 90 TABLET | Refills: 1 | OUTPATIENT
Start: 2023-01-01

## 2023-01-01 RX ORDER — POTASSIUM CHLORIDE 1500 MG/1
TABLET, EXTENDED RELEASE ORAL
Qty: 180 TABLET | Refills: 1 | OUTPATIENT
Start: 2023-01-01

## 2023-01-01 RX ADMIN — NALOXEGOL OXALATE 12.5 MG: 12.5 TABLET, FILM COATED ORAL at 05:19

## 2023-01-01 RX ADMIN — OXYCODONE 5 MG: 5 TABLET ORAL at 00:21

## 2023-01-01 RX ADMIN — DIGOXIN 250 MCG: 250 TABLET ORAL at 11:07

## 2023-01-01 RX ADMIN — OXYCODONE 5 MG: 5 TABLET ORAL at 11:04

## 2023-01-01 RX ADMIN — FLUDROCORTISONE ACETATE 0.1 MG: 0.1 TABLET ORAL at 08:55

## 2023-01-01 RX ADMIN — OXYCODONE 5 MG: 5 TABLET ORAL at 05:19

## 2023-01-01 RX ADMIN — Medication 6 MG: at 20:47

## 2023-01-01 RX ADMIN — MIDODRINE HYDROCHLORIDE 5 MG: 5 TABLET ORAL at 20:47

## 2023-01-01 RX ADMIN — SODIUM CHLORIDE: 9 INJECTION, SOLUTION INTRAVENOUS at 00:24

## 2023-01-01 RX ADMIN — OXYCODONE 5 MG: 5 TABLET ORAL at 23:40

## 2023-01-01 RX ADMIN — FLUDROCORTISONE ACETATE 0.1 MG: 0.1 TABLET ORAL at 13:34

## 2023-01-01 RX ADMIN — ASPIRIN 81 MG 81 MG: 81 TABLET ORAL at 08:55

## 2023-01-01 RX ADMIN — PREDNISONE 10 MG: 10 TABLET ORAL at 08:55

## 2023-01-01 RX ADMIN — MIDODRINE HYDROCHLORIDE 5 MG: 5 TABLET ORAL at 13:34

## 2023-01-01 RX ADMIN — OXYCODONE 5 MG: 5 TABLET ORAL at 17:07

## 2023-01-01 RX ADMIN — LEVOTHYROXINE SODIUM 100 MCG: 0.1 TABLET ORAL at 05:19

## 2023-01-01 RX ADMIN — ALLOPURINOL 300 MG: 300 TABLET ORAL at 08:55

## 2023-01-01 RX ADMIN — ATORVASTATIN CALCIUM 20 MG: 20 TABLET, FILM COATED ORAL at 20:46

## 2023-01-01 RX ADMIN — POTASSIUM CHLORIDE 40 MEQ: 1500 TABLET, EXTENDED RELEASE ORAL at 08:55

## 2023-01-01 ASSESSMENT — PAIN SCALES - GENERAL
PAINLEVEL_OUTOF10: 6
PAINLEVEL_OUTOF10: 8

## 2023-01-01 ASSESSMENT — PAIN DESCRIPTION - ORIENTATION
ORIENTATION: LOWER

## 2023-01-01 ASSESSMENT — PAIN DESCRIPTION - DESCRIPTORS
DESCRIPTORS: DULL
DESCRIPTORS: SORE;ACHING
DESCRIPTORS: ACHING

## 2023-01-01 ASSESSMENT — PAIN DESCRIPTION - LOCATION
LOCATION: BACK

## 2023-01-01 NOTE — PROGRESS NOTES
Assessment and Plan:        Atrial flutter; irreg pulse noted in IPR; no sxs:  consult cardio, dig. For rate control. Daughter commented his pulse was 120 at [facility]; suspect was in flutter then. Cannot access any ekgs  Hypoadrenal- continue meds; boost florinef due to borderline bp  Hyperglycemia- trend  Hypothyroid- on med; check levels  Metastatic melanoma,- holding anticoagulation for no du        CC:  irregular pulse  HPI: pt with metastatic melanoma, long stay at [facility] for sepsis; aflutter noted in IPR. No prior hx of heart. Echo recently ok at [facility] (12 point review of systems completed. Pertinent positives noted.  Otherwise ROS is negative) :   PMH:  Per HPI  SHX:  , nonsmoker, little etoh  FHX: hbp, lung ca, multiple sclerosis  Allergies: See above    Medications:     dextrose        digoxin  250 mcg Oral Daily    [START ON 1/2/2023] fludrocortisone  0.2 mg Oral Daily    fludrocortisone  0.1 mg Oral Once    midodrine  5 mg Oral Q8H    allopurinol  300 mg Oral Daily    aspirin  81 mg Oral Daily    atorvastatin  20 mg Oral Nightly    enoxaparin  40 mg SubCUTAneous Daily    levothyroxine  100 mcg Oral QAM AC    potassium chloride  40 mEq Oral Daily    predniSONE  10 mg Oral Daily    senna  2 tablet Oral BID    Trametinib Dimethyl Sulfoxide  2 mg Oral Nightly    oxyCODONE  5 mg Oral Q6H    melatonin  6 mg Oral Nightly    [START ON 1/2/2023] sulfamethoxazole-trimethoprim  1 tablet Oral Once per day on Mon Wed Fri    docusate sodium  100 mg Oral BID    lidocaine  1 patch TransDERmal Q24H    naloxegol  12.5 mg Oral QAM AC    Dabrafenib Mesylate  150 mg Oral Q12H    insulin lispro  0-8 Units SubCUTAneous TID WC    insulin lispro  0-4 Units SubCUTAneous Nightly       Vital Signs:   BP 94/63   Pulse (!) 113   Temp 97.9 °F (36.6 °C) (Oral)   Resp 16   Wt 190 lb 11.2 oz (86.5 kg)   SpO2 99%   BMI 25.68 kg/m²      Intake/Output Summary (Last 24 hours) at 1/1/2023 1147  Last data filed at 12/31/2022 1631  Gross per 24 hour   Intake 1516.32 ml   Output --   Net 1516.32 ml        Physical Examination: General appearance - chronically ill appearing  Mental status - alert, oriented to person, place, and time  Neck - supple, no significant adenopathy, no JVD, or carotid bruits  Chest - clear to auscultation, no wheezes, rales or rhonchi, symmetric air entry  Heart - irregularly irregular rhythm with rate 100  Abdomen - soft, nontender, nondistended, no masses or organomegaly  Neurological - alert, oriented, normal speech, no focal findings or movement disorder noted  Musculoskeletal - no joint tenderness, deformity or swelling  Extremities - peripheral pulses normal, no pedal edema, no clubbing or cyanosis  Skin - normal coloration and turgor, no rashes, no suspicious skin lesions noted    Data: (All radiographs, tracings, PFTs, and imaging are personally viewed and interpreted unless otherwise noted).    Reviewed labs, ekg      Electronically signed by Ally Brown MD on 1/1/2023 at 11:47 AM

## 2023-01-01 NOTE — PROGRESS NOTES
Cardiology Progress Note      Patient:  Tabitha Curry  YOB: 1960  MRN: [id number]   Acct: [de-identified]  [facility] Date:  12/30/2022  Primary Cardiologist:  none    Note per dr Gumaro Davis for Consultation:  Aas flutter. Clinical Summary:  62/M was diagnosed with have atrial flutter during his routine physical visit with his primary care physician few days ago. He was noted to have rapid ventricular rate was admitted to the hospital for further management. The patient did not report any symptoms and has no prior history of atrial arrhythmias. Soft blood pressure, systolic 80 to 90 mmHg precluding the use of beta-blockers and calcium blockers for rate control. He is currently undergoing oral distalization. History of brain mets anticoagulation was not deemed safe. He has history of melanoma diagnosed in 1996 treated with surgical resection. Due to weight loss of about 40 pounds he was diagnosed few months ago to have diffuse metastasis, likely melanoma in his brain as well as bones. He is currently on chemotherapy. Denies chest pain, palpitations, shortness of breath. Does report weakness especially with physical activity. He walks with the help of a cane. He is . Denies smoking or alcohol use. Medcial Hx: HTN, HPL, hx of dermal melanoma dx 1996 => surgical resection and recently noted to have multiple metastasis on chemotherapy at [facility], bicytopenia (anemia an leukopenia), debility undergoing rehab and hx of clostridium difficile infection. \"    Subjective (Events in last 24 hours): pt awake and alert. NAD. no cp or sob. No palpitations.   No edema or orthopnea  On RA      Objective:   BP 94/63   Pulse (!) 113   Temp 97.9 °F (36.6 °C) (Oral)   Resp 16   Wt 190 lb 11.2 oz (86.5 kg)   SpO2 99%   BMI 25.68 kg/m²        TELEMETRY: aflutter low 100-118    Physical Exam:  General Appearance: alert and oriented to person, place and time, in no acute distress  Cardiovascular: tachy rate, regular rhythm, normal S1 and S2, no murmurs, rubs, clicks, or gallops, distal pulses intact, no carotid bruits, no JVD  Pulmonary/Chest: clear to auscultation bilaterally- no wheezes, rales or rhonchi, normal air movement, no respiratory distress  Abdomen: soft, non-tender, non-distended, normal bowel sounds, no masses Extremities: no cyanosis, clubbing or edema, pulse   Skin: warm and dry  Head: normocephalic and atraumatic  Eyes: pupils equal, round, and reactive to light  Neck: supple and non-tender without mass, no thyromegaly   Neurological: alert, oriented, normal speech, no focal findings or movement disorder noted    Medications:    allopurinol  300 mg Oral Daily    aspirin  81 mg Oral Daily    atorvastatin  20 mg Oral Nightly    enoxaparin  40 mg SubCUTAneous Daily    fludrocortisone  0.1 mg Oral Daily    levothyroxine  100 mcg Oral QAM AC    potassium chloride  40 mEq Oral Daily    predniSONE  10 mg Oral Daily    senna  2 tablet Oral BID    Trametinib Dimethyl Sulfoxide  2 mg Oral Nightly    oxyCODONE  5 mg Oral Q6H    melatonin  6 mg Oral Nightly    [START ON 1/2/2023] sulfamethoxazole-trimethoprim  1 tablet Oral Once per day on Mon Wed Fri    docusate sodium  100 mg Oral BID    lidocaine  1 patch TransDERmal Q24H    naloxegol  12.5 mg Oral QAM AC    Dabrafenib Mesylate  150 mg Oral Q12H    insulin lispro  0-8 Units SubCUTAneous TID WC    insulin lispro  0-4 Units SubCUTAneous Nightly      dextrose      sodium chloride 75 mL/hr at 01/01/23 0024     oxyCODONE, 5 mg, Q4H PRN   Or  oxyCODONE, 2.5 mg, Q4H PRN  acetaminophen, 650 mg, Q4H PRN  bisacodyl, 10 mg, Daily PRN  magnesium hydroxide, 15 mL, Daily PRN  traZODone, 50 mg, Nightly PRN  polyethylene glycol, 17 g, Daily PRN  biotene, 10 mL, PRN  glucose, 4 tablet, PRN  dextrose bolus, 125 mL, PRN   Or  dextrose bolus, 250 mL, PRN  glucagon (rDNA), 1 mg, PRN  dextrose, , Continuous PRN        Diagnostics:  TTE 12/31/22  Summary   No previous study available for comparison. Normal left ventricular cavity with overall normal systolic function. Ejection fraction is visually estimated at 60-65%. No regional wall motion abnormality. Normal right ventricular size and function. No significant valvular abnormality. No evidence of any pericardial effusion. Signature      ----------------------------------------------------------------   Electronically signed by Jacquie Hess MD (Interpreting   physician) on 12/31/2022 at 07:36 PM      Lab Data:    Cardiac Enzymes:  No results for input(s): CKTOTAL, CKMB, CKMBINDEX, TROPONINI in the last 72 hours.     CBC:   Lab Results   Component Value Date/Time    WBC 4.2 12/30/2022 07:01 PM    RBC 3.35 12/30/2022 07:01 PM    RBC 4.79 08/04/2021 07:00 AM    HGB 8.8 12/30/2022 07:01 PM    HCT 29.2 12/30/2022 07:01 PM     12/30/2022 07:01 PM       CMP:    Lab Results   Component Value Date/Time     12/30/2022 07:01 PM    K 4.6 12/30/2022 07:01 PM    K 4.4 12/29/2022 06:21 AM    CL 96 12/30/2022 07:01 PM    CO2 25 12/30/2022 07:01 PM    BUN 16 12/30/2022 07:01 PM    CREATININE 0.4 12/30/2022 07:01 PM    LABGLOM >60 12/30/2022 07:01 PM    GLUCOSE 204 12/30/2022 07:01 PM    GLUCOSE 101 08/04/2021 07:00 AM    CALCIUM 7.8 12/30/2022 07:01 PM       Hepatic Function Panel:    Lab Results   Component Value Date/Time    ALKPHOS 184 12/29/2022 06:21 AM    ALT 19 12/29/2022 06:21 AM    AST 31 12/29/2022 06:21 AM    PROT 6.0 12/29/2022 06:21 AM    BILITOT 0.3 12/29/2022 06:21 AM    LABALBU 3.0 12/29/2022 06:21 AM       Magnesium:    Lab Results   Component Value Date/Time    MG 2.0 12/30/2022 07:01 PM       PT/INR:  No results found for: PROTIME, INR    HgBA1c:    Lab Results   Component Value Date/Time    LABA1C 5.8 05/09/2022 01:37 PM    LABA1C 5.8 10/16/2019 03:13 PM       FLP:    Lab Results   Component Value Date/Time    TRIG 144 12/29/2022 06:21 AM    HDL 30 12/29/2022 06:21 AM    LDLCALC 77 12/29/2022 06:21 AM    LABVLDL 39 08/04/2021 07:00 AM       TSH:    Lab Results   Component Value Date/Time    TSH 3.240 12/31/2022 07:09 AM         Assessment:    New onset aflutter rvr - low 100s   Chadsvasc = 1  Ef 60-65 per TTE 12/31/22  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  anemia      Plan:    Keep mag >2 and K >4  Normal TSH  Cont asa  Add dig 250 daily  Dig level 1 week  Need clearance from oncology to determine safe for OAC, and if so then consider rhythm control with CV or catheter ablation       Electronically signed by Coco Davidson PA-C on 1/1/2023 at 9:16 AM

## 2023-01-02 LAB
ANION GAP SERPL CALCULATED.3IONS-SCNC: 10 MEQ/L (ref 8–16)
BUN BLDV-MCNC: 10 MG/DL (ref 7–22)
CALCIUM SERPL-MCNC: 7.9 MG/DL (ref 8.5–10.5)
CHLORIDE BLD-SCNC: 99 MEQ/L (ref 98–111)
CO2: 25 MEQ/L (ref 23–33)
CREAT SERPL-MCNC: 0.3 MG/DL (ref 0.4–1.2)
GFR SERPL CREATININE-BSD FRML MDRD: > 60 ML/MIN/1.73M2
GLUCOSE BLD-MCNC: 104 MG/DL (ref 70–108)
GLUCOSE BLD-MCNC: 186 MG/DL (ref 70–108)
GLUCOSE BLD-MCNC: 191 MG/DL (ref 70–108)
GLUCOSE BLD-MCNC: 238 MG/DL (ref 70–108)
GLUCOSE BLD-MCNC: 88 MG/DL (ref 70–108)
MAGNESIUM: 2 MG/DL (ref 1.6–2.4)
POTASSIUM SERPL-SCNC: 4.3 MEQ/L (ref 3.5–5.2)
SODIUM BLD-SCNC: 134 MEQ/L (ref 135–145)

## 2023-01-02 PROCEDURE — 80048 BASIC METABOLIC PNL TOTAL CA: CPT

## 2023-01-02 PROCEDURE — 36415 COLL VENOUS BLD VENIPUNCTURE: CPT

## 2023-01-02 PROCEDURE — 2140000000 HC CCU INTERMEDIATE R&B

## 2023-01-02 PROCEDURE — 6360000002 HC RX W HCPCS: Performed by: PHYSICAL MEDICINE & REHABILITATION

## 2023-01-02 PROCEDURE — 6370000000 HC RX 637 (ALT 250 FOR IP): Performed by: PHYSICIAN ASSISTANT

## 2023-01-02 PROCEDURE — 82948 REAGENT STRIP/BLOOD GLUCOSE: CPT

## 2023-01-02 PROCEDURE — 6370000000 HC RX 637 (ALT 250 FOR IP): Performed by: PHYSICAL MEDICINE & REHABILITATION

## 2023-01-02 PROCEDURE — 6370000000 HC RX 637 (ALT 250 FOR IP): Performed by: INTERNAL MEDICINE

## 2023-01-02 PROCEDURE — 83735 ASSAY OF MAGNESIUM: CPT

## 2023-01-02 PROCEDURE — 99233 SBSQ HOSP IP/OBS HIGH 50: CPT | Performed by: INTERNAL MEDICINE

## 2023-01-02 RX ADMIN — Medication 6 MG: at 20:52

## 2023-01-02 RX ADMIN — SENNOSIDES 17.2 MG: 8.6 TABLET, FILM COATED ORAL at 20:52

## 2023-01-02 RX ADMIN — SULFAMETHOXAZOLE AND TRIMETHOPRIM 1 TABLET: 800; 160 TABLET ORAL at 10:30

## 2023-01-02 RX ADMIN — DIGOXIN 250 MCG: 250 TABLET ORAL at 10:31

## 2023-01-02 RX ADMIN — POTASSIUM CHLORIDE 40 MEQ: 1500 TABLET, EXTENDED RELEASE ORAL at 10:36

## 2023-01-02 RX ADMIN — MIDODRINE HYDROCHLORIDE 5 MG: 5 TABLET ORAL at 12:58

## 2023-01-02 RX ADMIN — ASPIRIN 81 MG 81 MG: 81 TABLET ORAL at 10:30

## 2023-01-02 RX ADMIN — FLUDROCORTISONE ACETATE 0.2 MG: 0.1 TABLET ORAL at 10:30

## 2023-01-02 RX ADMIN — DOCUSATE SODIUM 100 MG: 100 CAPSULE, LIQUID FILLED ORAL at 10:31

## 2023-01-02 RX ADMIN — DOCUSATE SODIUM 100 MG: 100 CAPSULE, LIQUID FILLED ORAL at 20:52

## 2023-01-02 RX ADMIN — PREDNISONE 10 MG: 10 TABLET ORAL at 10:30

## 2023-01-02 RX ADMIN — OXYCODONE 5 MG: 5 TABLET ORAL at 17:29

## 2023-01-02 RX ADMIN — ENOXAPARIN SODIUM 40 MG: 100 INJECTION SUBCUTANEOUS at 10:29

## 2023-01-02 RX ADMIN — LEVOTHYROXINE SODIUM 100 MCG: 0.1 TABLET ORAL at 05:19

## 2023-01-02 RX ADMIN — SENNOSIDES 17.2 MG: 8.6 TABLET, FILM COATED ORAL at 10:30

## 2023-01-02 RX ADMIN — MIDODRINE HYDROCHLORIDE 5 MG: 5 TABLET ORAL at 20:52

## 2023-01-02 RX ADMIN — OXYCODONE 5 MG: 5 TABLET ORAL at 11:55

## 2023-01-02 RX ADMIN — OXYCODONE 5 MG: 5 TABLET ORAL at 05:19

## 2023-01-02 RX ADMIN — ATORVASTATIN CALCIUM 20 MG: 20 TABLET, FILM COATED ORAL at 20:52

## 2023-01-02 RX ADMIN — MIDODRINE HYDROCHLORIDE 5 MG: 5 TABLET ORAL at 03:52

## 2023-01-02 RX ADMIN — OXYCODONE 5 MG: 5 TABLET ORAL at 23:39

## 2023-01-02 RX ADMIN — NALOXEGOL OXALATE 12.5 MG: 12.5 TABLET, FILM COATED ORAL at 05:19

## 2023-01-02 RX ADMIN — INSULIN LISPRO 2 UNITS: 100 INJECTION, SOLUTION INTRAVENOUS; SUBCUTANEOUS at 17:27

## 2023-01-02 RX ADMIN — ALLOPURINOL 300 MG: 300 TABLET ORAL at 10:30

## 2023-01-02 ASSESSMENT — PAIN SCALES - GENERAL
PAINLEVEL_OUTOF10: 3
PAINLEVEL_OUTOF10: 7
PAINLEVEL_OUTOF10: 7
PAINLEVEL_OUTOF10: 5
PAINLEVEL_OUTOF10: 5

## 2023-01-02 ASSESSMENT — PAIN DESCRIPTION - DESCRIPTORS
DESCRIPTORS: ACHING
DESCRIPTORS: ACHING

## 2023-01-02 ASSESSMENT — PAIN DESCRIPTION - LOCATION
LOCATION: BACK
LOCATION: BACK

## 2023-01-02 ASSESSMENT — PAIN DESCRIPTION - ORIENTATION
ORIENTATION: LOWER
ORIENTATION: LOWER

## 2023-01-02 NOTE — CONSULTS
Endocrine Consult    Patient:  Camilla Navarro  YOB: 1960    MRN: [id number]       Date of Service: Pt seen/examined in consultation on 1/2/23    History Of Present Illness:      58 y.o. male who we are asked to see/evaluate by Sammie De Leon DO for medical management of adrenal insufficiency. This is a 70-year-old male with metastatic melanoma who receives the care at [facility]. Patient was transferred from the rehab for acute care recently with a diagnosis of new onset atrial flutter associated with RVR and hypotension. The patient tells me that he was diagnosed with adrenal insufficiency about a month ago at [facility] point started on prednisone and Florinef at that point. His main symptom is weight loss and low blood pressure. The patient has lost about 50 pounds in the last 3 months but he has gone through a lot of issues in relation to the melanoma diagnosis. He has had lightheadedness and his blood pressure has been running low even by the time he was at [facility]. He denies salt craving. Reports no GI issues at this point. He has not noticed any discoloration of his skin. The patient has had melanoma since 1996 and has had multiple surgical procedures including removal of lymph nodes in both axillary areas. He has been through multiple rounds of chemotherapy. I reviewed records from [facility] on care everywhere. I see in the oncology note from 12/3/2022 that the [address] stimulation test was indicating of adrenal insufficiency which was the basis for him being placed on steroids. There is a brief endocrine note discussing the doses of prednisone and Florinef with recommendations for endocrine follow-up after discharge. However, when I pull up the labs on care everywhere, his cortisol level was 46.38 on 12/4/2022. On 12/8/2022 he had 2 cortisol measurements of 31.57 and 30.25. ACTH on that date was normal at 32. This patient is also being treated for hypothyroidism.   His labs from 12/21/2022 reviewed on Care Everywhere consisted of slight elevation of TSH at 7.602 with a low free T4 of 0.79. The patient has a multinodular goiter based on surgical notes from 4/8/2021 by Dr. Lin Rae where he indicated that there would be need for follow-up for growth of one of the nodules that was high risk based on radiological features but not big enough to be biopsied. The patient tells me that his mother is hypothyroid. I saw this patient in the presence of his cousin.   Past Medical History:   Diagnosis Date    Hyperlipidemia     In 2010s    Hypertension 2014    Hypothyroidism 2017    Melanoma ([address]     brain, spine, bone, muscle       Past Surgical History:   Procedure Laterality Date    DOPPLER ECHOCARDIOGRAPHY      LYMPHADENECTOMY Right 11/15/2013    arm pit    MOHS SURGERY      MOHS SURGERY Right 09/17/2021    MOHS REPAIR BCC RIGHT PARANASAL performed by Mervin Shah MD at [address] [address] [address] History     Tobacco Use    Smoking status: Never    Smokeless tobacco: Former     Types: Chew, Snuff     Quit date: 09/2022   Substance Use Topics    Alcohol use: Yes     Comment: once per month drinking 2 to 3 cans of beer        Family History   Problem Relation Age of Onset    High Blood Pressure Mother     High Cholesterol Mother     Lung Cancer Father     Mult Sclerosis Sister     Melanoma Brother         Current Facility-Administered Medications   Medication Dose Route Frequency Provider Last Rate Last Admin    digoxin (LANOXIN) tablet 250 mcg  250 mcg Oral Daily Aguilar Mack PA-C   250 mcg at 01/02/23 1031    fludrocortisone (FLORINEF) tablet 0.2 mg  0.2 mg Oral Daily Ksenia Mcmanus MD   0.2 mg at 01/02/23 1030    midodrine (PROAMATINE) tablet 5 mg  5 mg Oral Q8H Ksenia Mcmanus MD   5 mg at 01/02/23 1258    allopurinol (ZYLOPRIM) tablet 300 mg  300 mg Oral Daily Chrissy Pierce MD   300 mg at 01/02/23 1030    aspirin chewable tablet 81 mg  81 mg Oral Daily Chrissy Pierce MD   81 mg at 01/02/23 1030    atorvastatin (LIPITOR) tablet 20 mg  20 mg Oral Nightly Rand Self MD   20 mg at 01/01/23 2046    enoxaparin (LOVENOX) injection 40 mg  40 mg SubCUTAneous Daily Rand Self MD   40 mg at 01/02/23 1029    levothyroxine (SYNTHROID) tablet 100 mcg  100 mcg Oral QAM AC Rand Self MD   100 mcg at 01/02/23 0519    potassium chloride (KLOR-CON M) extended release tablet 40 mEq  40 mEq Oral Daily Rand Self MD   40 mEq at 01/02/23 1036    oxyCODONE (ROXICODONE) immediate release tablet 5 mg  5 mg Oral Q4H PRN Rand Self MD   5 mg at 01/02/23 1729    Or    oxyCODONE (ROXICODONE) immediate release tablet 2.5 mg  2.5 mg Oral Q4H PRN Rand Self MD        predniSONE (DELTASONE) tablet 10 mg  10 mg Oral Daily Rand Self MD   10 mg at 01/02/23 1030    senna (SENOKOT) tablet 17.2 mg  2 tablet Oral BID Rand Self MD   17.2 mg at 01/02/23 1030    Trametinib Dimethyl Sulfoxide TABS 2 mg (Patient Supplied)  2 mg Oral Nightly Rand Self MD   2 mg at 01/01/23 2047    acetaminophen (TYLENOL) tablet 650 mg  650 mg Oral Q4H PRN Rand Self MD        oxyCODONE (ROXICODONE) immediate release tablet 5 mg  5 mg Oral Q6H Rand Self MD   5 mg at 01/02/23 1155    melatonin tablet 6 mg  6 mg Oral Nightly Rand Self MD   6 mg at 01/01/23 2047    sulfamethoxazole-trimethoprim (BACTRIM DS;SEPTRA DS) 800-160 MG per tablet 1 tablet  1 tablet Oral Once per day on Mon Wed Fri Rand Self MD   1 tablet at 01/02/23 1030    docusate sodium (COLACE) capsule 100 mg  100 mg Oral BID Rand Self MD   100 mg at 01/02/23 1031    bisacodyl (DULCOLAX) suppository 10 mg  10 mg Rectal Daily PRN Rand Self MD        magnesium hydroxide (MILK OF MAGNESIA) 400 MG/5ML suspension 15 mL  15 mL Oral Daily PRN Rand Self MD        traZODone (DESYREL) tablet 50 mg  50 mg Oral Nightly PRN Rand Self MD        polyethylene glycol (GLYCOLAX) packet 17 g  17 g Oral Daily PRN Celine Power MD        lidocaine 4 % external patch 1 patch  1 patch TransDERmal Q24H Celine Power MD   1 patch at 01/01/23 1105    naloxegol (MOVANTIK) tablet 12.5 mg  12.5 mg Oral QAM AC Celine Power MD   12.5 mg at 01/02/23 6751    Dabrafenib Mesylate CAPS 150 mg (Patient Supplied)  150 mg Oral Q12H Celine Power MD   150 mg at 01/02/23 1036    biotene oral solution  10 mL Swish & Spit PRN Celine Power MD        insulin lispro (HUMALOG) injection vial 0-8 Units  0-8 Units SubCUTAneous TID WC Marilyn Prieto PA-C   2 Units at 01/02/23 1727    insulin lispro (HUMALOG) injection vial 0-4 Units  0-4 Units SubCUTAneous Nightly Maggy R Samantha Dillon PA-C        glucose chewable tablet 16 g  4 tablet Oral PRN Marilyn Prieto PA-C        dextrose bolus 10% 125 mL  125 mL IntraVENous PRN Marilyn Prieto PA-C        Or    dextrose bolus 10% 250 mL  250 mL IntraVENous PRN Maggy Weinstein PA-C        glucagon (rDNA) injection 1 mg  1 mg SubCUTAneous PRN Maggy R GRECIA Weinstein        dextrose 10 % infusion   IntraVENous Continuous PRN Marilyn Prieto PA-C            Past Medical History:        Diagnosis Date    Hyperlipidemia     In 2010s    Hypertension 2014    Hypothyroidism 2017    Melanoma ([facility] [address]     brain, spine, bone, muscle       Past Surgical History:        Procedure Laterality Date    DOPPLER ECHOCARDIOGRAPHY      LYMPHADENECTOMY Right 11/15/2013    arm pit    MOHS SURGERY      MOHS SURGERY Right 09/17/2021    MOHS REPAIR BCC RIGHT PARANASAL performed by Sharita Smith MD at [address]       Medications Prior to Admission:    Prior to Admission medications    Medication Sig Start Date End Date Taking?  Authorizing Provider   lisinopril (PRINIVIL;ZESTRIL) 5 MG tablet TAKE 1 TABLET BY MOUTH EVERY DAY 5/9/22   DEBBIE Bess CNP   atorvastatin (LIPITOR) 20 MG tablet TAKE 1 TABLET BY MOUTH EVERY DAY 5/9/22   DEBBIE Bess CNP   metFORMIN (GLUCOPHAGE) 500 MG tablet Take 1 tablet by mouth 2 times daily (with meals) 5/9/22   DEBBIE Jackson CNP   aspirin (ASPIRIN LOW DOSE) 81 MG EC tablet TAKE 1 TABLET BY MOUTH DAILY 5/6/21   DEBBIE Jackson CNP   ACCU-CHEK SOFTCLIX LANCETS MISC 1 Device by Does not apply route 2 times daily 10/16/19   DEBBIE Jackson CNP   blood glucose monitor kit and supplies Test once a day & as needed for symptoms of irregular blood glucose. Please dispense all supplies strips and lancets 10/16/19   DEBBIE Jackson CNP   blood glucose monitor strips Test bid & as needed for symptoms of irregular blood glucose. 10/16/19   DEBBIE Jackson CNP   sildenafil (VIAGRA) 50 MG tablet Take 1/2 tablet one hour prior to sexual activity 10/16/19   DEBBIE Jackson CNP   pembrolizumab [facility]) 50 MG SOLR chemo injection Infuse intravenously    Historical Provider, MD       Allergies:  Patient has no known allergies. Social History:   TOBACCO:   reports that he has never smoked. He quit smokeless tobacco use about 4 months ago. His smokeless tobacco use included chew and snuff. ETOH:   reports current alcohol use. Family History:      Problem Relation Age of Onset    High Blood Pressure Mother     High Cholesterol Mother     Lung Cancer Father     Mult Sclerosis Sister     Melanoma Brother        Diet:  ADULT DIET; Regular  ADULT ORAL NUTRITION SUPPLEMENT; Breakfast, Dinner; Standard High Calorie/High Protein Oral Supplement    REVIEW OF SYSTEMS:   Pertinent positives as noted in the HPI. All other systems reviewed and negative. PHYSICAL EXAM:  BP 97/60   Pulse (!) 103   Temp 98.4 °F (36.9 °C) (Oral)   Resp 18   Wt 190 lb 11.2 oz (86.5 kg)   SpO2 96%   BMI 25.68 kg/m²   General appearance: No apparent distress, appears stated age and cooperative. Ill-appearing. HEENT: Normal cephalic, atraumatic without obvious deformity. Pupils equal, round, and reactive to light. Extra ocular muscles intact. Conjunctivae/corneas clear.   Neck: Supple, with full range of motion. No jugular venous distention. Trachea midline. Thyroid: Nodular with no tenderness. Respiratory:  Normal respiratory effort. Clear to auscultation, bilaterally without Rales/Wheezes/Rhonchi. Cardiovascular: Regular rate and rhythm with normal S1/S2 without murmurs, rubs or gallops. Abdomen: Soft, non-tender, non-distended with normal bowel sounds. Musculoskeletal:  No clubbing, cyanosis or edema bilaterally. Questionable Dupuytren's contracture involving the left hand. Skin: Skin color, texture, turgor normal.  No rashes or lesions. Neurologic: Mental status is normal.  Speech is normal.  Cranial nerves II to XII are normal.  The patient is unable to extend the lateral 3 digits on the left hand. Psychiatric: Alert and oriented, thought content appropriate, normal insight    Recent Labs     12/30/22 1901   WBC 4.2*   HGB 8.8*   HCT 29.2*        Recent Labs     12/30/22 1901 01/02/23  0907   * 134*   K 4.6 4.3   CL 96* 99   CO2 25 25   BUN 16 10   CREATININE 0.4 0.3*   CALCIUM 7.8* 7.9*     No results for input(s): AST, ALT, BILIDIR, BILITOT, ALKPHOS in the last 72 hours. No results for input(s): INR in the last 72 hours. No results for input(s): Kar Pander in the last 72 hours. Urinalysis:  No results found for: Venetta Albino, BACTERIA, RBCUA, BLOODU, [address]    Radiology: I have reviewed the radiology reports with the following interpretation:     CTA CHEST W WO CONTRAST   Final Result       1. No evidence of pulmonary bolus. 2. 1.9 cm pleural-based nodule in the left upper lobe anteriorly. This is concerning for malignancy. Consider PET/CT for further evaluation. 3. Prominent mediastinal adenopathy and bilateral hilar lymphadenopathy. **This report has been created using voice recognition software. It may contain minor errors which are inherent in voice recognition technology. **      Final report electronically signed by  Gerhardt Older DO, MD on 12/31/2022 11:03 AM      XR CHEST PORTABLE   Final Result   Stable radiographic appearance of the chest. No evidence of an acute process. **This report has been created using voice recognition software. It may contain minor errors which are inherent in voice recognition technology. **      Final report electronically signed by Dr. Angelica Vaughn MD on 12/31/2022 11:20 AM               ASSESSMENT/PLAN:  1. Presumed adrenal insufficiency: There is some uncertainty surrounding his diagnosis. The notes from [facility] state that his ACTH stimulation test was indicated if of adrenal insufficiency but his cortisol levels on file do not jibe with this diagnosis. I do not know if the labs I am seeing were done when the patient was already on glucocorticoid therapy. For now we will keep him on the current regiment while we seek clarification of his diagnostic labs. 2.  Hypothyroidism: The patient is on Synthroid. Clinically doing well. I do need a complete thyroid profile including free T4 and TSH. We will also check antithyroid antibodies. 3.  Multinodular goiter: Obtain thyroid ultrasound to clarify on the current status. 4.  Atrial flutter: Being seen by cardiology. He is mildly tachycardic but is stable. 5.  Metastatic melanoma: Being followed at [facility]. Patient was initially diagnosed in 1996 and is status post multiple surgical procedures and multiple rounds of chemotherapy. Thank you for the consultation.     Electronically signed by Lopez Nunes MD on 1/2/2023 at 5:41 PM

## 2023-01-02 NOTE — PLAN OF CARE
Problem: Discharge Planning  Goal: Discharge to home or other facility with appropriate resources  1/2/2023 1219 by Josselin Shah RN  Outcome: Progressing  Flowsheets (Taken 1/2/2023 0878)  Discharge to home or other facility with appropriate resources:   Identify barriers to discharge with patient and caregiver   Arrange for needed discharge resources and transportation as appropriate   Identify discharge learning needs (meds, wound care, etc)   Refer to discharge planning if patient needs post-hospital services based on physician order or complex needs related to functional status, cognitive ability or social support system     Problem: Skin/Tissue Integrity  Goal: Absence of new skin breakdown  Description: 1. Monitor for areas of redness and/or skin breakdown  2. Assess vascular access sites hourly  3. Every 4-6 hours minimum:  Change oxygen saturation probe site  4. Every 4-6 hours:  If on nasal continuous positive airway pressure, respiratory therapy assess nares and determine need for appliance change or resting period. 1/2/2023 1219 by Josselin Shah RN  Outcome: Progressing     Problem: Safety - Adult  Goal: Free from fall injury  1/2/2023 1219 by Josselin Shah RN  Flowsheets (Taken 1/2/2023 3290 by Elsy Johnston RN)  Free From Fall Injury: Instruct family/caregiver on patient safety     Problem: ABCDS Injury Assessment  Goal: Absence of physical injury  1/2/2023 1219 by Josselin Shah RN  Outcome: Vivek Jackson (Taken 1/2/2023 0664 by Elsy Johnston RN)  Absence of Physical Injury: Implement safety measures based on patient assessment  Care plan reviewed with patient. Patient verbalizes understanding of the care plan and contributed to goal setting.

## 2023-01-02 NOTE — PLAN OF CARE
Problem: Discharge Planning  Goal: Discharge to home or other facility with appropriate resources  Outcome: Progressing  Flowsheets (Taken 1/2/2023 8137)  Discharge to home or other facility with appropriate resources: Identify barriers to discharge with patient and caregiver     Problem: Skin/Tissue Integrity  Goal: Absence of new skin breakdown  Description: 1. Monitor for areas of redness and/or skin breakdown  2. Assess vascular access sites hourly  3. Every 4-6 hours minimum:  Change oxygen saturation probe site  4. Every 4-6 hours:  If on nasal continuous positive airway pressure, respiratory therapy assess nares and determine need for appliance change or resting period. Outcome: Progressing  Note: Ongoing assessment & interventions provided throughout shift. Skin assessments provided. Encouraging/assisting patient to turn as needed. Problem: Safety - Adult  Goal: Free from fall injury  Outcome: Progressing  Flowsheets (Taken 1/2/2023 0629)  Free From Fall Injury: Instruct family/caregiver on patient safety  Note: Bed locked & in low position, call light in reach, side-rails up x2, bed/chair alarm utilized, non-slip socks on when ambulating, reminded patient to use call light to call for assistance. Problem: ABCDS Injury Assessment  Goal: Absence of physical injury  Outcome: Progressing  Flowsheets (Taken 1/2/2023 0629)  Absence of Physical Injury: Implement safety measures based on patient assessment   Care plan reviewed with patient. Patient verbalizes understanding of the care plan and contributed to goal setting.

## 2023-01-02 NOTE — PROGRESS NOTES
Hospitalist Progress Note    Patient:  Mik Bridges : 1960  Unit/Bed:3B-33/033-A    Date of Admission: 2022      Assessment/Plan:  Atrial flutter: recent echo 22 shows normal EF, no pericardial effusion, and no sig valvular disease. Digoxin continued. Hypotension limits BB use at this time. CHADSVASC = 2 (HTN, DM). Will attempt to reach out to Dr. Kian Taylor and/or Dr. Armando Madrigal to discuss feasibility of [facility] prior to consideration for cardioversion. (Not successful on 23)  Adrenal Insufficiency / Acute on chronic hypotension: newly diagnosed, at [facility]. Dc'ed on prednisone and florinef. Increased florinef to 0.2, continue prednisone  Continue midodrine  Consult to Endocrinology for further assistance. Recent clostridium bacteremia treated at [facility], port removed there. Metastatic melanoma with new brain mets (diagnosed 2022), as well as spleen, mediastinum, lungs, LN: extensive history for several decades. Follows Dr. Armando Madrigal at [facility]) and Dr. Vinny Ellington at [facility] (Neurosurgery for neuropathy) and Dr. Kian Taylor at [facility] (Neuro-Onc brain mets)  Had been recently started on [place] and [address] on 22 due to progression  On Ipi/Nivolomab since 2022  Now on Trametinib and Dabrafenib started 22, continued. Continue scheduled oxycodone, bowel regimen. NIDDM2: continue ssi for now. Metformin recently discontinued by [facility]. DM diet, change ONS to diabetic supp. Hypothyroidism: continue synthroid 100mg  Hx TLS: continue allopurinol  HTN now with hypotension, continue midodrine.    Left arm/hand neuropathy: per OSH, \"osseous mets in C-spine, epidural and intramedullary tumor infiltration in the C- spine, tumor infiltration in the brachial plexus, and radiation induced plexopathy\"       Expected discharge date:  2-3 days     Disposition: pending ability to anticoagulate    ===================================================================    Chief Complaint: Atrial flutter  Subjective (past 24 hours): Patient seen at bedside. He is feeling good at this time. No chest pain, palpitations, dizziness. Does report some dizziness with getting up and moving. No constipation or diarrhea. No headaches noted. Hospital Course/Initial HPI: Patient admitted from [facility] for notation of atrial flutter with RVR. Exam:  /68   Pulse (!) 109   Temp 98.4 °F (36.9 °C) (Oral)   Resp 18   Wt 190 lb 11.2 oz (86.5 kg)   SpO2 96%   BMI 25.68 kg/m²     General: No distress, appears stated age. Chronically ill appearing. Eyes:  PERRL. Conjunctivae/corneas clear. HENT: Head normal appearing. Nares normal. Oral mucosa moist.  Hearing intact. Neck: Supple, with full range of motion. Trachea midline. No gross JVD appreciated. Respiratory:  Normal effort. Clear to auscultation, without rales or wheezes or rhonchi. Cardiovascular: regular rhythm with normal S1/S2 without murmurs. No lower extremity edema. Tachycardic rate  Non-pitting edema LUE. Abdomen: Soft, non-tender, non-distended with normal bowel sounds. Musculoskeletal: No joint swelling or tenderness. Normal tone. No abnormal movements. Skin: Warm and dry. No rashes or lesions. Bruising left eye  Neurologic:  No focal sensory/motor deficits in the upper or lower extremities. Cranial nerves:  grossly non-focal 2-12. Psychiatric: Alert and oriented, normal insight and thought content. Capillary Refill: Brisk,< 3 seconds. Peripheral Pulses: +2 palpable, equal bilaterally. Images    CTA CHEST W WO CONTRAST    Result Date: 12/31/2022   1. No evidence of pulmonary bolus. 2. 1.9 cm pleural-based nodule in the left upper lobe anteriorly. This is concerning for malignancy. Consider PET/CT for further evaluation. 3. Prominent mediastinal adenopathy and bilateral hilar lymphadenopathy. **This report has been created using voice recognition software.  It may contain minor errors which are inherent in voice recognition technology. ** Final report electronically signed by Dr. Madeleine Johnson MD on 12/31/2022 11:03 AM    XR CHEST PORTABLE    Result Date: 12/31/2022  Stable radiographic appearance of the chest. No evidence of an acute process. **This report has been created using voice recognition software. It may contain minor errors which are inherent in voice recognition technology. ** Final report electronically signed by Dr. Madeleine Johnson MD on 12/31/2022 11:20 AM         DVT prophylaxis:    [x] Lovenox  [] SCDs  [] SQ Heparin  [] Encourage ambulation   [] Already on Anticoagulation       Diet: ADULT DIET;  Regular  ADULT ORAL NUTRITION SUPPLEMENT; Breakfast, Dinner; Standard High Calorie/High Protein Oral Supplement  Code Status: Full Code  PT/OT: yes  Tele: yes  IVF: na    Electronically signed by Camilo Moore DO on 1/2/2023 at 8:40 AM

## 2023-01-03 ENCOUNTER — APPOINTMENT (OUTPATIENT)
Dept: MRI IMAGING | Age: 63
DRG: 274 | End: 2023-01-03
Attending: INTERNAL MEDICINE
Payer: COMMERCIAL

## 2023-01-03 LAB
GLUCOSE BLD-MCNC: 206 MG/DL (ref 70–108)
GLUCOSE BLD-MCNC: 239 MG/DL (ref 70–108)
GLUCOSE BLD-MCNC: 92 MG/DL (ref 70–108)

## 2023-01-03 PROCEDURE — 6370000000 HC RX 637 (ALT 250 FOR IP): Performed by: PHYSICIAN ASSISTANT

## 2023-01-03 PROCEDURE — 97116 GAIT TRAINING THERAPY: CPT

## 2023-01-03 PROCEDURE — 6370000000 HC RX 637 (ALT 250 FOR IP): Performed by: INTERNAL MEDICINE

## 2023-01-03 PROCEDURE — 92523 SPEECH SOUND LANG COMPREHEN: CPT

## 2023-01-03 PROCEDURE — 6360000002 HC RX W HCPCS: Performed by: PHYSICAL MEDICINE & REHABILITATION

## 2023-01-03 PROCEDURE — 97535 SELF CARE MNGMENT TRAINING: CPT

## 2023-01-03 PROCEDURE — 6370000000 HC RX 637 (ALT 250 FOR IP): Performed by: PHYSICAL MEDICINE & REHABILITATION

## 2023-01-03 PROCEDURE — 99232 SBSQ HOSP IP/OBS MODERATE 35: CPT | Performed by: PHYSICIAN ASSISTANT

## 2023-01-03 PROCEDURE — 3209999900 MRI INTERPRETATION OF OUTSIDE IMAGES

## 2023-01-03 PROCEDURE — 2140000000 HC CCU INTERMEDIATE R&B

## 2023-01-03 PROCEDURE — 97530 THERAPEUTIC ACTIVITIES: CPT

## 2023-01-03 PROCEDURE — 82948 REAGENT STRIP/BLOOD GLUCOSE: CPT

## 2023-01-03 PROCEDURE — 99233 SBSQ HOSP IP/OBS HIGH 50: CPT | Performed by: INTERNAL MEDICINE

## 2023-01-03 PROCEDURE — 97162 PT EVAL MOD COMPLEX 30 MIN: CPT

## 2023-01-03 RX ADMIN — DOCUSATE SODIUM 100 MG: 100 CAPSULE, LIQUID FILLED ORAL at 20:31

## 2023-01-03 RX ADMIN — Medication 6 MG: at 20:31

## 2023-01-03 RX ADMIN — ALLOPURINOL 300 MG: 300 TABLET ORAL at 10:39

## 2023-01-03 RX ADMIN — INSULIN LISPRO 2 UNITS: 100 INJECTION, SOLUTION INTRAVENOUS; SUBCUTANEOUS at 12:19

## 2023-01-03 RX ADMIN — SENNOSIDES 17.2 MG: 8.6 TABLET, FILM COATED ORAL at 20:32

## 2023-01-03 RX ADMIN — PREDNISONE 10 MG: 10 TABLET ORAL at 10:38

## 2023-01-03 RX ADMIN — DIGOXIN 250 MCG: 250 TABLET ORAL at 10:39

## 2023-01-03 RX ADMIN — OXYCODONE 5 MG: 5 TABLET ORAL at 10:37

## 2023-01-03 RX ADMIN — POTASSIUM CHLORIDE 40 MEQ: 1500 TABLET, EXTENDED RELEASE ORAL at 10:33

## 2023-01-03 RX ADMIN — NALOXEGOL OXALATE 12.5 MG: 12.5 TABLET, FILM COATED ORAL at 04:39

## 2023-01-03 RX ADMIN — LEVOTHYROXINE SODIUM 100 MCG: 0.1 TABLET ORAL at 04:39

## 2023-01-03 RX ADMIN — ASPIRIN 81 MG 81 MG: 81 TABLET ORAL at 10:33

## 2023-01-03 RX ADMIN — MIDODRINE HYDROCHLORIDE 5 MG: 5 TABLET ORAL at 20:31

## 2023-01-03 RX ADMIN — ATORVASTATIN CALCIUM 20 MG: 20 TABLET, FILM COATED ORAL at 20:31

## 2023-01-03 RX ADMIN — OXYCODONE 5 MG: 5 TABLET ORAL at 17:38

## 2023-01-03 RX ADMIN — OXYCODONE 5 MG: 5 TABLET ORAL at 23:20

## 2023-01-03 RX ADMIN — FLUDROCORTISONE ACETATE 0.2 MG: 0.1 TABLET ORAL at 10:39

## 2023-01-03 RX ADMIN — OXYCODONE 5 MG: 5 TABLET ORAL at 04:39

## 2023-01-03 RX ADMIN — MIDODRINE HYDROCHLORIDE 5 MG: 5 TABLET ORAL at 04:39

## 2023-01-03 RX ADMIN — MIDODRINE HYDROCHLORIDE 5 MG: 5 TABLET ORAL at 12:19

## 2023-01-03 RX ADMIN — ENOXAPARIN SODIUM 40 MG: 100 INJECTION SUBCUTANEOUS at 10:28

## 2023-01-03 ASSESSMENT — PAIN DESCRIPTION - DESCRIPTORS
DESCRIPTORS: ACHING
DESCRIPTORS: ACHING

## 2023-01-03 ASSESSMENT — PAIN DESCRIPTION - ORIENTATION
ORIENTATION: LOWER
ORIENTATION: LOWER

## 2023-01-03 ASSESSMENT — PAIN DESCRIPTION - LOCATION
LOCATION: BACK
LOCATION: BACK

## 2023-01-03 ASSESSMENT — PAIN SCALES - GENERAL
PAINLEVEL_OUTOF10: 5
PAINLEVEL_OUTOF10: 0
PAINLEVEL_OUTOF10: 6
PAINLEVEL_OUTOF10: 6

## 2023-01-03 NOTE — PROGRESS NOTES
[facility] COORDINATOR CONSULT    Referral Type: internal    Patient Name: Bridgett Peña      MRN: [id number]    : 1960  (64 y.o.)  Gender: male   Race:White (non-)     Payor Source: Payor: Kaykay Dallas / Plan: Cynda Bigness / Product Type: *No Product type* /   Secondary Payor Source:      Isolation Status: Contact    Lives With: Alone  Type of Home: House  Home Layout: One level  Home Access: Stairs to enter without rails  Entrance Stairs - Number of Steps: 1  Receives Help From: Family  Occupation: Full time employment  Type of Occupation: Shop  (computer work) at [employer]  Additional Comments: Patient reports prior to hospital admission patient was independent with all ADL's/IADL's without use of AD for mobility. 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 Patient reports he plans to go to his mothers house for a few weeks( 1 story home, walk in shower, handicap toilets, grab bars in bathroom) after discharge. Patient reports his mother is able to help with tasks in home. Patient reports his mother has a shower chair in her shower. What is treatment plan? Disciplines Required upon Admission to Inpatient Rehabilitation: Physical Therapy and Occupational Therapy  Post operative: No  Fall: No  Dialysis: No  Diet: ADULT DIET; Regular; 5 carb choices (75 gm/meal)  ADULT ORAL NUTRITION SUPPLEMENT; Breakfast, Dinner; Diabetic Oral Supplement  Discussed patient with  and PM&R provider: Await medical work up completion.

## 2023-01-03 NOTE — PROGRESS NOTES
Spoke with patient regarding rehab referral and fact that he ambulated a great distance with PT. Recommend home health after medical work up is complete. Discussed this with Dr. Dariana Lara. Matilda Velazquez CM updated.

## 2023-01-03 NOTE — PROGRESS NOTES
[facility] THERAPY  STRZ CCU-STEPDOWN 3B  Speech - Language - Cognitive Evaluation    SLP Individual Minutes  Time In: 2874  Time Out: 2356  Minutes: 16  Timed Code Treatment Minutes: 0 Minutes       Date: 1/3/2023  Patient Name: Marta Johnson      CSN: [id number]   : 1960  (58 y.o.)  Gender: male   Referring Physician:  Madison Cornell MD  Diagnosis: Atrial flutter  Precautions: Fall risk  History of Present Illness/Injury: Patient admitted to [facility] with above med dx; please refer to physician H&P for full details. Per chart review, pertinent hx for metastatic melanoma with new brain mets (diagnosed 2022) as well as spleen, mediastinum, lungs, LN. Prior IPR placement with needs to transfer to the acute unit of the facility s/t admitting dx. ST consulted to complete cognitive linguistic assessment to develop goals per POC as indicated. Past Medical History:   Diagnosis Date    Hyperlipidemia     In     Hypertension 2014    Hypothyroidism 2017    Melanoma ([facility] [address]     brain, spine, bone, muscle       Pain: No pain reported. Subjective:  RN Lenward Homans with approval to proceed with evaluation. Upon arrival, patient resting in bed post PT evaluation, alert and pleasant. Positive engagement in assessment. 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    Hobbies: Watching televised sports, spending time with family, riding motorcycle   Vision Status: Reading glasses  Hearing: WFL  Type of Home: House  Home Layout: One level  Home Access: Stairs to enter without rails  Entrance Stairs - Number of Steps: 1  Home Equipment: Cane    SPEECH / VOICE:  Speech and Voice appear to be grossly intact for basic and complex daily communication    LANGUAGE:  Receptive and Expressive:  Receptive and expressive language skills appear to be grossly intact for basic and complex daily communication. No apparent communicative breakdowns at dialogue level with patient successful for conveying desired message. Independent executions of multi-step commands throughout evaluation. COGNITION:  Jose Cognitive Assessment (MOCA) version 7.2 completed. Patient scored 26/30. Normal is greater than or equal to 26/30. *prior documented MOCA score of 24/30 to illustrate a 2+ point improvement   Orientation: 6  Immediate Recall:   Short-Term Recall: 3/5  Divergent Namin/11 members in 52 second time frame  Problem Solving: 3/3  Reasonin/2 verbal  Sequencin/1  Thought Organization: WFL  Insight: Adequate   Attention: Concerns for higher level skills  Math Computation:  serial subtraction   Executive Functionin/5    SWALLOWING:  Current Diet: Regular, thin liquids with patient denying concern for swallow function within baseline diet level. RECOMMENDATIONS/ASSESSMENT:  DIAGNOSTIC IMPRESSIONS:  Patient presents with a Mod I cognitive impairment as derived by clinical findings outlined above to negatively influence abilities to return to IADL completion and vocational employment per PLOF. Expressive and receptive language domains grossly intact with no apparent communicative breakdowns. Speech intelligibility best approximates 100%, absence of dysarthria. Skilled ST services are recommended to address the above aforementioned deficits to improve cognitive performance for optimal re-integration into home/community/work-related settings.       Rehabilitation Potential: excellent  Discharge Recommendations: Continue to Assess Pending Progress    EDUCATION:  Learner: Patient  Education:  Reviewed results and recommendations of this evaluation, Reviewed ST goals and Plan of Care, and Reviewed recommendations for follow-up  Evaluation of Education: Verbalizes understanding, Demonstrates without assistance, and Family not present    PLAN:  Skilled SLP intervention on acute care 1-3x per week or until goals met and/or pt plateaus in function. Specific interventions for next session may include: cognitive rehabilitation . PATIENT GOAL:    Did not state. Will further assess during treatment. SHORT TERM GOALS:  Short Term Goals  Time Frame for Short Term Goals: 2 weeks  Goal 1: Patient will complete higher level immediate/delayed recall and working memory tasks with 80% accuracy, min cues to improve retention of pertinent information. Goal 2: Patient will complete moderately complex verbal/visual reasoning and executive functioning tasks (time, money/math/finances, medications, work-related) with 80% accuracy, min cues to improve contributions within IADL completion. Goal 3: Patient will complete higher level attention tasks with no more than 3 errors until task completion to permit potential return to multi-tasking, IADLs, driving, and vocational employment.     LONG TERM GOALS:  No LTG established given short DRUOS      Stephania Pena M.A., [facility]

## 2023-01-03 NOTE — PROGRESS NOTES
Patient to be discharged on Friday, 12/30 to [facility]. Patient will be under the supervision of staff. Family education was not provided.

## 2023-01-03 NOTE — PROGRESS NOTES
[address] [place] [address]  Occupational Therapy  Daily Note  Time:   Time In: 7903  Time Out: 0572  Timed Code Treatment Minutes: 24 Minutes  Minutes: 24          Date: 1/3/2023  Patient Name: Camilla Navarro,   Gender: male      Room: [facility]33/033-A  MRN: [id number]  : 1960  (58 y.o.)  Referring Practitioner: Dr. Sabrina Trejo  Diagnosis: atrial flutter  Additional Pertinent Hx: Camilla Navarro is a 58 y.o. right-handed male with history of HTN,  hyperlipidemia, recurrent multiple primary melanomas (281,, ) including a [id number] melanoma of the left flank s/p excision with left axillary lymph node dissection, is admitted on 2022 for intensive inpatient management of impairment & disability secondary to debility/physical decondition secondary to extensive multiple metastatic melanoma to his brain, cervical spine, musculature, and brachial plexus. The patient presented to [address] [place] [address] clinic on 12/3/2022 with progressive weakness, dysphagia and facial drooping. 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 MRI of cervical spine done on 2022 revealed diffuse osseous metastasis disease in cervical spine and upper thoracic spine, mild right ventral epidural tumor at C7 extending into right C7-T1 neuroforamen, intramedullary enhancing lesion within central cervical spinal cord at C5-6 and right C6-7 cervical spinal cord. Repeated MRI of brain done on 2022 revealed enhancing lesions in right frontal lobe and throughout the supratentorial and infratentorial brain, within the right  musculature, bilateral temporalis musculature, bilateral extraocular musculature, and within the calvarium and skull base.   Because of extensive metastatic malignancy, oral chemotherapywas started on 12/8/2022. venous duplex study was done on 12/16/2022 and showed no acute DVT but acute superficial venous thrombosis in RUE. MRI of brachial plexus done on 12/22/2022 show extensive metastatic disease involving osseous structures, multiple muscles including bilateral scapular intrinsic muscles and paraspinal muscles. The LUE weakness and edema was thought to be due to tumor infiltrating brachial plexus and radiation induced plexopathy. His hospital course was also complicated by tumor lysis syndrome, acute renal failure, and dysphagia with severe protein calorie malnutrition. Pt transferred [facility] IPR unit to [facility] on 12/30 due to atrial fibrillation. Restrictions/Precautions:  Restrictions/Precautions: Contact Precautions     SUBJECTIVE: Pt lying supine upon arrival, agreeable to OT session. PAIN: None    Vitals: Heart Rate: 112 following mobility. COGNITION: WNL    ADL:   Grooming: Independent. Standing sink side for oral care and washing hands  Toileting: Independent. Standing to void for clothing management. BALANCE:  Sitting Balance:  Independent. Standing Balance: Independent. >10 minutes within session    BED MOBILITY:  Supine to Sit: Independent    Sit to Supine: Independent    Scooting: Independent      TRANSFERS:  Sit to Stand:  Independent. Stand to Sit: Independent. FUNCTIONAL MOBILITY:  Assistive Device: None  Assist Level:  Supervision. Distance:   Completed functional mobility greater than household distance within unit hallway at fair pace, no LOB noted. Pt requires seated rest break after trial of mobility, min fatigue noted. ADDITIONAL ACTIVITIES:  Patient in functional DME education regarding bathroom safety with safe toilet/shower transfers. Pt's confirmed access to shower chair, RW, and straight cane for home use. Patient demo'ed good understanding of education, appreciation and asked appropriate questions.       ASSESSMENT: Activity Tolerance:  Patient tolerance of  treatment: good. Discharge Recommendations: Home with Home Health OT  Equipment Recommendations: Other: Pt has access to a shower chair. Continue to assess needs  Plan: Times Per Week: 3-5x  Current Treatment Recommendations: Strengthening, ROM, Balance training, Endurance training, Neuromuscular re-education, Equipment evaluation, education, & procurement, Patient/Caregiver education & training, Safety education & training, Self-Care / ADL, Functional mobility training    Patient Education  Patient Education: ADL's, Energy Conservation, Equipment Education, Home Safety, Importance of Increasing Activity, and Safety with transfers and mobility. Goals  Short Term Goals  Time Frame for Short Term Goals: by discharge  Short Term Goal 1: Pt will complete LB ADLs with supervision to increase independence with self care tasks. Short Term Goal 2: Pt will complete IADL task with supervision to increase ability to return to independent living. Short Term Goal 3: Pt will tolerate standing X10 minutes with supervision in prep for IADL completion. Following session, patient left in safe position with all fall risk precautions in place.

## 2023-01-03 NOTE — PROGRESS NOTES
[address] [address]  INPATIENT PHYSICAL THERAPY  EVALUATION  [facility] CCU-STEPDOWN 3B - 3B-33/033-A    Time In: 3482  Time Out: 9440  Timed Code Treatment Minutes: 8 Minutes  Minutes: 13          Date: 1/3/2023  Patient Name: Audi Boudreaux,  Gender:  male        MRN: [id number]  : 1960  (58 y.o.)      Referring Practitioner: Verna Villatoro MD  Diagnosis: a flutter  Additional Pertinent Hx: Audi Boudreaux is a 58 y.o. 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 The patient presented to [address] [place] [address] clinic on 12/3/2022 with progressive weakness, dysphagia and facial drooping. 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 MRI of cervical spine done on 2022 revealed diffuse osseous metastasis disease in cervical spine and upper thoracic spine, mild right ventral epidural tumor at C7 extending into right C7-T1 neuroforamen, intramedullary enhancing lesion within central cervical spinal cord at C5-6 and right C6-7 cervical spinal cord. Repeated MRI of brain done on 2022 revealed enhancing lesions in right frontal lobe and throughout the supratentorial and infratentorial brain, within the right  musculature, bilateral temporalis musculature, bilateral extraocular musculature, and within the calvarium and skull base.   Because of extensive metastatic Patient notified of negative covid results.  Patient states he feels great and no longer having symptoms.  Advised patient to self isolate for at least 72 hrs after recovery.  Patient voiced understanding. malignancy, oral chemotherapywas started on 12/8/2022. venous duplex study was done on 12/16/2022 and showed no acute DVT but acute superficial venous thrombosis in RUE. MRI of brachial plexus done on 12/22/2022 show extensive metastatic disease involving osseous structures, multiple muscles including bilateral scapular intrinsic muscles and paraspinal muscles. The LUE weakness and edema was thought to be due to tumor infiltrating brachial plexus and radiation induced plexopathy. His hospital course was also complicated by tumor lysis syndrome, acute renal failure, and dysphagia with severe protein calorie malnutrition. Pt transferred [facility] IPR unit to [facility] on 12/30 due to atrial fibrillation.      Restrictions/Precautions:  Restrictions/Precautions: Contact Precautions    Subjective:  Chart Reviewed: Yes  Patient assessed for rehabilitation services?: Yes  Subjective: pleasant and cooperative, per pt up in room MOD I without use of AD    General:     Vision: Impaired  Vision Exceptions: Wears glasses for reading  Hearing: Exceptions to [facility]  Hearing Exceptions: Hard of hearing/hearing concerns       Pain: no pain    Vitals: Vitals not assessed per clinical judgement, see nursing flowsheet    Social/Functional History:    Lives With: Alone  Type of Home: House  Home Layout: One level  Home Access: Stairs to enter without rails  Entrance Stairs - Number of Steps: 1  Home Equipment: Cane     Bathroom Shower/Tub: Walk-in shower  Bathroom Toilet: Handicap height  Bathroom Equipment: Grab bars in shower (suction cup grab bar in shower)    Receives Help From: Family  ADL Assistance: Independent  Homemaking Assistance: Independent  Ambulation Assistance: Independent  Transfer Assistance: Independent    Active : Yes  Occupation: Full time employment  Type of Occupation: Shop  (computer work) at [employer]  Additional Comments: Patient reports prior to hospital admission patient was independent with all ADL's/IADL's without use of AD for mobility. 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 Patient reports he plans to go to his mothers house for a few weeks( 1 story home, walk in shower, handicap toilets, grab bars in bathroom) after discharge. Patient reports his mother is able to help with tasks in home. Patient reports his mother has a shower chair in her shower. OBJECTIVE:  Range of Motion:  Bilateral Lower Extremity: WFL    Strength:  Bilateral Lower Extremity: WFL    Balance:  Static Sitting Balance:  Modified Independent  Dynamic Sitting Balance: Modified Independent, reaching OBOS  Static Standing Balance: Supervision, no UE  support  Dynamic Standing Balance: Stand By Assistance, to use bathroom, no UE support, no LOB    Bed Mobility:  Rolling to Left: Modified Independent   Supine to Sit: Modified Independent  Sit to Supine: Modified Independent   Scooting: Modified Independent    Transfers:  Sit to Stand: Modified Independent  Stand to Sit:Modified Independent    Ambulation:  Stand By Assistance, to S  Distance: 10'x1, 600'x1  Surface: Level Tile  Device:No Device  Gait Deviations:  Mild Path Deviations and no LOB, per pt felt steady without use of AD        Functional Outcome Measures: Completed  AM-PAC Inpatient Mobility Raw Score : 21  AM-PAC Inpatient T-Scale Score : 50.25    ASSESSMENT:  Activity Tolerance:  Patient tolerance of  treatment: good. Treatment Initiated: Treatment and education initiated within context of evaluation. Evaluation time included review of current medical information, gathering information related to past medical, social and functional history, completion of standardized testing, formal and informal observation of tasks, assessment of data and development of plan of care and goals.   Treatment time included skilled education and facilitation of tasks to increase safety and independence with functional mobility for improved independence and quality of life. Assessment: Body Structures, Functions, Activity Limitations Requiring Skilled Therapeutic Intervention: Decreased functional mobility , Decreased safe awareness, Decreased ROM, Decreased endurance, Decreased body mechanics, Decreased strength, Decreased balance, Decreased posture  Assessment: Rowena Martin is a 58 y.o. male that presents with a flutter. he is ind prior to admission now requiring assist for basic mobility. Pt demonstrates a decrease in baseline by way of bed mobility, transfers and ambulation secondary to decreased activity tolerance, strength, fatigue, and balance deficits. Pt will benefit from skilled PT services throughout admission and beyond hospital discharge for improvements in functional mobility and in order to decrease fall risk and return pt to PLOF. Requires PT Follow-Up: Yes    Discharge Recommendations:  Discharge Recommendations: Continue to assess pending progress    Patient Education:      . Patient Education  Education Given To: Patient  Education Provided: Role of Therapy, Plan of Care  Education Method: Verbal  Barriers to Learning: None  Education Outcome: Verbalized understanding       Equipment Recommendations:   Other: cont to assess needs    Plan:  Current Treatment Recommendations: Strengthening, ROM, Balance training, Functional mobility training, Transfer training, Endurance training, Gait training, Stair training, Neuromuscular re-education, Safety education & training, Patient/Caregiver education & training, Equipment evaluation, education, & procurement, Therapeutic activities  General Plan:  (5X GM)    Goals:  Patient Goals : to have doctor talk to his cardiologist in [facility] before proceeding with any heart procedure for his a flutter  Short Term Goals  Time Frame for Short Term Goals: by discharge  Short Term Goal 1: Patient to perform bed mobility supine<>sit with Mod I with HOB flat and no railings in order to assist with getting into and out of bed. Short Term Goal 2: Patient to perform sit to stand transfers without AD  and MOD I from various surfaces in order to assist wtih safety with transfers in home. Short Term Goal 3: Patient to ambulate >/=150 feet without AD with MOD I in order to assist with safety with home mobility. Short Term Goal 4: Patient to ascend/descend 1 step without railings with S in order to assist with home entry. Long Term Goals  Time Frame for Long Term Goals : no LTGs set secondary to short ELOS    Following session, patient left in safe position with all fall risk precautions in place.

## 2023-01-03 NOTE — PROGRESS NOTES
[address] [address]  Recreational Therapy  Discharge Note  Inpatient Rehabilitation Unit         Date:  1/3/2023       Patient Name: Leonard Hogan      MRN: [id number]       YOB: 1960 (64 y.o.)       Gender: male  Diagnosis: Debility  Referring Practitioner: Dr. Rosalia Patel    Patient discharged from Recreational Therapy at this time. See recreational therapy notes for details.     Electronically signed by: NURA Patrick  Date: 1/3/2023

## 2023-01-03 NOTE — PROGRESS NOTES
Transportation:   Has transportation kept you from medical appointments, meetings, work, or from getting things needed for daily living? (Check all that apply)  Patient unable to respond. Health Literacy:   How often do you need to have someone help you when you read instructions, pamphlets, or other written material from your doctor or pharmacy? 8. - Patient unable to respond    Social Isolation:  How often do you feel lonely or isolated from those around you? 8. Patient unable to respond      Patient Mood Interview (PHQ-2 to 9) (from [email or web address])   Say to Patient: \"Over the last 2 weeks, have you been bothered by any of the following problems? \"   If symptom is present, enter 1 (yes) in column 1 (Symptom Presence)  If yes in column 1, then ask the patient: About how often have you been bothered by this?   Read and show the patient a card with the symptom frequency choices. Indicate response in column 2, Symptom Frequency. Symptom Presence  No (enter 0 in column 2)   Yes (enter 0-3 in column 2)  9. No response (leave column 2 blank) Symptom Frequency  Never or 1 day  2-6 days (several days)  7-11 days (half or more of the days)  12-14 days (nearly every day    Symptom Presence Symptom Frequency   Little interest or pleasure in doing things 9. No response 0. Never or 1 day   Feeling down, depressed, or hopeless 9. No response 0.  Never or 1 day

## 2023-01-03 NOTE — PROGRESS NOTES
Hospitalist Progress Note    Patient:  Alexandria Weinberg, : 1960  Unit/Bed:3B-33/033-A    Date of Admission: 2022      Assessment/Plan:  Atrial flutter: recent echo 22 shows normal EF, no pericardial effusion, and no sig valvular disease. Digoxin continued. Hypotension limits BB use at this time. CHADSVASC = 2 (HTN, DM). Discussed with the patient's [facility] oncologist Dr. Annie Villafana on 1/3/23, in regards to [facility] in setting of brain mets, recommends that if necessary prefers coumadin due to reversibility. There seems to be some uncertainty in regards to whether the brain lesions are metastatic vs infarcts  D/w Cardiology. Will consult Neurology to review imaging and determine if the lesions appear most consistent with mets vs infarcts, prior to making decision on [facility] and/or cardioversion. MRI of the brain and c-spine done 22 at [facility] sent over for our radiologist re-review and for our neurology team to be able to review. Alternatively, if risk of bleed felt to be too great, could defer AC until f/up with his usual Cardiologist and [facility] Neuro-Onc (could try to get this appt sooner) and pursue a rate control strategy. Adrenal Insufficiency / Acute on chronic hypotension: newly diagnosed, at [facility] ?related to Opdivo/Yervoy? .  Dc'ed on prednisone and florinef. Increased florinef to 0.2, continue prednisone  Continue midodrine  Consult to Endocrinology for further assistance. Appreciate recommendations. Recent clostridium bacteremia treated at [facility], port removed there. Metastatic melanoma with new brain mets (diagnosed 2022), as well as spleen, mediastinum, lungs, LN: extensive history for several decades.   Follows Dr. Annie Villafana at [facility]) and Dr. Betty Soria at [facility] (Neurosurgery for neuropathy) and Dr. Swapna Bowles at [facility] (Neuro-Onc brain mets - has not seen yet)  Had been recently started on Tubular Labs and [address] on 22 due to progression  On Ipi/Nivolomab since 2022  Now on Trametinib and Dabrafenib started 12/8/22, continued. Continue scheduled oxycodone, bowel regimen. NIDDM2: continue ssi for now. Metformin recently discontinued by [facility]. DM diet, change ONS to diabetic supp. Hypothyroidism with MNG: continue synthroid 100mg, Endocrinology seeking further eval with thyroid ultrasound. Free t4 ordered for am.  TSH wnl. Hx TLS: continue allopurinol  HTN now with hypotension, continue midodrine. Left arm/hand neuropathy: per OSH, \"osseous mets in C-spine, epidural and intramedullary tumor infiltration in the C- spine, tumor infiltration in the brachial plexus, and radiation induced plexopathy\"       Expected discharge date:  2-3 days     Disposition: pending ability to anticoagulate    ===================================================================    Chief Complaint: Atrial flutter  Subjective (past 24 hours): Patient seen at bedside. Still reports doing well, heart rate remains in the 100-1 20 range on digoxin. He was able to ambulate in the hallway without any issues, specifically denied any dizziness or palpitations. No headaches dizziness nausea vomiting diarrhea or constipation. Hospital Course/Initial HPI: Patient admitted from [facility] for notation of atrial flutter with RVR. Exam:  BP 96/64   Pulse (!) 111   Temp 97.7 °F (36.5 °C) (Oral)   Resp 18   Wt 173 lb 14.4 oz (78.9 kg)   SpO2 97%   BMI 23.42 kg/m²     General: No distress, appears stated age. Chronically ill appearing. Eyes:  PERRL. Conjunctivae/corneas clear. HENT: Head normal appearing. Nares normal. Oral mucosa moist.  Hearing intact. Neck: Supple, with full range of motion. Trachea midline. No gross JVD appreciated. Respiratory:  Normal effort. Clear to auscultation, without rales or wheezes or rhonchi. Cardiovascular: regular rhythm with normal S1/S2 without murmurs. No lower extremity edema. Tachycardic rate  Non-pitting edema LUE.    Abdomen: Soft, non-tender, non-distended with normal bowel sounds. Musculoskeletal: No joint swelling or tenderness. Normal tone. No abnormal movements. Skin: Warm and dry. No rashes or lesions. Bruising left eye  Neurologic:  No focal sensory/motor deficits in the upper or lower extremities. Cranial nerves:  grossly non-focal 2-12. Psychiatric: Alert and oriented, normal insight and thought content. Capillary Refill: Brisk,< 3 seconds. Peripheral Pulses: +2 palpable, equal bilaterally. Images    CTA CHEST W WO CONTRAST    Result Date: 12/31/2022   1. No evidence of pulmonary bolus. 2. 1.9 cm pleural-based nodule in the left upper lobe anteriorly. This is concerning for malignancy. Consider PET/CT for further evaluation. 3. Prominent mediastinal adenopathy and bilateral hilar lymphadenopathy. **This report has been created using voice recognition software. It may contain minor errors which are inherent in voice recognition technology. ** Final report electronically signed by Dr. Christine Lara MD on 12/31/2022 11:03 AM    XR CHEST PORTABLE    Result Date: 12/31/2022  Stable radiographic appearance of the chest. No evidence of an acute process. **This report has been created using voice recognition software. It may contain minor errors which are inherent in voice recognition technology. ** Final report electronically signed by Dr. Christine Lara MD on 12/31/2022 11:20 AM         DVT prophylaxis:    [x] Lovenox  [] SCDs  [] SQ Heparin  [] Encourage ambulation   [] Already on Anticoagulation       Diet: ADULT DIET;  Regular; 5 carb choices (75 gm/meal)  ADULT ORAL NUTRITION SUPPLEMENT; Breakfast, Dinner; Diabetic Oral Supplement  Code Status: Full Code  PT/OT: yes  Tele: yes  IVF: na    Electronically signed by Sree Gooden DO on 1/3/2023 at 5:26 PM

## 2023-01-03 NOTE — PROGRESS NOTES
Cardiology Progress Note      Patient:  Maralyn Area  YOB: 1960  MRN: [id number]   Acct: [de-identified]  [facility] Date:  12/30/2022  Primary Cardiologist:  none    Note per dr Amina Lee for Consultation:  [place] flutter. Clinical Summary:  62/M was diagnosed with have atrial flutter during his routine physical visit with his primary care physician few days ago. He was noted to have rapid ventricular rate was admitted to the hospital for further management. The patient did not report any symptoms and has no prior history of atrial arrhythmias. Soft blood pressure, systolic 80 to 90 mmHg precluding the use of beta-blockers and calcium blockers for rate control. He is currently undergoing oral distalization. History of brain mets anticoagulation was not deemed safe. He has history of melanoma diagnosed in 1996 treated with surgical resection. Due to weight loss of about 40 pounds he was diagnosed few months ago to have diffuse metastasis, likely melanoma in his brain as well as bones. He is currently on chemotherapy. Denies chest pain, palpitations, shortness of breath. Does report weakness especially with physical activity. He walks with the help of a cane. He is . Denies smoking or alcohol use. Medcial Hx: HTN, HPL, hx of dermal melanoma dx 1996 => surgical resection and recently noted to have multiple metastasis on chemotherapy at [facility], bicytopenia (anemia an leukopenia), debility undergoing rehab and hx of clostridium difficile infection. \"    Subjective (Events in last 24 hours):   Pt awake and alert. NAD. No cp or sob.   No lightheadedness or syncope  On RA  No complaints  Dr diallo attempted to contact oncology yesterday,, no answer yet      Objective:   /68   Pulse (!) 115   Temp 98 °F (36.7 °C) (Oral)   Resp 18   Wt 173 lb 14.4 oz (78.9 kg)   SpO2 98%   BMI 23.42 kg/m²        TELEMETRY: aflutter 100 at rest    Physical Exam:  General Appearance: alert and oriented to person, place and time, in no acute distress  Cardiovascular: tachy rate, regular rhythm, normal S1 and S2, no murmurs, rubs, clicks, or gallops, distal pulses intact, no carotid bruits, no JVD  Pulmonary/Chest: clear to auscultation bilaterally- no wheezes, rales or rhonchi, normal air movement, no respiratory distress  Abdomen: soft, non-tender, non-distended, normal bowel sounds, no masses Extremities: no cyanosis, clubbing or edema, pulse   Skin: warm and dry  Head: normocephalic and atraumatic  Eyes: pupils equal, round, and reactive to light  Neck: supple and non-tender without mass, no thyromegaly   Neurological: alert, oriented, normal speech, no focal findings or movement disorder noted    Medications:    digoxin  250 mcg Oral Daily    fludrocortisone  0.2 mg Oral Daily    midodrine  5 mg Oral Q8H    allopurinol  300 mg Oral Daily    aspirin  81 mg Oral Daily    atorvastatin  20 mg Oral Nightly    enoxaparin  40 mg SubCUTAneous Daily    levothyroxine  100 mcg Oral QAM AC    potassium chloride  40 mEq Oral Daily    predniSONE  10 mg Oral Daily    senna  2 tablet Oral BID    Trametinib Dimethyl Sulfoxide  2 mg Oral Nightly    oxyCODONE  5 mg Oral Q6H    melatonin  6 mg Oral Nightly    sulfamethoxazole-trimethoprim  1 tablet Oral Once per day on Mon Wed Fri    docusate sodium  100 mg Oral BID    lidocaine  1 patch TransDERmal Q24H    naloxegol  12.5 mg Oral QAM AC    Dabrafenib Mesylate  150 mg Oral Q12H    insulin lispro  0-8 Units SubCUTAneous TID WC    insulin lispro  0-4 Units SubCUTAneous Nightly      dextrose       oxyCODONE, 5 mg, Q4H PRN   Or  oxyCODONE, 2.5 mg, Q4H PRN  acetaminophen, 650 mg, Q4H PRN  bisacodyl, 10 mg, Daily PRN  magnesium hydroxide, 15 mL, Daily PRN  traZODone, 50 mg, Nightly PRN  polyethylene glycol, 17 g, Daily PRN  biotene, 10 mL, PRN  glucose, 4 tablet, PRN  dextrose bolus, 125 mL, PRN   Or  dextrose bolus, 250 mL, PRN  glucagon (rDNA), 1 mg, PRN  dextrose, , Continuous PRN      Diagnostics:  TTE 12/31/22  Summary   No previous study available for comparison. Normal left ventricular cavity with overall normal systolic function. Ejection fraction is visually estimated at 60-65%. No regional wall motion abnormality. Normal right ventricular size and function. No significant valvular abnormality. No evidence of any pericardial effusion. Signature      ----------------------------------------------------------------   Electronically signed by Jose Govea MD (Interpreting   physician) on 12/31/2022 at 07:36 PM      Lab Data:    Cardiac Enzymes:  No results for input(s): CKTOTAL, CKMB, CKMBINDEX, TROPONINI in the last 72 hours.     CBC:   Lab Results   Component Value Date/Time    WBC 4.2 12/30/2022 07:01 PM    RBC 3.35 12/30/2022 07:01 PM    RBC 4.79 08/04/2021 07:00 AM    HGB 8.8 12/30/2022 07:01 PM    HCT 29.2 12/30/2022 07:01 PM     12/30/2022 07:01 PM       CMP:    Lab Results   Component Value Date/Time     01/02/2023 09:07 AM    K 4.3 01/02/2023 09:07 AM    K 4.4 12/29/2022 06:21 AM    CL 99 01/02/2023 09:07 AM    CO2 25 01/02/2023 09:07 AM    BUN 10 01/02/2023 09:07 AM    CREATININE 0.3 01/02/2023 09:07 AM    LABGLOM >60 01/02/2023 09:07 AM    GLUCOSE 88 01/02/2023 09:07 AM    GLUCOSE 101 08/04/2021 07:00 AM    CALCIUM 7.9 01/02/2023 09:07 AM       Hepatic Function Panel:    Lab Results   Component Value Date/Time    ALKPHOS 184 12/29/2022 06:21 AM    ALT 19 12/29/2022 06:21 AM    AST 31 12/29/2022 06:21 AM    PROT 6.0 12/29/2022 06:21 AM    BILITOT 0.3 12/29/2022 06:21 AM    LABALBU 3.0 12/29/2022 06:21 AM       Magnesium:    Lab Results   Component Value Date/Time    MG 2.0 01/02/2023 09:07 AM       PT/INR:  No results found for: PROTIME, INR    HgBA1c:    Lab Results   Component Value Date/Time    LABA1C 5.8 05/09/2022 01:37 PM    LABA1C 5.8 10/16/2019 03:13 PM       FLP:    Lab Results   Component Value Date/Time    TRIG 144 12/29/2022 06:21 AM    HDL 30 12/29/2022 06:21 AM    LDLCALC 77 12/29/2022 06:21 AM    LABVLDL 39 08/04/2021 07:00 AM       TSH:    Lab Results   Component Value Date/Time    TSH 3.240 12/31/2022 07:09 AM         Assessment:    New onset aflutter rvr - low 100s   Chadsvasc = 1  Ef 60-65 per TTE 12/31/22  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  Anemia - attending following  Adrenal insufficiency/hypotension - endo following, on midodrine      Plan:    Keep mag >2 and K >4  Normal TSH  Cont asa/dig  Dig level 1/6/23  Need clearance from oncology to determine safe for [facility] and if so then consider rhythm control with CV or catheter ablation  F/up dr Michael Lomas 2-4 weeks       Electronically signed by Danielle Reinoso PA-C on 1/3/2023 at 10:19 AM

## 2023-01-03 NOTE — PLAN OF CARE
Problem: Discharge Planning  Goal: Discharge to home or other facility with appropriate resources  1/3/2023 1244 by Franci Rogers RN  Outcome: Progressing  Flowsheets (Taken 1/3/2023 1243)  Discharge to home or other facility with appropriate resources:   Identify barriers to discharge with patient and caregiver   Arrange for needed discharge resources and transportation as appropriate  Note: Patient from Inpatient rehab but planning to return home with home health upon discharge. Pending anticoagulation recs from [facility] and plan of care with Aflutter. Problem: Skin/Tissue Integrity  Goal: Absence of new skin breakdown  Description: 1. Monitor for areas of redness and/or skin breakdown  2. Assess vascular access sites hourly  3. Every 4-6 hours minimum:  Change oxygen saturation probe site  4. Every 4-6 hours:  If on nasal continuous positive airway pressure, respiratory therapy assess nares and determine need for appliance change or resting period. 1/3/2023 1244 by Franci Rogers RN  Outcome: Progressing  Note: Ongoing assessment & interventions provided throughout shift. Encouraging/assisting patient to turn as needed.        Problem: Safety - Adult  Goal: Free from fall injury  1/3/2023 1244 by Franci Rogers RN  Outcome: Progressing  Flowsheets (Taken 1/3/2023 1244)  Free From Fall Injury: Glenys Carrie family/caregiver on patient safety     Problem: ABCDS Injury Assessment  Goal: Absence of physical injury  1/3/2023 1244 by Franci Rogers RN  Outcome: Progressing  Flowsheets (Taken 1/3/2023 1244)  Absence of Physical Injury: Implement safety measures based on patient assessment     Problem: Pain  Goal: Verbalizes/displays adequate comfort level or baseline comfort level  1/3/2023 1244 by Franci Rogers RN  Outcome: Progressing  Flowsheets (Taken 1/3/2023 1244)  Verbalizes/displays adequate comfort level or baseline comfort level:   Encourage patient to monitor pain and request assistance   Assess pain using appropriate pain scale     Problem: Cardiovascular - Adult  Goal: Maintains optimal cardiac output and hemodynamic stability  Outcome: Progressing  Flowsheets (Taken 1/3/2023 1246)  Maintains optimal cardiac output and hemodynamic stability:   Monitor blood pressure and heart rate   Assess for signs of decreased cardiac output  Note: A Flutter rhythm but rate controlled with digoxin     Care plan reviewed with patient. Patient verbalizes understanding of the care plan and contributed to goal setting.

## 2023-01-03 NOTE — PLAN OF CARE
Problem: Discharge Planning  Goal: Discharge to home or other facility with appropriate resources  Outcome: Progressing  Flowsheets (Taken 1/3/2023 0431)  Discharge to home or other facility with appropriate resources: Identify barriers to discharge with patient and caregiver     Problem: Skin/Tissue Integrity  Goal: Absence of new skin breakdown  Description: 1. Monitor for areas of redness and/or skin breakdown  2. Assess vascular access sites hourly  3. Every 4-6 hours minimum:  Change oxygen saturation probe site  4. Every 4-6 hours:  If on nasal continuous positive airway pressure, respiratory therapy assess nares and determine need for appliance change or resting period. Outcome: Progressing  Note: Ongoing assessment & interventions provided throughout shift. Skin assessments provided. Encouraging/assisting patient to turn as needed. Problem: Safety - Adult  Goal: Free from fall injury  Outcome: Progressing  Flowsheets (Taken 1/3/2023 0431)  Free From Fall Injury: Instruct family/caregiver on patient safety  Note: Bed locked & in low position, call light in reach, side-rails up x2, bed/chair alarm utilized, non-slip socks on when ambulating, reminded patient to use call light to call for assistance. Problem: ABCDS Injury Assessment  Goal: Absence of physical injury  Outcome: Progressing  Flowsheets (Taken 1/3/2023 0431)  Absence of Physical Injury: Implement safety measures based on patient assessment     Problem: Pain  Goal: Verbalizes/displays adequate comfort level or baseline comfort level  Outcome: Progressing  Flowsheets (Taken 1/3/2023 0431)  Verbalizes/displays adequate comfort level or baseline comfort level: Encourage patient to monitor pain and request assistance  Note: Ongoing assessment & interventions provided throughout shift. Reminded patient to report any pain, pressure, or shortness of breath to the nurse. Pain medications provided per physician's orders.      Care plan reviewed with patient. Patient verbalizes understanding of the care plan and contributed to goal setting.

## 2023-01-03 NOTE — CARE COORDINATION
1/3/23, 8:12 AM EST    DISCHARGE ON GOING [address] I day: 4  Location: [address] [place] [address]-A Reason for admit: Atrial flutter [facility]) [I48.92]   Procedure:   12/31 CTA Chest: No evidence of pulmonary bolus. 1.9 cm pleural-based nodule in the left upper lobe anteriorly. This is concerning for malignancy. Consider PET/CT for further evaluation. Prominent mediastinal adenopathy and bilateral hilar lymphadenopathy. 12/31 ECHO: EF 60-65%. Barriers to Discharge: Hospitalist, Cardiology, Endocrinology and PM&R following. PT/OT/SLP. IS. Room air, sats 98%. Aflutter with ventricular rate . Lovenox. Oxycodone q6hr. PCP: DEBBIE Nowak CNP  Readmission Risk Score: 12.1%  Patient Goals/Plan/Treatment Preferences: Planning return to [facility] when medically ready.

## 2023-01-04 ENCOUNTER — APPOINTMENT (OUTPATIENT)
Dept: CT IMAGING | Age: 63
DRG: 274 | End: 2023-01-04
Attending: INTERNAL MEDICINE
Payer: COMMERCIAL

## 2023-01-04 LAB
ANION GAP SERPL CALCULATED.3IONS-SCNC: 13 MEQ/L (ref 8–16)
APTT: 36.1 SECONDS (ref 22–38)
APTT: 46.6 SECONDS (ref 22–38)
BUN BLDV-MCNC: 13 MG/DL (ref 7–22)
CALCIUM SERPL-MCNC: 8.2 MG/DL (ref 8.5–10.5)
CHLORIDE BLD-SCNC: 97 MEQ/L (ref 98–111)
CO2: 25 MEQ/L (ref 23–33)
CREAT SERPL-MCNC: 0.4 MG/DL (ref 0.4–1.2)
GFR SERPL CREATININE-BSD FRML MDRD: > 60 ML/MIN/1.73M2
GLUCOSE BLD-MCNC: 135 MG/DL (ref 70–108)
GLUCOSE BLD-MCNC: 206 MG/DL (ref 70–108)
GLUCOSE BLD-MCNC: 207 MG/DL (ref 70–108)
GLUCOSE BLD-MCNC: 219 MG/DL (ref 70–108)
GLUCOSE BLD-MCNC: 83 MG/DL (ref 70–108)
INR BLD: 1.07 (ref 0.85–1.13)
MAGNESIUM: 1.9 MG/DL (ref 1.6–2.4)
POTASSIUM SERPL-SCNC: 4.2 MEQ/L (ref 3.5–5.2)
SODIUM BLD-SCNC: 135 MEQ/L (ref 135–145)
T4 FREE: 1.05 NG/DL (ref 0.93–1.76)

## 2023-01-04 PROCEDURE — 80048 BASIC METABOLIC PNL TOTAL CA: CPT

## 2023-01-04 PROCEDURE — 84439 ASSAY OF FREE THYROXINE: CPT

## 2023-01-04 PROCEDURE — 82948 REAGENT STRIP/BLOOD GLUCOSE: CPT

## 2023-01-04 PROCEDURE — 70450 CT HEAD/BRAIN W/O DYE: CPT

## 2023-01-04 PROCEDURE — 97535 SELF CARE MNGMENT TRAINING: CPT

## 2023-01-04 PROCEDURE — 83735 ASSAY OF MAGNESIUM: CPT

## 2023-01-04 PROCEDURE — 6360000002 HC RX W HCPCS: Performed by: INTERNAL MEDICINE

## 2023-01-04 PROCEDURE — 6370000000 HC RX 637 (ALT 250 FOR IP): Performed by: PHYSICAL MEDICINE & REHABILITATION

## 2023-01-04 PROCEDURE — 2140000000 HC CCU INTERMEDIATE R&B

## 2023-01-04 PROCEDURE — 99222 1ST HOSP IP/OBS MODERATE 55: CPT | Performed by: PHYSICIAN ASSISTANT

## 2023-01-04 PROCEDURE — 6370000000 HC RX 637 (ALT 250 FOR IP): Performed by: PHYSICIAN ASSISTANT

## 2023-01-04 PROCEDURE — 85730 THROMBOPLASTIN TIME PARTIAL: CPT

## 2023-01-04 PROCEDURE — 97116 GAIT TRAINING THERAPY: CPT

## 2023-01-04 PROCEDURE — 6370000000 HC RX 637 (ALT 250 FOR IP): Performed by: INTERNAL MEDICINE

## 2023-01-04 PROCEDURE — 97110 THERAPEUTIC EXERCISES: CPT

## 2023-01-04 PROCEDURE — 85610 PROTHROMBIN TIME: CPT

## 2023-01-04 PROCEDURE — 36415 COLL VENOUS BLD VENIPUNCTURE: CPT

## 2023-01-04 PROCEDURE — 99233 SBSQ HOSP IP/OBS HIGH 50: CPT | Performed by: INTERNAL MEDICINE

## 2023-01-04 RX ORDER — HEPARIN SODIUM 10000 [USP'U]/100ML
5-30 INJECTION, SOLUTION INTRAVENOUS CONTINUOUS
Status: DISCONTINUED | OUTPATIENT
Start: 2023-01-04 | End: 2023-01-05

## 2023-01-04 RX ORDER — HEPARIN SODIUM 10000 [USP'U]/100ML
5-30 INJECTION, SOLUTION INTRAVENOUS CONTINUOUS
Status: DISCONTINUED | OUTPATIENT
Start: 2023-01-04 | End: 2023-01-04

## 2023-01-04 RX ADMIN — Medication 6 MG: at 21:03

## 2023-01-04 RX ADMIN — POTASSIUM CHLORIDE 40 MEQ: 1500 TABLET, EXTENDED RELEASE ORAL at 09:59

## 2023-01-04 RX ADMIN — SENNOSIDES 17.2 MG: 8.6 TABLET, FILM COATED ORAL at 09:59

## 2023-01-04 RX ADMIN — DIGOXIN 250 MCG: 250 TABLET ORAL at 09:59

## 2023-01-04 RX ADMIN — OXYCODONE 5 MG: 5 TABLET ORAL at 10:57

## 2023-01-04 RX ADMIN — INSULIN LISPRO 2 UNITS: 100 INJECTION, SOLUTION INTRAVENOUS; SUBCUTANEOUS at 12:25

## 2023-01-04 RX ADMIN — ALLOPURINOL 300 MG: 300 TABLET ORAL at 09:59

## 2023-01-04 RX ADMIN — OXYCODONE 5 MG: 5 TABLET ORAL at 17:26

## 2023-01-04 RX ADMIN — SENNOSIDES 17.2 MG: 8.6 TABLET, FILM COATED ORAL at 21:03

## 2023-01-04 RX ADMIN — DOCUSATE SODIUM 100 MG: 100 CAPSULE, LIQUID FILLED ORAL at 21:03

## 2023-01-04 RX ADMIN — LEVOTHYROXINE SODIUM 100 MCG: 0.1 TABLET ORAL at 05:25

## 2023-01-04 RX ADMIN — NALOXEGOL OXALATE 12.5 MG: 12.5 TABLET, FILM COATED ORAL at 05:25

## 2023-01-04 RX ADMIN — MIDODRINE HYDROCHLORIDE 5 MG: 5 TABLET ORAL at 21:03

## 2023-01-04 RX ADMIN — DOCUSATE SODIUM 100 MG: 100 CAPSULE, LIQUID FILLED ORAL at 09:59

## 2023-01-04 RX ADMIN — MIDODRINE HYDROCHLORIDE 5 MG: 5 TABLET ORAL at 10:57

## 2023-01-04 RX ADMIN — MIDODRINE HYDROCHLORIDE 5 MG: 5 TABLET ORAL at 04:03

## 2023-01-04 RX ADMIN — ASPIRIN 81 MG 81 MG: 81 TABLET ORAL at 09:59

## 2023-01-04 RX ADMIN — HEPARIN SODIUM 12 UNITS/KG/HR: 10000 INJECTION, SOLUTION INTRAVENOUS at 16:20

## 2023-01-04 RX ADMIN — FLUDROCORTISONE ACETATE 0.2 MG: 0.1 TABLET ORAL at 09:59

## 2023-01-04 RX ADMIN — ATORVASTATIN CALCIUM 20 MG: 20 TABLET, FILM COATED ORAL at 21:03

## 2023-01-04 RX ADMIN — SULFAMETHOXAZOLE AND TRIMETHOPRIM 1 TABLET: 800; 160 TABLET ORAL at 09:59

## 2023-01-04 RX ADMIN — INSULIN LISPRO 2 UNITS: 100 INJECTION, SOLUTION INTRAVENOUS; SUBCUTANEOUS at 17:26

## 2023-01-04 RX ADMIN — OXYCODONE 5 MG: 5 TABLET ORAL at 05:25

## 2023-01-04 RX ADMIN — PREDNISONE 10 MG: 10 TABLET ORAL at 09:58

## 2023-01-04 RX ADMIN — OXYCODONE 5 MG: 5 TABLET ORAL at 23:58

## 2023-01-04 ASSESSMENT — ENCOUNTER SYMPTOMS
VOMITING: 0
SHORTNESS OF BREATH: 0
PHOTOPHOBIA: 0
TROUBLE SWALLOWING: 0
NAUSEA: 0
COLOR CHANGE: 0
DIARRHEA: 0
COUGH: 0

## 2023-01-04 ASSESSMENT — PAIN DESCRIPTION - LOCATION
LOCATION: BACK

## 2023-01-04 ASSESSMENT — PAIN DESCRIPTION - ORIENTATION
ORIENTATION: MID;LOWER
ORIENTATION: LOWER
ORIENTATION: MID;LOWER
ORIENTATION: LOWER

## 2023-01-04 ASSESSMENT — PAIN DESCRIPTION - DESCRIPTORS
DESCRIPTORS: DULL;ACHING
DESCRIPTORS: ACHING

## 2023-01-04 ASSESSMENT — PAIN SCALES - GENERAL
PAINLEVEL_OUTOF10: 5
PAINLEVEL_OUTOF10: 6
PAINLEVEL_OUTOF10: 4
PAINLEVEL_OUTOF10: 5
PAINLEVEL_OUTOF10: 4
PAINLEVEL_OUTOF10: 5
PAINLEVEL_OUTOF10: 5

## 2023-01-04 NOTE — PROGRESS NOTES
[address] [address]  INPATIENT PHYSICAL THERAPY  Daily Note  [facility] CCU-STEPDOWN 3B - 3B-33/033-A    Time In: 8533  Time Out: 9589  Timed Code Treatment Minutes: 24 Minutes  Minutes: 24          Date: 2023  Patient Name: Audi Boudreaux,  Gender:  male        MRN: [id number]  : 1960  (58 y.o.)     Referring Practitioner: Verna Villatoro MD  Diagnosis: a flutter  Additional Pertinent Hx: Audi Boudreaux is a 58 y.o. right-handed male with history of HTN,  hyperlipidemia, recurrent multiple primary melanomas ([id number], ) including a [id number] melanoma of the left flank s/p excision with left axillary lymph node dissection, is admitted on 2022 for intensive inpatient management of impairment & disability secondary to debility/physical decondition secondary to extensive multiple metastatic melanoma to his brain, cervical spine, musculature, and brachial plexus. The patient presented to [address] [place] [address] clinic on 12/3/2022 with progressive weakness, dysphagia and facial drooping. 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 MRI of cervical spine done on 2022 revealed diffuse osseous metastasis disease in cervical spine and upper thoracic spine, mild right ventral epidural tumor at C7 extending into right C7-T1 neuroforamen, intramedullary enhancing lesion within central cervical spinal cord at C5-6 and right C6-7 cervical spinal cord. Repeated MRI of brain done on 2022 revealed enhancing lesions in right frontal lobe and throughout the supratentorial and infratentorial brain, within the right  musculature, bilateral temporalis musculature, bilateral extraocular musculature, and within the calvarium and skull base.   Because of extensive metastatic malignancy, oral chemotherapywas started on 12/8/2022. venous duplex study was done on 12/16/2022 and showed no acute DVT but acute superficial venous thrombosis in RUE. MRI of brachial plexus done on 12/22/2022 show extensive metastatic disease involving osseous structures, multiple muscles including bilateral scapular intrinsic muscles and paraspinal muscles. The LUE weakness and edema was thought to be due to tumor infiltrating brachial plexus and radiation induced plexopathy. His hospital course was also complicated by tumor lysis syndrome, acute renal failure, and dysphagia with severe protein calorie malnutrition. Pt transferred [facility] IPR unit to [facility] on 12/30 due to atrial fibrillation. Prior Level of Function:  Lives With: Alone  Type of Home: House  Home Layout: One level  Home Access: Stairs to enter without rails  Entrance Stairs - Number of Steps: 1  Home Equipment: Cane   Bathroom Shower/Tub: Walk-in shower  Bathroom Toilet: Handicap height  Bathroom Equipment: Grab bars in shower (suction cup grab bar in shower)    Receives Help From: Family  ADL Assistance: [address] [place] [address] Avenue: Independent  Ambulation Assistance: Independent  Transfer Assistance: Independent  Active : Yes  Additional Comments: Patient reports prior to hospital admission patient was independent with all ADL's/IADL's without use of AD for mobility. 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 Patient reports he plans to go to his mothers house for a few weeks( 1 story home, walk in shower, handicap toilets, grab bars in bathroom) after discharge. Patient reports his mother is able to help with tasks in home. Patient reports his mother has a shower chair in her shower. Restrictions/Precautions:  Restrictions/Precautions: Contact Precautions     SUBJECTIVE: Pt. Laying in his bed and pleasantly agrees to therapy session. Pt. Recalls this therapist from IP rehab stay. Pt. Motivated for session. Brother in-law arrived during session. RN approved therapy session. PAIN: None indicated    Vitals:   Patient Vitals for the past 8 hrs:   BP Patient Position Temp Temp src Pulse Resp SpO2 O2 Device   01/04/23 1114 93/65 Semi fowlers 98.3 °F (36.8 °C) Oral (!) 103 16 98 % None (Room air)   01/04/23 0951 (!) 89/56 Up in chair 98.1 °F (36.7 °C) Oral (!) 105 16 98 % None (Room air)   01/04/23 0359 96/64 -- 98.1 °F (36.7 °C) Oral 98 18 99 % None (Room air)        OBJECTIVE:  Bed Mobility:  Rolling to Left: Modified Independent   Supine to Sit: Modified Independent  Sit to Supine: Modified Independent     Transfers:  Sit to Stand: Modified Independent  Stand to Sit:Modified Independent    Ambulation:  Supervision  Distance: 600' x 1  Surface: Level Tile  Device: No Device  Gait Deviations:  Decreased Trunk Rotation, Decreased Gait Speed, Mild Path Deviations, and Decreased Terminal Knee Extension    Stairs:  None    Balance:  None    Neuromuscular Re-education  None    Exercise:  Patient was guided in 1 set(s) 10 reps of exercises: Ankle pumps, Glut sets, Quad sets, Heelslides,Hip abduction/adduction and Straight leg raises. Exercises were completed for increased independence with functional mobility. Functional Outcome Measures:   Not completed    ASSESSMENT:  Assessment: Patient progressing toward established goals. Activity Tolerance:  Patient tolerance of  treatment: good. Equipment Recommendations: Other: cont to assess needs  Discharge Recommendations: Continue to assess pending progress Home with Assist as Needed and Home with Outpatient PT    Plan: Current Treatment Recommendations: Strengthening, ROM, Balance training, Functional mobility training, Transfer training, Endurance training, Gait training, Stair training, Neuromuscular re-education, Safety education & training, Patient/Caregiver education & training, Equipment evaluation, education, & procurement, Therapeutic activities  General Plan: (5X GM)    Patient Education  Patient Education: Plan of Care, Functional Mobility, Reviewed Prior Education, Health Promotion and Wellness Education, Safety, Verbal Exercise Instruction    Goals:  Patient Goals : to have doctor talk to his cardiologist in [facility] before proceeding with any heart procedure for his a flutter  Short Term Goals  Time Frame for Short Term Goals: by discharge  Short Term Goal 1: Patient to perform bed mobility supine<>sit with Mod I with HOB flat and no railings in order to assist with getting into and out of bed. Short Term Goal 2: Patient to perform sit to stand transfers without AD  and MOD I from various surfaces in order to assist wtih safety with transfers in home. Short Term Goal 3: Patient to ambulate >/=150 feet without AD with MOD I in order to assist with safety with home mobility. Short Term Goal 4: Patient to ascend/descend 1 step without railings with S in order to assist with home entry. Long Term Goals  Time Frame for Long Term Goals : no LTGs set secondary to short ELOS    Following session, patient left in safe position with all fall risk precautions in place.

## 2023-01-04 NOTE — PROGRESS NOTES
[address] [place] [address]  Occupational Therapy  Daily Note  Time:   Time In: 4846  Time Out: 0940  Timed Code Treatment Minutes: 15 Minutes  Minutes: 15          Date: 2023  Patient Name: Trisha Samaniego,   Gender: male      Room: [facility]33/033-A  MRN: [id number]  : 1960  (58 y.o.)  Referring Practitioner: Dr. Fernie Cope  Diagnosis: atrial flutter  Additional Pertinent Hx: Trisha Samaniego is a 58 y.o. right-handed male with history of HTN,  hyperlipidemia, recurrent multiple primary melanomas ([id number], ) including a [id number] melanoma of the left flank s/p excision with left axillary lymph node dissection, is admitted on 2022 for intensive inpatient management of impairment & disability secondary to debility/physical decondition secondary to extensive multiple metastatic melanoma to his brain, cervical spine, musculature, and brachial plexus. The patient presented to [address] [place] [address] clinic on 12/3/2022 with progressive weakness, dysphagia and facial drooping. 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 MRI of cervical spine done on 2022 revealed diffuse osseous metastasis disease in cervical spine and upper thoracic spine, mild right ventral epidural tumor at C7 extending into right C7-T1 neuroforamen, intramedullary enhancing lesion within central cervical spinal cord at C5-6 and right C6-7 cervical spinal cord. Repeated MRI of brain done on 2022 revealed enhancing lesions in right frontal lobe and throughout the supratentorial and infratentorial brain, within the right  musculature, bilateral temporalis musculature, bilateral extraocular musculature, and within the calvarium and skull base.   Because of extensive metastatic malignancy, oral chemotherapywas started on 12/8/2022. venous duplex study was done on 12/16/2022 and showed no acute DVT but acute superficial venous thrombosis in RUE. MRI of brachial plexus done on 12/22/2022 show extensive metastatic disease involving osseous structures, multiple muscles including bilateral scapular intrinsic muscles and paraspinal muscles. The LUE weakness and edema was thought to be due to tumor infiltrating brachial plexus and radiation induced plexopathy. His hospital course was also complicated by tumor lysis syndrome, acute renal failure, and dysphagia with severe protein calorie malnutrition. Pt transferred [facility] IPR unit to [facility] on 12/30 due to atrial fibrillation. Restrictions/Precautions:  Restrictions/Precautions: Contact Precautions     SUBJECTIVE: Nurse approve Tx. Pt agreeable to Tx. PAIN: does not state     Vitals: Vitals not assessed per clinical judgement, see nursing flowsheet    COGNITION: WNL    ADL:   Grooming: Independent. Oral care and face washing at sink standing with 0 LOB  . BALANCE:  Sitting Balance:  Independent. Standing Balance: Independent. BED MOBILITY:  Supine to Sit: Independent      TRANSFERS:  Sit to Stand:  Independent. Stand to Sit: Independent. FUNCTIONAL MOBILITY:  Assistive Device: None  Assist Level: Independent. Distance: To and from bathroom  O lOB      ADDITIONAL ACTIVITIES:  Orange theraband in all planes of motion with BUEs 1x5 ea to educate and train on HEP to increase UB strength to facilitate (I) with self care tasks. ASSESSMENT:     Activity Tolerance:  Patient tolerance of  treatment: good. Discharge Recommendations: Home with assist as needed  Equipment Recommendations: Other: Pt has access to a shower chair.   Continue to assess needs  Plan: Times Per Week: 3-5x  Current Treatment Recommendations: Strengthening, ROM, Balance training, Endurance training, Neuromuscular re-education, Equipment evaluation, education, & procurement, Patient/Caregiver education & training, Safety education & training, Self-Care / ADL, Functional mobility training    Patient Education  Patient Education: Role of OT, Plan of Care, and ADL's     Goals  Short Term Goals  Time Frame for Short Term Goals: by discharge  Short Term Goal 1: Pt will complete LB ADLs with supervision to increase independence with self care tasks. Short Term Goal 2: Pt will complete IADL task with supervision to increase ability to return to independent living. Short Term Goal 3: Pt will tolerate standing X10 minutes with supervision in prep for IADL completion. Following session, patient left in safe position with all fall risk precautions in place.

## 2023-01-04 NOTE — PROGRESS NOTES
Hospitalist Progress Note    Patient:  Monique Kirk, : 1960  Unit/Bed:3B-33/033-A    Date of Admission: 2022      Assessment/Plan:  Atrial flutter with RVR, newly discovered: recent echo 22 shows normal EF, no pericardial effusion, and no sig valvular disease. Digoxin continued. Hypotension limits BB use at this time. CHADSVASC = 2 (HTN, DM). Persistent elevation of -120 (higher when walking), limited by low BP unable to titrate any more AVNB. Amio not option 2.2 no AC   Unable to cardiovert or ablate until it is known if he is safe for anticoagulation. Melanoma is a relatively higher risk of hemorrhage. Discussed with the patient's [facility] oncologist Dr. Javed Roman on 1/3/23, in regards to [facility] in setting of brain mets, recommends that if necessary prefers coumadin due to reversibility. There seems to be some uncertainty in regards to whether the brain lesions are metastatic vs infarcts. Discussed case with [facility] neuro-oncology group 23 (Dr. Jose Manuel Wells via RN Marly Schmitz), their recommendation was for checking a baseline CT head, if no bleed then trial of anticoagulation with repeat CT head in 12 to 24 hours, with close monitoring of neurologic status. D/w patient plan and risk of bleed while on heparin vs risk of stroke if off. He agrees to proceed with [facility]. Baseline CT head on  shows no evidence of bleed. Starting on heparin drip with no bolus. Stop ASA as unclear why this is on chronically  Every 4 hour NIHSS for 24 hours. Repeat CT head on  AM.  Tentatively planning for ablation  at 2 PM if CT head repeated on  is normal.  Neuro consulted here for review of MRI brain and c-spine from prior hospitalization  MRI of the brain and c-spine done 22 at [facility] sent over for our radiologist re-review and for our neurology team to be able to review. Adrenal Insufficiency / Acute on chronic hypotension: newly diagnosed, at [facility] ?related to Opdivo/Yervoy? Giselle Zapata Dc'ed on prednisone and florinef. Increased florinef to 0.2, continue prednisone  Continue midodrine  Consult to Endocrinology for further assistance. Appreciate recommendations. Recent clostridium bacteremia treated at [facility], port removed there. Metastatic melanoma with new brain mets (diagnosed 12/2022), as well as spleen, mediastinum, lungs, LN: extensive history for several decades. Follows Dr. Tristin Peña at [facility]) and Dr. Luis Meehan at [facility] (Neurosurgery for neuropathy) and Dr. Morris Aranda at [facility] (Neuro-Onc brain mets)  Had been recently started on [place] and [address] on 12/8/22 due to progression  On Ipi/Nivolomab since 1/2022  Now on Trametinib and Dabrafenib started 12/8/22, continued. Continue scheduled oxycodone, bowel regimen. See above for discussion with Neuro-Onc and Oncology at [facility] in regards to safety of AC. NIDDM2: continue ssi for now. Metformin recently discontinued by [facility]. DM diet, change ONS to diabetic supp. Hypothyroidism with MNG: continue synthroid 100mg, Endocrinology seeking further eval with thyroid ultrasound. Free t4 and TSH wnl. Hx TLS: continue allopurinol  HTN now with hypotension, continue midodrine. Left arm/hand neuropathy: per OSH, \"osseous mets in C-spine, epidural and intramedullary tumor infiltration in the C- spine, tumor infiltration in the brachial plexus, and radiation induced plexopathy\"       Expected discharge date:  2-3 days     Disposition: pending ability to anticoagulate    ===================================================================    Chief Complaint: Atrial flutter  Subjective (past 24 hours): Patient seen at bedside. Still reports doing well, heart rate remains in the 100-1 20 range on digoxin. He was able to ambulate in the hallway without any issues, specifically denied any dizziness or palpitations. Patient happy to hear that he will possibly have procedure tomorrow.        Hospital Course/Initial HPI: Patient admitted from [facility] for notation of atrial flutter with RVR. Exam:  BP 95/61   Pulse (!) 101   Temp 97.5 °F (36.4 °C) (Oral)   Resp 16   Wt 173 lb 14.4 oz (78.9 kg)   SpO2 97%   BMI 23.42 kg/m²     General: No distress, appears stated age. Chronically ill appearing. Eyes:  PERRL. Conjunctivae/corneas clear. HENT: Head normal appearing. Nares normal. Oral mucosa moist.  Hearing intact. Neck: Supple, with full range of motion. Trachea midline. No gross JVD appreciated. Respiratory:  Normal effort. Clear to auscultation, without rales or wheezes or rhonchi. Cardiovascular:  normal S1/S2 without murmurs. No lower extremity edema. Tachycardic rate, irregularly irregular rhythm  Non-pitting edema LUE. Abdomen: Soft, non-tender, non-distended with normal bowel sounds. Musculoskeletal: No joint swelling or tenderness. Normal tone. No abnormal movements. Skin: Warm and dry. No rashes or lesions. Bruising left eye  Neurologic:  No focal sensory/motor deficits in the upper or lower extremities. Cranial nerves:  grossly non-focal 2-12. Psychiatric: Alert and oriented, normal insight and thought content. Capillary Refill: Brisk,< 3 seconds. Peripheral Pulses: +2 palpable, equal bilaterally. Images    CTA CHEST W WO CONTRAST    Result Date: 12/31/2022   1. No evidence of pulmonary bolus. 2. 1.9 cm pleural-based nodule in the left upper lobe anteriorly. This is concerning for malignancy. Consider PET/CT for further evaluation. 3. Prominent mediastinal adenopathy and bilateral hilar lymphadenopathy. **This report has been created using voice recognition software. It may contain minor errors which are inherent in voice recognition technology. ** Final report electronically signed by Dr. Arnav Garay MD on 12/31/2022 11:03 AM    XR CHEST PORTABLE    Result Date: 12/31/2022  Stable radiographic appearance of the chest. No evidence of an acute process.  **This report has been created using voice recognition software. It may contain minor errors which are inherent in voice recognition technology. ** Final report electronically signed by Dr. Noelle Posadas MD on 12/31/2022 11:20 AM         DVT prophylaxis:    [] Lovenox  [] SCDs  [] SQ Heparin  [] Encourage ambulation   [x] Already on Anticoagulation - heparin drip       Diet: ADULT DIET;  Regular; 5 carb choices (75 gm/meal)  ADULT ORAL NUTRITION SUPPLEMENT; Breakfast, Dinner; Diabetic Oral Supplement  Diet NPO  Code Status: Full Code  PT/OT: yes  Tele: yes  IVF: na    Electronically signed by Boston Burgos DO on 1/4/2023 at 4:44 PM

## 2023-01-04 NOTE — CARE COORDINATION
1/4/23, 2:13 PM EST    DISCHARGE ON GOING EVALUATION    [address] day: 5  Location: [facility]33/033-A Reason for admit: Atrial flutter [facility]) [I48.92]   Procedure:   12/31 CTA Chest: No evidence of pulmonary bolus. 1.9 cm pleural-based nodule in the left upper lobe anteriorly. This is concerning for malignancy. Consider PET/CT for further evaluation. Prominent mediastinal adenopathy and bilateral hilar lymphadenopathy. 12/31 ECHO: EF 60-65%. 1/4 CT head: No CT evidence of acute intracranial abnormality. Focal areas of enlargement of the extraocular muscles bilaterally with more diffuse enlargement of the right lateral rectus muscle, likely on the basis of metastatic disease given previous history. Barriers to Discharge: Hospitalist, Cardiology, Endocrinology, Neurology following. PT/OT. PM&R consulted, pt doing well enough not to go to [facility], planning home. Pt remains in aflutter. Hospitalist attempting to reach [address] to decide on anti-coagulation or not. Digoxin po daily. Lovenox held. Midodrine q8hr. Movantik daily. Oxycodone q6hr. Prednisone daily, Senokot bid. Bactrim. PCP: DEBBIE Heredia CNP  Readmission Risk Score: 11.5%  Patient Goals/Plan/Treatment Preferences: Plan was for pt to return to [facility], but pt is doing well enough to go home. Therapy recommending HH. SW consulted.

## 2023-01-04 NOTE — PLAN OF CARE
Problem: Discharge Planning  Goal: Discharge to home or other facility with appropriate resources  Outcome: Progressing  Flowsheets (Taken 1/4/2023 0807)  Discharge to home or other facility with appropriate resources: Identify barriers to discharge with patient and caregiver     Problem: Skin/Tissue Integrity  Goal: Absence of new skin breakdown  Description: 1. Monitor for areas of redness and/or skin breakdown  2. Assess vascular access sites hourly  3. Every 4-6 hours minimum:  Change oxygen saturation probe site  4. Every 4-6 hours:  If on nasal continuous positive airway pressure, respiratory therapy assess nares and determine need for appliance change or resting period. Outcome: Progressing     Problem: Safety - Adult  Goal: Free from fall injury  Outcome: Progressing  Note: Ongoing assessment & interventions provided throughout shift. Skin assessments provided. Encouraging/assisting patient to turn as needed. Problem: ABCDS Injury Assessment  Goal: Absence of physical injury  Outcome: Progressing  Flowsheets (Taken 1/4/2023 0807)  Absence of Physical Injury: Implement safety measures based on patient assessment     Problem: Pain  Goal: Verbalizes/displays adequate comfort level or baseline comfort level  Outcome: Progressing  Flowsheets (Taken 1/4/2023 0807)  Verbalizes/displays adequate comfort level or baseline comfort level: Encourage patient to monitor pain and request assistance     Problem: Cardiovascular - Adult  Goal: Maintains optimal cardiac output and hemodynamic stability  Outcome: Progressing  Flowsheets (Taken 1/4/2023 0807)  Maintains optimal cardiac output and hemodynamic stability: Monitor blood pressure and heart rate   Care plan reviewed with patient. Patient verbalizes understanding of the care plan and contributed to goal setting.

## 2023-01-04 NOTE — PROGRESS NOTES
Cardiology Progress Note      Patient:  Mik Bridges  YOB: 1960  MRN: [id number]   Acct: [de-identified]  [address] [facility] Date:  12/30/2022  Primary Cardiologist:  none    Note per dr Trudi Hunt for Consultation:  Asa bishop. Clinical Summary:  62/M was diagnosed with have atrial flutter during his routine physical visit with his primary care physician few days ago. He was noted to have rapid ventricular rate was admitted to the hospital for further management. The patient did not report any symptoms and has no prior history of atrial arrhythmias. Soft blood pressure, systolic 80 to 90 mmHg precluding the use of beta-blockers and calcium blockers for rate control. He is currently undergoing oral distalization. History of brain mets anticoagulation was not deemed safe. He has history of melanoma diagnosed in 1996 treated with surgical resection. Due to weight loss of about 40 pounds he was diagnosed few months ago to have diffuse metastasis, likely melanoma in his brain as well as bones. He is currently on chemotherapy. Denies chest pain, palpitations, shortness of breath. Does report weakness especially with physical activity. He walks with the help of a cane. He is . Denies smoking or alcohol use. Medcial Hx: HTN, HPL, hx of dermal melanoma dx 1996 => surgical resection and recently noted to have multiple metastasis on chemotherapy at [facility], bicytopenia (anemia an leukopenia), debility undergoing rehab and hx of clostridium difficile infection. \"    Subjective (Events in last 24 hours):   Pt awake and alert. NAD.   No cp, sob, edema, orthopnea, lightheadedness, syncope  On RA      Objective:   BP 96/64   Pulse 98   Temp 98.1 °F (36.7 °C) (Oral)   Resp 18   Wt 173 lb 14.4 oz (78.9 kg)   SpO2 99%   BMI 23.42 kg/m²        TELEMETRY: aflutter 130 with ambulation, down to 120 sitting in bed, then down to 100-105 at rest    Physical Exam:  General Appearance: alert and oriented to person, place and time, in no acute distress  Cardiovascular: tachy rate, regular rhythm, normal S1 and S2, no murmurs, rubs, clicks, or gallops, distal pulses intact, no carotid bruits, no JVD  Pulmonary/Chest: clear to auscultation bilaterally- no wheezes, rales or rhonchi, normal air movement, no respiratory distress  Abdomen: soft, non-tender, non-distended, normal bowel sounds, no masses Extremities: no cyanosis, clubbing or edema, pulse   Skin: warm and dry  Head: normocephalic and atraumatic  Eyes: pupils equal, round, and reactive to light  Neck: supple and non-tender without mass, no thyromegaly   Neurological: alert, oriented, normal speech, no focal findings or movement disorder noted    Medications:    digoxin  250 mcg Oral Daily    fludrocortisone  0.2 mg Oral Daily    midodrine  5 mg Oral Q8H    allopurinol  300 mg Oral Daily    aspirin  81 mg Oral Daily    atorvastatin  20 mg Oral Nightly    [Held by provider] enoxaparin  40 mg SubCUTAneous Daily    levothyroxine  100 mcg Oral QAM AC    potassium chloride  40 mEq Oral Daily    predniSONE  10 mg Oral Daily    senna  2 tablet Oral BID    Trametinib Dimethyl Sulfoxide  2 mg Oral Nightly    oxyCODONE  5 mg Oral Q6H    melatonin  6 mg Oral Nightly    sulfamethoxazole-trimethoprim  1 tablet Oral Once per day on Mon Wed Fri    docusate sodium  100 mg Oral BID    lidocaine  1 patch TransDERmal Q24H    naloxegol  12.5 mg Oral QAM AC    Dabrafenib Mesylate  150 mg Oral Q12H    insulin lispro  0-8 Units SubCUTAneous TID WC    insulin lispro  0-4 Units SubCUTAneous Nightly      dextrose       oxyCODONE, 5 mg, Q4H PRN   Or  oxyCODONE, 2.5 mg, Q4H PRN  acetaminophen, 650 mg, Q4H PRN  bisacodyl, 10 mg, Daily PRN  magnesium hydroxide, 15 mL, Daily PRN  traZODone, 50 mg, Nightly PRN  polyethylene glycol, 17 g, Daily PRN  biotene, 10 mL, PRN  glucose, 4 tablet, PRN  dextrose bolus, 125 mL, PRN   Or  dextrose bolus, 250 mL, PRN  glucagon (rDNA), 1 mg, PRN  dextrose, , Continuous PRN      Diagnostics:  TTE 12/31/22  Summary   No previous study available for comparison. Normal left ventricular cavity with overall normal systolic function. Ejection fraction is visually estimated at 60-65%. No regional wall motion abnormality. Normal right ventricular size and function. No significant valvular abnormality. No evidence of any pericardial effusion. Signature      ----------------------------------------------------------------   Electronically signed by Yesenia Fitzgerald MD (Interpreting   physician) on 12/31/2022 at 07:36 PM      Lab Data:    Cardiac Enzymes:  No results for input(s): CKTOTAL, CKMB, CKMBINDEX, TROPONINI in the last 72 hours.     CBC:   Lab Results   Component Value Date/Time    WBC 4.2 12/30/2022 07:01 PM    RBC 3.35 12/30/2022 07:01 PM    RBC 4.79 08/04/2021 07:00 AM    HGB 8.8 12/30/2022 07:01 PM    HCT 29.2 12/30/2022 07:01 PM     12/30/2022 07:01 PM       CMP:    Lab Results   Component Value Date/Time     01/04/2023 04:11 AM    K 4.2 01/04/2023 04:11 AM    K 4.4 12/29/2022 06:21 AM    CL 97 01/04/2023 04:11 AM    CO2 25 01/04/2023 04:11 AM    BUN 13 01/04/2023 04:11 AM    CREATININE 0.4 01/04/2023 04:11 AM    LABGLOM >60 01/04/2023 04:11 AM    GLUCOSE 83 01/04/2023 04:11 AM    GLUCOSE 101 08/04/2021 07:00 AM    CALCIUM 8.2 01/04/2023 04:11 AM       Hepatic Function Panel:    Lab Results   Component Value Date/Time    ALKPHOS 184 12/29/2022 06:21 AM    ALT 19 12/29/2022 06:21 AM    AST 31 12/29/2022 06:21 AM    PROT 6.0 12/29/2022 06:21 AM    BILITOT 0.3 12/29/2022 06:21 AM    LABALBU 3.0 12/29/2022 06:21 AM       Magnesium:    Lab Results   Component Value Date/Time    MG 1.9 01/04/2023 04:11 AM       PT/INR:  No results found for: PROTIME, INR    HgBA1c:    Lab Results   Component Value Date/Time    LABA1C 5.8 05/09/2022 01:37 PM    LABA1C 5.8 10/16/2019 03:13 PM       FLP:    Lab Results   Component Value Date/Time TRIG 144 12/29/2022 06:21 AM    HDL 30 12/29/2022 06:21 AM    LDLCALC 77 12/29/2022 06:21 AM    LABVLDL 39 08/04/2021 07:00 AM       TSH:    Lab Results   Component Value Date/Time    TSH 3.240 12/31/2022 07:09 AM         Assessment:    New onset aflutter rvr -  at rest, up to 130 with ambulation    Chadsvasc = 1  Ef 60-65 per TTE 12/31/22  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  Anemia - attending following  Adrenal insufficiency/hypotension - endo following, on midodrine      Plan:    Keep mag >2 and K >4  Normal TSH  Cont asa/dig  Dig level 1/6/23  Need clearance from neurology to determine safe for [facility], and if so then consider rhythm control with CV or catheter ablation  Discussed with dr diallo yesterday - he contacted oncology at [facility], and consulting neuro to help clarify if ok to start [facility]  F/up dr Steffanie Neal 2-4 weeks     Addendum  Dr diallo discussed with neuro-onc at [facility]  Recommend ct head to r/o any current bleed, then start heparin and repeat ct head tomorrow, if no bleed then ok to proceed with [facility].   Discussed with dr Steffanie Neal, will schedule for aflutter ablation 2pm tomorrow if pt does well with anticoagulation    Pt agrees with plan    Electronically signed by Daniel Quintana PA-C on 1/4/2023 at 9:31 AM

## 2023-01-05 ENCOUNTER — ANESTHESIA (OUTPATIENT)
Dept: CARDIAC CATH/INVASIVE PROCEDURES | Age: 63
End: 2023-01-05
Payer: COMMERCIAL

## 2023-01-05 ENCOUNTER — APPOINTMENT (OUTPATIENT)
Dept: CT IMAGING | Age: 63
DRG: 274 | End: 2023-01-05
Attending: INTERNAL MEDICINE
Payer: COMMERCIAL

## 2023-01-05 ENCOUNTER — APPOINTMENT (OUTPATIENT)
Dept: CARDIAC CATH/INVASIVE PROCEDURES | Age: 63
DRG: 274 | End: 2023-01-05
Attending: INTERNAL MEDICINE
Payer: COMMERCIAL

## 2023-01-05 ENCOUNTER — ANESTHESIA EVENT (OUTPATIENT)
Dept: CARDIAC CATH/INVASIVE PROCEDURES | Age: 63
End: 2023-01-05
Payer: COMMERCIAL

## 2023-01-05 LAB
ABO: NORMAL
ANION GAP SERPL CALCULATED.3IONS-SCNC: 12 MEQ/L (ref 8–16)
ANTIBODY SCREEN: NORMAL
APTT: 39.4 SECONDS (ref 22–38)
APTT: 44.9 SECONDS (ref 22–38)
APTT: 74.9 SECONDS (ref 22–38)
APTT: 76.7 SECONDS (ref 22–38)
BILIRUBIN URINE: NEGATIVE
BLOOD, URINE: NEGATIVE
BUN BLDV-MCNC: 14 MG/DL (ref 7–22)
CALCIUM SERPL-MCNC: 8.6 MG/DL (ref 8.5–10.5)
CHARACTER, URINE: CLEAR
CHLORIDE BLD-SCNC: 94 MEQ/L (ref 98–111)
CO2: 25 MEQ/L (ref 23–33)
COLOR: YELLOW
CREAT SERPL-MCNC: 0.4 MG/DL (ref 0.4–1.2)
EKG ATRIAL RATE: 300 BPM
EKG ATRIAL RATE: 90 BPM
EKG P AXIS: 54 DEGREES
EKG P AXIS: 59 DEGREES
EKG P-R INTERVAL: 198 MS
EKG Q-T INTERVAL: 320 MS
EKG Q-T INTERVAL: 364 MS
EKG QRS DURATION: 96 MS
EKG QRS DURATION: 98 MS
EKG QTC CALCULATION (BAZETT): 441 MS
EKG QTC CALCULATION (BAZETT): 445 MS
EKG R AXIS: 39 DEGREES
EKG R AXIS: 63 DEGREES
EKG T AXIS: -38 DEGREES
EKG T AXIS: 37 DEGREES
EKG VENTRICULAR RATE: 114 BPM
EKG VENTRICULAR RATE: 90 BPM
ERYTHROCYTE [DISTWIDTH] IN BLOOD BY AUTOMATED COUNT: 19.5 % (ref 11.5–14.5)
ERYTHROCYTE [DISTWIDTH] IN BLOOD BY AUTOMATED COUNT: 19.6 % (ref 11.5–14.5)
ERYTHROCYTE [DISTWIDTH] IN BLOOD BY AUTOMATED COUNT: 19.8 % (ref 11.5–14.5)
ERYTHROCYTE [DISTWIDTH] IN BLOOD BY AUTOMATED COUNT: 59.8 FL (ref 35–45)
ERYTHROCYTE [DISTWIDTH] IN BLOOD BY AUTOMATED COUNT: 60 FL (ref 35–45)
ERYTHROCYTE [DISTWIDTH] IN BLOOD BY AUTOMATED COUNT: 60.9 FL (ref 35–45)
GFR SERPL CREATININE-BSD FRML MDRD: > 60 ML/MIN/1.73M2
GLUCOSE BLD-MCNC: 111 MG/DL (ref 70–108)
GLUCOSE BLD-MCNC: 118 MG/DL (ref 70–108)
GLUCOSE BLD-MCNC: 136 MG/DL (ref 70–108)
GLUCOSE BLD-MCNC: 159 MG/DL (ref 70–108)
GLUCOSE BLD-MCNC: 204 MG/DL (ref 70–108)
GLUCOSE URINE: NEGATIVE MG/DL
HCT VFR BLD CALC: 25.3 % (ref 42–52)
HCT VFR BLD CALC: 30.2 % (ref 42–52)
HCT VFR BLD CALC: 32.6 % (ref 42–52)
HEMOGLOBIN: 10.3 GM/DL (ref 14–18)
HEMOGLOBIN: 7.8 GM/DL (ref 14–18)
HEMOGLOBIN: 9.5 GM/DL (ref 14–18)
KETONES, URINE: NEGATIVE
LEUKOCYTE ESTERASE, URINE: NEGATIVE
LV EF: 53 %
LVEF MODALITY: NORMAL
MCH RBC QN AUTO: 26.2 PG (ref 26–33)
MCH RBC QN AUTO: 26.5 PG (ref 26–33)
MCH RBC QN AUTO: 26.9 PG (ref 26–33)
MCHC RBC AUTO-ENTMCNC: 30.8 GM/DL (ref 32.2–35.5)
MCHC RBC AUTO-ENTMCNC: 31.5 GM/DL (ref 32.2–35.5)
MCHC RBC AUTO-ENTMCNC: 31.6 GM/DL (ref 32.2–35.5)
MCV RBC AUTO: 84.1 FL (ref 80–94)
MCV RBC AUTO: 84.9 FL (ref 80–94)
MCV RBC AUTO: 85.1 FL (ref 80–94)
NITRITE, URINE: NEGATIVE
PH UA: 7 (ref 5–9)
PLATELET # BLD: 167 THOU/MM3 (ref 130–400)
PLATELET # BLD: 208 THOU/MM3 (ref 130–400)
PLATELET # BLD: 220 THOU/MM3 (ref 130–400)
PMV BLD AUTO: 8.5 FL (ref 9.4–12.4)
PMV BLD AUTO: 8.6 FL (ref 9.4–12.4)
PMV BLD AUTO: 8.6 FL (ref 9.4–12.4)
POTASSIUM SERPL-SCNC: 4.1 MEQ/L (ref 3.5–5.2)
PROTEIN UA: NEGATIVE
RBC # BLD: 2.98 MILL/MM3 (ref 4.7–6.1)
RBC # BLD: 3.59 MILL/MM3 (ref 4.7–6.1)
RBC # BLD: 3.83 MILL/MM3 (ref 4.7–6.1)
RH FACTOR: NORMAL
SODIUM BLD-SCNC: 131 MEQ/L (ref 135–145)
SPECIFIC GRAVITY, URINE: 1.01 (ref 1–1.03)
UROBILINOGEN, URINE: 0.2 EU/DL (ref 0–1)
WBC # BLD: 3.8 THOU/MM3 (ref 4.8–10.8)

## 2023-01-05 PROCEDURE — 81003 URINALYSIS AUTO W/O SCOPE: CPT

## 2023-01-05 PROCEDURE — 93010 ELECTROCARDIOGRAM REPORT: CPT | Performed by: NUCLEAR MEDICINE

## 2023-01-05 PROCEDURE — 99222 1ST HOSP IP/OBS MODERATE 55: CPT | Performed by: PHYSICIAN ASSISTANT

## 2023-01-05 PROCEDURE — 2140000000 HC CCU INTERMEDIATE R&B

## 2023-01-05 PROCEDURE — 99233 SBSQ HOSP IP/OBS HIGH 50: CPT | Performed by: INTERNAL MEDICINE

## 2023-01-05 PROCEDURE — C1894 INTRO/SHEATH, NON-LASER: HCPCS

## 2023-01-05 PROCEDURE — 6360000002 HC RX W HCPCS

## 2023-01-05 PROCEDURE — 3700000000 HC ANESTHESIA ATTENDED CARE

## 2023-01-05 PROCEDURE — 6370000000 HC RX 637 (ALT 250 FOR IP): Performed by: INTERNAL MEDICINE

## 2023-01-05 PROCEDURE — C1732 CATH, EP, DIAG/ABL, 3D/VECT: HCPCS

## 2023-01-05 PROCEDURE — 6360000002 HC RX W HCPCS: Performed by: NURSE ANESTHETIST, CERTIFIED REGISTERED

## 2023-01-05 PROCEDURE — 6370000000 HC RX 637 (ALT 250 FOR IP): Performed by: PHYSICAL MEDICINE & REHABILITATION

## 2023-01-05 PROCEDURE — 36415 COLL VENOUS BLD VENIPUNCTURE: CPT

## 2023-01-05 PROCEDURE — 85730 THROMBOPLASTIN TIME PARTIAL: CPT

## 2023-01-05 PROCEDURE — 2500000003 HC RX 250 WO HCPCS

## 2023-01-05 PROCEDURE — 93320 DOPPLER ECHO COMPLETE: CPT

## 2023-01-05 PROCEDURE — 93312 ECHO TRANSESOPHAGEAL: CPT

## 2023-01-05 PROCEDURE — 86900 BLOOD TYPING SEROLOGIC ABO: CPT

## 2023-01-05 PROCEDURE — 80048 BASIC METABOLIC PNL TOTAL CA: CPT

## 2023-01-05 PROCEDURE — 7100000001 HC PACU RECOVERY - ADDTL 15 MIN

## 2023-01-05 PROCEDURE — B24BZZZ ULTRASONOGRAPHY OF HEART WITH AORTA: ICD-10-PCS | Performed by: INTERNAL MEDICINE

## 2023-01-05 PROCEDURE — 6370000000 HC RX 637 (ALT 250 FOR IP): Performed by: PHYSICIAN ASSISTANT

## 2023-01-05 PROCEDURE — 70450 CT HEAD/BRAIN W/O DYE: CPT

## 2023-01-05 PROCEDURE — 3700000001 HC ADD 15 MINUTES (ANESTHESIA)

## 2023-01-05 PROCEDURE — 86850 RBC ANTIBODY SCREEN: CPT

## 2023-01-05 PROCEDURE — 2580000003 HC RX 258: Performed by: NURSE ANESTHETIST, CERTIFIED REGISTERED

## 2023-01-05 PROCEDURE — 93325 DOPPLER ECHO COLOR FLOW MAPG: CPT

## 2023-01-05 PROCEDURE — 2500000003 HC RX 250 WO HCPCS: Performed by: NURSE ANESTHETIST, CERTIFIED REGISTERED

## 2023-01-05 PROCEDURE — 6360000002 HC RX W HCPCS: Performed by: INTERNAL MEDICINE

## 2023-01-05 PROCEDURE — C1759 CATH, INTRA ECHOCARDIOGRAPHY: HCPCS

## 2023-01-05 PROCEDURE — 93662 INTRACARDIAC ECG (ICE): CPT

## 2023-01-05 PROCEDURE — 82948 REAGENT STRIP/BLOOD GLUCOSE: CPT

## 2023-01-05 PROCEDURE — 85027 COMPLETE CBC AUTOMATED: CPT

## 2023-01-05 PROCEDURE — 2709999900 HC NON-CHARGEABLE SUPPLY

## 2023-01-05 PROCEDURE — 2580000003 HC RX 258: Performed by: PHYSICIAN ASSISTANT

## 2023-01-05 PROCEDURE — 93005 ELECTROCARDIOGRAM TRACING: CPT | Performed by: PHYSICIAN ASSISTANT

## 2023-01-05 PROCEDURE — B24BZZ4 ULTRASONOGRAPHY OF HEART WITH AORTA, TRANSESOPHAGEAL: ICD-10-PCS | Performed by: INTERNAL MEDICINE

## 2023-01-05 PROCEDURE — 6370000000 HC RX 637 (ALT 250 FOR IP)

## 2023-01-05 PROCEDURE — 93653 COMPRE EP EVAL TX SVT: CPT | Performed by: INTERNAL MEDICINE

## 2023-01-05 PROCEDURE — 86901 BLOOD TYPING SEROLOGIC RH(D): CPT

## 2023-01-05 PROCEDURE — 02583ZZ DESTRUCTION OF CONDUCTION MECHANISM, PERCUTANEOUS APPROACH: ICD-10-PCS | Performed by: INTERNAL MEDICINE

## 2023-01-05 PROCEDURE — C1730 CATH, EP, 19 OR FEW ELECT: HCPCS

## 2023-01-05 PROCEDURE — 87040 BLOOD CULTURE FOR BACTERIA: CPT

## 2023-01-05 PROCEDURE — 7100000000 HC PACU RECOVERY - FIRST 15 MIN

## 2023-01-05 PROCEDURE — C1766 INTRO/SHEATH,STRBLE,NON-PEEL: HCPCS

## 2023-01-05 PROCEDURE — 4A0234Z MEASUREMENT OF CARDIAC ELECTRICAL ACTIVITY, PERCUTANEOUS APPROACH: ICD-10-PCS | Performed by: INTERNAL MEDICINE

## 2023-01-05 PROCEDURE — 94760 N-INVAS EAR/PLS OXIMETRY 1: CPT

## 2023-01-05 PROCEDURE — C9113 INJ PANTOPRAZOLE SODIUM, VIA: HCPCS | Performed by: INTERNAL MEDICINE

## 2023-01-05 PROCEDURE — 93653 COMPRE EP EVAL TX SVT: CPT

## 2023-01-05 PROCEDURE — 93005 ELECTROCARDIOGRAM TRACING: CPT | Performed by: INTERNAL MEDICINE

## 2023-01-05 PROCEDURE — 02K83ZZ MAP CONDUCTION MECHANISM, PERCUTANEOUS APPROACH: ICD-10-PCS | Performed by: INTERNAL MEDICINE

## 2023-01-05 RX ORDER — ENOXAPARIN SODIUM 100 MG/ML
80 INJECTION SUBCUTANEOUS 2 TIMES DAILY
Status: DISCONTINUED | OUTPATIENT
Start: 2023-01-05 | End: 2023-01-05

## 2023-01-05 RX ORDER — HEPARIN SODIUM 1000 [USP'U]/ML
2000 INJECTION, SOLUTION INTRAVENOUS; SUBCUTANEOUS PRN
Status: DISCONTINUED | OUTPATIENT
Start: 2023-01-05 | End: 2023-01-08 | Stop reason: HOSPADM

## 2023-01-05 RX ORDER — ONDANSETRON 2 MG/ML
INJECTION INTRAMUSCULAR; INTRAVENOUS PRN
Status: DISCONTINUED | OUTPATIENT
Start: 2023-01-05 | End: 2023-01-05 | Stop reason: SDUPTHER

## 2023-01-05 RX ORDER — SODIUM CHLORIDE 9 MG/ML
INJECTION, SOLUTION INTRAVENOUS PRN
Status: DISCONTINUED | OUTPATIENT
Start: 2023-01-05 | End: 2023-01-05 | Stop reason: SDUPTHER

## 2023-01-05 RX ORDER — HEPARIN SODIUM 10000 [USP'U]/100ML
5-30 INJECTION, SOLUTION INTRAVENOUS CONTINUOUS
Status: DISCONTINUED | OUTPATIENT
Start: 2023-01-05 | End: 2023-01-08

## 2023-01-05 RX ORDER — LABETALOL HYDROCHLORIDE 5 MG/ML
10 INJECTION, SOLUTION INTRAVENOUS
Status: DISCONTINUED | OUTPATIENT
Start: 2023-01-05 | End: 2023-01-08 | Stop reason: HOSPADM

## 2023-01-05 RX ORDER — WARFARIN SODIUM 5 MG/1
5 TABLET ORAL DAILY
Status: DISCONTINUED | OUTPATIENT
Start: 2023-01-05 | End: 2023-01-05

## 2023-01-05 RX ORDER — MORPHINE SULFATE 2 MG/ML
2 INJECTION, SOLUTION INTRAMUSCULAR; INTRAVENOUS EVERY 5 MIN PRN
Status: DISCONTINUED | OUTPATIENT
Start: 2023-01-05 | End: 2023-01-08 | Stop reason: HOSPADM

## 2023-01-05 RX ORDER — HEPARIN SODIUM 1000 [USP'U]/ML
4000 INJECTION, SOLUTION INTRAVENOUS; SUBCUTANEOUS PRN
Status: DISCONTINUED | OUTPATIENT
Start: 2023-01-05 | End: 2023-01-08 | Stop reason: HOSPADM

## 2023-01-05 RX ORDER — SODIUM CHLORIDE 0.9 % (FLUSH) 0.9 %
5-40 SYRINGE (ML) INJECTION EVERY 12 HOURS SCHEDULED
Status: DISCONTINUED | OUTPATIENT
Start: 2023-01-05 | End: 2023-01-05 | Stop reason: SDUPTHER

## 2023-01-05 RX ORDER — PROPOFOL 10 MG/ML
INJECTION, EMULSION INTRAVENOUS PRN
Status: DISCONTINUED | OUTPATIENT
Start: 2023-01-05 | End: 2023-01-05 | Stop reason: SDUPTHER

## 2023-01-05 RX ORDER — PANTOPRAZOLE SODIUM 40 MG/10ML
40 INJECTION, POWDER, LYOPHILIZED, FOR SOLUTION INTRAVENOUS DAILY
Status: DISCONTINUED | OUTPATIENT
Start: 2023-01-05 | End: 2023-01-06

## 2023-01-05 RX ORDER — SODIUM CHLORIDE 0.9 % (FLUSH) 0.9 %
5-40 SYRINGE (ML) INJECTION PRN
Status: DISCONTINUED | OUTPATIENT
Start: 2023-01-05 | End: 2023-01-05 | Stop reason: SDUPTHER

## 2023-01-05 RX ORDER — WARFARIN SODIUM 5 MG/1
5 TABLET ORAL
Status: COMPLETED | OUTPATIENT
Start: 2023-01-05 | End: 2023-01-05

## 2023-01-05 RX ORDER — PHENYLEPHRINE HCL IN 0.9% NACL 1 MG/10 ML
SYRINGE (ML) INTRAVENOUS PRN
Status: DISCONTINUED | OUTPATIENT
Start: 2023-01-05 | End: 2023-01-05 | Stop reason: SDUPTHER

## 2023-01-05 RX ORDER — SODIUM CHLORIDE 0.9 % (FLUSH) 0.9 %
5-40 SYRINGE (ML) INJECTION EVERY 12 HOURS SCHEDULED
Status: DISCONTINUED | OUTPATIENT
Start: 2023-01-05 | End: 2023-01-08 | Stop reason: HOSPADM

## 2023-01-05 RX ORDER — FENTANYL CITRATE 50 UG/ML
50 INJECTION, SOLUTION INTRAMUSCULAR; INTRAVENOUS EVERY 5 MIN PRN
Status: DISCONTINUED | OUTPATIENT
Start: 2023-01-05 | End: 2023-01-08 | Stop reason: HOSPADM

## 2023-01-05 RX ORDER — MIDODRINE HYDROCHLORIDE 5 MG/1
5 TABLET ORAL ONCE
Status: COMPLETED | OUTPATIENT
Start: 2023-01-05 | End: 2023-01-05

## 2023-01-05 RX ORDER — VASOPRESSIN 20 U/ML
INJECTION PARENTERAL PRN
Status: DISCONTINUED | OUTPATIENT
Start: 2023-01-05 | End: 2023-01-05 | Stop reason: SDUPTHER

## 2023-01-05 RX ORDER — SODIUM CHLORIDE 9 MG/ML
INJECTION, SOLUTION INTRAVENOUS CONTINUOUS
Status: DISCONTINUED | OUTPATIENT
Start: 2023-01-05 | End: 2023-01-06

## 2023-01-05 RX ORDER — SODIUM CHLORIDE 9 MG/ML
INJECTION, SOLUTION INTRAVENOUS PRN
Status: DISCONTINUED | OUTPATIENT
Start: 2023-01-05 | End: 2023-01-08 | Stop reason: HOSPADM

## 2023-01-05 RX ORDER — DEXAMETHASONE SODIUM PHOSPHATE 10 MG/ML
INJECTION, EMULSION INTRAMUSCULAR; INTRAVENOUS PRN
Status: DISCONTINUED | OUTPATIENT
Start: 2023-01-05 | End: 2023-01-05 | Stop reason: SDUPTHER

## 2023-01-05 RX ORDER — ROCURONIUM BROMIDE 10 MG/ML
INJECTION, SOLUTION INTRAVENOUS PRN
Status: DISCONTINUED | OUTPATIENT
Start: 2023-01-05 | End: 2023-01-05 | Stop reason: SDUPTHER

## 2023-01-05 RX ORDER — SODIUM CHLORIDE 0.9 % (FLUSH) 0.9 %
5-40 SYRINGE (ML) INJECTION PRN
Status: DISCONTINUED | OUTPATIENT
Start: 2023-01-05 | End: 2023-01-08 | Stop reason: HOSPADM

## 2023-01-05 RX ADMIN — OXYCODONE 5 MG: 5 TABLET ORAL at 23:24

## 2023-01-05 RX ADMIN — VASOPRESSIN 2 UNITS: 20 INJECTION INTRAVENOUS at 14:35

## 2023-01-05 RX ADMIN — Medication 40 MG: at 14:00

## 2023-01-05 RX ADMIN — SODIUM CHLORIDE: 9 INJECTION, SOLUTION INTRAVENOUS at 13:42

## 2023-01-05 RX ADMIN — ONDANSETRON 4 MG: 2 INJECTION INTRAMUSCULAR; INTRAVENOUS at 15:53

## 2023-01-05 RX ADMIN — HEPARIN SODIUM 12 UNITS/KG/HR: 10000 INJECTION, SOLUTION INTRAVENOUS at 21:57

## 2023-01-05 RX ADMIN — Medication 200 MCG: at 14:15

## 2023-01-05 RX ADMIN — Medication 200 MCG: at 14:12

## 2023-01-05 RX ADMIN — VASOPRESSIN 4 UNITS: 20 INJECTION INTRAVENOUS at 15:04

## 2023-01-05 RX ADMIN — VASOPRESSIN 3 UNITS: 20 INJECTION INTRAVENOUS at 14:38

## 2023-01-05 RX ADMIN — POTASSIUM CHLORIDE 40 MEQ: 1500 TABLET, EXTENDED RELEASE ORAL at 11:33

## 2023-01-05 RX ADMIN — Medication 200 MCG: at 15:39

## 2023-01-05 RX ADMIN — DIGOXIN 250 MCG: 250 TABLET ORAL at 11:34

## 2023-01-05 RX ADMIN — OXYCODONE 5 MG: 5 TABLET ORAL at 17:54

## 2023-01-05 RX ADMIN — LEVOTHYROXINE SODIUM 100 MCG: 0.1 TABLET ORAL at 05:25

## 2023-01-05 RX ADMIN — FLUDROCORTISONE ACETATE 0.2 MG: 0.1 TABLET ORAL at 11:33

## 2023-01-05 RX ADMIN — DEXAMETHASONE SODIUM PHOSPHATE 10 MG: 10 INJECTION, EMULSION INTRAMUSCULAR; INTRAVENOUS at 14:12

## 2023-01-05 RX ADMIN — MIDODRINE HYDROCHLORIDE 5 MG: 5 TABLET ORAL at 11:41

## 2023-01-05 RX ADMIN — ROCURONIUM BROMIDE 10 MG: 10 INJECTION INTRAVENOUS at 14:42

## 2023-01-05 RX ADMIN — Medication 100 MCG: at 14:00

## 2023-01-05 RX ADMIN — MIDODRINE HYDROCHLORIDE 5 MG: 5 TABLET ORAL at 19:33

## 2023-01-05 RX ADMIN — VASOPRESSIN 3 UNITS: 20 INJECTION INTRAVENOUS at 14:52

## 2023-01-05 RX ADMIN — Medication 200 MCG: at 14:49

## 2023-01-05 RX ADMIN — NALOXEGOL OXALATE 12.5 MG: 12.5 TABLET, FILM COATED ORAL at 05:25

## 2023-01-05 RX ADMIN — OXYCODONE 5 MG: 5 TABLET ORAL at 11:33

## 2023-01-05 RX ADMIN — Medication 300 MCG: at 14:55

## 2023-01-05 RX ADMIN — Medication 6 MG: at 22:07

## 2023-01-05 RX ADMIN — Medication 200 MCG: at 14:09

## 2023-01-05 RX ADMIN — PREDNISONE 10 MG: 10 TABLET ORAL at 11:33

## 2023-01-05 RX ADMIN — PHENYLEPHRINE HYDROCHLORIDE 100 MCG/MIN: 10 INJECTION INTRAVENOUS at 15:07

## 2023-01-05 RX ADMIN — ATORVASTATIN CALCIUM 20 MG: 20 TABLET, FILM COATED ORAL at 22:07

## 2023-01-05 RX ADMIN — PROPOFOL 120 MG: 10 INJECTION, EMULSION INTRAVENOUS at 14:00

## 2023-01-05 RX ADMIN — OXYCODONE 5 MG: 5 TABLET ORAL at 05:25

## 2023-01-05 RX ADMIN — SENNOSIDES 17.2 MG: 8.6 TABLET, FILM COATED ORAL at 11:33

## 2023-01-05 RX ADMIN — PANTOPRAZOLE SODIUM 40 MG: 40 INJECTION, POWDER, FOR SOLUTION INTRAVENOUS at 11:34

## 2023-01-05 RX ADMIN — ROCURONIUM BROMIDE 40 MG: 10 INJECTION INTRAVENOUS at 14:04

## 2023-01-05 RX ADMIN — WARFARIN SODIUM 5 MG: 5 TABLET ORAL at 17:57

## 2023-01-05 RX ADMIN — MIDODRINE HYDROCHLORIDE 5 MG: 5 TABLET ORAL at 05:25

## 2023-01-05 RX ADMIN — MIDODRINE HYDROCHLORIDE 5 MG: 5 TABLET ORAL at 19:43

## 2023-01-05 RX ADMIN — VASOPRESSIN 2 UNITS: 20 INJECTION INTRAVENOUS at 14:26

## 2023-01-05 RX ADMIN — ALLOPURINOL 300 MG: 300 TABLET ORAL at 11:33

## 2023-01-05 RX ADMIN — DOCUSATE SODIUM 100 MG: 100 CAPSULE, LIQUID FILLED ORAL at 11:33

## 2023-01-05 ASSESSMENT — PAIN SCALES - GENERAL
PAINLEVEL_OUTOF10: 7
PAINLEVEL_OUTOF10: 7
PAINLEVEL_OUTOF10: 8
PAINLEVEL_OUTOF10: 7

## 2023-01-05 ASSESSMENT — PAIN DESCRIPTION - LOCATION
LOCATION: GENERALIZED
LOCATION: BACK
LOCATION: GENERALIZED

## 2023-01-05 ASSESSMENT — PAIN DESCRIPTION - ORIENTATION: ORIENTATION: LOWER

## 2023-01-05 ASSESSMENT — PAIN DESCRIPTION - DESCRIPTORS: DESCRIPTORS: ACHING

## 2023-01-05 NOTE — BRIEF OP NOTE
Brief Postoperative Note    Date:   1/5/2023  Patient name: Camilla Navarro  YOB: 1960  Sex: male   MRN:   [id number]    PCP: DEBBIE Valderrama CNP     Procedure:Atrial flutter ablation. Pre-Op Diagnosis: Atrial flutter. Post-Op Diagnosis: Typical atrial flutter and right internal jugular vein thrombus. Surgeon: Aurelia Novoa MD, MRCP, [facility]    Assistant: Parish Martinez. Anesthesia/sedation: General     Estimated Blood Loss (mL): less than 50     Complications: None    Recommendations:  See orders in Epic. Bed rest for 6 hours. Watch access site/s for bleeding or swelling. Hold pressure if bleeding or swelling. Ambulate 6 hour after suture/sheath removal.  Start Lovenox 80 mg BID SQ till INR is 2-3. First dose tonight at 10 PM.   Start coumadin, dosing per pharmacy.             Electronically signed by Edgard Anderson MD, Demetrius Heard on 1/5/2023 at 4:19 PM

## 2023-01-05 NOTE — ANESTHESIA PRE PROCEDURE
Department of Anesthesiology  Preprocedure Note       Name:  Jorene Olszewski   Age:  58 y.o.  :  1960                                          MRN:  [id number]         Date:  2023      Surgeon: * Surgery not found *    Procedure:     Medications prior to admission:   Prior to Admission medications    Medication Sig Start Date End Date Taking? Authorizing Provider   lisinopril (PRINIVIL;ZESTRIL) 5 MG tablet TAKE 1 TABLET BY MOUTH EVERY DAY 22   DEBBIE Horne CNP   atorvastatin (LIPITOR) 20 MG tablet TAKE 1 TABLET BY MOUTH EVERY DAY 22   DEBBIE Horne CNP   metFORMIN (GLUCOPHAGE) 500 MG tablet Take 1 tablet by mouth 2 times daily (with meals) 22   DEBBIE Horne CNP   aspirin (ASPIRIN LOW DOSE) 81 MG EC tablet TAKE 1 TABLET BY MOUTH DAILY 21   DEBBIE Horne CNP   ACCU-CHEK SOFTCLIX LANCETS MISC 1 Device by Does not apply route 2 times daily 10/16/19   DEBBIE Horne CNP   blood glucose monitor kit and supplies Test once a day & as needed for symptoms of irregular blood glucose. Please dispense all supplies strips and lancets 10/16/19   DEBBIE Horne CNP   blood glucose monitor strips Test bid & as needed for symptoms of irregular blood glucose.  10/16/19   DEBBIE Horne CNP   sildenafil (VIAGRA) 50 MG tablet Take 1/2 tablet one hour prior to sexual activity 10/16/19   DEBBIE Horne CNP   pembrolizumab [facility]) 50 MG SOLR chemo injection Infuse intravenously    Historical Provider, MD       Current medications:    Current Facility-Administered Medications   Medication Dose Route Frequency Provider Last Rate Last Admin    sodium chloride flush 0.9 % injection 5-40 mL  5-40 mL IntraVENous 2 times per day RAMONA Morales-OLENA        sodium chloride flush 0.9 % injection 5-40 mL  5-40 mL IntraVENous PRN Maryfrkwasi Pérez, PA-C        0.9 % sodium chloride infusion   IntraVENous PRN Maryfrkwasi Deleone, PA-C        pantoprazole (PROTONIX) injection 40 mg  40 mg IntraVENous Daily Reida Huddle, DO   40 mg at 01/05/23 1134    0.9 % sodium chloride infusion   IntraVENous Continuous Reida Huddle, DO        heparin 25,000 units in dextrose 5% 250 mL (premix) infusion  5-30 Units/kg/hr IntraVENous Continuous Reida Huddle, DO 11.8 mL/hr at 01/04/23 2353 15 Units/kg/hr at 01/04/23 2353    digoxin (LANOXIN) tablet 250 mcg  250 mcg Oral Daily John Zapata PA-C   250 mcg at 01/05/23 1134    fludrocortisone (FLORINEF) tablet 0.2 mg  0.2 mg Oral Daily Tatyana Quinteros MD   0.2 mg at 01/05/23 1133    midodrine (PROAMATINE) tablet 5 mg  5 mg Oral Q8H Tatyana Quinteros MD   5 mg at 01/05/23 1141    allopurinol (ZYLOPRIM) tablet 300 mg  300 mg Oral Daily Marely Colby MD   300 mg at 01/05/23 1133    [Held by provider] aspirin chewable tablet 81 mg  81 mg Oral Daily Marely Colby MD   81 mg at 01/04/23 0959    atorvastatin (LIPITOR) tablet 20 mg  20 mg Oral Nightly Marely Colby MD   20 mg at 01/04/23 2103    levothyroxine (SYNTHROID) tablet 100 mcg  100 mcg Oral QAM AC Marely Colby MD   100 mcg at 01/05/23 0525    potassium chloride (KLOR-CON M) extended release tablet 40 mEq  40 mEq Oral Daily Marely Colby MD   40 mEq at 01/05/23 1133    oxyCODONE (ROXICODONE) immediate release tablet 5 mg  5 mg Oral Q4H PRN Marely Colby MD   5 mg at 01/02/23 1729    Or    oxyCODONE (ROXICODONE) immediate release tablet 2.5 mg  2.5 mg Oral Q4H PRN Marely Colby MD        predniSONE (DELTASONE) tablet 10 mg  10 mg Oral Daily Marely Colby MD   10 mg at 01/05/23 1133    senna (SENOKOT) tablet 17.2 mg  2 tablet Oral BID Marely Colby MD   17.2 mg at 01/05/23 1133    Trametinib Dimethyl Sulfoxide TABS 2 mg (Patient Supplied)  2 mg Oral Nightly Marely Colby MD   2 mg at 01/04/23 2105    acetaminophen (TYLENOL) tablet 650 mg  650 mg Oral Q4H PRN Marely Colby MD        oxyCODONE (ROXICODONE) immediate release tablet 5 mg  5 mg Oral Q6H Vivek Mason MD   5 mg at 01/05/23 1133    melatonin tablet 6 mg  6 mg Oral Nightly Vivek Mason MD   6 mg at 01/04/23 2103    sulfamethoxazole-trimethoprim (BACTRIM DS;SEPTRA DS) 800-160 MG per tablet 1 tablet  1 tablet Oral Once per day on Mon Wed Fri Vivek Mason MD   1 tablet at 01/04/23 8614    docusate sodium (COLACE) capsule 100 mg  100 mg Oral BID Vivek Mason MD   100 mg at 01/05/23 1133    bisacodyl (DULCOLAX) suppository 10 mg  10 mg Rectal Daily PRN Vivek Mason MD        magnesium hydroxide (MILK OF MAGNESIA) 400 MG/5ML suspension 15 mL  15 mL Oral Daily PRN Vivek Mason MD        traZODone (DESYREL) tablet 50 mg  50 mg Oral Nightly PRN Vivek Mason MD        polyethylene glycol [facility]) packet 17 g  17 g Oral Daily PRN Vivek Mason MD        lidocaine 4 % external patch 1 patch  1 patch TransDERmal Q24H Vivek Mason MD   1 patch at 01/01/23 1105    naloxegol (MOVANTIK) tablet 12.5 mg  12.5 mg Oral QAM AC Vivek Mason MD   12.5 mg at 01/05/23 0525    Dabrafenib Mesylate CAPS 150 mg (Patient Supplied)  150 mg Oral Q12H Vivek Mason MD   150 mg at 01/05/23 1137    biotene oral solution  10 mL Swish & Spit PRN Vivek Mason MD        insulin lispro (HUMALOG) injection vial 0-8 Units  0-8 Units SubCUTAneous TID WC Tito Argueta PA-C   2 Units at 01/04/23 1726    insulin lispro (HUMALOG) injection vial 0-4 Units  0-4 Units SubCUTAneous Nightly Maggy Weinstein PA-C        glucose chewable tablet 16 g  4 tablet Oral PRN Tito Argueta PA-C        dextrose bolus 10% 125 mL  125 mL IntraVENous PRN Maggy Patino PA-C        Or    dextrose bolus 10% 250 mL  250 mL IntraVENous PRN Tito Argueta PA-C        glucagon (rDNA) injection 1 mg  1 mg SubCUTAneous PRN Maggy Noonan PA-C        dextrose 10 % infusion   IntraVENous Continuous PRN Tito Argueta PA-C           Allergies:  No Known Allergies    Problem List: Patient Active Problem List   Diagnosis Code    Hypertension I10    Hyperlipidemia E78.5    History of melanoma Z85.820    Acquired hypothyroidism E03.9    Debility R53.81    Metastatic malignant melanoma ([facility] [address] C43.9    Malignant melanoma metastatic to brain ([facility] [address] C79.31    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    Brachial plexopathy, left G54.0    Contracture, left shoulder M24.512    Moderate malnutrition (HCC) E44.0    Atrial flutter with rapid ventricular response (HCC) I48.92    Atrial flutter (HCC) I48.92    Adrenal insufficiency (Rochelle's disease) ([facility] [address] E27.1       Past Medical History:        Diagnosis Date    Hyperlipidemia     In 2010s    Hypertension 2014    Hypothyroidism 2017    Melanoma ([facility] [address]     brain, spine, bone, muscle       Past Surgical History:        Procedure Laterality Date    DOPPLER ECHOCARDIOGRAPHY      LYMPHADENECTOMY Right 11/15/2013    arm pit    MOHS SURGERY      MOHS SURGERY Right 09/17/2021    MOHS REPAIR BCC RIGHT PARANASAL performed by Krystal Oliver MD at [id number] [place] [address] Road History:    Social History     Tobacco Use    Smoking status: Never    Smokeless tobacco: Former     Types: Chew, Snuff     Quit date: 09/2022   Substance Use Topics    Alcohol use: Yes     Comment: once per month drinking 2 to 3 cans of beer                                Counseling given: Not Answered      Vital Signs (Current):   Vitals:    01/05/23 0425 01/05/23 0504 01/05/23 0815 01/05/23 1118   BP: 102/69  (!) 90/58 107/63   Pulse: (!) 114  (!) 102 (!) 114   Resp: 20  20 18   Temp: 98.3 °F (36.8 °C)  98.4 °F (36.9 °C) 97.9 °F (36.6 °C)   TempSrc: Oral  Oral Oral   SpO2: 97%  97% 99%   Weight:  178 lb 3.2 oz (80.8 kg)                                                BP Readings from Last 3 Encounters:   01/05/23 107/63   12/30/22 98/73   05/09/22 110/78       NPO Status:                                                                                 BMI:   Wt Readings from Last 3 Encounters:   01/05/23 178 lb 3.2 oz (80.8 kg)   12/28/22 183 lb 3 oz (83.1 kg)   05/09/22 243 lb 3.2 oz (110.3 kg)     Body mass index is 24 kg/m².     CBC:   Lab Results   Component Value Date/Time    WBC 3.8 01/05/2023 04:14 AM    RBC 3.59 01/05/2023 04:14 AM    RBC 4.79 08/04/2021 07:00 AM    HGB 9.5 01/05/2023 04:14 AM    HCT 30.2 01/05/2023 04:14 AM    MCV 84.1 01/05/2023 04:14 AM    RDW 14.0 08/04/2021 07:00 AM     01/05/2023 04:14 AM       CMP:   Lab Results   Component Value Date/Time     01/05/2023 01:08 AM    K 4.1 01/05/2023 01:08 AM    K 4.4 12/29/2022 06:21 AM    CL 94 01/05/2023 01:08 AM    CO2 25 01/05/2023 01:08 AM    BUN 14 01/05/2023 01:08 AM    CREATININE 0.4 01/05/2023 01:08 AM    LABGLOM >60 01/05/2023 01:08 AM    GLUCOSE 136 01/05/2023 01:08 AM    GLUCOSE 101 08/04/2021 07:00 AM    PROT 6.0 12/29/2022 06:21 AM    CALCIUM 8.6 01/05/2023 01:08 AM    BILITOT 0.3 12/29/2022 06:21 AM    ALKPHOS 184 12/29/2022 06:21 AM    AST 31 12/29/2022 06:21 AM    ALT 19 12/29/2022 06:21 AM       POC Tests:   Recent Labs     01/05/23  1140   POCGLU 111*       Coags:   Lab Results   Component Value Date/Time    INR 1.07 01/04/2023 03:30 PM    APTT 76.7 01/05/2023 10:36 AM       HCG (If Applicable): No results found for: PREGTESTUR, PREGSERUM, HCG, HCGQUANT     ABGs: No results found for: PHART, PO2ART, RNQ5NHH, PQZ4CDW, BEART, Q5JDVVBW     Type & Screen (If Applicable):  Lab Results   Component Value Date    LABRH NEG 01/05/2023       Drug/Infectious Status (If Applicable):  No results found for: HIV, HEPCAB    COVID-19 Screening (If Applicable):   Lab Results   Component Value Date/Time    COVID19 Detected 11/20/2020 03:18 PM           Anesthesia Evaluation    Airway: Mallampati: II  TM distance: >3 FB   Neck ROM: full  Mouth opening: > = 3 FB   Dental:          Pulmonary: breath sounds clear to auscultation                             Cardiovascular:    (+) hypertension:, dysrhythmias: atrial flutter,         Rhythm: regular                      Neuro/Psych:               GI/Hepatic/Renal:             Endo/Other:    (+) hypothyroidism::., .                 Abdominal:             Vascular: Other Findings:           Anesthesia Plan      general     ASA 3       Induction: intravenous. MIPS: Postoperative opioids intended and Prophylactic antiemetics administered. Anesthetic plan and risks discussed with patient and mother. Plan discussed with CRNA.                     Devon Duarte MD   1/5/2023

## 2023-01-05 NOTE — PROGRESS NOTES
Hospitalist Progress Note    Patient:  Maricruz Dodson, : 1960  Unit/Bed:3B33/033-A    Date of Admission: 2022      Assessment/Plan:  Atrial flutter with RVR, newly discovered: recent echo 22 shows normal EF, no pericardial effusion, and no sig valvular disease. Digoxin continued. Hypotension limits BB use at this time. CHADSVASC = 2 (HTN, DM). Persistent elevation of -120 (higher when walking), limited by low BP unable to titrate any more AVNB. Amio not option 2/2 no AC   Unable to cardiovert or ablate until it is known if he is safe for anticoagulation. Melanoma is a relatively higher risk of hemorrhage. Discussed with the patient's [facility] oncologist Dr. Cesar Chua on 1/3/23, in regards to [facility] in setting of brain mets, recommends that if necessary prefers coumadin due to reversibility. There seems to be some uncertainty in regards to whether the brain lesions are metastatic vs infarcts. Discussed case with [facility] neuro-oncology group 23 (Dr. Paty Marti via RN Rashaad Sánchez), their recommendation was for checking a baseline CT head, if no bleed then trial of anticoagulation with repeat CT head in 12 to 24 hours, with close monitoring of neurologic status. D/w patient plan and risk of bleed while on heparin vs risk of stroke if off. He agrees to proceed with [facility]. Baseline CT head on  shows no evidence of bleed - heparin gtt started. Repeat CT head on  16 hours after shows stable findings without bleed. Continue on heparin drip with no bolus. Stopped ASA as unclear why this is on chronically  Every 4 hour NIHSS for 24 hours. planning for ablation  at 2 PM  Neuro consulted here for review of MRI brain and c-spine from prior hospitalization: No evidence of any definitive strokes that would be at risk for hemorrhagic transformation. They are deferring to neuro-onc recommendations at [address] [place] [address] regarding mets. One-time fever noted 1/4 night (no sepsis):  Unclear etiology, as he has no objective signs of infection, specifically no congestion, cough, shortness of breath, dysuria, diarrhea, abdominal discomfort, or skin changes consistent with cellulitis. Given immunosuppression on steroids, will check urinalysis, check blood culture since he did have a prior port infection with Clostridium, also obtain COVID and flu swab given high prevalence  Discussed with Dr. Jaycee Wright and cardiology, they are okay to continue with ablation as long as no other objective signs of infection  Adrenal Insufficiency / Acute on chronic hypotension: newly diagnosed, at [address] [place] [address] ?related to Opdivo/[place]? .  Dc'ed on prednisone and florinef. Increased florinef to 0.2, continue prednisone  Continue midodrine  Consult to Endocrinology for further assistance. Appreciate recommendations. Stable blood pressures, asymptomatic. Recent clostridium bacteremia treated at [address] [place] [address], port removed there. Metastatic melanoma with new brain mets (diagnosed 12/2022), as well as spleen, mediastinum, lungs, LN: extensive history for several decades. Follows Dr. Lela Klein at [facility]) and Dr. Srikanth Douglas at [address] [place] [address] (Neurosurgery for neuropathy) and Dr. Lavinia Bosworth at [address] [place] [address] (Neuro-Onc brain mets)  Had been recently started on [place] [employer] and [address] on 12/8/22 due to progression  On Ipi/Nivolomab since 1/2022  Now on Trametinib and Dabrafenib started 12/8/22, continued. Continue scheduled oxycodone, bowel regimen. See above for discussion with Neuro-Onc and Oncology at [address] [place] [address] in regards to safety of AC. NIDDM2: continue ssi for now. Metformin recently discontinued by [facility]. DM diet, change ONS to diabetic supp. Hypothyroidism with MNG: continue synthroid 100mg, Endocrinology seeking further eval with thyroid ultrasound. Free t4 and TSH wnl. Mild hyponatremia: will monitor, could be related to malignancy. Intermittent. BMP in am.   Hx TLS: continue allopurinol  HTN now with hypotension, continue midodrine.    Left arm/hand neuropathy: per OSH, \"osseous mets in C-spine, epidural and intramedullary tumor infiltration in the C- spine, tumor infiltration in the brachial plexus, and radiation induced plexopathy\"       Expected discharge date:  2-3 days     Disposition: pending ablation    ===================================================================    Chief Complaint: Atrial flutter  Subjective (past 24 hours): Patient seen at bedside. No new major symptoms. Was noted to have one-time fever of 101.7 overnight. He denies any symptoms of coughing, subjective fevers, urinary discomfort/pressure, diarrhea constipation, abdominal discomfort. He also denies any new neurologic symptoms overnight and has no bleeding reported since being on heparin drip. Hospital Course/Initial HPI: Patient admitted from [facility] for notation of atrial flutter with RVR. Exam:  /63   Pulse (!) 114   Temp 97.9 °F (36.6 °C) (Oral)   Resp 18   Wt 178 lb 3.2 oz (80.8 kg)   SpO2 99%   BMI 24.00 kg/m²     General: No distress, appears stated age. Chronically ill appearing. Eyes:  PERRL. Conjunctivae/corneas clear. HENT: Head normal appearing. Nares normal. Oral mucosa moist.  Hearing intact. Neck: Supple, with full range of motion. Trachea midline. No gross JVD appreciated. Respiratory:  Normal effort. Clear to auscultation, without rales or wheezes or rhonchi. Cardiovascular:  normal S1/S2 without murmurs. No lower extremity edema. Tachycardic rate, irregularly irregular rhythm  Non-pitting edema LUE. Abdomen: Soft, non-tender, non-distended with normal bowel sounds. Musculoskeletal: No joint swelling or tenderness. Normal tone. No abnormal movements. Skin: Warm and dry. No rashes or lesions. Bruising left eye  Neurologic:  No focal sensory/motor deficits in the upper or lower extremities. Cranial nerves:  grossly non-focal 2-12. Psychiatric: Alert and oriented, normal insight and thought content.      Images    CTA CHEST W WO CONTRAST    Result Date: 12/31/2022   1. No evidence of pulmonary bolus. 2. 1.9 cm pleural-based nodule in the left upper lobe anteriorly. This is concerning for malignancy. Consider PET/CT for further evaluation. 3. Prominent mediastinal adenopathy and bilateral hilar lymphadenopathy. **This report has been created using voice recognition software. It may contain minor errors which are inherent in voice recognition technology. ** Final report electronically signed by Dr. Pelon Escobar MD on 12/31/2022 11:03 AM    XR CHEST PORTABLE    Result Date: 12/31/2022  Stable radiographic appearance of the chest. No evidence of an acute process. **This report has been created using voice recognition software. It may contain minor errors which are inherent in voice recognition technology. ** Final report electronically signed by Dr. Pelon Escobar MD on 12/31/2022 11:20 AM         DVT prophylaxis:    [] Lovenox  [] SCDs  [] SQ Heparin  [] Encourage ambulation   [x] Already on Anticoagulation - heparin drip       Diet: Diet NPO Exceptions are: Sips of Water with Meds  Code Status: Full Code  PT/OT: yes  Tele: yes  IVF: na    Electronically signed by Fabio Edward DO on 1/5/2023 at 11:52 AM

## 2023-01-05 NOTE — PROGRESS NOTES
Warfarin Pharmacy Consult Note    Misha Dunham is a 58 y.o. male for whom pharmacy has been asked to manage warfarin therapy. Consulting Provider: Dr. Chanda Still  Warfarin Indication: A fib/flutter and DVT & PE  Target INR range: 2.0-3.0   Warfarin dose prior to admission: new start  Outpatient warfarin provider: to be determined    Recent Labs     01/04/23  1530   INR 1.07     Recent Labs     01/05/23  0108 01/05/23  0414   HGB 10.3* 9.5*    208     Concurrent anticoagulants/antiplatelets: Lovenox 80 mg bid  Significant warfarin drug-drug interactions: Bactrim (MWF), allopurinol, prednisone, fludrocortisone, levothyroxine, trazodone    Date INR Warfarin Dose   1/4/2023 1.07 -   1/5/2023 - 5 mg                               INR will be monitored routinely until therapeutic INR is achieved. Pharmacy will continue to follow.  Thank you for the consult,

## 2023-01-05 NOTE — PROGRESS NOTES
Cardiology Progress Note      Patient:  Elbert Pompa  YOB: 1960  MRN: [id number]   Acct: [id number]  Admit Date:  12/30/2022  Primary Cardiologist:  none    Note per dr joiner \"Reason for Consultation:  [place] flutter.     Clinical Summary:  62/M was diagnosed with have atrial flutter during his routine physical visit with his primary care physician few days ago.  He was noted to have rapid ventricular rate was admitted to the hospital for further management.  The patient did not report any symptoms and has no prior history of atrial arrhythmias.  Soft blood pressure, systolic 80 to 90 mmHg precluding the use of beta-blockers and calcium blockers for rate control.  He is currently undergoing oral distalization.  History of brain mets anticoagulation was not deemed safe.  He has history of melanoma diagnosed in 1996 treated with surgical resection.  Due to weight loss of about 40 pounds he was diagnosed few months ago to have diffuse metastasis, likely melanoma in his brain as well as bones.  He is currently on chemotherapy.     Denies chest pain, palpitations, shortness of breath.  Does report weakness especially with physical activity.  He walks with the help of a cane.  He is .  Denies smoking or alcohol use. Medcial Hx: HTN, HPL, hx of dermal melanoma dx 1996 => surgical resection and recently noted to have multiple metastasis on chemotherapy at [facility], bicytopenia (anemia an leukopenia), debility undergoing rehab and hx of clostridium difficile infection. \"    Subjective (Events in last 24 hours):   Pt awake and alert.  NAD. No cp or sob.  No complaints  On RA  On heparin gtt      Objective:   BP (!) 90/58   Pulse (!) 102   Temp 98.4 °F (36.9 °C) (Oral)   Resp 20   Wt 178 lb 3.2 oz (80.8 kg)   SpO2 97%   BMI 24.00 kg/m²        TELEMETRY: aflutter low 100s at rest in bed    Physical Exam:  General Appearance: alert and oriented to person, place and time, in no acute  distress  Cardiovascular: tachy rate, regular rhythm, normal S1 and S2, no murmurs, rubs, clicks, or gallops, distal pulses intact, no carotid bruits, no JVD  Pulmonary/Chest: clear to auscultation bilaterally- no wheezes, rales or rhonchi, normal air movement, no respiratory distress  Abdomen: soft, non-tender, non-distended, normal bowel sounds, no masses Extremities: no cyanosis, clubbing or edema, pulse   Skin: warm and dry  Head: normocephalic and atraumatic  Eyes: pupils equal, round, and reactive to light  Neck: supple and non-tender without mass, no thyromegaly   Neurological: alert, oriented, normal speech, no focal findings or movement disorder noted    Medications:    sodium chloride flush  5-40 mL IntraVENous 2 times per day    pantoprazole  40 mg IntraVENous Daily    digoxin  250 mcg Oral Daily    fludrocortisone  0.2 mg Oral Daily    midodrine  5 mg Oral Q8H    allopurinol  300 mg Oral Daily    [Held by provider] aspirin  81 mg Oral Daily    atorvastatin  20 mg Oral Nightly    levothyroxine  100 mcg Oral QAM AC    potassium chloride  40 mEq Oral Daily    predniSONE  10 mg Oral Daily    senna  2 tablet Oral BID    Trametinib Dimethyl Sulfoxide  2 mg Oral Nightly    oxyCODONE  5 mg Oral Q6H    melatonin  6 mg Oral Nightly    sulfamethoxazole-trimethoprim  1 tablet Oral Once per day on Mon Wed Fri    docusate sodium  100 mg Oral BID    lidocaine  1 patch TransDERmal Q24H    naloxegol  12.5 mg Oral QAM AC    Dabrafenib Mesylate  150 mg Oral Q12H    insulin lispro  0-8 Units SubCUTAneous TID WC    insulin lispro  0-4 Units SubCUTAneous Nightly      sodium chloride      sodium chloride      heparin (PORCINE) Infusion 15 Units/kg/hr (01/04/23 0232)    dextrose       sodium chloride flush, 5-40 mL, PRN  sodium chloride, , PRN  oxyCODONE, 5 mg, Q4H PRN   Or  oxyCODONE, 2.5 mg, Q4H PRN  acetaminophen, 650 mg, Q4H PRN  bisacodyl, 10 mg, Daily PRN  magnesium hydroxide, 15 mL, Daily PRN  traZODone, 50 mg, Nightly PRN  polyethylene glycol, 17 g, Daily PRN  biotene, 10 mL, PRN  glucose, 4 tablet, PRN  dextrose bolus, 125 mL, PRN   Or  dextrose bolus, 250 mL, PRN  glucagon (rDNA), 1 mg, PRN  dextrose, , Continuous PRN      Diagnostics:  TTE 12/31/22  Summary   No previous study available for comparison. Normal left ventricular cavity with overall normal systolic function. Ejection fraction is visually estimated at 60-65%. No regional wall motion abnormality. Normal right ventricular size and function. No significant valvular abnormality. No evidence of any pericardial effusion. Signature      ----------------------------------------------------------------   Electronically signed by Mia Toscano MD (Interpreting   physician) on 12/31/2022 at 07:36 PM      Lab Data:    Cardiac Enzymes:  No results for input(s): CKTOTAL, CKMB, CKMBINDEX, TROPONINI in the last 72 hours.     CBC:   Lab Results   Component Value Date/Time    WBC 3.8 01/05/2023 04:14 AM    RBC 3.59 01/05/2023 04:14 AM    RBC 4.79 08/04/2021 07:00 AM    HGB 9.5 01/05/2023 04:14 AM    HCT 30.2 01/05/2023 04:14 AM     01/05/2023 04:14 AM       CMP:    Lab Results   Component Value Date/Time     01/05/2023 01:08 AM    K 4.1 01/05/2023 01:08 AM    K 4.4 12/29/2022 06:21 AM    CL 94 01/05/2023 01:08 AM    CO2 25 01/05/2023 01:08 AM    BUN 14 01/05/2023 01:08 AM    CREATININE 0.4 01/05/2023 01:08 AM    LABGLOM >60 01/05/2023 01:08 AM    GLUCOSE 136 01/05/2023 01:08 AM    GLUCOSE 101 08/04/2021 07:00 AM    CALCIUM 8.6 01/05/2023 01:08 AM       Hepatic Function Panel:    Lab Results   Component Value Date/Time    ALKPHOS 184 12/29/2022 06:21 AM    ALT 19 12/29/2022 06:21 AM    AST 31 12/29/2022 06:21 AM    PROT 6.0 12/29/2022 06:21 AM    BILITOT 0.3 12/29/2022 06:21 AM    LABALBU 3.0 12/29/2022 06:21 AM       Magnesium:    Lab Results   Component Value Date/Time    MG 1.9 01/04/2023 04:11 AM       PT/INR:    Lab Results   Component Value Date/Time    INR 1.07 01/04/2023 03:30 PM       HgBA1c:    Lab Results   Component Value Date/Time    LABA1C 5.8 05/09/2022 01:37 PM    LABA1C 5.8 10/16/2019 03:13 PM       FLP:    Lab Results   Component Value Date/Time    TRIG 144 12/29/2022 06:21 AM    HDL 30 12/29/2022 06:21 AM    LDLCALC 77 12/29/2022 06:21 AM    LABVLDL 39 08/04/2021 07:00 AM       TSH:    Lab Results   Component Value Date/Time    TSH 3.240 12/31/2022 07:09 AM         Assessment:    New onset aflutter rvr - HR low 100s at rest   Chadsvasc = 1  Ef 60-65 per TTE 12/31/22  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  Anemia - attending following  Adrenal insufficiency/hypotension - endo following, on midodrine      Plan:    Keep mag >2 and K >4  Normal TSH  Cont asa/dig  Dig level 1/6/23  Ct head today without bleed  Cont heparin gtt  Discussed with dr Almita Brandon - will proceed for ablation today  Npo  Dr diallo has discussed [address] [place] [address] Road plan with neuro-onc at [facility] agrees to proceed for [address] and ablation    Electronically signed by Garima Vasquez PA-C on 1/5/2023 at 10:22 AM

## 2023-01-05 NOTE — FLOWSHEET NOTE
Inder Bojorquez 60  PHYSICAL THERAPY MISSED TREATMENT NOTE  [facility]STEPDOWN 3B    Date: 2023  Patient Name: Kenyon Shannon        MRN: [id number]   : 1960  (58 y.o.)  Gender: male   Referring Practitioner: Conner Osuna MD  Diagnosis: a flutter         REASON FOR MISSED TREATMENT:  Missed Treat. Patient off the unit at heart cath. Will resume therapy on next appropriate date.

## 2023-01-05 NOTE — PROGRESS NOTES
The patient continues to be in atrial fibrillation with RVR. Low blood pressure precluding optimization of rate control agents. Neurology and oncology services working about anticoagulation in the setting of brain Mets. Started on Heparin yesterday. Clinically stable. If follow up CT head show no evidence of IC bleed the patient should be able to tolerate short term anticoagulation per neuro recommendation. The patients wishes to have catheter ablation over cardioversion and long tern AAD therapy. Risks and benefits dicussed. He wishes to proceed.     Kady Oconnor MD, MRCP, Miguel A Borja on 1/5/2023 at 7:52 AM

## 2023-01-05 NOTE — CONSULTS
Neurology Consult Note    Date:1/4/2023       TAAR:[id number]  Patient Name:Elbert Bronson Precise     YOB: 1960     Age:62 y.o. Requesting Physician: Boneta Holstein, DO     Reason for Consult:  Evaluate for Stroke, anticoagulation       Chief Complaint: Abnormal Imaging    Subjective     Berlin Oneil is a 51-year-old  male with past medical history significant for hyperlipidemia, hypertension, hypothyroidism, malignant melanoma with mets to spine, muscle, brain, spleen, mediastinum, lungs who was in inpatient rehab and subsequently admitted to the hospital with finding of new onset atrial fibrillation. He recently had an MRI brain with and without contrast at [facility] on 12/16/2022 in which there is a question of stroke versus metastatic lesions. Patient is a consult to neurology for question of stroke on MRI and whether or not he would be okay for anticoagulation for his atrial fibrillation. Patient states that he had an MRI brain as he is experiencing left hand numbness recently, also reporting some mild left hand weakness. He reports no visual deficit, balance difficulty, facial droop. Review of Systems   Review of Systems   Constitutional:  Positive for fatigue. Negative for activity change and fever. HENT:  Negative for tinnitus and trouble swallowing. Eyes:  Negative for photophobia and visual disturbance. Respiratory:  Negative for cough and shortness of breath. Cardiovascular:  Positive for palpitations. Negative for chest pain. Gastrointestinal:  Negative for diarrhea, nausea and vomiting. Genitourinary:  Negative for dysuria and flank pain. Musculoskeletal:  Negative for neck pain and neck stiffness. Skin:  Negative for color change and rash. Neurological:  Positive for weakness and numbness. Negative for dizziness, facial asymmetry, speech difficulty and headaches. Psychiatric/Behavioral:  Negative for agitation and confusion. Medications   Scheduled Meds:    digoxin  250 mcg Oral Daily    fludrocortisone  0.2 mg Oral Daily    midodrine  5 mg Oral Q8H    allopurinol  300 mg Oral Daily    [Held by provider] aspirin  81 mg Oral Daily    atorvastatin  20 mg Oral Nightly    levothyroxine  100 mcg Oral QAM AC    potassium chloride  40 mEq Oral Daily    predniSONE  10 mg Oral Daily    senna  2 tablet Oral BID    Trametinib Dimethyl Sulfoxide  2 mg Oral Nightly    oxyCODONE  5 mg Oral Q6H    melatonin  6 mg Oral Nightly    sulfamethoxazole-trimethoprim  1 tablet Oral Once per day on     docusate sodium  100 mg Oral BID    lidocaine  1 patch TransDERmal Q24H    naloxegol  12.5 mg Oral QAM AC    Dabrafenib Mesylate  150 mg Oral Q12H    insulin lispro  0-8 Units SubCUTAneous TID WC    insulin lispro  0-4 Units SubCUTAneous Nightly     Continuous Infusions:    heparin (PORCINE) Infusion 12 Units/kg/hr (23 1620)    dextrose       PRN Meds: oxyCODONE **OR** oxyCODONE, acetaminophen, bisacodyl, magnesium hydroxide, traZODone, polyethylene glycol, biotene, glucose, dextrose bolus **OR** dextrose bolus, glucagon (rDNA), dextrose    Past History    Past Medical History:   has a past medical history of Hyperlipidemia, Hypertension, Hypothyroidism, and Melanoma ([address] Social History:   reports that he has never smoked. He quit smokeless tobacco use about 4 months ago. His smokeless tobacco use included chew and snuff. He reports current alcohol use. He reports that he does not use drugs.      Family History:   Family History   Problem Relation Age of Onset    High Blood Pressure Mother     High Cholesterol Mother     Lung Cancer Father     Mult Sclerosis Sister     Melanoma Brother        Physical Examination      Vitals:  BP 95/61   Pulse (!) 101   Temp 97.5 °F (36.4 °C) (Oral)   Resp 16   Wt 173 lb 14.4 oz (78.9 kg)   SpO2 97%   BMI 23.42 kg/m²   Temp (24hrs), Av.1 °F (36.7 °C), Min:97.5 °F (36.4 °C), Max:98.4 °F (36.9 °C)      I/O (24Hr): Intake/Output Summary (Last 24 hours) at 1/4/2023 2006  Last data filed at 1/4/2023 1342  Gross per 24 hour   Intake 610 ml   Output --   Net 610 ml     Physical Exam  Vitals reviewed. Constitutional:       Appearance: He is ill-appearing. HENT:      Head: Normocephalic and atraumatic. Right Ear: External ear normal.      Left Ear: External ear normal.      Nose: Nose normal.      Mouth/Throat:      Mouth: Mucous membranes are moist.      Pharynx: Oropharynx is clear. Eyes:      Extraocular Movements: Extraocular movements intact and EOM normal.      Pupils: Pupils are equal, round, and reactive to light. Cardiovascular:      Rate and Rhythm: Tachycardia present. Rhythm irregular. Pulmonary:      Effort: Pulmonary effort is normal. No respiratory distress. Abdominal:      General: There is no distension. Palpations: Abdomen is soft. Tenderness: There is no abdominal tenderness. Musculoskeletal:         General: No deformity or signs of injury. Cervical back: No rigidity or tenderness. Skin:     General: Skin is warm and dry. Neurological:      Mental Status: He is alert and oriented to person, place, and time. Coordination: Finger-Nose-Finger Test and Heel to [employer] normal.   Psychiatric:         Mood and Affect: Mood normal.         Speech: Speech normal.         Behavior: Behavior normal.         Thought Content: Thought content normal.     Neurologic Exam     Mental Status   Oriented to person, place, and time. Follows 2 step commands. Attention: normal.   Speech: speech is normal   Level of consciousness: alert  Knowledge: good. Able to name object. Able to read. Able to repeat. Cranial Nerves     CN II   Visual fields full to confrontation. CN III, IV, VI   Pupils are equal, round, and reactive to light. Extraocular motions are normal.     CN V   Facial sensation intact. CN VII   Facial expression full, symmetric.      CN VIII   CN VIII normal.   Hearing: intact    CN IX, X   CN IX normal.   Palate: symmetric    CN XI   CN XI normal.   Right sternocleidomastoid strength: normal  Left sternocleidomastoid strength: normal  Right trapezius strength: normal  Left trapezius strength: normal    CN XII   CN XII normal.   Tongue: not atrophic    Motor Exam   Muscle bulk: normal  Overall muscle tone: normal  Strength:  Right upper extremity 5/5  Left upper extremity 5/5 at the shoulder, 4+/5 at the elbow and   Bilateral lower extremities 5/5     Sensory Exam   Right arm light touch: normal  Left arm light touch: decreased from wrist  Right leg light touch: normal  Left leg light touch: normal    Gait, Coordination, and Reflexes     Coordination   Finger to nose coordination: normal  Heel to shin coordination: normal       Labs/Imaging/Diagnostics   Labs:  CBC:No results for input(s): WBC, RBC, HGB, HCT, MCV, RDW, PLT in the last 72 hours. CHEMISTRIES:  Recent Labs     01/02/23  0907 01/04/23  0411   * 135   K 4.3 4.2   CL 99 97*   CO2 25 25   BUN 10 13   CREATININE 0.3* 0.4   GLUCOSE 88 83   MG 2.0 1.9     PT/INR:  Recent Labs     01/04/23  1530   INR 1.07     APTT:  Recent Labs     01/04/23  1530   APTT 36.1     LIVER PROFILE:No results for input(s): AST, ALT, BILIDIR, BILITOT, ALKPHOS in the last 72 hours. Imaging Last 24 Hours:  CT HEAD WO CONTRAST    Result Date: 1/4/2023  PROCEDURE: CT HEAD WO CONTRAST CLINICAL INFORMATION: Baseline exam prior to consideration for anticoagulation, hx brain mets. COMPARISON: MR brain dated 12/16/2022. TECHNIQUE: Noncontrast 5 mm axial images were obtained through the brain. Sagittal and coronal reconstructions were created. All CT scans at this facility use dose modulation, iterative reconstruction, and/or weight-based dosing when appropriate to reduce radiation dose to as low as reasonably achievable.  FINDINGS: The ventricles, cisterns and sulci are symmetric and normal in size and configuration. Gray-white matter projection appears grossly preserved. No intracranial hemorrhage, mass effect or midline shift is identified. Tiny metastatic lesion seen on previous  MRI are below the level of resolution this exam. The calvarium appears intact. There is diffuse enlargement and thickening of the right lateral rectus muscle to include the tendon, similar to prior MRI. Focal areas of enlargement of other extraocular muscles are also present, better seen on previous MRI. Globes are symmetric and normal in appearance. Paranasal sinuses and mastoid air cells are clear. 1. No CT evidence of acute intracranial abnormality. 2. Focal areas of enlargement of the extraocular muscles bilaterally with more diffuse enlargement of the right lateral rectus muscle, likely on the basis of metastatic disease given previous history. **This report has been created using voice recognition software. It may contain minor errors which are inherent in voice recognition technology. ** Final report electronically signed by Dr. Samira Robles MD on 1/4/2023 12:56 PM    MRI brain with and without contrast from 12/16/2022 from [facility] reviewed with Dr. Sandhu and it is our impression that the imaging does demonstrate multiple enhancing lesions which are suspicious for metastatic disease. Imaging does not demonstrate acute stroke.     Assessment and Plan:        Hospital Problems             Last Modified POA    * (Principal) Atrial flutter ([address] 12/30/2022 Yes    Acquired hypothyroidism 12/31/2022 Yes    Metastatic malignant melanoma ([address] 12/31/2022 Yes    Moderate malnutrition ([address] 12/31/2022 Yes    Adrenal insufficiency ([place]'s disease) ([address] 12/31/2022 Yes       Metastatic melanoma, abnormal MRI brain  MRI brain with and without contrast from 12/16/2022 from [facility] reviewed with Dr. Regina Hobbs and it is our impression that the imaging does demonstrate multiple enhancing lesions which are suspicious for metastatic disease. Imaging does not demonstrate acute stroke. From a stroke perspective this patient is safe to start anticoagulation, there are no acute strokes for which we would be concerned for risk of hemorrhagic conversion. Metastatic melanoma is high risk for bleeding and therefore we defer to recommendations of [facility] neuro-oncology for assessment of bleeding risk for these lesions. No further work-up or recommendations per neurology, we will sign off at this time. Please call with any questions or if we can be of additional assistance. Thank you for this consult. This was discussed with Dr. Irving Inman and he is in agreement with the assessment and plan. Electronically signed by Ileana Ricardo PA-C on 1/4/23 at 8:23 PM EST    I agree with evaluation and plan.     Merlene Sanchez MD

## 2023-01-05 NOTE — H&P
[facility] ()  [address] [place] [id number]  H&P and SEDATION/ANALGESIA     Patient demographics:  Date:   1/5/2023  Patient name: Nicklas Heimlich  YOB: 1960  Sex: male   MRN:   [id number]    Reason for admission or planned procedure:  Atrial flutter ablation. Code Status: Full Code    Consent:The risk and benefits of atrial flutter catheter ablation including bleeding, vascular complications, pericardial tamponade requiring emergency pericardiocentesis or emergency sternotomy and placement of pericardial drain or emergency sternotomy, stroke, MI, death and need for left atrial access including LA ablation and its accompanying complication including phrenic nerve injury, pulmonary vein stenosis, atrio-esophageal fistula,  and recurrent atrial tachy-arrhythmia requiring further ablations and/or initiation of AAD therapy were discussed with the patient. He verbalized understanding of the discussion. Questions answered. He wishes to proceed. Brief clinical summary:  62/M with atrial flutter and rapid ventricular rate,intolerant to medical therapy (low BP) presents to Ep lab for catheter ablation. Medcial Hx: HTN, HPL, hx of dermal melanoma dx 1996 => surgical resection and recently noted to have multiple metastasis on chemotherapy at [address] [place] [address], bicytopenia (anemia an leukopenia), debility undergoing rehab and hx of clostridium difficile infection.      Past Medical History:  Past Medical History:   Diagnosis Date    Hyperlipidemia     In 2010s    Hypertension 2014    Hypothyroidism 2017    Melanoma ([facility] [address]     brain, spine, bone, muscle       Past Surgical History:  Past Surgical History:   Procedure Laterality Date    DOPPLER ECHOCARDIOGRAPHY      LYMPHADENECTOMY Right 11/15/2013    arm pit    MOHS SURGERY      MOHS SURGERY Right 09/17/2021    MOHS REPAIR BCC RIGHT PARANASAL performed by Selma Armas MD at [address] [place] [address] Allergies:    Allergies as of 12/30/2022    (No Known Allergies)     Additional information:       Medications     Current Facility-Administered Medications:     sodium chloride flush 0.9 % injection 5-40 mL, 5-40 mL, IntraVENous, 2 times per day, Denise Snell PA-C    sodium chloride flush 0.9 % injection 5-40 mL, 5-40 mL, IntraVENous, PRN, Denise Snell PA-C    0.9 % sodium chloride infusion, , IntraVENous, PRN, Denise Snell PA-C    pantoprazole (PROTONIX) injection 40 mg, 40 mg, IntraVENous, Daily, Terisa Shorten, DO, 40 mg at 01/05/23 1134    0.9 % sodium chloride infusion, , IntraVENous, Continuous, Timbo Cristobal DO    heparin 25,000 units in dextrose 5% 250 mL (premix) infusion, 5-30 Units/kg/hr, IntraVENous, Continuous, Terisa Shorten, DO, Last Rate: 11.8 mL/hr at 01/04/23 2353, 15 Units/kg/hr at 01/04/23 2353    digoxin (LANOXIN) tablet 250 mcg, 250 mcg, Oral, Daily, Denise Snell PA-C, 250 mcg at 01/05/23 1134    fludrocortisone (FLORINEF) tablet 0.2 mg, 0.2 mg, Oral, Daily, Julio Silverio MD, 0.2 mg at 01/05/23 1133    midodrine (PROAMATINE) tablet 5 mg, 5 mg, Oral, Q8H, Julio Silverio MD, 5 mg at 01/05/23 1141    allopurinol (ZYLOPRIM) tablet 300 mg, 300 mg, Oral, Daily, Seth Vasquez MD, 300 mg at 01/05/23 1133    [Held by provider] aspirin chewable tablet 81 mg, 81 mg, Oral, Daily, Seth Vasquez MD, 81 mg at 01/04/23 0959    atorvastatin (LIPITOR) tablet 20 mg, 20 mg, Oral, Nightly, Seth Vasquez MD, 20 mg at 01/04/23 2103    levothyroxine (SYNTHROID) tablet 100 mcg, 100 mcg, Oral, QAM AC, Seth Vasquez MD, 100 mcg at 01/05/23 0525    potassium chloride (KLOR-CON M) extended release tablet 40 mEq, 40 mEq, Oral, Daily, Seth Vasquez MD, 40 mEq at 01/05/23 1133    oxyCODONE (ROXICODONE) immediate release tablet 5 mg, 5 mg, Oral, Q4H PRN, 5 mg at 01/02/23 1729 **OR** oxyCODONE (ROXICODONE) immediate release tablet 2.5 mg, 2.5 mg, Oral, Q4H PRN, Seth Vasquez MD predniSONE (DELTASONE) tablet 10 mg, 10 mg, Oral, Daily, Iván Joshua MD, 10 mg at 01/05/23 1133    senna (SENOKOT) tablet 17.2 mg, 2 tablet, Oral, BID, Iván Joshua MD, 17.2 mg at 01/05/23 1133    Trametinib Dimethyl Sulfoxide TABS 2 mg (Patient Supplied), 2 mg, Oral, Nightly, Iván Joshua MD, 2 mg at 01/04/23 2105    acetaminophen (TYLENOL) tablet 650 mg, 650 mg, Oral, Q4H PRN, Iván Joshua MD    oxyCODONE (ROXICODONE) immediate release tablet 5 mg, 5 mg, Oral, Q6H, Iván Joshua MD, 5 mg at 01/05/23 1133    melatonin tablet 6 mg, 6 mg, Oral, Nightly, Iván Joshua MD, 6 mg at 01/04/23 2103    sulfamethoxazole-trimethoprim (BACTRIM DS;SEPTRA DS) 800-160 MG per tablet 1 tablet, 1 tablet, Oral, Once per day on Mon Wed Fri, Iván Joshua MD, 1 tablet at 01/04/23 9427    docusate sodium (COLACE) capsule 100 mg, 100 mg, Oral, BID, Iván Joshua MD, 100 mg at 01/05/23 1133    bisacodyl (DULCOLAX) suppository 10 mg, 10 mg, Rectal, Daily PRN, Iván Joshua MD    magnesium hydroxide (MILK OF MAGNESIA) 400 MG/5ML suspension 15 mL, 15 mL, Oral, Daily PRN, Iván Joshua MD    traZODone (DESYREL) tablet 50 mg, 50 mg, Oral, Nightly PRN, Iván Joshua MD    polyethylene glycol [facility]) packet 17 g, 17 g, Oral, Daily PRN, Iván Joshua MD    lidocaine 4 % external patch 1 patch, 1 patch, TransDERmal, Q24H, Iván Joshua MD, 1 patch at 01/01/23 1105    naloxegol ([facility]) tablet 12.5 mg, 12.5 mg, Oral, QAM AC, Iván Joshua MD, 12.5 mg at 01/05/23 1815    Dabrafenib Mesylate CAPS 150 mg (Patient Supplied), 150 mg, Oral, Q12H, Iván Joshua MD, 150 mg at 01/05/23 1137    biotene oral solution, 10 mL, Swish & Spit, PRN, Iván Joshua MD    insulin lispro (HUMALOG) injection vial 0-8 Units, 0-8 Units, SubCUTAneous, TID WC, Maggy Weinstein PA-C, 2 Units at 01/04/23 1726    insulin lispro (HUMALOG) injection vial 0-4 Units, 0-4 Units, SubCUTAneous, Nightly, MIKAL ClevelandC glucose chewable tablet 16 g, 4 tablet, Oral, PRN, Maggy Weinstein, PA-C    dextrose bolus 10% 125 mL, 125 mL, IntraVENous, PRN **OR** dextrose bolus 10% 250 mL, 250 mL, IntraVENous, PRN, Maggy RAMONA Naylor-OLENA    glucagon (rDNA) injection 1 mg, 1 mg, SubCUTAneous, PRN, [employer], PA-C    dextrose 10 % infusion, , IntraVENous, Continuous PRN, [employer], PA-C  Prior to Admission medications    Medication Sig Start Date End Date Taking? Authorizing Provider   lisinopril (PRINIVIL;ZESTRIL) 5 MG tablet TAKE 1 TABLET BY MOUTH EVERY DAY 5/9/22 Jacqulin Prior, APRN - CNP   atorvastatin (LIPITOR) 20 MG tablet TAKE 1 TABLET BY MOUTH EVERY DAY 5/9/22 Jacqulin Prior, APRN - CNP   metFORMIN (GLUCOPHAGE) 500 MG tablet Take 1 tablet by mouth 2 times daily (with meals) 5/9/22 Jacqulin Prior, APRN - CNP   aspirin (ASPIRIN LOW DOSE) 81 MG EC tablet TAKE 1 TABLET BY MOUTH DAILY 5/6/21 Jacqulin Prior, APRN - CNP   ACCU-CHEK SOFTCLIX LANCETS MISC 1 Device by Does not apply route 2 times daily 10/16/19   Jacqulin Prior, APRN - CNP   blood glucose monitor kit and supplies Test once a day & as needed for symptoms of irregular blood glucose. Please dispense all supplies strips and lancets 10/16/19   Jacqulin Prior, APRN - CNP   blood glucose monitor strips Test bid & as needed for symptoms of irregular blood glucose.  10/16/19   Jacqulin Prior, APRN - CNP   sildenafil (VIAGRA) 50 MG tablet Take 1/2 tablet one hour prior to sexual activity 10/16/19   Jacqulin Prior, APRN - CNP   pembrolizumab (Chris Pi) 50 MG SOLR chemo injection Infuse intravenously    Historical Provider, MD     Aspirin  [] 81 mg  [] 325 mg  [x] None  Antiplatelet drug therapy use last 5 days  [x]No []Yes  Coumadin Use Last 5 Days [x]No []Yes  Other anticoagulant use last 5 days  []No [x]Yes  Additional information: Per medical records       Vitals:   Vitals:    01/05/23 1118   BP: 107/63   Pulse: (!) 114   Resp: 18   Temp: 97.9 °F (36.6 °C)   SpO2: 99%       Physical Examination:   GENERAL: Alert and oriented. No distress. EYES: No pallor or icterus. ENT: No central cyanosis. VESSELS: No jugular venous distension or carotid bruits. HEART: Irregular S1/S2. No murmur, rub or gallop. LUNGS: Clear to auscultation. ABDOMEN: Soft and non-tender. EXTREMITIES: No lower extremity edema. Feet are warm. NEUROLOGICAL: Grossly intact. Procedure Plannd:   [] AF ablation [x] Atrial Flutter ablation [] SVT ablation [] PVC/VT ablation [x] EP study  [] Pacemaker implantation [] AICD implantation [] BiV PM/ICD implantation   [] Generator change [] Loop recorder implantation [] Loop recorder explantation. [] Micra leadless pacemaker implantation. [] His bundle/Left bundle pacemaker implantation. [] Lead revision. [] Pocket Revision. [] DON. [] Cardioversion    []Other:       Planned Sedation/Analgesia:  [x] General anesthesia.   [] Midazolam [] Fentanyl [] Propofol [] Propofol with anesthesia team.    []Midazolam []Meperidine []Sublimaze []Morphine [] Diazepam    []Other:       MD MD Demetrius Jhaveri  Electronically signed 1/5/2023 at 12:50 PM

## 2023-01-05 NOTE — PLAN OF CARE
Problem: Discharge Planning  Goal: Discharge to home or other facility with appropriate resources  Outcome: Progressing  Flowsheets (Taken 1/5/2023 0442)  Discharge to home or other facility with appropriate resources:   Identify barriers to discharge with patient and caregiver   Arrange for needed discharge resources and transportation as appropriate     Problem: Skin/Tissue Integrity  Goal: Absence of new skin breakdown  Description: 1. Monitor for areas of redness and/or skin breakdown  2. Assess vascular access sites hourly  3. Every 4-6 hours minimum:  Change oxygen saturation probe site  4. Every 4-6 hours:  If on nasal continuous positive airway pressure, respiratory therapy assess nares and determine need for appliance change or resting period.   Outcome: Progressing     Problem: Safety - Adult  Goal: Free from fall injury  Outcome: Progressing  Flowsheets (Taken 1/5/2023 0442)  Free From Fall Injury: Instruct family/caregiver on patient safety     Problem: ABCDS Injury Assessment  Goal: Absence of physical injury  Outcome: Progressing  Flowsheets (Taken 1/5/2023 0442)  Absence of Physical Injury: Implement safety measures based on patient assessment     Problem: Pain  Goal: Verbalizes/displays adequate comfort level or baseline comfort level  Outcome: Progressing  Flowsheets (Taken 1/5/2023 0442)  Verbalizes/displays adequate comfort level or baseline comfort level:   Encourage patient to monitor pain and request assistance   Assess pain using appropriate pain scale     Problem: Cardiovascular - Adult  Goal: Maintains optimal cardiac output and hemodynamic stability  Outcome: Progressing  Flowsheets (Taken 1/5/2023 0442)  Maintains optimal cardiac output and hemodynamic stability: Monitor blood pressure and heart rate     Problem: Infection - Adult  Goal: Absence of infection at discharge  Outcome: Progressing  Flowsheets (Taken 1/5/2023 0442)  Absence of infection at discharge: Assess and monitor for signs and symptoms of infection     Care plan reviewed with patient. Patient verbalizes understanding of the care plan and contributed to goal setting.

## 2023-01-05 NOTE — PROCEDURES
[facility] [place]  Dept: [phone]  CARDIAC ELECTROPHYSIOLOGY: PROCEDURE NOTE  Patients Demographics:  Date:   1/5/2023  Patient name:              Rowena Martin  YOB: 1960  Sex:    male   MRN:   [id number]    Cardiac Electrophysiologist:  Karen Vaughan MD, MRCP, [facility] [place], [facility]    Primary Care provider:    DEBBIE Vaca - CNP     Procedure Planned:  Typical atrial flutter catheter ablation. Indication/s of the Procedure:  Drug intolerant typical atrial flutter with rapid ventricular rate. Post operative diagnosis:  Typical (counter clockwise) atrial flutter  Right internal jugular vein thrombosis. Small preexisting pericardial effusion. Brief Medical History:  62/M with atrial flutter and rapid ventricular rate,intolerant to medical therapy (low BP) presents to Ep lab for catheter ablation. Medcial Hx: HTN, HPL, hx of dermal melanoma dx 1996 => surgical resection and recently noted to have multiple metastasis on chemotherapy at [address] [place] [address], bicytopenia (anemia an leukopenia), debility undergoing rehab and hx of clostridium difficile infection. Procedure/s Performed:  Right and left atrial pacing and recording,  3D electro anatomical mapping (CARTO). Cavo-tricuspid isthmus radiofrequency catheter ablation. Electrophysiology study. Description of the Procedure: The patient was brought to the electrophysiology laboratory is a fasting and non-sedated state. General anesthesia was administered by anesthesia service. Transesophageal echocardiography was performed to exclude left atrial clots. The patient was prepped and draped in the usual sterile fashion. Intravenous Fentanyl and Midazolam as needed was administered for analgesia and conscious sedation. Lidocaine, 2% was injected into the right femoral region and right side of the neck for local anesthesia.   Vascular access was obtained under ultrasound guidance and hemostatic short sheaths placed over the guidewires (9-Anguillan, 8-Anguillan and 7-Anguillan in the right femoral vein and 11-Anguillan in left femoral vein vein). A thrombus was noted in right internal jugular vein during an attempt to advance micropuncture guidewire in the vein. A 3D geometry of right atrium and coronary sinus  was created using 3.5 mm irrigated tip mapping/ablation (STSF D/F curve) catheter. A 7-Anguillan dual curve catheter decapolar catheter was advanced through the right femoral vein and positioned in coronary sinus. A 10 Anguillan phased array ultrasound catheter was advanced from the left femoral vein for intracardiac echocardiography. Baseline scanning showed small circumferential pericardial effusion. No left atrial clots were noted. At bassline the patient was in atrial flutter with cycle length of 220 ms. The activation pattern on diagnostic catheters, activation mapping and entrainment mapping were suggestive of  typical (counter clockwise) atrial flutter. The 7-Anguillan short sheath in right femoral vein was was exchanged with  an 8.5-Anguillan, long steerable sheath (CampEasy) through which the ablation catheter was advanced and positioned across the cavo-tricuspid isthmus (CTI) at 6 o'clock position in HECTOR projection. Linear ablation using radio-frequency energy was delivered along the CTI that resulted in termination of flutter with bidirectional CTI block during the first pass. Post ablation CTI conduction time was at 156 ms. Bi-directional block across CTI was confirmed by differential pacing after 20 minutes of waiting period. Programmed stimulation was performed and no tachycardia induced. Post ablation, sinus cycle length was 852 ms,  ms, QRSd 119 ms, QT interval 420 ms, AH interval 99 ms, HV interval 56 ms, AV Wenckebach 510 ms and AV node /600. Corrected sinus node recovery time was 430 ms.       At the end of procedure the catheters and sheaths were removed. Hemostasis was achieved by figure of 8 stitch (groin) and manual compression. The access sites were covered by a light sterile dressing. Ablation Summary:  Total number of RF lesions 16 and total RF time 4.44, maximum energy used 40 bob, mean catheter tip temperature 32 degree C. Medications:   General anesthesia. Lidocaine 10 cc SQ for local anesthesia     Fluoroscopy time:  Zero. Blood loss:  Minimal.     Complications:  None. Fluid Balance (IN and OUT):  800/0 cc. Summary:  Typical (counter clockwise atrial flutter). Successful cavo-tricuspid isthmus radiofrequency ablation with bidirectional block. Normal AV node and His-Purkinje function. Right internal jugular vein thrombus. Small preexisting pericardial effusion. Recommendaions:   Observation for 6 hours. Start anticoagulation with warfarin with LMW heparin bridge till INR is 2 or above. Post-procedure care per protocol.         Electronically signed by Cornelio Abarca MD, Faith Blanks on 1/5/2023 at 4:30 PM

## 2023-01-05 NOTE — ANESTHESIA POSTPROCEDURE EVALUATION
Department of Anesthesiology  Postprocedure Note    Patient: Samuel Lopez  MRN: [id number]  YOB: 1960  Date of evaluation: 1/5/2023      Procedure Summary     Date: 01/05/23 Room / Location: [facility] CATH LAB    Anesthesia Start: 8097 Anesthesia Stop: [phone]    Procedure: STR ABLATION WITH ANESTHESIA Diagnosis:     Scheduled Providers:  Responsible Provider: Darylene Been, MD    Anesthesia Type: general ASA Status: 3          Anesthesia Type: No value filed. Walt Phase I: Walt Score: 10    Walt Phase II:        Anesthesia Post Evaluation    Patient location during evaluation: PACU  Patient participation: complete - patient participated  Level of consciousness: awake and alert  Airway patency: patent  Nausea & Vomiting: no nausea and no vomiting  Complications: no  Cardiovascular status: hemodynamically stable  Respiratory status: acceptable  Hydration status: euvolemic      [address]  POST-ANESTHESIA NOTE       Name:  Samuel Lopez                                         Age:  58 y.o.   MRN:  [id number]      Last Vitals:  BP (!) (P) 83/56   Pulse (P) 85   Temp (P) 98 °F (36.7 °C) (Oral)   Resp (P) 16   Wt 178 lb 3.2 oz (80.8 kg)   SpO2 (P) 96%   BMI 24.00 kg/m²   Patient Vitals for the past 4 hrs:   BP Temp Temp src Pulse Resp SpO2   01/05/23 1730 (!) (P) 83/56 (P) 98 °F (36.7 °C) (P) Oral (P) 85 (P) 16 (P) 96 %   01/05/23 1715 (!) (P) 87/56 (P) 98 °F (36.7 °C) (P) Oral (P) 87 (P) 16 (P) 95 %   01/05/23 1705 (!) 87/51 -- -- 90 14 97 %   01/05/23 1700 (!) 87/54 -- -- 90 13 98 %   01/05/23 1655 (!) 87/50 -- -- 90 19 95 %   01/05/23 1650 (!) 83/51 -- -- 91 21 96 %   01/05/23 1645 (!) 84/51 -- -- 91 24 95 %   01/05/23 1640 (!) 82/50 -- -- 91 28 97 %   01/05/23 1635 (!) 84/51 -- -- 91 19 97 %   01/05/23 1630 (!) 83/50 -- -- 91 24 97 %   01/05/23 1625 (!) 85/51 -- -- 91 26 98 %   01/05/23 1620 (!) 84/51 -- -- 91 20 95 %   01/05/23 1615 (!) 82/49 -- -- 91 20 95 % 01/05/23 1610 (!) 86/51 -- -- 91 20 95 %   01/05/23 1609 (!) 86/51 98.6 °F (37 °C) Temporal 91 20 93 %       Level of Consciousness:  Awake    Respiratory:  Stable    Oxygen Saturation:  Stable    Cardiovascular:  Stable    Hydration:  Adequate    PONV:  Stable    Post-op Pain:  Adequate analgesia    Post-op Assessment:  No apparent anesthetic complications    Additional Follow-Up / Treatment / Comment:  None    Silvia Monsalve MD  January 5, 2023   5:46 PM

## 2023-01-05 NOTE — PROGRESS NOTES
1609- Pt to PACU from Cath Lab. Bilateral groin sites drsg C/D/I no signs of bleeding and/or hematoma noted. SBP in 80s, Mathur at bedside VO for 500ml bolus. Pt oriented and asymptomatic. No acute distress noted at this time. 1615- Pt denies pain and nausea. VSS on RA    1630- Resting with eyes closed. No acute distress noted. 1645- Pt denies pain and nausea. VSS on RA, no acute distress noted.      1700- Pt met PACU discharge criteria, called report to [employer]

## 2023-01-06 PROBLEM — I95.89 CHRONIC HYPOTENSION: Status: ACTIVE | Noted: 2023-01-06

## 2023-01-06 LAB
ANION GAP SERPL CALCULATED.3IONS-SCNC: 10 MEQ/L (ref 8–16)
BUN BLDV-MCNC: 17 MG/DL (ref 7–22)
CALCIUM SERPL-MCNC: 7.6 MG/DL (ref 8.5–10.5)
CHLORIDE BLD-SCNC: 98 MEQ/L (ref 98–111)
CO2: 23 MEQ/L (ref 23–33)
CREAT SERPL-MCNC: 0.4 MG/DL (ref 0.4–1.2)
DIGOXIN LEVEL: 0.7 NG/ML (ref 0.5–2)
GFR SERPL CREATININE-BSD FRML MDRD: > 60 ML/MIN/1.73M2
GLUCOSE BLD-MCNC: 110 MG/DL (ref 70–108)
GLUCOSE BLD-MCNC: 123 MG/DL (ref 70–108)
GLUCOSE BLD-MCNC: 184 MG/DL (ref 70–108)
GLUCOSE BLD-MCNC: 198 MG/DL (ref 70–108)
GLUCOSE BLD-MCNC: 224 MG/DL (ref 70–108)
HCT VFR BLD CALC: 27.4 % (ref 42–52)
HEMOGLOBIN: 8.5 GM/DL (ref 14–18)
HEPARIN UNFRACTIONATED: 0.16 U/ML (ref 0.3–0.7)
HEPARIN UNFRACTIONATED: 0.18 U/ML (ref 0.3–0.7)
HEPARIN UNFRACTIONATED: 0.31 U/ML (ref 0.3–0.7)
HEPARIN UNFRACTIONATED: 0.33 U/ML (ref 0.3–0.7)
INR BLD: 1.24 (ref 0.85–1.13)
POTASSIUM SERPL-SCNC: 4.1 MEQ/L (ref 3.5–5.2)
SODIUM BLD-SCNC: 131 MEQ/L (ref 135–145)

## 2023-01-06 PROCEDURE — 36415 COLL VENOUS BLD VENIPUNCTURE: CPT

## 2023-01-06 PROCEDURE — 6370000000 HC RX 637 (ALT 250 FOR IP): Performed by: PHYSICIAN ASSISTANT

## 2023-01-06 PROCEDURE — 6360000002 HC RX W HCPCS: Performed by: INTERNAL MEDICINE

## 2023-01-06 PROCEDURE — 99232 SBSQ HOSP IP/OBS MODERATE 35: CPT | Performed by: INTERNAL MEDICINE

## 2023-01-06 PROCEDURE — 82948 REAGENT STRIP/BLOOD GLUCOSE: CPT

## 2023-01-06 PROCEDURE — 85014 HEMATOCRIT: CPT

## 2023-01-06 PROCEDURE — 6370000000 HC RX 637 (ALT 250 FOR IP): Performed by: PHARMACIST

## 2023-01-06 PROCEDURE — 85520 HEPARIN ASSAY: CPT

## 2023-01-06 PROCEDURE — 80162 ASSAY OF DIGOXIN TOTAL: CPT

## 2023-01-06 PROCEDURE — 85018 HEMOGLOBIN: CPT

## 2023-01-06 PROCEDURE — 85610 PROTHROMBIN TIME: CPT

## 2023-01-06 PROCEDURE — 80048 BASIC METABOLIC PNL TOTAL CA: CPT

## 2023-01-06 PROCEDURE — 6370000000 HC RX 637 (ALT 250 FOR IP): Performed by: PHYSICAL MEDICINE & REHABILITATION

## 2023-01-06 PROCEDURE — 2140000000 HC CCU INTERMEDIATE R&B

## 2023-01-06 PROCEDURE — 97530 THERAPEUTIC ACTIVITIES: CPT

## 2023-01-06 PROCEDURE — 6370000000 HC RX 637 (ALT 250 FOR IP): Performed by: INTERNAL MEDICINE

## 2023-01-06 RX ORDER — PANTOPRAZOLE SODIUM 40 MG/1
40 TABLET, DELAYED RELEASE ORAL
Status: DISCONTINUED | OUTPATIENT
Start: 2023-01-06 | End: 2023-01-08 | Stop reason: HOSPADM

## 2023-01-06 RX ORDER — WARFARIN SODIUM 3 MG/1
3 TABLET ORAL ONCE
Status: COMPLETED | OUTPATIENT
Start: 2023-01-06 | End: 2023-01-06

## 2023-01-06 RX ORDER — MIDODRINE HYDROCHLORIDE 10 MG/1
10 TABLET ORAL EVERY 8 HOURS
Status: DISCONTINUED | OUTPATIENT
Start: 2023-01-06 | End: 2023-01-08 | Stop reason: HOSPADM

## 2023-01-06 RX ADMIN — WARFARIN SODIUM 3 MG: 3 TABLET ORAL at 17:38

## 2023-01-06 RX ADMIN — SULFAMETHOXAZOLE AND TRIMETHOPRIM 1 TABLET: 800; 160 TABLET ORAL at 09:22

## 2023-01-06 RX ADMIN — LEVOTHYROXINE SODIUM 100 MCG: 0.1 TABLET ORAL at 05:28

## 2023-01-06 RX ADMIN — PREDNISONE 10 MG: 10 TABLET ORAL at 09:22

## 2023-01-06 RX ADMIN — SENNOSIDES 17.2 MG: 8.6 TABLET, FILM COATED ORAL at 09:22

## 2023-01-06 RX ADMIN — POTASSIUM CHLORIDE 40 MEQ: 1500 TABLET, EXTENDED RELEASE ORAL at 09:22

## 2023-01-06 RX ADMIN — MIDODRINE HYDROCHLORIDE 10 MG: 5 TABLET ORAL at 11:09

## 2023-01-06 RX ADMIN — MIDODRINE HYDROCHLORIDE 5 MG: 5 TABLET ORAL at 04:01

## 2023-01-06 RX ADMIN — Medication 6 MG: at 21:26

## 2023-01-06 RX ADMIN — MIDODRINE HYDROCHLORIDE 10 MG: 5 TABLET ORAL at 21:26

## 2023-01-06 RX ADMIN — INSULIN LISPRO 2 UNITS: 100 INJECTION, SOLUTION INTRAVENOUS; SUBCUTANEOUS at 17:37

## 2023-01-06 RX ADMIN — SENNOSIDES 17.2 MG: 8.6 TABLET, FILM COATED ORAL at 21:26

## 2023-01-06 RX ADMIN — DOCUSATE SODIUM 100 MG: 100 CAPSULE, LIQUID FILLED ORAL at 09:22

## 2023-01-06 RX ADMIN — OXYCODONE 5 MG: 5 TABLET ORAL at 23:49

## 2023-01-06 RX ADMIN — ATORVASTATIN CALCIUM 20 MG: 20 TABLET, FILM COATED ORAL at 21:26

## 2023-01-06 RX ADMIN — FLUDROCORTISONE ACETATE 0.2 MG: 0.1 TABLET ORAL at 09:22

## 2023-01-06 RX ADMIN — NALOXEGOL OXALATE 12.5 MG: 12.5 TABLET, FILM COATED ORAL at 05:28

## 2023-01-06 RX ADMIN — HEPARIN SODIUM 2000 UNITS: 1000 INJECTION, SOLUTION INTRAVENOUS; SUBCUTANEOUS at 05:35

## 2023-01-06 RX ADMIN — DOCUSATE SODIUM 100 MG: 100 CAPSULE, LIQUID FILLED ORAL at 21:26

## 2023-01-06 RX ADMIN — HEPARIN SODIUM 14 UNITS/KG/HR: 10000 INJECTION, SOLUTION INTRAVENOUS at 21:33

## 2023-01-06 RX ADMIN — PANTOPRAZOLE SODIUM 40 MG: 40 TABLET, DELAYED RELEASE ORAL at 09:22

## 2023-01-06 RX ADMIN — ALLOPURINOL 300 MG: 300 TABLET ORAL at 09:22

## 2023-01-06 ASSESSMENT — PAIN SCALES - GENERAL
PAINLEVEL_OUTOF10: 0
PAINLEVEL_OUTOF10: 8

## 2023-01-06 ASSESSMENT — PAIN DESCRIPTION - LOCATION: LOCATION: BACK

## 2023-01-06 ASSESSMENT — PAIN DESCRIPTION - ORIENTATION: ORIENTATION: LOWER

## 2023-01-06 NOTE — PROGRESS NOTES
Assessment and Plan:        aflutter: ablation 1.5; so far maintaining nsr; on heparin/coumadin  Hypotension- boost midodrine  Melanoma- multiple mets including brain- being treated at [facility]. Makes anticoagulation somewhat difficult. Hopefully can be withdrawn at some point  Right IJ thrombus- on anticoagulationd      Left arm weakness:  has right cerebral metastasis. CC:  fast heart beat  HPI:  pt with metastatic melanoma, transferred to our rehab; fast heart beat noted and he was transferred to . Was in aflutter; ablated. Undergoing anticoagulation. Hr of 120 noted at [facility], suspect he was in flutter then. ROS (12 point review of systems completed. Pertinent positives noted.  Otherwise ROS is negative) :     PMH:  Per HPI  SHX:    FHX: hbp, melanoma, high cholesterol  Allergies: See above    Medications:     sodium chloride      heparin (PORCINE) Infusion 14 Units/kg/hr (01/06/23 0536)    dextrose        midodrine  10 mg Oral Q8H    pantoprazole  40 mg Oral QAM AC    sodium chloride flush  5-40 mL IntraVENous 2 times per day    warfarin placeholder: dosing by pharmacy   Other RX Placeholder    fludrocortisone  0.2 mg Oral Daily    allopurinol  300 mg Oral Daily    atorvastatin  20 mg Oral Nightly    levothyroxine  100 mcg Oral QAM AC    potassium chloride  40 mEq Oral Daily    predniSONE  10 mg Oral Daily    senna  2 tablet Oral BID    Trametinib Dimethyl Sulfoxide  2 mg Oral Nightly    oxyCODONE  5 mg Oral Q6H    melatonin  6 mg Oral Nightly    sulfamethoxazole-trimethoprim  1 tablet Oral Once per day on Mon Wed Fri    docusate sodium  100 mg Oral BID    lidocaine  1 patch TransDERmal Q24H    naloxegol  12.5 mg Oral QAM AC    Dabrafenib Mesylate  150 mg Oral Q12H    insulin lispro  0-8 Units SubCUTAneous TID     insulin lispro  0-4 Units SubCUTAneous Nightly       Vital Signs:   BP (!) 85/57 Comment: per Dr Janis Anne is okay with now, if map gets below 60 message   Pulse 87   Temp 99 °F (37.2 °C) (Oral)   Resp 18   Wt 178 lb 3.2 oz (80.8 kg)   SpO2 99%   BMI 24.00 kg/m²      Intake/Output Summary (Last 24 hours) at 1/6/2023 0536  Last data filed at 1/5/2023 1655  Gross per 24 hour   Intake 1500 ml   Output 290 ml   Net 1210 ml        Physical Examination: General appearance - chronically ill appearing  Mental status - alert, oriented to person, place, and time  Neck - supple, no significant adenopathy, no JVD, or carotid bruits  Chest - clear to auscultation, no wheezes, rales or rhonchi, symmetric air entry  Heart - normal rate, regular rhythm, normal S1, S2, no murmurs, rubs, clicks or gallops  Abdomen - soft, nontender, nondistended, no masses or organomegaly  Neurological - left  slightly weak  Musculoskeletal - no joint tenderness, deformity or swelling  Extremities - peripheral pulses normal, no pedal edema, no clubbing or cyanosis  Skin - normal coloration and turgor, no rashes, no suspicious skin lesions noted    Data: (All radiographs, tracings, PFTs, and imaging are personally viewed and interpreted unless otherwise noted).          Electronically signed by Monroe Ng MD on 1/6/2023 at 5:36 AM

## 2023-01-06 NOTE — PLAN OF CARE
Problem: Discharge Planning  Goal: Discharge to home or other facility with appropriate resources  Outcome: Progressing  Flowsheets (Taken 1/6/2023 3408)  Discharge to home or other facility with appropriate resources:   Identify barriers to discharge with patient and caregiver   Arrange for needed discharge resources and transportation as appropriate   Identify discharge learning needs (meds, wound care, etc)   Refer to discharge planning if patient needs post-hospital services based on physician order or complex needs related to functional status, cognitive ability or social support system     Problem: Skin/Tissue Integrity  Goal: Absence of new skin breakdown  Description: 1. Monitor for areas of redness and/or skin breakdown  2. Assess vascular access sites hourly  3. Every 4-6 hours minimum:  Change oxygen saturation probe site  4. Every 4-6 hours:  If on nasal continuous positive airway pressure, respiratory therapy assess nares and determine need for appliance change or resting period. Outcome: Progressing     Problem: Safety - Adult  Goal: Free from fall injury  Outcome: Progressing  Flowsheets (Taken 1/5/2023 0442 by Vlad Schwartz RN)  Free From Fall Injury: Instruct family/caregiver on patient safety     Problem: ABCDS Injury Assessment  Goal: Absence of physical injury  Outcome: Progressing  Flowsheets (Taken 1/6/2023 1616)  Absence of Physical Injury: Implement safety measures based on patient assessment  Care plan reviewed with patient. Patient verbalizes understanding of the care plan and contributed to goal setting.

## 2023-01-06 NOTE — PROGRESS NOTES
[facility]  INPATIENT PHYSICAL THERAPY  DAILY NOTE  STRZ CCU-STEPDOWN 3B - 3B-33/033-A    Time In: 0848  Time Out: 7898  Timed Code Treatment Minutes: 9 Minutes  Minutes: 9          Date: 2023  Patient Name: Portillo Doran,  Gender:  male        MRN: [id number]  : 1960  (58 y.o.)     Referring Practitioner: Enzo Kirby MD  Diagnosis: a flutter  Additional Pertinent Hx: Portillo Doran is a 58 y.o. right-handed male with history of HTN,  hyperlipidemia, recurrent multiple primary melanomas (90,78, ) including a S3tC2hO5 melanoma of the left flank s/p excision with left axillary lymph node dissection, is admitted on 2022 for intensive inpatient management of impairment & disability secondary to debility/physical decondition secondary to extensive multiple metastatic melanoma to his brain, cervical spine, musculature, and brachial plexus. The patient presented to [address] [place] [address] clinic on 12/3/2022 with progressive weakness, dysphagia and facial drooping. 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 MRI of cervical spine done on 2022 revealed diffuse osseous metastasis disease in cervical spine and upper thoracic spine, mild right ventral epidural tumor at C7 extending into right C7-T1 neuroforamen, intramedullary enhancing lesion within central cervical spinal cord at C5-6 and right C6-7 cervical spinal cord. Repeated MRI of brain done on 2022 revealed enhancing lesions in right frontal lobe and throughout the supratentorial and infratentorial brain, within the right  musculature, bilateral temporalis musculature, bilateral extraocular musculature, and within the calvarium and skull base.   Because of extensive metastatic malignancy, oral chemotherapywas started on 12/8/2022. venous duplex study was done on 12/16/2022 and showed no acute DVT but acute superficial venous thrombosis in RUE. MRI of brachial plexus done on 12/22/2022 show extensive metastatic disease involving osseous structures, multiple muscles including bilateral scapular intrinsic muscles and paraspinal muscles. The LUE weakness and edema was thought to be due to tumor infiltrating brachial plexus and radiation induced plexopathy. His hospital course was also complicated by tumor lysis syndrome, acute renal failure, and dysphagia with severe protein calorie malnutrition. Pt transferred [facility] IPR unit to [facility] on 12/30 due to atrial fibrillation. Prior Level of Function:  Lives With: Alone  Type of Home: House  Home Layout: One level  Home Access: Stairs to enter without rails  Entrance Stairs - Number of Steps: 1  Home Equipment: Cane   Bathroom Shower/Tub: Walk-in shower  Bathroom Toilet: Handicap height  Bathroom Equipment: Grab bars in shower (suction cup grab bar in shower)    Receives Help From: Family  ADL Assistance: [address] [place] [address] Avenue: Independent  Ambulation Assistance: Independent  Transfer Assistance: Independent  Active : Yes  Additional Comments: Patient reports prior to hospital admission patient was independent with all ADL's/IADL's without use of AD for mobility. 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 Patient reports he plans to go to his mothers house for a few weeks( 1 story home, walk in shower, handicap toilets, grab bars in bathroom) after discharge. Patient reports his mother is able to help with tasks in home. Patient reports his mother has a shower chair in her shower. Restrictions/Precautions:  Restrictions/Precautions: Contact Precautions     SUBJECTIVE: RN approved session.  Patient laying in bed upon arrival and agreeable to transfer to chair for breakfast.    PAIN: not rated, soreness from procedure yesterday    Vitals: Vitals not assessed per clinical judgement, see nursing flowsheet    OBJECTIVE:  Bed Mobility:  Supine to Sit: Stand By Assistance, with head of bed raised, with rail    Transfers:  Sit to Stand: Stand By Assistance  Stand to Sit:Stand By Assistance    Ambulation:  Stand By Assistance  Distance: 8ft, 6ft  Surface: Level Tile  Device:No Device  Gait Deviations: Forward Flexed Posture, Slow Gretchen, Decreased Step Length Bilaterally, and Decreased Gait Speed    Balance:  Dynamic Standing Balance: Supervision    Exercise:  None this session. Functional Outcome Measures: Completed  AM-PAC Inpatient Mobility Raw Score : 19  -PAC Inpatient T-Scale Score : 45.44    ASSESSMENT:  Assessment: Patient progressing toward established goals. Activity Tolerance:  Patient tolerance of  treatment: good. Equipment Recommendations: Other: cont to assess needs  Discharge Recommendations: Home with Assist as Needed  Plan: Current Treatment Recommendations: Strengthening, ROM, Balance training, Functional mobility training, Transfer training, Endurance training, Gait training, Stair training, Neuromuscular re-education, Safety education & training, Patient/Caregiver education & training, Equipment evaluation, education, & procurement, Therapeutic activities  General Plan:  (5X GM)    Patient Education  Patient Education: Plan of Care, Bed Mobility, Transfers, Gait, Up in Chair for All Meals    Goals:  Patient Goals : to have doctor talk to his cardiologist in [facility] before proceeding with any heart procedure for his a flutter  Short Term Goals  Time Frame for Short Term Goals: by discharge  Short Term Goal 1: Patient to perform bed mobility supine<>sit with Mod I with HOB flat and no railings in order to assist with getting into and out of bed.   Short Term Goal 2: Patient to perform sit to stand transfers without AD  and MOD I from various surfaces in order to assist wtih safety with transfers in home. Short Term Goal 3: Patient to ambulate >/=150 feet without AD with MOD I in order to assist with safety with home mobility. Short Term Goal 4: Patient to ascend/descend 1 step without railings with S in order to assist with home entry. Long Term Goals  Time Frame for Long Term Goals : no LTGs set secondary to short ELOS    Following session, patient left in safe position with all fall risk precautions in place.

## 2023-01-06 NOTE — PROGRESS NOTES
Warfarin Pharmacy Consult Note    Warfarin Indication: A fib/flutter  Target INR: 2.0-3.0  Dose prior to admission: none    Recent Labs     01/04/23  1530 01/06/23  1152   INR 1.07 1.24*     Recent Labs     01/05/23  0108 01/05/23  0414 01/05/23  1946 01/06/23  0346   HGB 10.3* 9.5* 7.8* 8.5*    208 167  --      Concurrent anticoagulants/antiplatelets: heparin drip  Significant warfarin drug-drug interactions: Bactrim (MWF), allopurinol, prednisone, fludrocortisone, levothyroxine, trazodone     Date INR Warfarin Dose   1/4/2023 1.07 -   1/5/2023 - 5 mg    1/6/23  1.24  3 mg                                          Monitoring:                   INR will be monitored daily until therapeutic INR is achieved. Statement Selected

## 2023-01-06 NOTE — CARE COORDINATION
1/6/23, 1:42 PM EST    DISCHARGE ON GOING EVALUATION    [facility] day: 7  Location: [address] [place] [address]-A Reason for admit: Atrial flutter [facility]) [I48.92]   Procedure:   12/31 CTA Chest: No evidence of pulmonary bolus. 1.9 cm pleural-based nodule in the left upper lobe anteriorly. This is concerning for malignancy. Consider PET/CT for further evaluation. Prominent mediastinal adenopathy and bilateral hilar lymphadenopathy. 12/31 ECHO: EF 60-65%. 1/4 CT head: No CT evidence of acute intracranial abnormality. Focal areas of enlargement of the extraocular muscles bilaterally with more diffuse enlargement of the right lateral rectus muscle, likely on the basis of metastatic disease given previous history. 1/5 Atrial flutter ablation done by Dr. Merlin Alonso. Barriers to Discharge: Hospitalist, Endocrinology, Cardiology following. Ablation done yesterday. Sodium 131, Hgb 8.5. INR 1.24. Coumadin started and dosing by Pharmacy. Pt has been set up with Coumadin clinic as OP. Heparin gtt. PT/OT. PCP: DEBBIE Brantley CNP  Readmission Risk Score: 14.1%  Patient Goals/Plan/Treatment Preferences: Planning discharge home when medically ready. Possible discharge soon. 1/6/23, 2:08 PM EST    Patient goals/plan/ treatment preferences discussed by  and . Patient goals/plan/ treatment preferences reviewed with patient/ family. Patient/ family verbalize understanding of discharge plan and are in agreement with goal/plan/treatment preferences. Understanding was demonstrated using the teach back method. AVS provided by RN at time of discharge, which includes all necessary medical information pertaining to the patients current course of illness, treatment, post-discharge goals of care, and treatment preferences.      Services At/After Discharge: None

## 2023-01-07 LAB
AVERAGE GLUCOSE: 99 MG/DL (ref 70–126)
ERYTHROCYTE [DISTWIDTH] IN BLOOD BY AUTOMATED COUNT: 19.9 % (ref 11.5–14.5)
ERYTHROCYTE [DISTWIDTH] IN BLOOD BY AUTOMATED COUNT: 62.5 FL (ref 35–45)
GLUCOSE BLD-MCNC: 170 MG/DL (ref 70–108)
GLUCOSE BLD-MCNC: 228 MG/DL (ref 70–108)
GLUCOSE BLD-MCNC: 254 MG/DL (ref 70–108)
GLUCOSE BLD-MCNC: 95 MG/DL (ref 70–108)
HBA1C MFR BLD: 5.3 % (ref 4.4–6.4)
HCT VFR BLD CALC: 26.8 % (ref 42–52)
HEMOGLOBIN: 8.1 GM/DL (ref 14–18)
HEPARIN UNFRACTIONATED: 0.36 U/ML (ref 0.3–0.7)
INR BLD: 1.22 (ref 0.85–1.13)
MCH RBC QN AUTO: 26 PG (ref 26–33)
MCHC RBC AUTO-ENTMCNC: 30.2 GM/DL (ref 32.2–35.5)
MCV RBC AUTO: 85.9 FL (ref 80–94)
PLATELET # BLD: 170 THOU/MM3 (ref 130–400)
PMV BLD AUTO: 8.7 FL (ref 9.4–12.4)
RBC # BLD: 3.12 MILL/MM3 (ref 4.7–6.1)
WBC # BLD: 3.7 THOU/MM3 (ref 4.8–10.8)

## 2023-01-07 PROCEDURE — 6370000000 HC RX 637 (ALT 250 FOR IP): Performed by: INTERNAL MEDICINE

## 2023-01-07 PROCEDURE — 6370000000 HC RX 637 (ALT 250 FOR IP): Performed by: PHYSICAL MEDICINE & REHABILITATION

## 2023-01-07 PROCEDURE — 85610 PROTHROMBIN TIME: CPT

## 2023-01-07 PROCEDURE — 83036 HEMOGLOBIN GLYCOSYLATED A1C: CPT

## 2023-01-07 PROCEDURE — 82948 REAGENT STRIP/BLOOD GLUCOSE: CPT

## 2023-01-07 PROCEDURE — 6370000000 HC RX 637 (ALT 250 FOR IP): Performed by: PHYSICIAN ASSISTANT

## 2023-01-07 PROCEDURE — 85027 COMPLETE CBC AUTOMATED: CPT

## 2023-01-07 PROCEDURE — 99231 SBSQ HOSP IP/OBS SF/LOW 25: CPT | Performed by: INTERNAL MEDICINE

## 2023-01-07 PROCEDURE — 6360000002 HC RX W HCPCS: Performed by: INTERNAL MEDICINE

## 2023-01-07 PROCEDURE — 2140000000 HC CCU INTERMEDIATE R&B

## 2023-01-07 PROCEDURE — 85520 HEPARIN ASSAY: CPT

## 2023-01-07 PROCEDURE — 36415 COLL VENOUS BLD VENIPUNCTURE: CPT

## 2023-01-07 RX ORDER — WARFARIN SODIUM 5 MG/1
5 TABLET ORAL ONCE
Status: COMPLETED | OUTPATIENT
Start: 2023-01-07 | End: 2023-01-07

## 2023-01-07 RX ADMIN — Medication 6 MG: at 20:45

## 2023-01-07 RX ADMIN — POTASSIUM CHLORIDE 40 MEQ: 1500 TABLET, EXTENDED RELEASE ORAL at 08:44

## 2023-01-07 RX ADMIN — OXYCODONE 5 MG: 5 TABLET ORAL at 20:45

## 2023-01-07 RX ADMIN — MIDODRINE HYDROCHLORIDE 10 MG: 5 TABLET ORAL at 20:41

## 2023-01-07 RX ADMIN — MIDODRINE HYDROCHLORIDE 10 MG: 5 TABLET ORAL at 12:05

## 2023-01-07 RX ADMIN — PANTOPRAZOLE SODIUM 40 MG: 40 TABLET, DELAYED RELEASE ORAL at 04:54

## 2023-01-07 RX ADMIN — WARFARIN SODIUM 5 MG: 5 TABLET ORAL at 17:28

## 2023-01-07 RX ADMIN — NALOXEGOL OXALATE 12.5 MG: 12.5 TABLET, FILM COATED ORAL at 04:52

## 2023-01-07 RX ADMIN — HEPARIN SODIUM 14 UNITS/KG/HR: 10000 INJECTION, SOLUTION INTRAVENOUS at 20:37

## 2023-01-07 RX ADMIN — SENNOSIDES 17.2 MG: 8.6 TABLET, FILM COATED ORAL at 20:45

## 2023-01-07 RX ADMIN — ATORVASTATIN CALCIUM 20 MG: 20 TABLET, FILM COATED ORAL at 20:41

## 2023-01-07 RX ADMIN — ALLOPURINOL 300 MG: 300 TABLET ORAL at 08:44

## 2023-01-07 RX ADMIN — PREDNISONE 10 MG: 10 TABLET ORAL at 08:44

## 2023-01-07 RX ADMIN — LEVOTHYROXINE SODIUM 100 MCG: 0.1 TABLET ORAL at 04:52

## 2023-01-07 RX ADMIN — DOCUSATE SODIUM 100 MG: 100 CAPSULE, LIQUID FILLED ORAL at 20:42

## 2023-01-07 RX ADMIN — FLUDROCORTISONE ACETATE 0.2 MG: 0.1 TABLET ORAL at 08:44

## 2023-01-07 RX ADMIN — INSULIN LISPRO 2 UNITS: 100 INJECTION, SOLUTION INTRAVENOUS; SUBCUTANEOUS at 12:20

## 2023-01-07 RX ADMIN — MIDODRINE HYDROCHLORIDE 10 MG: 5 TABLET ORAL at 04:52

## 2023-01-07 ASSESSMENT — PAIN SCALES - GENERAL
PAINLEVEL_OUTOF10: 2
PAINLEVEL_OUTOF10: 7

## 2023-01-07 ASSESSMENT — PAIN DESCRIPTION - ORIENTATION
ORIENTATION: LOWER
ORIENTATION: LOWER

## 2023-01-07 ASSESSMENT — PAIN DESCRIPTION - LOCATION
LOCATION: BACK
LOCATION: BACK

## 2023-01-07 ASSESSMENT — PAIN DESCRIPTION - DESCRIPTORS
DESCRIPTORS: ACHING
DESCRIPTORS: ACHING

## 2023-01-07 NOTE — PLAN OF CARE
Problem: Discharge Planning  Goal: Discharge to home or other facility with appropriate resources  1/7/2023 0109 by Stephanie Patterson RN  Outcome: Progressing  Flowsheets (Taken 1/6/2023 2122)  Discharge to home or other facility with appropriate resources:   Identify barriers to discharge with patient and caregiver   Arrange for needed discharge resources and transportation as appropriate   Identify discharge learning needs (meds, wound care, etc)   Refer to discharge planning if patient needs post-hospital services based on physician order or complex needs related to functional status, cognitive ability or social support system  1/6/2023 1616 by Ernesto Galloway RN  Outcome: Progressing  Flowsheets (Taken 1/6/2023 5353)  Discharge to home or other facility with appropriate resources:   Identify barriers to discharge with patient and caregiver   Arrange for needed discharge resources and transportation as appropriate   Identify discharge learning needs (meds, wound care, etc)   Refer to discharge planning if patient needs post-hospital services based on physician order or complex needs related to functional status, cognitive ability or social support system     Problem: Skin/Tissue Integrity  Goal: Absence of new skin breakdown  Description: 1. Monitor for areas of redness and/or skin breakdown  2. Assess vascular access sites hourly  3. Every 4-6 hours minimum:  Change oxygen saturation probe site  4. Every 4-6 hours:  If on nasal continuous positive airway pressure, respiratory therapy assess nares and determine need for appliance change or resting period.   1/7/2023 0109 by Stephanie Patterson RN  Outcome: Progressing  1/6/2023 1616 by Ernesto Galloway RN  Outcome: Progressing     Problem: Safety - Adult  Goal: Free from fall injury  1/7/2023 0109 by Stephanie Patterson RN  Outcome: Arnav Rubio (Taken 1/6/2023 2100)  Free From Fall Injury: Jannet Brooks family/caregiver on patient safety  1/6/2023 1616 by Ernesto Galloway RN  Outcome: Progressing  Flowsheets (Taken 1/5/2023 0442 by Freya Mackenzie, RN)  Free From Fall Injury: Instruct family/caregiver on patient safety     Problem: ABCDS Injury Assessment  Goal: Absence of physical injury  1/7/2023 0109 by Levi Neumann RN  Outcome: Progressing  Flowsheets (Taken 1/6/2023 2100)  Absence of Physical Injury: Implement safety measures based on patient assessment  1/6/2023 1616 by Chanel Bennett RN  Outcome: Progressing  Flowsheets (Taken 1/6/2023 1616)  Absence of Physical Injury: Implement safety measures based on patient assessment     Problem: Pain  Goal: Verbalizes/displays adequate comfort level or baseline comfort level  Outcome: Progressing  Flowsheets (Taken 1/6/2023 2122)  Verbalizes/displays adequate comfort level or baseline comfort level:   Encourage patient to monitor pain and request assistance   Assess pain using appropriate pain scale   Administer analgesics based on type and severity of pain and evaluate response   Implement non-pharmacological measures as appropriate and evaluate response   Notify Licensed Independent Practitioner if interventions unsuccessful or patient reports new pain     Problem: Cardiovascular - Adult  Goal: Maintains optimal cardiac output and hemodynamic stability  Outcome: Progressing  Flowsheets (Taken 1/6/2023 2122)  Maintains optimal cardiac output and hemodynamic stability:   Monitor blood pressure and heart rate   Monitor urine output and notify Licensed Independent Practitioner for values outside of normal range   Assess for signs of decreased cardiac output     Problem: Infection - Adult  Goal: Absence of infection at discharge  Outcome: Progressing  Flowsheets (Taken 1/6/2023 2122)  Absence of infection at discharge:   Assess and monitor for signs and symptoms of infection   Monitor lab/diagnostic results   Monitor all insertion sites i.e., indwelling lines, tubes and drains   Administer medications as ordered   Instruct and encourage patient and family to use good hand hygiene technique   Identify and instruct in appropriate isolation precautions for identified infection/condition  Goal: Absence of infection during hospitalization  Outcome: Progressing  Flowsheets (Taken 1/6/2023 2122)  Absence of infection during hospitalization:   Assess and monitor for signs and symptoms of infection   Monitor lab/diagnostic results   Administer medications as ordered   Instruct and encourage patient and family to use good hand hygiene technique   Identify and instruct in appropriate isolation precautions for identified infection/condition  Goal: Absence of fever/infection during anticipated neutropenic period  Outcome: Progressing  Flowsheets (Taken 1/6/2023 2122)  Absence of fever/infection during anticipated neutropenic period: Monitor white blood cell count  Goal: Isolation precautions  Outcome: Progressing

## 2023-01-07 NOTE — PROGRESS NOTES
Assessment and Plan:        aflutter: ablation 1.5; so far maintaining nsr; on heparin/coumadin  Hypotension- boosted midodrine  Melanoma- multiple mets including brain- being treated at [facility]. Makes anticoagulation somewhat difficult. 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 CC:  fast heart beat  HPI:  pt with metastatic melanoma, transferred to our rehab; fast heart beat noted and he was transferred to . Was in aflutter; ablated. Undergoing anticoagulation. Hr of 120hr noted at [facility], suspect he was in flutter then. ROS (12 point review of systems completed. Pertinent positives noted.  Otherwise ROS is negative) :     PMH:  Per HPI  SHX:    FHX: hbp, melanoma, high cholesterol  Allergies: See above    Medications:     sodium chloride      heparin (PORCINE) Infusion 14 Units/kg/hr (01/07/23 0816)    dextrose        warfarin  5 mg Oral Once    midodrine  10 mg Oral Q8H    pantoprazole  40 mg Oral QAM AC    sodium chloride flush  5-40 mL IntraVENous 2 times per day    warfarin placeholder: dosing by pharmacy   Other RX Placeholder    fludrocortisone  0.2 mg Oral Daily    allopurinol  300 mg Oral Daily    atorvastatin  20 mg Oral Nightly    levothyroxine  100 mcg Oral QAM AC    potassium chloride  40 mEq Oral Daily    predniSONE  10 mg Oral Daily    senna  2 tablet Oral BID    Trametinib Dimethyl Sulfoxide  2 mg Oral Nightly    oxyCODONE  5 mg Oral Q6H    melatonin  6 mg Oral Nightly    sulfamethoxazole-trimethoprim  1 tablet Oral Once per day on Mon Wed Fri    docusate sodium  100 mg Oral BID    lidocaine  1 patch TransDERmal Q24H    naloxegol  12.5 mg Oral QAM AC    Dabrafenib Mesylate  150 mg Oral Q12H    insulin lispro  0-8 Units SubCUTAneous TID WC    insulin lispro  0-4 Units SubCUTAneous Nightly       Vital Signs:   BP (!) 88/59   Pulse 80   Temp 98 °F (36.7 °C) (Oral)   Resp 16   Wt 185 lb 9.6 oz (84.2 kg)   SpO2 96%   BMI 25.00 kg/m²      Intake/Output Summary (Last 24 hours) at 1/7/2023 1251  Last data filed at 1/7/2023 1112  Gross per 24 hour   Intake 1507.03 ml   Output 400 ml   Net 1107.03 ml        Physical Examination: General appearance - chronically ill appearing  Mental status - alert, oriented to person, place, and time  Neck - supple, no significant adenopathy, no JVD, or carotid bruits  Chest - clear to auscultation, no wheezes, rales or rhonchi, symmetric air entry  Heart - normal rate, regular rhythm, normal S1, S2, no murmurs, rubs, clicks or gallops  Abdomen - soft, nontender, nondistended, no masses or organomegaly  Neurological - left  slightly weak  Musculoskeletal - no joint tenderness, deformity or swelling  Extremities - peripheral pulses normal, no pedal edema, no clubbing or cyanosis  Skin - normal coloration and turgor, no rashes, no suspicious skin lesions noted    Data: (All radiographs, tracings, PFTs, and imaging are personally viewed and interpreted unless otherwise noted).          Electronically signed by Francisco Wu MD on 1/7/2023 at 12:51 PM

## 2023-01-07 NOTE — PROGRESS NOTES
Warfarin Pharmacy Consult Note    Warfarin Indication: A fib/flutter  Target INR: 2.0-3.0  Dose prior to admission: new start     Recent Labs     01/04/23  1530 01/06/23  1152 01/07/23  0645   INR 1.07 1.24* 1.22*     Recent Labs     01/05/23  0414 01/05/23  1946 01/06/23  0346 01/07/23  0645   HGB 9.5* 7.8* 8.5* 8.1*    167  --  170     Concurrent anticoagulants/antiplatelets: heparin drip  Significant warfarin drug-drug interactions: Bactrim (MWF), allopurinol, prednisone, fludrocortisone, levothyroxine, trazodone, Dabrafenib (CYP2C9 inducer)     Date INR Warfarin Dose   1/4/2023 1.07 -   1/5/2023 - 5 mg    1/6/23    1.24  3 mg     1/7/2023  1.22 5 mg                             Monitoring:                   INR will be monitored daily until therapeutic INR is achieved.     Carlos Stephen PharmD 1/7/2023 11:41 AM

## 2023-01-08 VITALS
BODY MASS INDEX: 25 KG/M2 | TEMPERATURE: 98.2 F | RESPIRATION RATE: 18 BRPM | OXYGEN SATURATION: 100 % | DIASTOLIC BLOOD PRESSURE: 67 MMHG | WEIGHT: 185.6 LBS | HEART RATE: 88 BPM | SYSTOLIC BLOOD PRESSURE: 108 MMHG

## 2023-01-08 LAB
GLUCOSE BLD-MCNC: 139 MG/DL (ref 70–108)
GLUCOSE BLD-MCNC: 142 MG/DL (ref 70–108)
HEPARIN UNFRACTIONATED: 0.27 U/ML (ref 0.3–0.7)
INR BLD: 1.26 (ref 0.85–1.13)

## 2023-01-08 PROCEDURE — 82948 REAGENT STRIP/BLOOD GLUCOSE: CPT

## 2023-01-08 PROCEDURE — 99239 HOSP IP/OBS DSCHRG MGMT >30: CPT | Performed by: INTERNAL MEDICINE

## 2023-01-08 PROCEDURE — 6360000002 HC RX W HCPCS: Performed by: INTERNAL MEDICINE

## 2023-01-08 PROCEDURE — 6370000000 HC RX 637 (ALT 250 FOR IP): Performed by: INTERNAL MEDICINE

## 2023-01-08 PROCEDURE — 36415 COLL VENOUS BLD VENIPUNCTURE: CPT

## 2023-01-08 PROCEDURE — 85610 PROTHROMBIN TIME: CPT

## 2023-01-08 PROCEDURE — 6370000000 HC RX 637 (ALT 250 FOR IP): Performed by: PHYSICAL MEDICINE & REHABILITATION

## 2023-01-08 PROCEDURE — 85520 HEPARIN ASSAY: CPT

## 2023-01-08 RX ORDER — POLYETHYLENE GLYCOL 3350 17 G/17G
17 POWDER, FOR SOLUTION ORAL DAILY PRN
Qty: 527 G | Refills: 1 | COMMUNITY
Start: 2023-01-08 | End: 2023-02-07

## 2023-01-08 RX ORDER — ALLOPURINOL 300 MG/1
300 TABLET ORAL DAILY
Qty: 30 TABLET | Refills: 3 | Status: SHIPPED | OUTPATIENT
Start: 2023-01-09

## 2023-01-08 RX ORDER — PREDNISONE 10 MG/1
10 TABLET ORAL DAILY
Qty: 10 TABLET | Refills: 0 | Status: SHIPPED | OUTPATIENT
Start: 2023-01-09 | End: 2023-01-19

## 2023-01-08 RX ORDER — ENOXAPARIN SODIUM 100 MG/ML
1 INJECTION SUBCUTANEOUS 2 TIMES DAILY
Qty: 10 EACH | Refills: 1 | Status: SHIPPED | OUTPATIENT
Start: 2023-01-08

## 2023-01-08 RX ORDER — ENOXAPARIN SODIUM 100 MG/ML
1 INJECTION SUBCUTANEOUS ONCE
Status: COMPLETED | OUTPATIENT
Start: 2023-01-08 | End: 2023-01-08

## 2023-01-08 RX ORDER — LANOLIN ALCOHOL/MO/W.PET/CERES
6 CREAM (GRAM) TOPICAL NIGHTLY
Refills: 3 | COMMUNITY
Start: 2023-01-08

## 2023-01-08 RX ORDER — WARFARIN SODIUM 5 MG/1
5 TABLET ORAL DAILY
Qty: 30 TABLET | Refills: 3 | Status: SHIPPED | OUTPATIENT
Start: 2023-01-08

## 2023-01-08 RX ORDER — FLUDROCORTISONE ACETATE 0.1 MG/1
0.2 TABLET ORAL DAILY
Qty: 30 TABLET | Refills: 3 | Status: SHIPPED | OUTPATIENT
Start: 2023-01-09 | End: 2023-02-08

## 2023-01-08 RX ORDER — SULFAMETHOXAZOLE AND TRIMETHOPRIM 800; 160 MG/1; MG/1
1 TABLET ORAL
Qty: 36 TABLET | Refills: 3 | Status: SHIPPED | OUTPATIENT
Start: 2023-01-09 | End: 2023-04-09

## 2023-01-08 RX ORDER — LEVOTHYROXINE SODIUM 0.1 MG/1
100 TABLET ORAL
Qty: 30 TABLET | Refills: 3 | Status: SHIPPED | OUTPATIENT
Start: 2023-01-09

## 2023-01-08 RX ORDER — POTASSIUM CHLORIDE 20 MEQ/1
40 TABLET, EXTENDED RELEASE ORAL DAILY
Qty: 60 TABLET | Refills: 3 | Status: SHIPPED | OUTPATIENT
Start: 2023-01-09

## 2023-01-08 RX ORDER — SENNA PLUS 8.6 MG/1
2 TABLET ORAL 2 TIMES DAILY
Qty: 120 TABLET | Refills: 0 | COMMUNITY
Start: 2023-01-08 | End: 2023-02-07

## 2023-01-08 RX ORDER — PANTOPRAZOLE SODIUM 40 MG/1
40 TABLET, DELAYED RELEASE ORAL
Qty: 30 TABLET | Refills: 3 | Status: SHIPPED | OUTPATIENT
Start: 2023-01-09

## 2023-01-08 RX ORDER — MIDODRINE HYDROCHLORIDE 10 MG/1
10 TABLET ORAL EVERY 8 HOURS
Qty: 90 TABLET | Refills: 5 | Status: SHIPPED | OUTPATIENT
Start: 2023-01-08

## 2023-01-08 RX ADMIN — ENOXAPARIN SODIUM 80 MG: 100 INJECTION SUBCUTANEOUS at 13:17

## 2023-01-08 RX ADMIN — LEVOTHYROXINE SODIUM 100 MCG: 0.1 TABLET ORAL at 05:54

## 2023-01-08 RX ADMIN — ALLOPURINOL 300 MG: 300 TABLET ORAL at 09:13

## 2023-01-08 RX ADMIN — FLUDROCORTISONE ACETATE 0.2 MG: 0.1 TABLET ORAL at 09:13

## 2023-01-08 RX ADMIN — MIDODRINE HYDROCHLORIDE 10 MG: 5 TABLET ORAL at 13:17

## 2023-01-08 RX ADMIN — POTASSIUM CHLORIDE 40 MEQ: 1500 TABLET, EXTENDED RELEASE ORAL at 09:13

## 2023-01-08 RX ADMIN — OXYCODONE 5 MG: 5 TABLET ORAL at 05:54

## 2023-01-08 RX ADMIN — MIDODRINE HYDROCHLORIDE 10 MG: 5 TABLET ORAL at 03:37

## 2023-01-08 RX ADMIN — PANTOPRAZOLE SODIUM 40 MG: 40 TABLET, DELAYED RELEASE ORAL at 05:53

## 2023-01-08 RX ADMIN — PREDNISONE 10 MG: 10 TABLET ORAL at 09:13

## 2023-01-08 RX ADMIN — HEPARIN SODIUM 2000 UNITS: 1000 INJECTION, SOLUTION INTRAVENOUS; SUBCUTANEOUS at 05:54

## 2023-01-08 RX ADMIN — NALOXEGOL OXALATE 12.5 MG: 12.5 TABLET, FILM COATED ORAL at 05:54

## 2023-01-08 ASSESSMENT — PAIN DESCRIPTION - DESCRIPTORS: DESCRIPTORS: ACHING

## 2023-01-08 ASSESSMENT — PAIN DESCRIPTION - LOCATION: LOCATION: BACK

## 2023-01-08 ASSESSMENT — PAIN - FUNCTIONAL ASSESSMENT: PAIN_FUNCTIONAL_ASSESSMENT: ACTIVITIES ARE NOT PREVENTED

## 2023-01-08 ASSESSMENT — PAIN DESCRIPTION - ORIENTATION: ORIENTATION: LOWER

## 2023-01-08 ASSESSMENT — PAIN SCALES - GENERAL: PAINLEVEL_OUTOF10: 3

## 2023-01-08 NOTE — PROGRESS NOTES
Iv removed, telemetry removed and left in room for cleaning, discharge instructions reviewed with patient and his daughter and signed, patient taken by wheelchair to discharge lobby for transport home by his daughter.

## 2023-01-08 NOTE — DISCHARGE SUMMARY
Discharge Summary    Patient:  Misha Dunham  YOB: 1960    MRN: [id number]   Acct: [de-identified]    Primary Care Physician: DEBBIE Morse CNP    Admit date:  12/30/2022    Discharge date:   1/8/2023        Discharge Diagnoses:   Atrial flutter [facility])  Principal Problem:    Atrial flutter ([facility] [address]  Active Problems:    Acquired hypothyroidism    Metast[facility]    Moderate malnutrition ([facility]    Adrenal insufficiency ([place]'s disease) ([facility]    Chronic hypotension  Resolved Problems:    * No resolved hospital problems. *        Admitted for: (HPI) evaluation and treatment of atrial flutter    Hospital Course: This is a pt with metastatic melanoma, with a long stay at [facility] for treatment of sepsis. He was transferred to Rehab for further treatment. It was noted there he had a rapid pulse found to be atrial flutter and he was transferred to . He was asymptomatic with the arrhythmia. He was started on heparin. Communication was had with [facility] re anticoagulation as he has cerebral mets; they approved Coumadin. Hopefully this can be stopped after a period of time, given the high success rate of ablation. He was seen by Dr Marlyn Boyd and did undergo successful ablation. He stayed in NSR throughout the rest of his stay. He had no thrombus on DON but spontaneous echo contrast in the left atrium. He was noted to run low blood pressures; he was thought to be hypoadrenal at [facility] and Dr Anastacia Gonsalez saw him re that. He was placed on midodrine in adjusted doses. He tolerated the low pressure well. He was discharged on a lovenox bridge to coumadin, which will be managed by the Coumadin Clinic. He will follow up with [facility] and Dr Marlyn Boyd.     Consultants:  Neurology, Cardiology, Endocrinology    Discharge Medications:       Medication List        START taking these medications      allopurinol 300 MG tablet  Commonly known as: ZYLOPRIM  Take 1 tablet by mouth daily  Start taking on: January 9, 2023     Dabrafenib Mesylate 75 MG Caps  Take 150 mg by mouth every 12 hours     enoxaparin 80 MG/0.8ML  Commonly known as: Lovenox  Inject 0.8 mLs into the skin 2 times daily     fludrocortisone 0.1 MG tablet  Commonly known as: FLORINEF  Take 2 tablets by mouth daily  Start taking on: January 9, 2023     levothyroxine 100 MCG tablet  Commonly known as: SYNTHROID  Take 1 tablet by mouth every morning (before breakfast)  Start taking on: January 9, 2023     melatonin 3 MG Tabs tablet  Take 2 tablets by mouth at bedtime     midodrine 10 MG tablet  Commonly known as: PROAMATINE  Take 1 tablet by mouth in the morning and 1 tablet at noon and 1 tablet in the evening.      naloxegol 12.5 MG Tabs tablet  Commonly known as: MOVANTIK  Take 1 tablet by mouth every morning (before breakfast)  Start taking on: January 9, 2023     pantoprazole 40 MG tablet  Commonly known as: PROTONIX  Take 1 tablet by mouth every morning (before breakfast)  Start taking on: January 9, 2023     polyethylene glycol 17 g packet  Commonly known as: GLYCOLAX  Take 17 g by mouth daily as needed for Constipation     potassium chloride 20 MEQ extended release tablet  Commonly known as: KLOR-CON M  Take 2 tablets by mouth daily  Start taking on: January 9, 2023     predniSONE 10 MG tablet  Commonly known as: DELTASONE  Take 1 tablet by mouth daily for 10 days  Start taking on: January 9, 2023     senna 8.6 MG tablet  Commonly known as: SENOKOT  Take 2 tablets by mouth 2 times daily     sulfamethoxazole-trimethoprim 800-160 MG per tablet  Commonly known as: BACTRIM DS;SEPTRA DS  Take 1 tablet by mouth three times a week  Start taking on: January 9, 2023     Trametinib Dimethyl Sulfoxide 2 MG Tabs  Take 2 mg by mouth nightly     warfarin 5 MG tablet  Commonly known as: Coumadin  Take 1 tablet by mouth daily Or as directed by Coumadin Clinic            CONTINUE taking these medications      Accu-Chek Softclix Lancets Misc  1 Device by Does not apply route 2 times daily     atorvastatin 20 MG tablet  Commonly known as: LIPITOR  TAKE 1 TABLET BY MOUTH EVERY DAY     blood glucose monitor kit and supplies  Test once a day & as needed for symptoms of irregular blood glucose. Please dispense all supplies strips and lancets     blood glucose test strips  Test bid & as needed for symptoms of irregular blood glucose.      metFORMIN 500 MG tablet  Commonly known as: GLUCOPHAGE  Take 1 tablet by mouth 2 times daily (with meals)     sildenafil 50 MG tablet  Commonly known as: VIAGRA  Take 1/2 tablet one hour prior to sexual activity            STOP taking these medications      aspirin 81 MG EC tablet  Commonly known as: ASPIRIN LOW DOSE     lisinopril 5 MG tablet  Commonly known as: PRINIVIL;ZESTRIL     pembrolizumab 50 MG Solr chemo injection  Commonly known as: KEYTRUDA          Medication Instructions:      Date Warfarin Dose   1/8/2023 7.5 mg = 1.5 tablets    1/9/2023 7.5 mg = 1.5 tablets    1/10/2023 5 mg = 1 tablet   1/11/2023 INR     Continue Lovenox 80 mg every 12 hours until 1/10/23 appointment     Provider dosing warfarin:   [facility]  [address] [address]  [phone]    Recheck INR:  Tuesday, 1/10/2023 at 8:20 AM                Where to Get Your Medications        These medications were sent to [facility]/pharmacy #[id number]- [place], [place] - [address] - P [phone] - F [phone]  [facility] [address]      Phone: [phone]   allopurinol 300 MG tablet  enoxaparin 80 MG/0.8ML  fludrocortisone 0.1 MG tablet  levothyroxine 100 MCG tablet  midodrine 10 MG tablet  naloxegol 12.5 MG Tabs tablet  pantoprazole 40 MG tablet  potassium chloride 20 MEQ extended release tablet  predniSONE 10 MG tablet  sulfamethoxazole-trimethoprim 800-160 MG per tablet  warfarin 5 MG tablet       You can get these medications from any pharmacy    You don't need a prescription for these medications  melatonin 3 MG Tabs tablet  polyethylene glycol 17 g packet  senna 8.6 MG tablet       Information about where to get these medications is not yet available    Ask your nurse or doctor about these medications  Dabrafenib Mesylate 75 MG Caps  Trametinib Dimethyl Sulfoxide 2 MG Tabs           Physical Exam:    Vitals:  Vitals:    01/08/23 0330 01/08/23 0624 01/08/23 0814 01/08/23 1156   BP: 99/61  106/64 108/67   Pulse: 84  87 88   Resp: 18 18 18 18   Temp: 98.6 °F (37 °C)  98.6 °F (37 °C) 98.2 °F (36.8 °C)   TempSrc: Oral  Oral Oral   SpO2: 95%  97% 100%   Weight:         Weight: Weight: 185 lb 9.6 oz (84.2 kg)     24 hour intake/output:  Intake/Output Summary (Last 24 hours) at 1/8/2023 1324  Last data filed at 1/8/2023 0915  Gross per 24 hour   Intake 620.16 ml   Output 1600 ml   Net -979.84 ml       General appearance - chronically ill appearing  Chest - clear to auscultation, no wheezes, rales or rhonchi, symmetric air entry  Heart - normal rate, regular rhythm, normal S1, S2, no murmurs, rubs, clicks or gallops  Abdomen - soft, nontender, nondistended, no masses or organomegaly  Obese: No; Protuberant: No   Neurological - alert, oriented, normal speech, no focal findings or movement disorder noted  Extremities - no pedal edema noted  Skin - normal coloration and turgor, no rashes, no suspicious skin lesions noted    Procedures:  aflutter ablation; DON      Diet:  ADULT DIET; Regular;  Low Sodium (2 gm)    Activity:  Activity as tolerated (Patient may move about with assist as indicated or with supervision.)    Follow-up:  in the next few weeks with DEBBIE Morse CNP,  in the next few weeks with Dr Marlyn Boyd and [facility]    Disposition: home    Condition: Stable      Time Spent: 35 minutes    Electronically signed by Chaim Ortiz MD on 1/8/2023 at 1:24 PM    Discharging Hospitalist

## 2023-01-08 NOTE — DISCHARGE INSTR - MEDS
Date Warfarin Dose   1/8/2023 7.5 mg = 1.5 tablets    1/9/2023 7.5 mg = 1.5 tablets    1/10/2023 5 mg = 1 tablet   1/11/2023 INR     Continue Lovenox 80 mg every 12 hours until 1/10/23 appointment     Provider dosing warfarin:   [address] [facility]  [address] [phone]    Recheck INR:  Tuesday, 1/10/2023 at 8:20 AM

## 2023-01-08 NOTE — PROGRESS NOTES
Clinical Pharmacy Note                                               Warfarin Discharge Recommendations      Patient discharged from [facility] today on warfarin.     Warfarin indication: AFlutter - post ablation, Right IJ Thrombus  INR goal during admission: 2.0-3.0  Interacting medications at discharge: Enoxaparin 1 mg/kg Q12H, Bactrim (MWF), allopurinol, prednisone, fludrocortisone, levothyroxine, Dabrafenib (CYP2C9 inducer)  Coumadin 5 mg tabs    Recent INRs:  Recent Labs     01/06/23  1152 01/07/23  0645 01/08/23  0413   INR 1.24* 1.22* 1.26*     Recommendations for discharge:   Date Warfarin Dose   1/8/2023 7.5 mg = 1.5 tablets    1/9/2023 7.5 mg = 1.5 tablets    1/10/2023 5 mg = 1 tablet   1/11/2023 INR     Continue Lovenox 80 mg every 12 hours until 1/10/23 appointment     Provider dosing warfarin:   [facility]  [address] [facility] [address]   [address] [address] [phone]    Recheck INR:  Tuesday, 1/10/2023 at [facility] PharmD 1/8/2023 12:08 PM

## 2023-01-09 ENCOUNTER — TELEPHONE (OUTPATIENT)
Dept: FAMILY MEDICINE CLINIC | Age: 63
End: 2023-01-09

## 2023-01-09 NOTE — TELEPHONE ENCOUNTER
Care Transitions Initial Follow Up Call    Outreach made within 2 business days of discharge: Yes    Patient: Davi Gutierrez Patient : 1960   MRN: [id number]  Reason for Admission: There are no discharge diagnoses documented for the most recent discharge. Discharge Date: 23       Spoke with: Left message on answering machine requesting pt to call back at earliest convenience. Discharge department/facility: [facility] Patient Contact:  Was patient able to fill all prescriptions:   Was patient instructed to bring all medications to the follow-up visit:   Is patient taking all medications as directed in the discharge summary?    Does patient understand their discharge instructions:   Does patient have questions or concerns that need addressed prior to 7-14 day follow up office visit:     Scheduled appointment with PCP within 7-14 days    Follow Up  Future Appointments   Date Time Provider Mariano Mitchell   1/10/2023  8:20 AM [facility] [address]   5/10/2023  2:00 PM Gm Aldrich MEHNAZ Maguire LPN

## 2023-01-10 ENCOUNTER — HOSPITAL ENCOUNTER (OUTPATIENT)
Dept: PHARMACY | Age: 63
Setting detail: THERAPIES SERIES
Discharge: HOME | End: 2023-01-10
Payer: COMMERCIAL

## 2023-01-10 DIAGNOSIS — Z79.01 ANTICOAGULATED ON COUMADIN: Primary | ICD-10-CM

## 2023-01-10 DIAGNOSIS — Z51.81 ENCOUNTER FOR THERAPEUTIC DRUG MONITORING: ICD-10-CM

## 2023-01-10 LAB
BLOOD CULTURE, ROUTINE: NORMAL
BLOOD CULTURE, ROUTINE: NORMAL
POC INR: 1.7 (ref 0.8–1.2)

## 2023-01-10 PROCEDURE — 99213 OFFICE O/P EST LOW 20 MIN: CPT | Performed by: PHARMACIST

## 2023-01-10 PROCEDURE — 85610 PROTHROMBIN TIME: CPT | Performed by: PHARMACIST

## 2023-01-10 PROCEDURE — 36416 COLLJ CAPILLARY BLOOD SPEC: CPT | Performed by: PHARMACIST

## 2023-01-10 NOTE — TELEPHONE ENCOUNTER
Care Transitions Initial Follow Up Call    Outreach made within 2 business days of discharge: Yes    Patient: Kaylyn Byers Patient : 1960   MRN: [id number]  Reason for Admission: There are no discharge diagnoses documented for the most recent discharge.   Discharge Date: 23       Spoke with: [facility] to return call at his earliest convenience      Discharge department/facility: [facility]    TCM Interactive Patient Contact:      Scheduled appointment with PCP within 7-14 days    Follow Up  Future Appointments   Date Time Provider Mariano Mitchell   2023  9:20 AM JUAN FRANCISCO Dodge BAILEE [facility] [address] [facility]   5/10/2023  2:00 PM Humaira Hayes 19       Jasmin Garza LPN

## 2023-01-10 NOTE — PROGRESS NOTES
Medication Management [address] [facility] [address]  [address] [place] [address]  [phone] (phone)  [phone] (fax)     Patient presents to the Anticoagulation clinic today for assessment and initial dosing of warfarin. Patient has a diagnosis of Aflutter and has been placed on Coumadin with a goal INR 2-3. Pt started Coumadin 1/5/23. Patient here with mother and girlfriend, states daughter is main caregiver. Ryan Jaeger was given full education today in the clinic including written materials, supplemental oral instructions, and all questions were answered. Specifically, Nuzhat Caruso was instructed to notfiy the Anticoagulation clinic immediately if there is any unusual bleeding, such as throwing or coughing up blood, bleeding from the nose, mouth, ears, or pink-red tinge in the urine or if the stools contain bright red blood or are black and tarry. In addition, Nuzhat Caruso was informed to notify the clinic about any and all medicine changes, including prescriptions, over-the-counter or nonprescription medicines, vitamins, herbs, supplements, creams, rubs, eye or ear drops, and injections, whether to be used short- or long-term, within 24 hours. The patient was also instructed to let all other physicians and pharmacists know that warfarin was started as a double-check against drug interactions. The patient was further instructed on the effects of vitamin K containing foods on warfarin and the importance of consistency was stressed. Nuzhat Caruso was also advised to avoid large amounts of alcohol, grapefruit juice or cranberry juice while on warfarin. HPI:  IJ thrombus, metastatic melamona with brain mets, recent aflutter s/p ablation    Medication changes: none   Tablet strength per patient: 5mg  Patient reported dosing regimen over last 1 week: updated on track board  Missed doses in the last 1-2 weeks: none  Extra doses in the last 1-2 weeks: none  Any problems with bleeding/bruising?  No  Any recent falls? No   Any signs or symptoms of DVT/PE or stroke? No  Alcohol use: none  Tobacco use: none  Diet changes as follows: No changes   Green leafy intake: discussed consistency   Grapefruit/cranberry juice: avoids  Upcoming surgeries or procedures: none    Currently bridging with Lovenox, last dose given at 10pm     Existing drug interactions:  Prednisone 10mg daily x 10 days  Bactrim 3x per week  Dabrafenib  Florinef  Levothyroxine  Melatonin prn    Was drinking Ensure while admitted, now is not. Assessment  Lab Results   Component Value Date    INR 1.70 (H) 01/10/2023    INR 1.26 (H) 2023    INR 1.22 (H) 2023     INR subtherapeutic   Recent Labs     01/10/23  0830   INR 1.70*             Plan  Continue Lovenox 80mg SQ Q12hr. Coumadin 5mg daily. Recheck INR 2 days. Patient reminded to call the Anticoagulation Clinic with any signs or symptoms of bleeding or with any medication changes. Patient given instructions utilizing the teach back method.  Printed patient teaching/instruction included with AVS.       For Pharmacy Admin Tracking Only    Intervention Detail: Adherence Monitorin and Dose Adjustment: 1, reason: Therapy Optimization  Total # of Interventions Recommended: 2  Total # of Interventions Accepted: 2  Time Spent (min): 45

## 2023-01-10 NOTE — TELEPHONE ENCOUNTER
Care Transitions Initial Follow Up Call    Outreach made within 2 business days of discharge: Yes    Patient: Trisha Samaniego Patient : 1960   MRN: [id number]  Reason for Admission: There are no discharge diagnoses documented for the most recent discharge. Discharge Date: 23       Spoke with: Patrick Moy    Discharge department/facility: [facility]    TCM Interactive Patient Contact:  Was patient able to fill all prescriptions: Yes  Was patient instructed to bring all medications to the follow-up visit: Yes  Is patient taking all medications as directed in the discharge summary?  Yes  Does patient understand their discharge instructions: Yes  Does patient have questions or concerns that need addressed prior to 7-14 day follow up office visit: no    Scheduled appointment with PCP within 7-14 days  Appt scheduled  Follow Up  Future Appointments   Date Time Provider Mariano Mitchell   2023  9:20 AM JUAN FRANCISCO Pimentel [facility] [address]   2023  9:40 AM DEBBIE [facility]redia - [id number] [facility] [address]   5/10/2023  2:00 PM Humaira Heredia 19        Codie Lees LPN

## 2023-01-11 ENCOUNTER — APPOINTMENT (OUTPATIENT)
Dept: GENERAL RADIOLOGY | Age: 63
End: 2023-01-11
Payer: COMMERCIAL

## 2023-01-11 ENCOUNTER — HOSPITAL ENCOUNTER (EMERGENCY)
Age: 63
Discharge: HOME | End: 2023-01-11
Attending: EMERGENCY MEDICINE
Payer: COMMERCIAL

## 2023-01-11 VITALS
OXYGEN SATURATION: 94 % | HEIGHT: 73 IN | WEIGHT: 185 LBS | HEART RATE: 92 BPM | SYSTOLIC BLOOD PRESSURE: 109 MMHG | TEMPERATURE: 100 F | RESPIRATION RATE: 18 BRPM | DIASTOLIC BLOOD PRESSURE: 67 MMHG | BODY MASS INDEX: 24.52 KG/M2

## 2023-01-11 DIAGNOSIS — R50.9 FEVER, UNKNOWN ORIGIN: Primary | ICD-10-CM

## 2023-01-11 LAB
ANION GAP SERPL CALCULATED.3IONS-SCNC: 12 MEQ/L (ref 8–16)
BACTERIA: ABNORMAL /HPF
BASOPHILS # BLD: 0.6 %
BASOPHILS ABSOLUTE: 0 THOU/MM3 (ref 0–0.1)
BILIRUBIN URINE: NEGATIVE
BLOOD, URINE: NEGATIVE
BUN BLDV-MCNC: 13 MG/DL (ref 7–22)
CALCIUM SERPL-MCNC: 7.8 MG/DL (ref 8.5–10.5)
CASTS 2: ABNORMAL /LPF
CASTS UA: ABNORMAL /LPF
CHARACTER, URINE: CLEAR
CHLORIDE BLD-SCNC: 93 MEQ/L (ref 98–111)
CO2: 22 MEQ/L (ref 23–33)
COLOR: YELLOW
CREAT SERPL-MCNC: 0.5 MG/DL (ref 0.4–1.2)
CRYSTALS, UA: ABNORMAL
EOSINOPHIL # BLD: 0 %
EOSINOPHILS ABSOLUTE: 0 THOU/MM3 (ref 0–0.4)
EPITHELIAL CELLS, UA: ABNORMAL /HPF
ERYTHROCYTE [DISTWIDTH] IN BLOOD BY AUTOMATED COUNT: 19.7 % (ref 11.5–14.5)
ERYTHROCYTE [DISTWIDTH] IN BLOOD BY AUTOMATED COUNT: 60.3 FL (ref 35–45)
GFR SERPL CREATININE-BSD FRML MDRD: > 60 ML/MIN/1.73M2
GLUCOSE BLD-MCNC: 160 MG/DL (ref 70–108)
GLUCOSE URINE: 250 MG/DL
HCT VFR BLD CALC: 28.1 % (ref 42–52)
HEMOGLOBIN: 8.8 GM/DL (ref 14–18)
IMMATURE GRANS (ABS): 0.07 THOU/MM3 (ref 0–0.07)
IMMATURE GRANULOCYTES: 2.2 %
INFLUENZA A: NOT DETECTED
INFLUENZA B: NOT DETECTED
KETONES, URINE: NEGATIVE
LEUKOCYTE ESTERASE, URINE: NEGATIVE
LYMPHOCYTES # BLD: 16.6 %
LYMPHOCYTES ABSOLUTE: 0.5 THOU/MM3 (ref 1–4.8)
MCH RBC QN AUTO: 26.3 PG (ref 26–33)
MCHC RBC AUTO-ENTMCNC: 31.3 GM/DL (ref 32.2–35.5)
MCV RBC AUTO: 84.1 FL (ref 80–94)
MISCELLANEOUS 2: ABNORMAL
MONOCYTES # BLD: 6.4 %
MONOCYTES ABSOLUTE: 0.2 THOU/MM3 (ref 0.4–1.3)
NITRITE, URINE: NEGATIVE
NUCLEATED RED BLOOD CELLS: 0 /100 WBC
OSMOLALITY CALCULATION: 258.8 MOSMOL/KG (ref 275–300)
PH UA: 6.5 (ref 5–9)
PLATELET # BLD: 179 THOU/MM3 (ref 130–400)
PMV BLD AUTO: 8.3 FL (ref 9.4–12.4)
POTASSIUM SERPL-SCNC: 4.1 MEQ/L (ref 3.5–5.2)
PROCALCITONIN: 0.22 NG/ML (ref 0.01–0.09)
PROTEIN UA: 100
RBC # BLD: 3.34 MILL/MM3 (ref 4.7–6.1)
RBC URINE: ABNORMAL /HPF
RENAL EPITHELIAL, UA: ABNORMAL
SARS-COV-2 RNA, RT PCR: NOT DETECTED
SCAN OF BLOOD SMEAR: NORMAL
SEG NEUTROPHILS: 74.2 %
SEGMENTED NEUTROPHILS ABSOLUTE COUNT: 2.3 THOU/MM3 (ref 1.8–7.7)
SODIUM BLD-SCNC: 127 MEQ/L (ref 135–145)
SPECIFIC GRAVITY, URINE: 1.02 (ref 1–1.03)
UROBILINOGEN, URINE: 0.2 EU/DL (ref 0–1)
WBC # BLD: 3.1 THOU/MM3 (ref 4.8–10.8)
WBC UA: ABNORMAL /HPF
YEAST: ABNORMAL

## 2023-01-11 PROCEDURE — 84145 PROCALCITONIN (PCT): CPT

## 2023-01-11 PROCEDURE — 87636 SARSCOV2 & INF A&B AMP PRB: CPT

## 2023-01-11 PROCEDURE — 87040 BLOOD CULTURE FOR BACTERIA: CPT

## 2023-01-11 PROCEDURE — 36415 COLL VENOUS BLD VENIPUNCTURE: CPT

## 2023-01-11 PROCEDURE — 71045 X-RAY EXAM CHEST 1 VIEW: CPT

## 2023-01-11 PROCEDURE — 80048 BASIC METABOLIC PNL TOTAL CA: CPT

## 2023-01-11 PROCEDURE — 81001 URINALYSIS AUTO W/SCOPE: CPT

## 2023-01-11 PROCEDURE — 99284 EMERGENCY DEPT VISIT MOD MDM: CPT

## 2023-01-11 PROCEDURE — 85025 COMPLETE CBC W/AUTO DIFF WBC: CPT

## 2023-01-11 RX ORDER — ACETAMINOPHEN 325 MG/1
650 TABLET ORAL ONCE
Status: DISCONTINUED | OUTPATIENT
Start: 2023-01-11 | End: 2023-01-12 | Stop reason: HOSPADM

## 2023-01-11 ASSESSMENT — PAIN - FUNCTIONAL ASSESSMENT: PAIN_FUNCTIONAL_ASSESSMENT: NONE - DENIES PAIN

## 2023-01-12 ENCOUNTER — HOSPITAL ENCOUNTER (OUTPATIENT)
Dept: PHARMACY | Age: 63
Setting detail: THERAPIES SERIES
Discharge: HOME | End: 2023-01-12
Payer: COMMERCIAL

## 2023-01-12 DIAGNOSIS — Z79.01 ANTICOAGULATED ON COUMADIN: Primary | ICD-10-CM

## 2023-01-12 DIAGNOSIS — Z51.81 ENCOUNTER FOR THERAPEUTIC DRUG MONITORING: ICD-10-CM

## 2023-01-12 LAB — POC INR: 1.8 (ref 0.8–1.2)

## 2023-01-12 PROCEDURE — 36416 COLLJ CAPILLARY BLOOD SPEC: CPT | Performed by: PHARMACIST

## 2023-01-12 PROCEDURE — 99211 OFF/OP EST MAY X REQ PHY/QHP: CPT | Performed by: PHARMACIST

## 2023-01-12 PROCEDURE — 85610 PROTHROMBIN TIME: CPT | Performed by: PHARMACIST

## 2023-01-12 NOTE — ED TRIAGE NOTES
Patient arrives with c/o fevers since Monday. Patient recently had a procedure done last Thursday. Patient also started a chemo pill and was told that it may cause fevers. Last given tylenol at 1630.  A&O; RR even and unlabored

## 2023-01-12 NOTE — ED NOTES
Urine Sample collected, labeled, double bagged     Radha Arnold, NICHOLN  78/82/04 [address] [place], LPN  46/39/95 [address], LPN  64/90/41 [id number]

## 2023-01-12 NOTE — ED PROVIDER NOTES
[facility] EMERGENCY DEPT      CHIEF COMPLAINT       Chief Complaint   Patient presents with    Fever       Nurses Notes reviewed and I agree except as noted in the HPI. HISTORY OF PRESENT ILLNESS    Bridgett Peña is a 58 y.o. male who presents with complaint of fever of unknown origin, states that he has no signs of infection, no cough, no belly pain no chest pain, no nausea/vomiting/diarrhea. Has not had known sick contact. Patient had cardiac ablation on Friday for atrial flutter. States that he also takes oral chemotherapy due to melanoma. States that he overall feels fine. Onset: Started today  Duration:   Timing:   Location of Pain: No pain  Intesity/severity: Low-grade fever, 100. Modifying Factors: Chemo patient  Relieved by;  Previous Episodes; Tx Before arrival: None  REVIEW OF SYSTEMS      Review of Systems   Constitutional: Positive for low-grade fever; negative for chills, diaphoresis and fatigue. HENT: Negative for congestion, drooling, facial swelling and sore throat. Eyes: Negative for photophobia, pain and discharge. Respiratory: Negative for cough, shortness of breath, wheezing and stridor. Cardiovascular: Negative for chest pain, palpitations and leg swelling. Gastrointestinal: Negative for abdominal pain, blood in stool and abdominal distention. Genitourinary: Negative for dysuria, urgency, hematuria and difficulty urinating. Musculoskeletal: Negative for gait problem, neck pain and neck stiffness. Skin; No rash, No itching  Neurological: Negative for seizures, weakness and numbness. Hematological: Negative for adenopathy. Does not bruise/bleed easily. Psychiatric/Behavioral: Negative for hallucinations, confusion and agitation. PAST MEDICAL HISTORY    has a past medical history of Hyperlipidemia, Hypertension, Hypothyroidism, and Melanoma ([address] SURGICAL HISTORY      has a past surgical history that includes doppler echocardiography;  Mohs surgery; Mohs surgery (Right, 09/17/2021); and lymphadenectomy (Right, 11/15/2013).     CURRENT MEDICATIONS       Discharge Medication List as of 1/11/2023  9:40 PM        CONTINUE these medications which have NOT CHANGED    Details   Dabrafenib Mesylate 75 MG CAPS Take 150 mg by mouth every 12 hours, Disp-1 capsule, R-1NO PRINT      Trametinib Dimethyl Sulfoxide 2 MG TABS Take 2 mg by mouth nightly, Disp-2 tablet, R-1NO PRINT      predniSONE (DELTASONE) 10 MG tablet Take 1 tablet by mouth daily for 10 days, Disp-10 tablet, R-0Normal      fludrocortisone (FLORINEF) 0.1 MG tablet Take 2 tablets by mouth daily, Disp-30 tablet, R-3Normal      allopurinol (ZYLOPRIM) 300 MG tablet Take 1 tablet by mouth daily, Disp-30 tablet, R-3Normal      polyethylene glycol (GLYCOLAX) 17 g packet Take 17 g by mouth daily as needed for Constipation, Disp-527 g, R-1OTC      senna (SENOKOT) 8.6 MG tablet Take 2 tablets by mouth 2 times daily, Disp-120 tablet, R-0OTC      melatonin 3 MG TABS tablet Take 2 tablets by mouth at bedtime, R-3OTC      potassium chloride (KLOR-CON M) 20 MEQ extended release tablet Take 2 tablets by mouth daily, Disp-60 tablet, R-3Normal      sulfamethoxazole-trimethoprim (BACTRIM DS;SEPTRA DS) 800-160 MG per tablet Take 1 tablet by mouth three times a week, Disp-36 tablet, R-3Normal      naloxegol (MOVANTIK) 12.5 MG TABS tablet Take 1 tablet by mouth every morning (before breakfast), Disp-30 tablet, R-5Normal      midodrine (PROAMATINE) 10 MG tablet Take 1 tablet by mouth in the morning and 1 tablet at noon and 1 tablet in the evening., Disp-90 tablet, R-5Normal      levothyroxine (SYNTHROID) 100 MCG tablet Take 1 tablet by mouth every morning (before breakfast), Disp-30 tablet, R-3Normal      pantoprazole (PROTONIX) 40 MG tablet Take 1 tablet by mouth every morning (before breakfast), Disp-30 tablet, R-3Normal      warfarin (COUMADIN) 5 MG tablet Take 1 tablet by mouth daily Or as directed by Coumadin Clinic, Disp-30 tablet, R-3Normal      enoxaparin (LOVENOX) 80 MG/0.8ML Inject 0.8 mLs into the skin 2 times daily, Disp-10 each, R-1Normal      atorvastatin (LIPITOR) 20 MG tablet TAKE 1 TABLET BY MOUTH EVERY DAY, Disp-90 tablet, R-4Normal      metFORMIN (GLUCOPHAGE) 500 MG tablet Take 1 tablet by mouth 2 times daily (with meals), Disp-180 tablet, R-4Normal      ACCU-CHEK SOFTCLIX LANCETS MISC 2 TIMES DAILY Starting Wed 10/16/2019, Disp-180 each, R-3, Normal      blood glucose monitor kit and supplies Test once a day & as needed for symptoms of irregular blood glucose. Please dispense all supplies strips and lancets, Disp-1 kit, R-0, Normal      blood glucose monitor strips Test bid & as needed for symptoms of irregular blood glucose., Disp-180 strip, R-3, Normal      sildenafil (VIAGRA) 50 MG tablet Take 1/2 tablet one hour prior to sexual activity, Disp-10 tablet, R-0Normal             ALLERGIES     has No Known Allergies. FAMILY HISTORY     He indicated that his mother is alive. He indicated that his father is . He indicated that his sister is alive. He indicated that his brother is alive. family history includes High Blood Pressure in his mother; High Cholesterol in his mother; Lenn Vika in his father; Melanoma in his brother; Mult Sclerosis in his sister. SOCIAL HISTORY      reports that he has never smoked. He quit smokeless tobacco use about 4 months ago. His smokeless tobacco use included chew and snuff. He reports current alcohol use. He reports that he does not use drugs. PHYSICAL EXAM     INITIAL VITALS:  height is 6' 1\" (1.854 m) and weight is 185 lb (83.9 kg). His oral temperature is 100 °F (37.8 °C). His blood pressure is 109/67 and his pulse is 92. His respiration is 18 and oxygen saturation is 94%. Physical Exam   Constitutional:  well-developed and well-nourished.    HENT: Head: Normocephalic, atraumatic, Bilateral external ears normal, Oropharynx mosit, No oral exudates, Nose normal. Eyes: PERRL, EOMI, Conjunctiva normal, No discharge. No scleral icterus  Neck: Normal range of motion, No tenderness, Supple  Cardiovascular: Normal rate, regular rhythm, S1 normal and S2 normal.  Exam reveals no gallop. Pulmonary/Chest: Effort normal and breath sounds normal. No accessory muscle usage or stridor. No respiratory distress. no wheezes. has no rales. exhibits no tenderness. Abdominal: Soft. Bowel sounds are normal.  exhibits no distension. There is no tenderness. There is no rebound and no guarding. Extremities: No edema, no tenderness, no cyanosis, no clubbing. Musculoskeletal: Good range of motion in major joints is observed. No major deformities noted. Neurological: Alert and oriented ×3, normal motor function, normal sensory function, no focal deficits. GCS 15  Skin: Skin is warm, dry and intact. No rash noted. No erythema. Psychiatric: Affect normal, judgment normal, mood normal.  DIFFERENTIAL DIAGNOSIS:           MEDICAL DECISION MAKING / ED COURSE:     1) Number and Complexity of Problems            Problem List This Visit:         Chief Complaint   Patient presents with    Fever            Differential Diagnosis includes (but not limited to):  Pneumonia, UTI, viral URI,        Diagnoses Considered but I have low suspicion of:   Endocarditis             Pertinent Comorbid Conditions:    Cancer patient on chemotherapy    2)  Data Reviewed (none if left blank)          My Independent interpretations:     EKG:          Imaging: No acute findings    Labs:      Pancytopenia secondary to chemotherapy                 Decision Rules/Clinical Scores utilized: SIRS criteria            External Documentation Reviewed:         Previous patient encounter documents & history available on EMR was reviewed              See Formal Diagnostic Results above for the lab and radiology tests and orders.     3)  Treatment and Disposition         ED Reassessment: Unchanged         Case discussed with consulting clinician: None         Shared Decision-Making was performed and disposition discussed with the        Patient/Family and questions answered          Social determinants of health impacting treatment or disposition:           Code Status: Full      Summary of Patient Presentation:      VIKRAM  /   Aby Patel Reviewed:    Vitals:    01/11/23 1916 01/11/23 1920 01/11/23 2039 01/11/23 2129   BP: 115/69  (!) 92/54 109/67   Pulse: (!) 111 (!) 108 93 92   Resp: 18      Temp: 100 °F (37.8 °C)      TempSrc: Oral      SpO2: 96% 96% 94% 94%   Weight: 185 lb (83.9 kg)      Height: 6' 1\" (1.854 m)          The patient was seen and examined. Appropriate diagnostic testing was performed and results reviewed with the patient. The results of pertinent diagnostic studies and exam findings were discussed. The patients provisional diagnosis and plan of care were discussed with the patient and present family who expressed understanding. Any medications were reviewed and indications and risks of medications were discussed with the patient /family present. Strict verbal and written return precautions, instructions and appropriate follow-up provided to  the patient . ED Medications administered this visit:  (None if blank)  Medications - No data to display              DIAGNOSTIC RESULTS     EKG: All EKG's are interpreted by the Emergency Department Physician who either signs or Co-signs this chart in the absence of a cardiologist.      RADIOLOGY: non-plain film images(s) such as CT, Ultrasound and MRI are read by the radiologist.  Plain radiographic images are visualized and preliminarily interpreted by the emergency physician unless otherwise stated below.       LABS:   Labs Reviewed   BASIC METABOLIC PANEL - Abnormal; Notable for the following components:       Result Value    Sodium 127 (*)     Chloride 93 (*)     CO2 22 (*)     Glucose 160 (*)     Calcium 7.8 (*)     All other components within normal limits   CBC WITH AUTO DIFFERENTIAL - Abnormal; Notable for the following components:    WBC 3.1 (*)     RBC 3.34 (*)     Hemoglobin 8.8 (*)     Hematocrit 28.1 (*)     MCHC 31.3 (*)     RDW-CV 19.7 (*)     RDW-SD 60.3 (*)     MPV 8.3 (*)     Lymphocytes Absolute 0.5 (*)     Monocytes Absolute 0.2 (*)     All other components within normal limits   PROCALCITONIN - Abnormal; Notable for the following components:    Procalcitonin 0.22 (*)     All other components within normal limits   URINE WITH REFLEXED MICRO - Abnormal; Notable for the following components:    Glucose, Ur 250 (*)     Protein,  (*)     All other components within normal limits   OSMOLALITY - Abnormal; Notable for the following components:    Osmolality Calc 258.8 (*)     All other components within normal limits   COVID-19 & INFLUENZA COMBO   CULTURE, BLOOD 1   CULTURE, BLOOD 2   ANION GAP   GLOMERULAR FILTRATION RATE, ESTIMATED   SCAN OF BLOOD SMEAR       EMERGENCY DEPARTMENT COURSE:   Vitals:    Vitals:    01/11/23 1916 01/11/23 1920 01/11/23 2039 01/11/23 2129   BP: 115/69  (!) 92/54 109/67   Pulse: (!) 111 (!) 108 93 92   Resp: 18      Temp: 100 °F (37.8 °C)      TempSrc: Oral      SpO2: 96% 96% 94% 94%   Weight: 185 lb (83.9 kg)      Height: 6' 1\" (1.854 m)        Patient has nonfocal work-up in the ED, discharged home awaiting blood culture results. Patient given return precautions, admits understanding. CRITICAL CARE:       CONSULTS:  None    PROCEDURES:  none    FINAL IMPRESSION      1.  Fever, unknown origin          DISPOSITION/PLAN   Decision To Discharge    PATIENT REFERRED TO:  DEBBIE Aguiar - CNP  [facility] [address]  [phone]    Call in 1 day  RE-CHECK AND FURTHER TESTING AS NEEDED    DISCHARGE MEDICATIONS:  Discharge Medication List as of 1/11/2023  9:40 PM          (Please note that portions of this note were completed with a voice recognition program.  Efforts were made to edit the dictations but occasionally words are mis-transcribed.)    Chapin Mcguire, DO Chapin Mcguire DO  01/12/23 0005

## 2023-01-12 NOTE — ED NOTES
Patient given care and discharge instructions. Understood well. No questions at this time. A&O; RR even and unlabored.       Chava Hernandez RN  01/11/23 7929

## 2023-01-12 NOTE — PROGRESS NOTES
Medication Management [address]  [phone] (phone)  [phone] (fax)    Mr. Berlin Oneil is a 58 y.o.  male with history of Aflutter who presents today for anticoagulation monitoring and adjustment. Here with mother and oldest daughter. Patient verifies current dosing regimen and tablet strength. No missed or extra doses. Patient denies s/s bleeding/bruising/swelling/SOB/chest pain  No blood in urine or stool. No dietary changes. No changes in medication/OTC agents/Herbals. Currently on Prednisone 10mg daily x 10 days. No change in alcohol use or tobacco use. No change in activity level. Patient denies headaches/lightheadedness/falls. +some dizziness  No vomiting or acute illness. +Diarrhea  No Procedures scheduled in the future at this time. Lovenox last given last pm, has three syringes left      Assessment:   Lab Results   Component Value Date    INR 1.80 (H) 01/12/2023    INR 1.70 (H) 01/10/2023    INR 1.26 (H) 01/08/2023     INR subtherapeutic   Recent Labs     01/12/23  0937   INR 1.80*         Plan:  Continue Lovenox 80mg SQ Q12h x 3. Coumadin 7.5mg today and tomorrow, 5mg Sat and Sun. Recheck INR in 4 day(s). Patient reminded to call the Anticoagulation Clinic with any signs or symptoms of bleeding or with any medication changes. Patient given instructions utilizing the teach back method. After visit summary printed and reviewed with patient. Discharged in [facility] in no apparent distress.     For Pharmacy Admin Tracking Only    Intervention Detail: Dose Adjustment: 2, reason: Therapy Optimization  Total # of Interventions Recommended: 2  Total # of Interventions Accepted: 2  Time Spent (min): 20

## 2023-01-13 LAB
BLOOD CULTURE, ROUTINE: NORMAL
BLOOD CULTURE, ROUTINE: NORMAL

## 2023-01-16 ENCOUNTER — HOSPITAL ENCOUNTER (OUTPATIENT)
Dept: PHARMACY | Age: 63
Setting detail: THERAPIES SERIES
Discharge: HOME | End: 2023-01-16
Payer: COMMERCIAL

## 2023-01-16 DIAGNOSIS — Z51.81 ENCOUNTER FOR THERAPEUTIC DRUG MONITORING: ICD-10-CM

## 2023-01-16 DIAGNOSIS — Z79.01 ANTICOAGULATED ON COUMADIN: Primary | ICD-10-CM

## 2023-01-16 LAB — POC INR: 3.1 (ref 0.8–1.2)

## 2023-01-16 PROCEDURE — 36416 COLLJ CAPILLARY BLOOD SPEC: CPT | Performed by: PHARMACIST

## 2023-01-16 PROCEDURE — 85610 PROTHROMBIN TIME: CPT | Performed by: PHARMACIST

## 2023-01-16 PROCEDURE — 99211 OFF/OP EST MAY X REQ PHY/QHP: CPT | Performed by: PHARMACIST

## 2023-01-16 NOTE — PROGRESS NOTES
Medication Management [address]  [phone] (phone)  [phone] (fax)    Mr. Saran Argueta is a 58 y.o.  male with history of Aflutter who presents today for anticoagulation monitoring and adjustment. Patient verifies current dosing regimen and tablet strength. No missed or extra doses. Last lovenox injection was Friday AM 1/13. Patient denies s/s bleeding/bruising/swelling/SOB/chest pain  No blood in urine or stool. No dietary changes. No changes in medication/OTC agents/Herbals. No change in alcohol use or tobacco use. No change in activity level. Patient denies headaches/dizziness/lightheadedness/falls. No vomiting/diarrhea or acute illness. Fevers about every other night - attributing to chemo medication but following up with [address]. No Procedures scheduled in the future at this time. Assessment:   Lab Results   Component Value Date    INR 3.10 (H) 01/16/2023    INR 1.80 (H) 01/12/2023    INR 1.70 (H) 01/10/2023     INR supratherapeutic   Recent Labs     01/16/23  0904   INR 3.10*   Patient has received 42.5 mg of Coumadin over the last 7 days. Plan:  Coumadin 2.5mg x1 today then Coumadin 5mg daily x2 days. Recheck INR in 3 days. (This will provide patient 37.5mg over the last 7 days). Patient reminded to call the Anticoagulation Clinic with any signs or symptoms of bleeding or with any medication changes. Patient given instructions utilizing the teach back method. After visit summary printed and reviewed with patient. Discharged in [facility] in no apparent distress.     For Pharmacy Admin Tracking Only    Intervention Detail: Dose Adjustment: 1, reason: Therapy Optimization  Total # of Interventions Recommended: 1  Total # of Interventions Accepted: 1  Time Spent (min): 20

## 2023-01-17 ENCOUNTER — OFFICE VISIT (OUTPATIENT)
Dept: FAMILY MEDICINE CLINIC | Age: 63
End: 2023-01-17

## 2023-01-17 ENCOUNTER — TELEPHONE (OUTPATIENT)
Dept: FAMILY MEDICINE CLINIC | Age: 63
End: 2023-01-17

## 2023-01-17 ENCOUNTER — TELEPHONE (OUTPATIENT)
Dept: CARDIOLOGY CLINIC | Age: 63
End: 2023-01-17

## 2023-01-17 VITALS
SYSTOLIC BLOOD PRESSURE: 96 MMHG | OXYGEN SATURATION: 96 % | HEIGHT: 73 IN | WEIGHT: 176.4 LBS | TEMPERATURE: 98.2 F | DIASTOLIC BLOOD PRESSURE: 52 MMHG | RESPIRATION RATE: 18 BRPM | BODY MASS INDEX: 23.38 KG/M2 | HEART RATE: 90 BPM

## 2023-01-17 DIAGNOSIS — R53.81 PHYSICAL DECONDITIONING: ICD-10-CM

## 2023-01-17 DIAGNOSIS — D50.9 IRON DEFICIENCY ANEMIA, UNSPECIFIED IRON DEFICIENCY ANEMIA TYPE: ICD-10-CM

## 2023-01-17 DIAGNOSIS — I95.89 OTHER SPECIFIED HYPOTENSION: ICD-10-CM

## 2023-01-17 DIAGNOSIS — R53.81 PHYSICAL DECONDITIONING: Primary | ICD-10-CM

## 2023-01-17 DIAGNOSIS — C79.31 MALIGNANT MELANOMA METASTATIC TO BRAIN (HCC): ICD-10-CM

## 2023-01-17 DIAGNOSIS — R53.1 WEAKNESS: ICD-10-CM

## 2023-01-17 DIAGNOSIS — D61.818 PANCYTOPENIA (HCC): ICD-10-CM

## 2023-01-17 DIAGNOSIS — Z09 HOSPITAL DISCHARGE FOLLOW-UP: Primary | ICD-10-CM

## 2023-01-17 DIAGNOSIS — E87.1 HYPONATREMIA: ICD-10-CM

## 2023-01-17 RX ORDER — FLUDROCORTISONE ACETATE 0.1 MG/1
0.2 TABLET ORAL DAILY
Qty: 60 TABLET | Refills: 3 | Status: SHIPPED | OUTPATIENT
Start: 2023-01-17 | End: 2023-02-16

## 2023-01-17 SDOH — ECONOMIC STABILITY: FOOD INSECURITY: WITHIN THE PAST 12 MONTHS, YOU WORRIED THAT YOUR FOOD WOULD RUN OUT BEFORE YOU GOT MONEY TO BUY MORE.: NEVER TRUE

## 2023-01-17 SDOH — ECONOMIC STABILITY: FOOD INSECURITY: WITHIN THE PAST 12 MONTHS, THE FOOD YOU BOUGHT JUST DIDN'T LAST AND YOU DIDN'T HAVE MONEY TO GET MORE.: NEVER TRUE

## 2023-01-17 ASSESSMENT — PATIENT HEALTH QUESTIONNAIRE - PHQ9
DEPRESSION UNABLE TO ASSESS: FUNCTIONAL CAPACITY MOTIVATION LIMITS ACCURACY
1. LITTLE INTEREST OR PLEASURE IN DOING THINGS: 0
SUM OF ALL RESPONSES TO PHQ QUESTIONS 1-9: 0
2. FEELING DOWN, DEPRESSED OR HOPELESS: 0
SUM OF ALL RESPONSES TO PHQ QUESTIONS 1-9: 0
SUM OF ALL RESPONSES TO PHQ9 QUESTIONS 1 & 2: 0

## 2023-01-17 ASSESSMENT — SOCIAL DETERMINANTS OF HEALTH (SDOH): HOW HARD IS IT FOR YOU TO PAY FOR THE VERY BASICS LIKE FOOD, HOUSING, MEDICAL CARE, AND HEATING?: NOT HARD AT ALL

## 2023-01-17 NOTE — PROGRESS NOTES
Post-Discharge Transitional Care  Follow Up      Tiara Scales   YOB: 1960    Date of Office Visit:  1/17/2023  Date of Hospital Admission: 1/11/23  Date of Hospital Discharge: 1/11/23  Risk of hospital readmission (high >=14%. Medium >=10%) :Readmission Risk Score: 16.6      Care management risk score Rising risk (score 2-5) and Complex Care (Scores >=6): No Risk Score On File     Non face to face  following discharge, date last encounter closed (first attempt may have been earlier): 01/09/2023    Call initiated 2 business days of discharge: Yes    ASSESSMENT/PLAN:   Hospital discharge follow-up  -     [place] DISCHARGE MEDS RECONCILED W/ CURRENT OUTPATIENT MED LIST  Other specified hypotension  Monitor BP  Keep hydrated with electrolytes  Hammad hose   -     fludrocortisone (FLORINEF) 0.1 MG tablet; Take 2 tablets by mouth daily, Disp-60 tablet, R-3Normal  Weakness  -     [address]  Malignant melanoma metastatic to brain [facility])  Contniue with [facility]  Iron deficiency anemia, unspecified iron deficiency anemia type  Pancytopenia ([facility] [address]  Hyponatremia  Monitor labs  Likely due to dehydration  Not on nay diuretics causing this   Physical deconditioning  -     [address]    Pt and mother in agreement with plan  Medical Decision Making: high complexity  Return in 3 months (on 4/17/2023), or if symptoms worsen or fail to improve, for f/u . On this date 1/17/2023 I have spent 32 minutes reviewing previous notes, test results and face to face with the patient discussing the diagnosis and importance of compliance with the treatment plan as well as documenting on the day of the visit. Subjective:   HPI:  Follow up of Hospital problems/diagnosis(es): Pt has had 2 recent hospital admission. He was admitted at [address] [place] [address] from 12/7/2022 through 12/28/2022 for sepsis and dehydration.  Pt is currently being treated by Oncology at [facility] for malignant melanoma with mets to brain. Pt has had melanoma for years and it had been controlled until recently. Pt had been on immunotherapy and then developed dehydration. Due to poor po intake. Pt was D/c from [facility] and then admitted at [facility] for rehab and then was diagnosed with A fib and placed on anticoagulation therapy and had a heart ablation. He does remain anticoagulated following with [facility]. Inpatient course: Discharge summary reviewed- see chart. From 12.28/2022 after being received from [facility]. Inpatient Rehabilitation Course:   Lang Joseph is a 58 y.o. right-handed  male with history of history of hypertension, hyperlipidemia, recurrent multiple primary melanomas (8233,9422, 2013) including a [id number] melanoma of the left flank s/p excision with left axillary lymph node dissection, was admitted to inpatient rehabilitation on 12/28/2022 for intensive inpatient management of impairment & disability secondary to debility/physical decondition secondary to extensive multiple metastatic melanoma to his brain, cervical spine, musculature, and brachial plexus. The patient participated in an aggressive multidisciplinary inpatient rehabilitation program involving 3 hours per day, 5 days per week of rehabilitation. Appropriate DVT prophylaxis options were considered throughout rehabilitation stay. Dr Lin Esparza followed during the IPR stay for medical management     The patient continued taking his own cancer medications [trametinib (Mekinist) & dabrafenib (Tafinlar)] after he was admitted to inpatient rehab unit. He tolerated the intensity inpatient rehab intervention well. On 12/30/2022 evening the patient was found to have irregular heartbeat with heart rate up to 112. EKG was done and revealed atrial flutter with variable A-V block with rated of 120/min ventricular rate. Cardiology was consulted.   The patient then was discharged from rehab unit and admitted to acute hospital for medical stabilization. Patient was discharged  to acute hospital for medical stabilization  in  guarded  condition. Consults:   cardiology   HPI from 12.30.2022 readmisson to [facility] for a fib. Admitted for: (HPI) evaluation and treatment of atrial flutter     Hospital Course: This is a pt with metastatic melanoma, with a long stay at [facility] for treatment of sepsis. He was transferred to Rehab for further treatment. It was noted there he had a rapid pulse found to be atrial flutter and he was transferred to . He was asymptomatic with the arrhythmia. He was started on heparin. Communication was had with [facility] re anticoagulation as he has cerebral mets; they approved Coumadin. Hopefully this can be stopped after a period of time, given the high success rate of ablation. He was seen by Dr Taylor Carranza and did undergo successful ablation. He stayed in NSR throughout the rest of his stay. He had no thrombus on DON but spontaneous echo contrast in the left atrium. He was noted to run low blood pressures; he was thought to be hypoadrenal at [facility] and Dr Indra Mi saw him re that. He was placed on midodrine in adjusted doses. He tolerated the low pressure well. He was discharged on a lovenox bridge to coumadin, which will be managed by the Coumadin Clinic. He will follow up with [facility] and Dr Taylor Carranza. Consultants:  Neurology, Cardiology, Endocrinology    Pt was also recently seen on 1.11.2023 at [facility] for fever, he is on chemo which does have the SE of fevers. Labs reviewed. Pt her with mother today, he has bene staying with his mother since D/c from [facility]. He is weak and deconditioned. Appetite improving. Interval history/Current status: stable but deconditioned and weakened.      Lab Results   Component Value Date    WBC 3.1 (L) 01/11/2023    HGB 8.8 (L) 01/11/2023    HCT 28.1 (L) 01/11/2023    MCV 84.1 01/11/2023     01/11/2023      Lab Results   Component Value Date/Time     01/11/2023 07:39 PM    K 4.1 01/11/2023 07:39 PM    K 4.4 12/29/2022 06:21 AM    CL 93 01/11/2023 07:39 PM    CO2 22 01/11/2023 07:39 PM    BUN 13 01/11/2023 07:39 PM    CREATININE 0.5 01/11/2023 07:39 PM    GLUCOSE 160 01/11/2023 07:39 PM    GLUCOSE 101 08/04/2021 07:00 AM    CALCIUM 7.8 01/11/2023 07:39 PM         Pt ion florinef and midodrine for hypotension, will refill until seen by Dr. Oscar Franco, he has f/u with him due to ablation, may need to consider Cardiology. Pt does try to keep hydrated. Oncology managing labs pt pancytopenic. 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 If you have any questions, ask your nurse or doctor. CONTINUE taking these medications      Accu-Chek Softclix Lancets Misc  1 Device by Does not apply route 2 times daily     allopurinol 300 MG tablet  Commonly known as: ZYLOPRIM  Take 1 tablet by mouth daily     atorvastatin 20 MG tablet  Commonly known as: LIPITOR  TAKE 1 TABLET BY MOUTH EVERY DAY     blood glucose monitor kit and supplies  Test once a day & as needed for symptoms of irregular blood glucose. Please dispense all supplies strips and lancets     blood glucose test strips  Test bid & as needed for symptoms of irregular blood glucose.      Dabrafenib Mesylate 75 MG Caps  Take 150 mg by mouth every 12 hours     enoxaparin 80 MG/0.8ML  Commonly known as: Lovenox  Inject 0.8 mLs into the skin 2 times daily     fludrocortisone 0.1 MG tablet  Commonly known as: FLORINEF  Take 2 tablets by mouth daily     levothyroxine 100 MCG tablet  Commonly known as: SYNTHROID  Take 1 tablet by mouth every morning (before breakfast)     melatonin 3 MG Tabs tablet  Take 2 tablets by mouth at bedtime     metFORMIN 500 MG tablet  Commonly known as: GLUCOPHAGE  Take 1 tablet by mouth 2 times daily (with meals)     midodrine 10 MG tablet  Commonly known as: PROAMATINE  Take 1 tablet by mouth in the morning and 1 tablet at noon and 1 tablet in the evening. naloxegol 12.5 MG Tabs tablet  Commonly known as: MOVANTIK  Take 1 tablet by mouth every morning (before breakfast)     pantoprazole 40 MG tablet  Commonly known as: PROTONIX  Take 1 tablet by mouth every morning (before breakfast)     polyethylene glycol 17 g packet  Commonly known as: GLYCOLAX  Take 17 g by mouth daily as needed for Constipation     potassium chloride 20 MEQ extended release tablet  Commonly known as: KLOR-CON M  Take 2 tablets by mouth daily     predniSONE 10 MG tablet  Commonly known as: DELTASONE  Take 1 tablet by mouth daily for 10 days     senna 8.6 MG tablet  Commonly known as: SENOKOT  Take 2 tablets by mouth 2 times daily     sildenafil 50 MG tablet  Commonly known as: VIAGRA  Take 1/2 tablet one hour prior to sexual activity     sulfamethoxazole-trimethoprim 800-160 MG per tablet  Commonly known as: BACTRIM DS;SEPTRA DS  Take 1 tablet by mouth three times a week     Trametinib Dimethyl Sulfoxide 2 MG Tabs  Take 2 mg by mouth nightly     warfarin 5 MG tablet  Commonly known as: Coumadin  Take as directed by the anticoagulation clinic. If you are unsure how to take this medication, talk to your nurse or doctor. Original instructions:  Take 1 tablet by mouth daily Or as directed by Coumadin Clinic Where to Get Your Medications        These medications were sent to [facility]/pharmacy #[id number]- [place], [place] - [address] [address]      Phone: [phone]   fludrocortisone 0.1 MG tablet           Medications marked \"taking\" at this time  Outpatient Medications Marked as Taking for the 1/17/23 encounter (Office Visit) with DEBBIE Lamb - CNP   Medication Sig Dispense Refill    fludrocortisone (FLORINEF) 0.1 MG tablet Take 2 tablets by mouth daily 60 tablet 3    Dabrafenib Mesylate 75 MG CAPS Take 150 mg by mouth every 12 hours 1 capsule 1    Trametinib Dimethyl Sulfoxide 2 MG TABS Take 2 mg by mouth nightly 2 tablet 1    predniSONE (DELTASONE) 10 MG tablet Take 1 tablet by mouth daily for 10 days 10 tablet 0    allopurinol (ZYLOPRIM) 300 MG tablet Take 1 tablet by mouth daily 30 tablet 3    polyethylene glycol (GLYCOLAX) 17 g packet Take 17 g by mouth daily as needed for Constipation 527 g 1    senna (SENOKOT) 8.6 MG tablet Take 2 tablets by mouth 2 times daily 120 tablet 0    melatonin 3 MG TABS tablet Take 2 tablets by mouth at bedtime  3    potassium chloride (KLOR-CON M) 20 MEQ extended release tablet Take 2 tablets by mouth daily 60 tablet 3    sulfamethoxazole-trimethoprim (BACTRIM DS;SEPTRA DS) 800-160 MG per tablet Take 1 tablet by mouth three times a week 36 tablet 3    naloxegol (MOVANTIK) 12.5 MG TABS tablet Take 1 tablet by mouth every morning (before breakfast) 30 tablet 5    midodrine (PROAMATINE) 10 MG tablet Take 1 tablet by mouth in the morning and 1 tablet at noon and 1 tablet in the evening.  90 tablet 5    levothyroxine (SYNTHROID) 100 MCG tablet Take 1 tablet by mouth every morning (before breakfast) 30 tablet 3    pantoprazole (PROTONIX) 40 MG tablet Take 1 tablet by mouth every morning (before breakfast) 30 tablet 3    warfarin (COUMADIN) 5 MG tablet Take 1 tablet by mouth daily Or as directed by Coumadin Clinic 30 tablet 3    enoxaparin (LOVENOX) 80 MG/0.8ML Inject 0.8 mLs into the skin 2 times daily 10 each 1    atorvastatin (LIPITOR) 20 MG tablet TAKE 1 TABLET BY MOUTH EVERY DAY 90 tablet 4    metFORMIN (GLUCOPHAGE) 500 MG tablet Take 1 tablet by mouth 2 times daily (with meals) 180 tablet 4    ACCU-CHEK SOFTCLIX LANCETS MISC 1 Device by Does not apply route 2 times daily 180 each 3    blood glucose monitor kit and supplies Test once a day & as needed for symptoms of irregular blood glucose. Please dispense all supplies strips and lancets 1 kit 0    blood glucose monitor strips Test bid & as needed for symptoms of irregular blood glucose. 180 strip 3        Medications patient taking as of now reconciled against medications ordered at time of hospital discharge: Yes    A comprehensive review of systems was negative except for what was noted in the HPI. Objective:    BP (!) 96/52   Pulse 90   Temp 98.2 °F (36.8 °C)   Resp 18   Ht 6' 1\" (1.854 m)   Wt 176 lb 6.4 oz (80 kg)   SpO2 96%   BMI 23.27 kg/m²   General Appearance: alert and oriented to person, place and time, frail-appearing, cachectic, and appears older than stated age  Skin: warm and dry, no rash or erythema  Pulmonary/Chest: clear to auscultation bilaterally- no wheezes, rales or rhonchi, normal air movement, no respiratory distress  Cardiovascular: normal rate, normal S1 and S2, no gallops, intact distal pulses, and no carotid bruits  Abdomen: soft, non-tender, non-distended, normal bowel sounds, no masses or organomegaly  Extremities: no cyanosis, no clubbing, and no edema  Musculoskeletal: pt deconditioned, BUE and BLE weakness noted, does ambulate with a walker at times. Procedures    DC DISCHARGE MEDS RECONCILED W/ CURRENT OUTPATIENT MED LIST      Rose Luna will require the following home care treatments or therapies: PT/OT medical social worker, need help getting disability .   Home care will be necessary because of pt being weak and deconditioned uses a walker to ambulate. .  The patient is in agreement to receiving home care. An electronic signature was used to authenticate this note.   --DEBBIE Marquez - CNP

## 2023-01-17 NOTE — TELEPHONE ENCOUNTER
DR MON, YOU DID AN INPT ABLATION FOR AFLUTTER    DAUGHTER IS CALLING TO MAKE A F/U    WHEN DO YOU WANT TO SEE HIM? ?

## 2023-01-17 NOTE — TELEPHONE ENCOUNTER
----- Message from Maxime Vanegas sent at 1/17/2023 11:03 AM EST -----  Regarding: Handicap parking   Santiago  I told the nurse can you send in a prescription for a handicap parking?   Thanks   Lindsey Tao

## 2023-01-19 ENCOUNTER — HOSPITAL ENCOUNTER (OUTPATIENT)
Dept: PHARMACY | Age: 63
Setting detail: THERAPIES SERIES
Discharge: HOME | End: 2023-01-19
Payer: COMMERCIAL

## 2023-01-19 DIAGNOSIS — Z51.81 ENCOUNTER FOR THERAPEUTIC DRUG MONITORING: ICD-10-CM

## 2023-01-19 DIAGNOSIS — Z79.01 ANTICOAGULATED ON COUMADIN: Primary | ICD-10-CM

## 2023-01-19 LAB — POC INR: 2.6 (ref 0.8–1.2)

## 2023-01-19 PROCEDURE — 85610 PROTHROMBIN TIME: CPT

## 2023-01-19 PROCEDURE — 99211 OFF/OP EST MAY X REQ PHY/QHP: CPT

## 2023-01-19 PROCEDURE — 36416 COLLJ CAPILLARY BLOOD SPEC: CPT

## 2023-01-19 NOTE — PROGRESS NOTES
Medication Management [address]  [phone] (phone)  [phone] (fax)    Mr. Tiara Sanford is a 58 y.o.  male with history of Aflutter who presents today for anticoagulation monitoring and adjustment. Patient verifies current dosing regimen and tablet strength. No missed or extra doses. Patient denies s/s bleeding/bruising/swelling/SOB/chest pain  No blood in urine or stool. No dietary changes. No changes in medication/OTC agents/Herbals. No change in alcohol use or tobacco use. No change in activity level. Patient denies headaches/dizziness/lightheadedness/falls. No vomiting/diarrhea or acute illness. No Procedures scheduled in the future at this time. Assessment:   Lab Results   Component Value Date    INR 2.60 (H) 01/19/2023    INR 3.10 (H) 01/16/2023    INR 1.80 (H) 01/12/2023     INR therapeutic   Recent Labs     01/19/23  0815   INR 2.60*     Patient presents with therapeutic INR. He has received 37.5 mg in the past 7 days. Also note INR dropped 0.5 in the past 3 days. Plan:  Take Coumadin 7.5 mg today (Th), 5 mg Fr, 7.5 mg Sat, 5 mg SuMo (40 mg 7 day total leading up to next recheck). Recheck INR Tuesday. Patient reminded to call the Anticoagulation Clinic with any signs or symptoms of bleeding or with any medication changes. Patient given instructions utilizing the teach back method. After visit summary printed and reviewed with patient. Discharged in transport chair in no apparent distress with mother.     For Pharmacy Admin Tracking Only    Time Spent (min): 15

## 2023-01-24 ENCOUNTER — HOSPITAL ENCOUNTER (OUTPATIENT)
Dept: PHARMACY | Age: 63
Setting detail: THERAPIES SERIES
Discharge: HOME | End: 2023-01-24
Payer: COMMERCIAL

## 2023-01-24 DIAGNOSIS — Z79.01 ANTICOAGULATED ON COUMADIN: Primary | ICD-10-CM

## 2023-01-24 DIAGNOSIS — I48.92 ATRIAL FLUTTER, UNSPECIFIED TYPE (HCC): ICD-10-CM

## 2023-01-24 DIAGNOSIS — Z51.81 ENCOUNTER FOR THERAPEUTIC DRUG MONITORING: ICD-10-CM

## 2023-01-24 LAB — POC INR: 3.3 (ref 0.8–1.2)

## 2023-01-24 PROCEDURE — 36416 COLLJ CAPILLARY BLOOD SPEC: CPT | Performed by: PHARMACIST

## 2023-01-24 PROCEDURE — 99212 OFFICE O/P EST SF 10 MIN: CPT | Performed by: PHARMACIST

## 2023-01-24 PROCEDURE — 99211 OFF/OP EST MAY X REQ PHY/QHP: CPT | Performed by: PHARMACIST

## 2023-01-24 PROCEDURE — 85610 PROTHROMBIN TIME: CPT | Performed by: PHARMACIST

## 2023-01-24 RX ORDER — WARFARIN SODIUM 5 MG/1
TABLET ORAL
Qty: 40 TABLET | Refills: 1 | Status: SHIPPED | OUTPATIENT
Start: 2023-01-24

## 2023-01-24 NOTE — PROGRESS NOTES
Medication Management [address]  [phone] (phone)  [phone] (fax)    Mr. Fredi Pate is a 58 y.o.  male with history of Aflutter who presents today for anticoagulation monitoring and adjustment. Patient verifies current dosing regimen and tablet strength. No missed or extra doses. Patient denies s/s bleeding/bruising/swelling/SOB/chest pain  No blood in urine or stool. No dietary changes. Planning to eat some broccoli today, discussed being consistent with Vit K intake. No changes in medication/OTC agents/Herbals. No change in alcohol use or tobacco use. No change in activity level. Patient denies headaches/dizziness/lightheadedness/falls. No vomiting/diarrhea or acute illness. No Procedures scheduled in the future at this time. F/U with Dr Mame Owusu on 2/15/23    Assessment:   Lab Results   Component Value Date    INR 3.30 (H) 01/24/2023    INR 2.60 (H) 01/19/2023    INR 3.10 (H) 01/16/2023     INR supratherapeutic   Recent Labs     01/24/23  0804   INR 3.30*     Regimen is still being determined. Has received 40mg warfarin over the past 7 days. INR slightly supratherapeutic, patient likely needs 7.5mg once or twice per week, and 5mg all other days. Plan:  Coumadin 7.5mg MF and 5mg all other days. Recheck INR in 6 day(s). Refill called to pharmacy  Patient reminded to call the Anticoagulation Clinic with any signs or symptoms of bleeding or with any medication changes. Patient given instructions utilizing the teach back method. After visit summary printed and reviewed with patient. Discharged in [place] [address] in no apparent distress.       For Pharmacy Admin Tracking Only    Intervention Detail: Dose Adjustment: 1, reason: Therapy Optimization and Refill(s) Provided  Total # of Interventions Recommended: 2  Total # of Interventions Accepted: 2  Time Spent (min): 20

## 2023-01-30 ENCOUNTER — HOSPITAL ENCOUNTER (OUTPATIENT)
Dept: PHARMACY | Age: 63
Setting detail: THERAPIES SERIES
Discharge: HOME | End: 2023-01-30
Payer: COMMERCIAL

## 2023-01-30 DIAGNOSIS — Z51.81 ENCOUNTER FOR THERAPEUTIC DRUG MONITORING: ICD-10-CM

## 2023-01-30 DIAGNOSIS — Z79.01 ANTICOAGULATED ON COUMADIN: Primary | ICD-10-CM

## 2023-01-30 DIAGNOSIS — I48.92 ATRIAL FLUTTER, UNSPECIFIED TYPE (HCC): ICD-10-CM

## 2023-01-30 LAB — POC INR: 2.5 (ref 0.8–1.2)

## 2023-01-30 PROCEDURE — 85610 PROTHROMBIN TIME: CPT | Performed by: PHARMACIST

## 2023-01-30 PROCEDURE — 36416 COLLJ CAPILLARY BLOOD SPEC: CPT | Performed by: PHARMACIST

## 2023-01-30 PROCEDURE — 99211 OFF/OP EST MAY X REQ PHY/QHP: CPT | Performed by: PHARMACIST

## 2023-01-30 RX ORDER — LEVOTHYROXINE SODIUM 0.1 MG/1
TABLET ORAL
Qty: 30 TABLET | Refills: 3 | OUTPATIENT
Start: 2023-01-30

## 2023-01-30 RX ORDER — POTASSIUM CHLORIDE 1500 MG/1
TABLET, EXTENDED RELEASE ORAL
Qty: 60 TABLET | Refills: 3 | OUTPATIENT
Start: 2023-01-30

## 2023-01-30 RX ORDER — MIDODRINE HYDROCHLORIDE 10 MG/1
TABLET ORAL
Qty: 90 TABLET | Refills: 5 | OUTPATIENT
Start: 2023-01-30

## 2023-01-30 RX ORDER — ALLOPURINOL 300 MG/1
TABLET ORAL
Qty: 30 TABLET | Refills: 3 | OUTPATIENT
Start: 2023-01-30

## 2023-01-30 RX ORDER — PANTOPRAZOLE SODIUM 40 MG/1
TABLET, DELAYED RELEASE ORAL
Qty: 30 TABLET | Refills: 3 | OUTPATIENT
Start: 2023-01-30

## 2023-01-30 RX ORDER — WARFARIN SODIUM 5 MG/1
TABLET ORAL
Qty: 30 TABLET | Refills: 3 | OUTPATIENT
Start: 2023-01-30

## 2023-01-30 NOTE — PROGRESS NOTES
Medication Management [address]  [phone] (phone)  [phone] (fax)    Mr. Rowena Martin is a 58 y.o.  male with history of Aflutter who presents today for anticoagulation monitoring and adjustment. Patient verifies current dosing regimen and tablet strength. No missed or extra doses. Patient denies s/s bleeding/bruising/swelling/SOB/chest pain  No blood in urine or stool. No dietary changes. No changes in medication/OTC agents/Herbals. No change in alcohol use or tobacco use. No change in activity level. Patient denies headaches/dizziness/lightheadedness/falls. No vomiting/diarrhea or acute illness. No Procedures scheduled in the future at this time. Follow up with Dr. Nata Garcia 2/15/23. Assessment:   Lab Results   Component Value Date    INR 2.50 (H) 01/30/2023    INR 3.30 (H) 01/24/2023    INR 2.60 (H) 01/19/2023     INR therapeutic   Recent Labs     01/30/23  0828   INR 2.50*   Patient has had 37.5mg of Coumadin the last 7 days. Plan:  Coumadin 7.5mg W 5mg SMTuThFS (will provide 37.5mg of Coumadin per week). Recheck INR in 1 week(s). Patient reminded to call the Anticoagulation Clinic with any signs or symptoms of bleeding or with any medication changes. Patient given instructions utilizing the teach back method. After visit summary printed and reviewed with patient. Discharged in wheelchair no apparent distress.     For Pharmacy Admin Tracking Only    Intervention Detail:   Total # of Interventions Recommended: 0  Total # of Interventions Accepted: 0  Time Spent (min): 20

## 2023-02-06 ENCOUNTER — HOSPITAL ENCOUNTER (OUTPATIENT)
Dept: PHARMACY | Age: 63
Setting detail: THERAPIES SERIES
Discharge: HOME | End: 2023-02-06
Payer: COMMERCIAL

## 2023-02-06 DIAGNOSIS — Z51.81 ENCOUNTER FOR THERAPEUTIC DRUG MONITORING: ICD-10-CM

## 2023-02-06 DIAGNOSIS — Z79.01 ANTICOAGULATED ON COUMADIN: ICD-10-CM

## 2023-02-06 DIAGNOSIS — I48.92 ATRIAL FLUTTER, UNSPECIFIED TYPE (HCC): Primary | ICD-10-CM

## 2023-02-06 LAB — POC INR: 1.6 (ref 0.8–1.2)

## 2023-02-06 PROCEDURE — 36416 COLLJ CAPILLARY BLOOD SPEC: CPT

## 2023-02-06 PROCEDURE — 99212 OFFICE O/P EST SF 10 MIN: CPT

## 2023-02-06 PROCEDURE — 85610 PROTHROMBIN TIME: CPT

## 2023-02-06 NOTE — PROGRESS NOTES
Medication Management [address]   [phone] (phone)  [phone] (fax)    Mr. Martinez Mix is a 58 y.o.  male with history of Aflutter who presents today for anticoagulation monitoring and adjustment. Patient verifies current dosing regimen and tablet strength. No missed or extra doses. Patient denies s/s bleeding/bruising/swelling/SOB/chest pain  No blood in urine or stool. No dietary changes. No changes in medication/OTC agents/Herbals. No change in alcohol use or tobacco use. Small increase in activity. Patient denies headaches/dizziness/lightheadedness/falls. No vomiting/diarrhea or acute illness. No Procedures scheduled in the future at this time. Appointment with Dr. Luis Lees on 2/15/23    Assessment:   Lab Results   Component Value Date    INR 1.60 (H) 02/06/2023    INR 2.50 (H) 01/30/2023    INR 3.30 (H) 01/24/2023     INR subtherapeutic   Recent Labs     02/06/23  0818   INR 1.60*     Goal INR 2.0-3.0    Plan: Will increase Coumadin 7.5mg MF and 5mg TWThSS. Recheck INR in 1 week(s). Patient reminded to call the Anticoagulation Clinic with signs or symptoms of bleeding or with any medication changes. Patient given instructions utilizing the teach back method. After visit summary printed and reviewed with patient. Discharged ambulatory in no apparent distress. For Pharmacy Admin Tracking Only    Intervention Detail: Dose Adjustment: 1, reason: Therapy Optimization  Total # of Interventions Recommended: 1  Total # of Interventions Accepted: 1  Time Spent (min): 20    Ronny Seymour, PharmD  PGY2 Ambulatory Care Pharmacy Resident  2/6/2023 8:38 AM

## 2023-02-13 ENCOUNTER — HOSPITAL ENCOUNTER (OUTPATIENT)
Dept: PHARMACY | Age: 63
Setting detail: THERAPIES SERIES
Discharge: HOME | End: 2023-02-13
Payer: COMMERCIAL

## 2023-02-13 DIAGNOSIS — I48.92 ATRIAL FLUTTER, UNSPECIFIED TYPE (HCC): Primary | ICD-10-CM

## 2023-02-13 DIAGNOSIS — Z51.81 ENCOUNTER FOR THERAPEUTIC DRUG MONITORING: ICD-10-CM

## 2023-02-13 DIAGNOSIS — Z79.01 ANTICOAGULATED ON COUMADIN: ICD-10-CM

## 2023-02-13 LAB — POC INR: 1.8 (ref 0.8–1.2)

## 2023-02-13 PROCEDURE — 85610 PROTHROMBIN TIME: CPT

## 2023-02-13 PROCEDURE — 36416 COLLJ CAPILLARY BLOOD SPEC: CPT

## 2023-02-13 PROCEDURE — 99211 OFF/OP EST MAY X REQ PHY/QHP: CPT

## 2023-02-13 NOTE — PATIENT INSTRUCTIONS
May stop Coumadin in 1 month     You may receive a survey regarding the care you received during your visit. Your input is valuable to us. We encourage you to complete and return your survey. We hope you will choose us in the future for your healthcare needs.

## 2023-02-13 NOTE — PROGRESS NOTES
Medication Management [address]   [phone] (phone)  [phone] (fax)    Mr. Janessa Gaston is a 58 y.o.  male with history of Aflutter who presents today for anticoagulation monitoring and adjustment. Patient verifies current dosing regimen and tablet strength. No missed or extra doses. Patient denies s/s bleeding/bruising/swelling/SOB/chest pain  No blood in urine or stool. No dietary changes. No changes in medication/OTC agents/Herbals. No change in alcohol use or tobacco use. No change in activity level. Patient denies headaches/dizziness/lightheadedness/falls. No vomiting/diarrhea or acute illness. No Procedures scheduled in the future at this time. - Appointment with Dr. Antony Núñez on Wednesday. Current referral is set to  on 23. Will need to follow-up after appointment to see if patient is continue on warfarin. Assessment:   Lab Results   Component Value Date    INR 1.80 (H) 2023    INR 1.60 (H) 2023    INR 2.50 (H) 2023     INR subtherapeutic   Recent Labs     23  0834   INR 1.80*     Plan:  Increase Coumadin 7.5 mg MWF, 5 mg all other days. Recheck INR in 1 week(s). Patient reminded to call the Anticoagulation Clinic with signs or symptoms of bleeding or with any medication changes. Patient given instructions utilizing the teach back method. After visit summary printed and reviewed with patient. Discharged ambulatory in no apparent distress.     For Pharmacy Admin Tracking Only    Intervention Detail: Adherence Monitorin and Dose Adjustment: 1, reason: Therapy Optimization  Total # of Interventions Recommended: 2  Total # of Interventions Accepted: 2  Time Spent (min): [address], PharmD, BCPS  2023  9:14 AM

## 2023-02-14 NOTE — PROGRESS NOTES
[address] [facility] ()  [address] [place] [id number]  Dept: [phone]  CARDIAC ELECTROPHYSIOLOGY: FOLLOW UP NOTE  Patient Demographics:  Date:   2/15/2023  Patient name:              Radha Bryant  YOB: 1960  Sex:    male   MRN:   [id number]    Primary care physician:   Jessie Mojica APRN - CNP    Reason For Follow up:   Status post atrial flutter ablation. Clinical summary:  62/M was admitted to [facility] in 12/2022 with new atrial flutter with rapid ventricular rate, intolerant to medical therapy (low BP) underwent EP study and successful ablation of typical atrial flutter on 12/30/2022. He is here for a follow up visit. Feeling good. No complaints. No palpitations, chest pain, shortness of breath. No bleeding or swelling in the groin. Medcial Hx: Typical atrial flutter ablation (12/30/22, HTN, HPL, hx of dermal melanoma dx 1996 => surgical resection and recently noted to have multiple metastasis on chemotherapy at [facility], bicytopenia (anemia an leukopenia), debility undergoing rehab and hx of clostridium difficile infection. Review of Systems:   Constitutional: Negative for chills and fever  HENT: Negative for congestion, sinus pressure, sneezing and sore throat. Eyes: Negative for pain, discharge, redness and itching. Respiratory: Negative for apnea, cough  Gastrointestinal: Negative for blood in stool, constipation, diarrhea   Endocrine: Negative for cold intolerance, heat intolerance, polydipsia. Genitourinary: Negative for dysuria, enuresis, flank pain and hematuria. Musculoskeletal: Negative for arthralgias, joint swelling and neck pain. Neurological: Negative for numbness and headaches. Psychiatric/Behavioral: Negative for agitation, confusion, decreased concentration and dysphoric mood.       Past Medical HistoryL  Past Medical History:   Diagnosis Date    Hyperlipidemia     In 2010s    Hypertension 2014 Hypothyroidism 2017    Melanoma ([facility] [address]     brain, spine, bone, muscle       Past Surgical History:  Past Surgical History:   Procedure Laterality Date    DOPPLER ECHOCARDIOGRAPHY      LYMPHADENECTOMY Right 11/15/2013    arm pit    MOHS SURGERY      MOHS SURGERY Right 09/17/2021    MOHS REPAIR BCC RIGHT PARANASAL performed by Jeremy Meadows MD at [address]       Family History:  Family History   Problem Relation Age of Onset    High Blood Pressure Mother     High Cholesterol Mother     Lung Cancer Father     Mult Sclerosis Sister     Melanoma Brother         Social History:  Social History     Socioeconomic History    Marital status:    Tobacco Use    Smoking status: Never    Smokeless tobacco: Former     Types: Chew, Snuff     Quit date: 09/2022   Vaping Use    Vaping Use: Never used   Substance and Sexual Activity    Alcohol use: Yes     Comment: once per month drinking 2 to 3 cans of beer    Drug use: No     Social Determinants of Health     Financial Resource Strain: Low Risk     Difficulty of Paying Living Expenses: Not hard at all   Food Insecurity: No Food Insecurity    Worried About Running Out of Food in the Last Year: Never true    Ran Out of Food in the Last Year: Never true        Allergies:  No Known Allergies     Medications:  Current Outpatient Medications   Medication Sig Dispense Refill    warfarin (COUMADIN) 5 MG tablet Take as directed by Anticoag Clinic 40 tabs = 30 days 40 tablet 1    fludrocortisone (FLORINEF) 0.1 MG tablet Take 2 tablets by mouth daily 60 tablet 3    Handicap Placard MISC by Does not apply route Duration: 3 years 1 each 0    Dabrafenib Mesylate 75 MG CAPS Take 150 mg by mouth every 12 hours (Patient not taking: Reported on 2/13/2023) 1 capsule 1    Trametinib Dimethyl Sulfoxide 2 MG TABS Take 2 mg by mouth nightly (Patient not taking: Reported on 2/13/2023) 2 tablet 1    allopurinol (ZYLOPRIM) 300 MG tablet Take 1 tablet by mouth daily 30 tablet 3 melatonin 3 MG TABS tablet Take 2 tablets by mouth at bedtime (Patient not taking: Reported on 2/13/2023)  3    potassium chloride (KLOR-CON M) 20 MEQ extended release tablet Take 2 tablets by mouth daily 60 tablet 3    sulfamethoxazole-trimethoprim (BACTRIM DS;SEPTRA DS) 800-160 MG per tablet Take 1 tablet by mouth three times a week 36 tablet 3    naloxegol (MOVANTIK) 12.5 MG TABS tablet Take 1 tablet by mouth every morning (before breakfast) 30 tablet 5    midodrine (PROAMATINE) 10 MG tablet Take 1 tablet by mouth in the morning and 1 tablet at noon and 1 tablet in the evening. 90 tablet 5    levothyroxine (SYNTHROID) 100 MCG tablet Take 1 tablet by mouth every morning (before breakfast) 30 tablet 3    pantoprazole (PROTONIX) 40 MG tablet Take 1 tablet by mouth every morning (before breakfast) 30 tablet 3    atorvastatin (LIPITOR) 20 MG tablet TAKE 1 TABLET BY MOUTH EVERY DAY 90 tablet 4    metFORMIN (GLUCOPHAGE) 500 MG tablet Take 1 tablet by mouth 2 times daily (with meals) 180 tablet 4    ACCU-CHEK SOFTCLIX LANCETS MISC 1 Device by Does not apply route 2 times daily 180 each 3    blood glucose monitor kit and supplies Test once a day & as needed for symptoms of irregular blood glucose. Please dispense all supplies strips and lancets 1 kit 0    blood glucose monitor strips Test bid & as needed for symptoms of irregular blood glucose. 180 strip 3    sildenafil (VIAGRA) 50 MG tablet Take 1/2 tablet one hour prior to sexual activity (Patient not taking: Reported on 1/17/2023) 10 tablet 0     No current facility-administered medications for this visit. Physical Examination:  Vitals:    02/15/23 0849   BP: 134/72   Pulse: (!) 103   SpO2: 100%     Body mass index is 25.01 kg/m². General: Alert and oriented. No distress. Eyes: No pallor or icterus. ENT: No central cyanosis. Vessels: No jugular venous distension or carotid bruits. Heart: Normal S1/S2. No murmur, rub or gallop.   Lungs: Clear to auscultation. Abdomen: Soft and non-tender. Extremities: No lower extremity edema. Feet are warm. Neurological: Grossly intact. Laboratory Data:  I have personally reviewed and interpreted the results of the following diagnostic testing    Lab Results   Component Value Date    WBC 3.1 (L) 01/11/2023    HGB 8.8 (L) 01/11/2023    HCT 28.1 (L) 01/11/2023     01/11/2023    CHOL 136 12/29/2022    TRIG 144 12/29/2022    HDL 30 12/29/2022    ALT 19 12/29/2022    AST 31 12/29/2022     (L) 01/11/2023    K 4.1 01/11/2023    CL 93 (L) 01/11/2023    CREATININE 0.5 01/11/2023    BUN 13 01/11/2023    CO2 22 (L) 01/11/2023    TSH 3.240 12/31/2022    INR 1.80 (H) 02/13/2023    LABA1C 5.3 01/07/2023    LABMICR 7.81 10/16/2019     Echocardiogram 12/20/22: LVEF 50-55%, LVWT 1 cm, LAV NA. Mild MR and small pericardial effusion. ECG 12/20/22: Atrial flutter with RVR (120 BPM). ECG 2/15/23: Sinus tachycardia (103 BPM). Stress test NA  Telemetry interrogation atrial flutter with variable AV conduction, RVR. Impression:  Status post typical atrial flutter ablation. .  [id number] score 1 (HTN). On warfarin. Hypertension, controlled  Metastatic melanoma on treatment. Has follow up with his oncologist in [place]. 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 Assessment and Plan:  The patient is doing good. Currently asymptomatic and in sinus rhythm. No gorin complications. Continue warfarin for 1 more month. Follow-up in a year    **This report has been created using voice recognition software. It may contain minor errors which are inherent in voice recognition technology. **      Catherine Salomon MD, MRCP, [facility], [facility] on 2/15/2023 at 9:14 AM

## 2023-02-15 ENCOUNTER — OFFICE VISIT (OUTPATIENT)
Dept: CARDIOLOGY CLINIC | Age: 63
End: 2023-02-15
Payer: COMMERCIAL

## 2023-02-15 VITALS
HEART RATE: 103 BPM | OXYGEN SATURATION: 100 % | DIASTOLIC BLOOD PRESSURE: 72 MMHG | BODY MASS INDEX: 25.13 KG/M2 | HEIGHT: 73 IN | SYSTOLIC BLOOD PRESSURE: 134 MMHG | WEIGHT: 189.6 LBS

## 2023-02-15 DIAGNOSIS — Z86.79 STATUS POST ABLATION OF ATRIAL FLUTTER: Primary | ICD-10-CM

## 2023-02-15 DIAGNOSIS — Z98.890 STATUS POST ABLATION OF ATRIAL FLUTTER: Primary | ICD-10-CM

## 2023-02-15 PROCEDURE — 3075F SYST BP GE 130 - 139MM HG: CPT | Performed by: INTERNAL MEDICINE

## 2023-02-15 PROCEDURE — 93000 ELECTROCARDIOGRAM COMPLETE: CPT | Performed by: INTERNAL MEDICINE

## 2023-02-15 PROCEDURE — 99214 OFFICE O/P EST MOD 30 MIN: CPT | Performed by: INTERNAL MEDICINE

## 2023-02-15 PROCEDURE — 3078F DIAST BP <80 MM HG: CPT | Performed by: INTERNAL MEDICINE

## 2023-02-20 RX ORDER — WARFARIN SODIUM 5 MG/1
TABLET ORAL
Qty: 40 TABLET | Refills: 1 | OUTPATIENT
Start: 2023-02-20

## 2023-02-21 ENCOUNTER — HOSPITAL ENCOUNTER (OUTPATIENT)
Dept: PHARMACY | Age: 63
Setting detail: THERAPIES SERIES
Discharge: HOME | End: 2023-02-21
Payer: COMMERCIAL

## 2023-02-21 DIAGNOSIS — Z79.01 ANTICOAGULATED ON COUMADIN: ICD-10-CM

## 2023-02-21 DIAGNOSIS — I48.92 ATRIAL FLUTTER, UNSPECIFIED TYPE (HCC): Primary | ICD-10-CM

## 2023-02-21 DIAGNOSIS — Z51.81 ENCOUNTER FOR THERAPEUTIC DRUG MONITORING: ICD-10-CM

## 2023-02-21 LAB — POC INR: 1.5 (ref 0.8–1.2)

## 2023-02-21 PROCEDURE — 99212 OFFICE O/P EST SF 10 MIN: CPT

## 2023-02-21 PROCEDURE — 85610 PROTHROMBIN TIME: CPT

## 2023-02-21 PROCEDURE — 36416 COLLJ CAPILLARY BLOOD SPEC: CPT

## 2023-02-21 NOTE — PROGRESS NOTES
Medication Management [address]  [phone] (phone)  [phone] (fax)    Mr. Gera Vergara is a 58 y.o.  male with history of Aflutter who presents today for anticoagulation monitoring and adjustment. Patient verifies current dosing regimen and tablet strength. No missed or extra doses. Patient denies s/s bleeding/bruising/swelling/SOB/chest pain  No blood in urine or stool. No dietary changes. No changes in medication/OTC agents/Herbals. No change in alcohol use or tobacco use. Bit more active. Patient denies headaches/dizziness/lightheadedness/falls. No vomiting/diarrhea or acute illness. No Procedures scheduled in the future at this time. Per patient and  Dr. Emelina Martinez note -  warfarin for one more month. Assessment:     Lab Results   Component Value Date    INR 1.50 (H) 02/21/2023    INR 1.80 (H) 02/13/2023    INR 1.60 (H) 02/06/2023     INR subtherapeutic   Recent Labs     02/21/23  0854   INR 1.50*   INR goal 2.0-3.0    Reviewed and discussed patient and INR with pharmacist.   Jean-Paul GriggsD Candidate 2023    Plan:  Increase Coumadin to 5 mg MF and 7.5 mg TWThSS (~11% increase). Recheck INR in 1 week(s). Patient reminded to call the Anticoagulation Clinic with signs or symptoms of bleeding or with any medication changes. Patient given instructions utilizing the teach back method. Discussed plan with Omid Mcmullen PharmD    After visit summary printed and reviewed with patient. Discharged ambulatory in no apparent distress.     Shaggy Chappell, [address]  2/21/2023 9:17 AM     For Pharmacy Admin Tracking Only    Intervention Detail: Dose Adjustment: 1, reason: Therapy Optimization  Total # of Interventions Recommended: 1  Total # of Interventions Accepted: 1  Time Spent (min): 20

## 2023-03-01 ENCOUNTER — TELEPHONE (OUTPATIENT)
Dept: PHARMACY | Age: 63
End: 2023-03-01

## 2023-03-01 ENCOUNTER — HOSPITAL ENCOUNTER (OUTPATIENT)
Dept: PHARMACY | Age: 63
Setting detail: THERAPIES SERIES
Discharge: HOME | End: 2023-03-01
Payer: COMMERCIAL

## 2023-03-01 DIAGNOSIS — I48.92 ATRIAL FLUTTER, UNSPECIFIED TYPE (HCC): Primary | ICD-10-CM

## 2023-03-01 DIAGNOSIS — Z79.01 ANTICOAGULATED ON COUMADIN: ICD-10-CM

## 2023-03-01 DIAGNOSIS — Z51.81 ENCOUNTER FOR THERAPEUTIC DRUG MONITORING: ICD-10-CM

## 2023-03-01 LAB — POC INR: 1.4 (ref 0.8–1.2)

## 2023-03-01 PROCEDURE — 99212 OFFICE O/P EST SF 10 MIN: CPT | Performed by: PHARMACIST

## 2023-03-01 PROCEDURE — 36416 COLLJ CAPILLARY BLOOD SPEC: CPT | Performed by: PHARMACIST

## 2023-03-01 PROCEDURE — 85610 PROTHROMBIN TIME: CPT | Performed by: PHARMACIST

## 2023-03-01 NOTE — PROGRESS NOTES
Medication Management [address]  [phone] (phone)  [phone] (fax)    Mr. Juan Sultana is a 58 y.o.  male with history of Aflutter who presents today for anticoagulation monitoring and adjustment. Patient verifies current dosing regimen and tablet strength. No missed or extra doses. Patient denies s/s bleeding/bruising/swelling/SOB/chest pain  No blood in urine or stool. Eating more Vit K foods, trying to eat more in general.  Drinking a protein supplement once per week. No changes in medication/OTC agents/Herbals. No change in alcohol use or tobacco use. Activity level continues to increase. Patient denies headaches/dizziness/lightheadedness/falls. No vomiting/diarrhea or acute illness. No Procedures scheduled in the future at this time. Assessment:   Lab Results   Component Value Date    INR 1.40 (H) 03/01/2023    INR 1.50 (H) 02/21/2023    INR 1.80 (H) 02/13/2023     INR subtherapeutic   Recent Labs     03/01/23  0917   INR 1.40*     Subtherapeutic INR despite dosing increases, likely due to concurrent increased activity level and increased dietary intake. Therapy end date planned for 3/15/23 as per Dr. Casimiro Farah office note. Msg routed to MD office to confirm. Plan:  Coumadin 10mg today and tomorrow, then increase Coumadin to 7.5mg daily. Recheck INR in 5 day(s). Patient reminded to call the Anticoagulation Clinic with any signs or symptoms of bleeding or with any medication changes. Patient given instructions utilizing the teach back method. After visit summary printed and reviewed with patient. Discharged ambulatory in no apparent distress.       For Pharmacy Admin Tracking Only    Intervention Detail: Dose Adjustment: 1, reason: Therapy Optimization  Total # of Interventions Recommended: 1  Total # of Interventions Accepted: 1  Time Spent (min): 20

## 2023-03-06 ENCOUNTER — HOSPITAL ENCOUNTER (OUTPATIENT)
Dept: PHARMACY | Age: 63
Setting detail: THERAPIES SERIES
Discharge: HOME | End: 2023-03-06
Payer: COMMERCIAL

## 2023-03-06 DIAGNOSIS — Z79.01 ANTICOAGULATED ON COUMADIN: Primary | ICD-10-CM

## 2023-03-06 DIAGNOSIS — Z51.81 ENCOUNTER FOR THERAPEUTIC DRUG MONITORING: ICD-10-CM

## 2023-03-06 LAB — POC INR: 2.1 (ref 0.8–1.2)

## 2023-03-06 PROCEDURE — 85610 PROTHROMBIN TIME: CPT | Performed by: PHARMACIST

## 2023-03-06 PROCEDURE — 99211 OFF/OP EST MAY X REQ PHY/QHP: CPT | Performed by: PHARMACIST

## 2023-03-06 PROCEDURE — 36416 COLLJ CAPILLARY BLOOD SPEC: CPT | Performed by: PHARMACIST

## 2023-03-06 NOTE — PROGRESS NOTES
Medication Management [address]  [phone] (phone)  [phone] (fax)    Mr. Shanita Samuels is a 58 y.o.  male with history of Aflutter who presents today for anticoagulation monitoring and adjustment. Patient verifies current dosing regimen and tablet strength. No missed or extra doses. Patient denies s/s bleeding/bruising/swelling/SOB/chest pain  No blood in urine or stool. No dietary changes. No changes in medication/OTC agents/Herbals. No change in alcohol use or tobacco use. No change in activity level. Continuing to be active. Patient denies headaches/dizziness/lightheadedness/falls. No vomiting/diarrhea or acute illness. No Procedures scheduled in the future at this time. Assessment:   Lab Results   Component Value Date    INR 2.10 (H) 03/06/2023    INR 1.40 (H) 03/01/2023    INR 1.50 (H) 02/21/2023     INR therapeutic   Recent Labs     03/06/23  0858   INR 2.10*   Patient has received 55mg of Coumadin over the last 7 days)    Plan:  Increase to Coumadin 10mg MF 7.5mg TuWThSS (This will provide 57.5mg of Coumadin over 7 days). No Recheck INR appointment scheduled at this time. Per Referral and clarification from Dr. Juan Zavala (see Telephone call 3/1) patient is to stop Coumadin on 3/15/23. I will discharge from clinic. Patient reminded to let us know if anything changes. Patient reminded to call the Anticoagulation Clinic with any signs or symptoms of bleeding or with any medication changes. Patient given instructions utilizing the teach back method. After visit summary printed and reviewed with patient. Discharged ambulatory in no apparent distress.     For Pharmacy Admin Tracking Only    Intervention Detail: Dose Adjustment: 1, reason: Therapy Optimization  Total # of Interventions Recommended: 1  Total # of Interventions Accepted: 1  Time Spent (min): 20

## 2023-04-15 ENCOUNTER — HOSPITAL ENCOUNTER (INPATIENT)
Age: 63
LOS: 3 days | Discharge: ANOTHER HOSPITAL | DRG: 923 | End: 2023-04-18
Attending: EMERGENCY MEDICINE | Admitting: STUDENT IN AN ORGANIZED HEALTH CARE EDUCATION/TRAINING PROGRAM
Payer: COMMERCIAL

## 2023-04-15 ENCOUNTER — APPOINTMENT (OUTPATIENT)
Dept: ULTRASOUND IMAGING | Age: 63
DRG: 923 | End: 2023-04-15
Payer: COMMERCIAL

## 2023-04-15 DIAGNOSIS — C43.9 MALIGNANT MELANOMA, UNSPECIFIED SITE (HCC): ICD-10-CM

## 2023-04-15 DIAGNOSIS — E87.5 HYPERKALEMIA: ICD-10-CM

## 2023-04-15 DIAGNOSIS — H11.422 CHEMOSIS OF LEFT CONJUNCTIVA: Primary | ICD-10-CM

## 2023-04-15 DIAGNOSIS — N17.9 AKI (ACUTE KIDNEY INJURY) (HCC): ICD-10-CM

## 2023-04-15 LAB
ALBUMIN SERPL BCG-MCNC: 3.8 G/DL (ref 3.5–5.1)
ALP SERPL-CCNC: 111 U/L (ref 38–126)
ALT SERPL W/O P-5'-P-CCNC: 17 U/L (ref 11–66)
ANION GAP SERPL CALC-SCNC: 16 MEQ/L (ref 8–16)
ANION GAP SERPL CALC-SCNC: 18 MEQ/L (ref 8–16)
AST SERPL-CCNC: 42 U/L (ref 5–40)
BASE EXCESS BLDA CALC-SCNC: -2 MMOL/L (ref -2–3)
BASOPHILS ABSOLUTE: 0 THOU/MM3 (ref 0–0.1)
BASOPHILS NFR BLD AUTO: 0.3 %
BILIRUB SERPL-MCNC: 0.3 MG/DL (ref 0.3–1.2)
BUN SERPL-MCNC: 56 MG/DL (ref 7–22)
BUN SERPL-MCNC: 62 MG/DL (ref 7–22)
CALCIUM SERPL-MCNC: 8.8 MG/DL (ref 8.5–10.5)
CALCIUM SERPL-MCNC: 9 MG/DL (ref 8.5–10.5)
CHLORIDE SERPL-SCNC: 96 MEQ/L (ref 98–111)
CHLORIDE SERPL-SCNC: 96 MEQ/L (ref 98–111)
CO2 SERPL-SCNC: 19 MEQ/L (ref 23–33)
CO2 SERPL-SCNC: 21 MEQ/L (ref 23–33)
COLLECTED BY:: ABNORMAL
CREAT SERPL-MCNC: 2.7 MG/DL (ref 0.4–1.2)
CREAT SERPL-MCNC: 3.5 MG/DL (ref 0.4–1.2)
CRP SERPL-MCNC: 10.86 MG/DL (ref 0–1)
DEPRECATED RDW RBC AUTO: 53.3 FL (ref 35–45)
EOSINOPHIL NFR BLD AUTO: 0 %
EOSINOPHILS ABSOLUTE: 0 THOU/MM3 (ref 0–0.4)
ERYTHROCYTE [DISTWIDTH] IN BLOOD BY AUTOMATED COUNT: 16.7 % (ref 11.5–14.5)
ERYTHROCYTE [SEDIMENTATION RATE] IN BLOOD BY WESTERGREN METHOD: 92 MM/HR (ref 0–10)
GFR SERPL CREATININE-BSD FRML MDRD: 19 ML/MIN/1.73M2
GFR SERPL CREATININE-BSD FRML MDRD: 26 ML/MIN/1.73M2
GLUCOSE BLD STRIP.AUTO-MCNC: 200 MG/DL (ref 70–108)
GLUCOSE SERPL-MCNC: 188 MG/DL (ref 70–108)
GLUCOSE SERPL-MCNC: 235 MG/DL (ref 70–108)
HCO3 BLDA-SCNC: 22 MMOL/L (ref 23–28)
HCT VFR BLD AUTO: 31.1 % (ref 42–52)
HGB BLD-MCNC: 9.5 GM/DL (ref 14–18)
IMM GRANULOCYTES # BLD AUTO: 0.38 THOU/MM3 (ref 0–0.07)
IMM GRANULOCYTES NFR BLD AUTO: 3.9 %
LACTATE SERPL-SCNC: 3.4 MMOL/L (ref 0.5–2)
LYMPHOCYTES ABSOLUTE: 0.2 THOU/MM3 (ref 1–4.8)
LYMPHOCYTES NFR BLD AUTO: 2.5 %
MAGNESIUM SERPL-MCNC: 2 MG/DL (ref 1.6–2.4)
MCH RBC QN AUTO: 27.1 PG (ref 26–33)
MCHC RBC AUTO-ENTMCNC: 30.5 GM/DL (ref 32.2–35.5)
MCV RBC AUTO: 88.9 FL (ref 80–94)
MONOCYTES ABSOLUTE: 0.4 THOU/MM3 (ref 0.4–1.3)
MONOCYTES NFR BLD AUTO: 3.8 %
NEUTROPHILS NFR BLD AUTO: 89.5 %
NRBC BLD AUTO-RTO: 0 /100 WBC
OSMOLALITY SERPL CALC.SUM OF ELEC: 285.2 MOSMOL/KG (ref 275–300)
OSMOLALITY SERPL CALC.SUM OF ELEC: 293.2 MOSMOL/KG (ref 275–300)
PCO2 TEMP ADJ BLDMV: 33 MMHG (ref 41–51)
PH BLDMV: 7.43 [PH] (ref 7.31–7.41)
PLATELET # BLD AUTO: 373 THOU/MM3 (ref 130–400)
PMV BLD AUTO: 8.5 FL (ref 9.4–12.4)
PO2 BLDMV: 50 MMHG (ref 25–40)
POTASSIUM SERPL-SCNC: 6.2 MEQ/L (ref 3.5–5.2)
POTASSIUM SERPL-SCNC: 6.4 MEQ/L (ref 3.5–5.2)
PROT SERPL-MCNC: 7.6 G/DL (ref 6.1–8)
RBC # BLD AUTO: 3.5 MILL/MM3 (ref 4.7–6.1)
SAO2 % BLDMV: 87 %
SEGMENTED NEUTROPHILS ABSOLUTE COUNT: 8.7 THOU/MM3 (ref 1.8–7.7)
SODIUM SERPL-SCNC: 131 MEQ/L (ref 135–145)
SODIUM SERPL-SCNC: 135 MEQ/L (ref 135–145)
URATE SERPL-MCNC: 6.3 MG/DL (ref 3.7–7)
WBC # BLD AUTO: 9.7 THOU/MM3 (ref 4.8–10.8)

## 2023-04-15 PROCEDURE — 76770 US EXAM ABDO BACK WALL COMP: CPT

## 2023-04-15 PROCEDURE — 96375 TX/PRO/DX INJ NEW DRUG ADDON: CPT

## 2023-04-15 PROCEDURE — 2580000003 HC RX 258: Performed by: STUDENT IN AN ORGANIZED HEALTH CARE EDUCATION/TRAINING PROGRAM

## 2023-04-15 PROCEDURE — 6370000000 HC RX 637 (ALT 250 FOR IP): Performed by: EMERGENCY MEDICINE

## 2023-04-15 PROCEDURE — 99285 EMERGENCY DEPT VISIT HI MDM: CPT

## 2023-04-15 PROCEDURE — 51798 US URINE CAPACITY MEASURE: CPT

## 2023-04-15 PROCEDURE — 94760 N-INVAS EAR/PLS OXIMETRY 1: CPT

## 2023-04-15 PROCEDURE — 6360000002 HC RX W HCPCS: Performed by: STUDENT IN AN ORGANIZED HEALTH CARE EDUCATION/TRAINING PROGRAM

## 2023-04-15 PROCEDURE — 85025 COMPLETE CBC W/AUTO DIFF WBC: CPT

## 2023-04-15 PROCEDURE — 87040 BLOOD CULTURE FOR BACTERIA: CPT

## 2023-04-15 PROCEDURE — 6370000000 HC RX 637 (ALT 250 FOR IP): Performed by: PSYCHIATRY & NEUROLOGY

## 2023-04-15 PROCEDURE — 2500000003 HC RX 250 WO HCPCS: Performed by: PSYCHIATRY & NEUROLOGY

## 2023-04-15 PROCEDURE — 6370000000 HC RX 637 (ALT 250 FOR IP): Performed by: STUDENT IN AN ORGANIZED HEALTH CARE EDUCATION/TRAINING PROGRAM

## 2023-04-15 PROCEDURE — 83605 ASSAY OF LACTIC ACID: CPT

## 2023-04-15 PROCEDURE — 6360000002 HC RX W HCPCS: Performed by: PSYCHIATRY & NEUROLOGY

## 2023-04-15 PROCEDURE — 96374 THER/PROPH/DIAG INJ IV PUSH: CPT

## 2023-04-15 PROCEDURE — 82803 BLOOD GASES ANY COMBINATION: CPT

## 2023-04-15 PROCEDURE — 80053 COMPREHEN METABOLIC PANEL: CPT

## 2023-04-15 PROCEDURE — 6360000002 HC RX W HCPCS: Performed by: EMERGENCY MEDICINE

## 2023-04-15 PROCEDURE — 94640 AIRWAY INHALATION TREATMENT: CPT

## 2023-04-15 PROCEDURE — 84550 ASSAY OF BLOOD/URIC ACID: CPT

## 2023-04-15 PROCEDURE — 2580000003 HC RX 258: Performed by: EMERGENCY MEDICINE

## 2023-04-15 PROCEDURE — 83735 ASSAY OF MAGNESIUM: CPT

## 2023-04-15 PROCEDURE — 2580000003 HC RX 258: Performed by: PSYCHIATRY & NEUROLOGY

## 2023-04-15 PROCEDURE — 86140 C-REACTIVE PROTEIN: CPT

## 2023-04-15 PROCEDURE — 93005 ELECTROCARDIOGRAM TRACING: CPT | Performed by: EMERGENCY MEDICINE

## 2023-04-15 PROCEDURE — 1200000003 HC TELEMETRY R&B

## 2023-04-15 PROCEDURE — 85651 RBC SED RATE NONAUTOMATED: CPT

## 2023-04-15 PROCEDURE — 82948 REAGENT STRIP/BLOOD GLUCOSE: CPT

## 2023-04-15 PROCEDURE — 36415 COLL VENOUS BLD VENIPUNCTURE: CPT

## 2023-04-15 RX ORDER — DEXTROSE MONOHYDRATE 100 MG/ML
INJECTION, SOLUTION INTRAVENOUS ONCE
Status: COMPLETED | OUTPATIENT
Start: 2023-04-15 | End: 2023-04-15

## 2023-04-15 RX ORDER — MEMANTINE HYDROCHLORIDE 5 MG/1
5 TABLET ORAL NIGHTLY
Status: DISCONTINUED | OUTPATIENT
Start: 2023-04-15 | End: 2023-04-19 | Stop reason: HOSPADM

## 2023-04-15 RX ORDER — 0.9 % SODIUM CHLORIDE 0.9 %
1000 INTRAVENOUS SOLUTION INTRAVENOUS ONCE
Status: COMPLETED | OUTPATIENT
Start: 2023-04-15 | End: 2023-04-15

## 2023-04-15 RX ORDER — ACETAMINOPHEN 650 MG/1
650 SUPPOSITORY RECTAL EVERY 6 HOURS PRN
Status: DISCONTINUED | OUTPATIENT
Start: 2023-04-15 | End: 2023-04-16

## 2023-04-15 RX ORDER — SODIUM POLYSTYRENE SULFONATE 15 G/60ML
30 SUSPENSION ORAL; RECTAL ONCE
Status: COMPLETED | OUTPATIENT
Start: 2023-04-15 | End: 2023-04-15

## 2023-04-15 RX ORDER — DEXAMETHASONE SODIUM PHOSPHATE 4 MG/ML
4 INJECTION, SOLUTION INTRA-ARTICULAR; INTRALESIONAL; INTRAMUSCULAR; INTRAVENOUS; SOFT TISSUE EVERY 6 HOURS
Status: DISCONTINUED | OUTPATIENT
Start: 2023-04-16 | End: 2023-04-19 | Stop reason: HOSPADM

## 2023-04-15 RX ORDER — DEXAMETHASONE SODIUM PHOSPHATE 4 MG/ML
10 INJECTION, SOLUTION INTRA-ARTICULAR; INTRALESIONAL; INTRAMUSCULAR; INTRAVENOUS; SOFT TISSUE ONCE
Status: COMPLETED | OUTPATIENT
Start: 2023-04-15 | End: 2023-04-15

## 2023-04-15 RX ORDER — SENNA PLUS 8.6 MG/1
1 TABLET ORAL NIGHTLY
Status: DISCONTINUED | OUTPATIENT
Start: 2023-04-15 | End: 2023-04-19 | Stop reason: HOSPADM

## 2023-04-15 RX ORDER — ALLOPURINOL 300 MG/1
300 TABLET ORAL DAILY
Status: DISCONTINUED | OUTPATIENT
Start: 2023-04-16 | End: 2023-04-19 | Stop reason: HOSPADM

## 2023-04-15 RX ORDER — SODIUM CHLORIDE 9 MG/ML
INJECTION, SOLUTION INTRAVENOUS PRN
Status: DISCONTINUED | OUTPATIENT
Start: 2023-04-15 | End: 2023-04-19 | Stop reason: HOSPADM

## 2023-04-15 RX ORDER — POLYETHYLENE GLYCOL 3350 17 G/17G
17 POWDER, FOR SOLUTION ORAL DAILY PRN
Status: DISCONTINUED | OUTPATIENT
Start: 2023-04-15 | End: 2023-04-19 | Stop reason: HOSPADM

## 2023-04-15 RX ORDER — SODIUM CHLORIDE 0.9 % (FLUSH) 0.9 %
5-40 SYRINGE (ML) INJECTION EVERY 12 HOURS SCHEDULED
Status: DISCONTINUED | OUTPATIENT
Start: 2023-04-15 | End: 2023-04-19 | Stop reason: HOSPADM

## 2023-04-15 RX ORDER — ATORVASTATIN CALCIUM 20 MG/1
20 TABLET, FILM COATED ORAL DAILY
Status: DISCONTINUED | OUTPATIENT
Start: 2023-04-16 | End: 2023-04-19 | Stop reason: HOSPADM

## 2023-04-15 RX ORDER — LEVOTHYROXINE SODIUM 0.1 MG/1
100 TABLET ORAL
Status: DISCONTINUED | OUTPATIENT
Start: 2023-04-16 | End: 2023-04-19 | Stop reason: HOSPADM

## 2023-04-15 RX ORDER — CALCIUM GLUCONATE 94 MG/ML
2000 INJECTION, SOLUTION INTRAVENOUS ONCE
Status: COMPLETED | OUTPATIENT
Start: 2023-04-15 | End: 2023-04-15

## 2023-04-15 RX ORDER — SODIUM CHLORIDE 9 MG/ML
INJECTION, SOLUTION INTRAVENOUS CONTINUOUS
Status: DISCONTINUED | OUTPATIENT
Start: 2023-04-15 | End: 2023-04-19 | Stop reason: HOSPADM

## 2023-04-15 RX ORDER — BUMETANIDE 0.25 MG/ML
2 INJECTION INTRAMUSCULAR; INTRAVENOUS ONCE
Status: COMPLETED | OUTPATIENT
Start: 2023-04-15 | End: 2023-04-15

## 2023-04-15 RX ORDER — SODIUM POLYSTYRENE SULFONATE 15 G/60ML
30 SUSPENSION ORAL; RECTAL EVERY 6 HOURS
Status: DISCONTINUED | OUTPATIENT
Start: 2023-04-15 | End: 2023-04-15

## 2023-04-15 RX ORDER — ACETAMINOPHEN 325 MG/1
650 TABLET ORAL EVERY 6 HOURS PRN
Status: DISCONTINUED | OUTPATIENT
Start: 2023-04-15 | End: 2023-04-16

## 2023-04-15 RX ORDER — DEXTROSE MONOHYDRATE 100 MG/ML
INJECTION, SOLUTION INTRAVENOUS CONTINUOUS PRN
Status: DISCONTINUED | OUTPATIENT
Start: 2023-04-15 | End: 2023-04-17

## 2023-04-15 RX ORDER — PANTOPRAZOLE SODIUM 40 MG/1
40 TABLET, DELAYED RELEASE ORAL
Status: DISCONTINUED | OUTPATIENT
Start: 2023-04-16 | End: 2023-04-19 | Stop reason: HOSPADM

## 2023-04-15 RX ORDER — ONDANSETRON 4 MG/1
4 TABLET, ORALLY DISINTEGRATING ORAL EVERY 8 HOURS PRN
Status: DISCONTINUED | OUTPATIENT
Start: 2023-04-15 | End: 2023-04-19 | Stop reason: HOSPADM

## 2023-04-15 RX ORDER — PROPARACAINE HYDROCHLORIDE 5 MG/ML
1 SOLUTION/ DROPS OPHTHALMIC ONCE
Status: DISCONTINUED | OUTPATIENT
Start: 2023-04-15 | End: 2023-04-19 | Stop reason: HOSPADM

## 2023-04-15 RX ORDER — LANOLIN ALCOHOL/MO/W.PET/CERES
6 CREAM (GRAM) TOPICAL NIGHTLY
Status: DISCONTINUED | OUTPATIENT
Start: 2023-04-15 | End: 2023-04-19 | Stop reason: HOSPADM

## 2023-04-15 RX ORDER — SODIUM CHLORIDE 0.9 % (FLUSH) 0.9 %
5-40 SYRINGE (ML) INJECTION PRN
Status: DISCONTINUED | OUTPATIENT
Start: 2023-04-15 | End: 2023-04-19 | Stop reason: HOSPADM

## 2023-04-15 RX ORDER — ONDANSETRON 2 MG/ML
4 INJECTION INTRAMUSCULAR; INTRAVENOUS EVERY 6 HOURS PRN
Status: DISCONTINUED | OUTPATIENT
Start: 2023-04-15 | End: 2023-04-19 | Stop reason: HOSPADM

## 2023-04-15 RX ORDER — MEMANTINE HYDROCHLORIDE 10 MG/1
10 TABLET ORAL DAILY
Status: DISCONTINUED | OUTPATIENT
Start: 2023-04-16 | End: 2023-04-19 | Stop reason: HOSPADM

## 2023-04-15 RX ADMIN — Medication 6 MG: at 23:36

## 2023-04-15 RX ADMIN — DEXTROSE MONOHYDRATE 250 ML: 100 INJECTION, SOLUTION INTRAVENOUS at 23:11

## 2023-04-15 RX ADMIN — SODIUM POLYSTYRENE SULFONATE 30 G: 15 SUSPENSION ORAL; RECTAL at 22:49

## 2023-04-15 RX ADMIN — ALBUTEROL SULFATE 10 MG: 2.5 SOLUTION RESPIRATORY (INHALATION) at 22:19

## 2023-04-15 RX ADMIN — INSULIN HUMAN 10 UNITS: 100 INJECTION, SOLUTION PARENTERAL at 23:11

## 2023-04-15 RX ADMIN — SODIUM CHLORIDE: 9 INJECTION, SOLUTION INTRAVENOUS at 22:13

## 2023-04-15 RX ADMIN — SODIUM CHLORIDE 1000 ML: 9 INJECTION, SOLUTION INTRAVENOUS at 18:29

## 2023-04-15 RX ADMIN — CALCIUM GLUCONATE 2000 MG: 98 INJECTION, SOLUTION INTRAVENOUS at 18:30

## 2023-04-15 RX ADMIN — MEMANTINE HYDROCHLORIDE 5 MG: 5 TABLET ORAL at 23:36

## 2023-04-15 RX ADMIN — DEXTROSE MONOHYDRATE: 100 INJECTION, SOLUTION INTRAVENOUS at 19:14

## 2023-04-15 RX ADMIN — ACETAMINOPHEN 650 MG: 325 TABLET ORAL at 23:15

## 2023-04-15 RX ADMIN — BUMETANIDE 2 MG: 0.25 INJECTION INTRAMUSCULAR; INTRAVENOUS at 22:29

## 2023-04-15 RX ADMIN — INSULIN HUMAN 10 UNITS: 100 INJECTION, SOLUTION PARENTERAL at 19:15

## 2023-04-15 RX ADMIN — DEXAMETHASONE SODIUM PHOSPHATE 10 MG: 4 INJECTION, SOLUTION INTRA-ARTICULAR; INTRALESIONAL; INTRAMUSCULAR; INTRAVENOUS; SOFT TISSUE at 23:16

## 2023-04-15 ASSESSMENT — PAIN - FUNCTIONAL ASSESSMENT: PAIN_FUNCTIONAL_ASSESSMENT: ACTIVITIES ARE NOT PREVENTED

## 2023-04-15 ASSESSMENT — PAIN DESCRIPTION - ORIENTATION: ORIENTATION: LEFT

## 2023-04-15 ASSESSMENT — PAIN SCALES - WONG BAKER
WONGBAKER_NUMERICALRESPONSE: 0
WONGBAKER_NUMERICALRESPONSE: 0

## 2023-04-15 ASSESSMENT — PAIN DESCRIPTION - DESCRIPTORS
DESCRIPTORS: PRESSURE
DESCRIPTORS: ACHING

## 2023-04-15 ASSESSMENT — PAIN SCALES - GENERAL
PAINLEVEL_OUTOF10: 7
PAINLEVEL_OUTOF10: 4

## 2023-04-15 ASSESSMENT — PAIN DESCRIPTION - FREQUENCY: FREQUENCY: CONTINUOUS

## 2023-04-15 ASSESSMENT — PAIN DESCRIPTION - PAIN TYPE: TYPE: ACUTE PAIN

## 2023-04-15 ASSESSMENT — PAIN DESCRIPTION - ONSET: ONSET: ON-GOING

## 2023-04-15 ASSESSMENT — PAIN DESCRIPTION - LOCATION
LOCATION: HEAD
LOCATION: EYE

## 2023-04-16 ENCOUNTER — APPOINTMENT (OUTPATIENT)
Dept: INTERVENTIONAL RADIOLOGY/VASCULAR | Age: 63
DRG: 923 | End: 2023-04-16
Payer: COMMERCIAL

## 2023-04-16 PROBLEM — N13.30 BILATERAL HYDRONEPHROSIS: Status: ACTIVE | Noted: 2023-01-01

## 2023-04-16 PROBLEM — N17.9 AKI (ACUTE KIDNEY INJURY) (HCC): Status: ACTIVE | Noted: 2023-04-16

## 2023-04-16 PROBLEM — H11.422 CHEMOSIS OF LEFT CONJUNCTIVA: Status: ACTIVE | Noted: 2023-04-16

## 2023-04-16 PROBLEM — E87.20 METABOLIC ACIDOSIS: Status: ACTIVE | Noted: 2023-04-16

## 2023-04-16 LAB
ALBUMIN SERPL BCG-MCNC: 3.5 G/DL (ref 3.5–5.1)
ALP SERPL-CCNC: 97 U/L (ref 38–126)
ALT SERPL W/O P-5'-P-CCNC: 15 U/L (ref 11–66)
ANION GAP SERPL CALC-SCNC: 12 MEQ/L (ref 8–16)
ANION GAP SERPL CALC-SCNC: 16 MEQ/L (ref 8–16)
ANION GAP SERPL CALC-SCNC: 17 MEQ/L (ref 8–16)
ANION GAP SERPL CALC-SCNC: 18 MEQ/L (ref 8–16)
AST SERPL-CCNC: 38 U/L (ref 5–40)
BACTERIA: ABNORMAL
BASOPHILS ABSOLUTE: 0 THOU/MM3 (ref 0–0.1)
BASOPHILS NFR BLD AUTO: 0.2 %
BILIRUB SERPL-MCNC: 0.2 MG/DL (ref 0.3–1.2)
BILIRUB UR QL STRIP: NEGATIVE
BUN SERPL-MCNC: 46 MG/DL (ref 7–22)
BUN SERPL-MCNC: 63 MG/DL (ref 7–22)
BUN SERPL-MCNC: 65 MG/DL (ref 7–22)
BUN SERPL-MCNC: 69 MG/DL (ref 7–22)
CA-I BLD ISE-SCNC: 1.01 MMOL/L (ref 1.12–1.32)
CA-I BLD ISE-SCNC: 1.03 MMOL/L (ref 1.12–1.32)
CALCIUM SERPL-MCNC: 8.2 MG/DL (ref 8.5–10.5)
CALCIUM SERPL-MCNC: 8.4 MG/DL (ref 8.5–10.5)
CALCIUM SERPL-MCNC: 8.4 MG/DL (ref 8.5–10.5)
CALCIUM SERPL-MCNC: 8.6 MG/DL (ref 8.5–10.5)
CASTS #/AREA URNS LPF: ABNORMAL /LPF
CASTS #/AREA URNS LPF: ABNORMAL /LPF
CHARACTER UR: CLEAR
CHARCOAL URNS QL MICRO: ABNORMAL
CHLORIDE SERPL-SCNC: 100 MEQ/L (ref 98–111)
CHLORIDE SERPL-SCNC: 94 MEQ/L (ref 98–111)
CHLORIDE SERPL-SCNC: 96 MEQ/L (ref 98–111)
CHLORIDE SERPL-SCNC: 98 MEQ/L (ref 98–111)
CO2 SERPL-SCNC: 19 MEQ/L (ref 23–33)
CO2 SERPL-SCNC: 21 MEQ/L (ref 23–33)
CO2 SERPL-SCNC: 21 MEQ/L (ref 23–33)
CO2 SERPL-SCNC: 23 MEQ/L (ref 23–33)
COLOR UR: YELLOW
CREAT SERPL-MCNC: 2.7 MG/DL (ref 0.4–1.2)
CREAT SERPL-MCNC: 3.7 MG/DL (ref 0.4–1.2)
CREAT SERPL-MCNC: 3.8 MG/DL (ref 0.4–1.2)
CREAT SERPL-MCNC: 4.2 MG/DL (ref 0.4–1.2)
CREAT UR-MCNC: 26.6 MG/DL
CRYSTALS URNS QL MICRO: ABNORMAL
DEPRECATED RDW RBC AUTO: 51.1 FL (ref 35–45)
EOSINOPHIL NFR BLD AUTO: 0 %
EOSINOPHILS ABSOLUTE: 0 THOU/MM3 (ref 0–0.4)
EPITHELIAL CELLS, UA: ABNORMAL /HPF
ERYTHROCYTE [DISTWIDTH] IN BLOOD BY AUTOMATED COUNT: 16.4 % (ref 11.5–14.5)
GFR SERPL CREATININE-BSD FRML MDRD: 15 ML/MIN/1.73M2
GFR SERPL CREATININE-BSD FRML MDRD: 17 ML/MIN/1.73M2
GFR SERPL CREATININE-BSD FRML MDRD: 18 ML/MIN/1.73M2
GFR SERPL CREATININE-BSD FRML MDRD: 26 ML/MIN/1.73M2
GLUCOSE BLD STRIP.AUTO-MCNC: 163 MG/DL (ref 70–108)
GLUCOSE BLD STRIP.AUTO-MCNC: 206 MG/DL (ref 70–108)
GLUCOSE BLD STRIP.AUTO-MCNC: 209 MG/DL (ref 70–108)
GLUCOSE BLD STRIP.AUTO-MCNC: 215 MG/DL (ref 70–108)
GLUCOSE BLD STRIP.AUTO-MCNC: 238 MG/DL (ref 70–108)
GLUCOSE SERPL-MCNC: 140 MG/DL (ref 70–108)
GLUCOSE SERPL-MCNC: 146 MG/DL (ref 70–108)
GLUCOSE SERPL-MCNC: 165 MG/DL (ref 70–108)
GLUCOSE SERPL-MCNC: 170 MG/DL (ref 70–108)
GLUCOSE UR QL STRIP.AUTO: NEGATIVE MG/DL
HBV SURFACE AB SER QL IA: NEGATIVE
HBV SURFACE AG SERPL QL IA: NEGATIVE
HCT VFR BLD AUTO: 27.9 % (ref 42–52)
HGB BLD-MCNC: 8.6 GM/DL (ref 14–18)
HGB UR QL STRIP.AUTO: ABNORMAL
IMM GRANULOCYTES # BLD AUTO: 0.24 THOU/MM3 (ref 0–0.07)
IMM GRANULOCYTES NFR BLD AUTO: 2.5 %
KETONES UR QL STRIP.AUTO: NEGATIVE
LACTATE SERPL-SCNC: 3.3 MMOL/L (ref 0.5–2)
LACTATE SERPL-SCNC: 4.2 MMOL/L (ref 0.5–2)
LDH SERPL L TO P-CCNC: 1461 U/L (ref 100–190)
LEUKOCYTE ESTERASE UR QL STRIP.AUTO: NEGATIVE
LYMPHOCYTES ABSOLUTE: 0.2 THOU/MM3 (ref 1–4.8)
LYMPHOCYTES NFR BLD AUTO: 1.7 %
MCH RBC QN AUTO: 27.1 PG (ref 26–33)
MCHC RBC AUTO-ENTMCNC: 30.8 GM/DL (ref 32.2–35.5)
MCV RBC AUTO: 88 FL (ref 80–94)
MONOCYTES ABSOLUTE: 0.3 THOU/MM3 (ref 0.4–1.3)
MONOCYTES NFR BLD AUTO: 2.8 %
NEUTROPHILS NFR BLD AUTO: 92.8 %
NITRITE UR QL STRIP.AUTO: NEGATIVE
NRBC BLD AUTO-RTO: 0 /100 WBC
PH UR STRIP.AUTO: 5.5 [PH] (ref 5–9)
PHOSPHATE SERPL-MCNC: 5.2 MG/DL (ref 2.4–4.7)
PLATELET # BLD AUTO: 268 THOU/MM3 (ref 130–400)
PMV BLD AUTO: 8.1 FL (ref 9.4–12.4)
POTASSIUM SERPL-SCNC: 5.1 MEQ/L (ref 3.5–5.2)
POTASSIUM SERPL-SCNC: 5.3 MEQ/L (ref 3.5–5.2)
POTASSIUM SERPL-SCNC: 6.3 MEQ/L (ref 3.5–5.2)
POTASSIUM SERPL-SCNC: 6.5 MEQ/L (ref 3.5–5.2)
POTASSIUM UR-SCNC: 15 MEQ/L
PROT SERPL-MCNC: 6.1 G/DL (ref 6.1–8)
PROT UR STRIP.AUTO-MCNC: NEGATIVE MG/DL
RBC # BLD AUTO: 3.17 MILL/MM3 (ref 4.7–6.1)
RBC #/AREA URNS HPF: ABNORMAL /HPF
RENAL EPI CELLS #/AREA URNS HPF: ABNORMAL /[HPF]
SEGMENTED NEUTROPHILS ABSOLUTE COUNT: 8.8 THOU/MM3 (ref 1.8–7.7)
SODIUM SERPL-SCNC: 132 MEQ/L (ref 135–145)
SODIUM SERPL-SCNC: 133 MEQ/L (ref 135–145)
SODIUM SERPL-SCNC: 135 MEQ/L (ref 135–145)
SODIUM SERPL-SCNC: 135 MEQ/L (ref 135–145)
SODIUM UR-SCNC: 105 MEQ/L
SPECIFIC GRAVITY UA: 1.01 (ref 1–1.03)
URATE SERPL-MCNC: 6.3 MG/DL (ref 3.7–7)
UROBILINOGEN, URINE: 0.2 EU/DL (ref 0–1)
UUN 24H UR-MCNC: 236 MG/DL
WBC # BLD AUTO: 9.5 THOU/MM3 (ref 4.8–10.8)
WBC #/AREA URNS HPF: ABNORMAL /HPF
YEAST LIKE FUNGI URNS QL MICRO: ABNORMAL

## 2023-04-16 PROCEDURE — 2580000003 HC RX 258: Performed by: STUDENT IN AN ORGANIZED HEALTH CARE EDUCATION/TRAINING PROGRAM

## 2023-04-16 PROCEDURE — 36415 COLL VENOUS BLD VENIPUNCTURE: CPT

## 2023-04-16 PROCEDURE — 83605 ASSAY OF LACTIC ACID: CPT

## 2023-04-16 PROCEDURE — 2580000003 HC RX 258: Performed by: PSYCHIATRY & NEUROLOGY

## 2023-04-16 PROCEDURE — 6370000000 HC RX 637 (ALT 250 FOR IP): Performed by: NURSE PRACTITIONER

## 2023-04-16 PROCEDURE — 87040 BLOOD CULTURE FOR BACTERIA: CPT

## 2023-04-16 PROCEDURE — 2709999900 IR GUIDED NEPHROSTOMY CATH PLACEMENT

## 2023-04-16 PROCEDURE — 02HV33Z INSERTION OF INFUSION DEVICE INTO SUPERIOR VENA CAVA, PERCUTANEOUS APPROACH: ICD-10-PCS | Performed by: RADIOLOGY

## 2023-04-16 PROCEDURE — 99233 SBSQ HOSP IP/OBS HIGH 50: CPT | Performed by: OPHTHALMOLOGY

## 2023-04-16 PROCEDURE — 86038 ANTINUCLEAR ANTIBODIES: CPT

## 2023-04-16 PROCEDURE — 82330 ASSAY OF CALCIUM: CPT

## 2023-04-16 PROCEDURE — 51798 US URINE CAPACITY MEASURE: CPT

## 2023-04-16 PROCEDURE — 6360000002 HC RX W HCPCS: Performed by: STUDENT IN AN ORGANIZED HEALTH CARE EDUCATION/TRAINING PROGRAM

## 2023-04-16 PROCEDURE — 76937 US GUIDE VASCULAR ACCESS: CPT

## 2023-04-16 PROCEDURE — 5A1D70Z PERFORMANCE OF URINARY FILTRATION, INTERMITTENT, LESS THAN 6 HOURS PER DAY: ICD-10-PCS | Performed by: INTERNAL MEDICINE

## 2023-04-16 PROCEDURE — G0257 UNSCHED DIALYSIS ESRD PT HOS: HCPCS

## 2023-04-16 PROCEDURE — 6360000002 HC RX W HCPCS: Performed by: RADIOLOGY

## 2023-04-16 PROCEDURE — 99223 1ST HOSP IP/OBS HIGH 75: CPT | Performed by: INTERNAL MEDICINE

## 2023-04-16 PROCEDURE — 83615 LACTATE (LD) (LDH) ENZYME: CPT

## 2023-04-16 PROCEDURE — 0T913ZZ DRAINAGE OF LEFT KIDNEY, PERCUTANEOUS APPROACH: ICD-10-PCS | Performed by: RADIOLOGY

## 2023-04-16 PROCEDURE — 80053 COMPREHEN METABOLIC PANEL: CPT

## 2023-04-16 PROCEDURE — 99254 IP/OBS CNSLTJ NEW/EST MOD 60: CPT | Performed by: NURSE PRACTITIONER

## 2023-04-16 PROCEDURE — 2140000000 HC CCU INTERMEDIATE R&B

## 2023-04-16 PROCEDURE — 2500000003 HC RX 250 WO HCPCS: Performed by: PSYCHIATRY & NEUROLOGY

## 2023-04-16 PROCEDURE — 51702 INSERT TEMP BLADDER CATH: CPT

## 2023-04-16 PROCEDURE — 50432 PLMT NEPHROSTOMY CATHETER: CPT

## 2023-04-16 PROCEDURE — 6370000000 HC RX 637 (ALT 250 FOR IP): Performed by: PSYCHIATRY & NEUROLOGY

## 2023-04-16 PROCEDURE — 85025 COMPLETE CBC W/AUTO DIFF WBC: CPT

## 2023-04-16 PROCEDURE — 84300 ASSAY OF URINE SODIUM: CPT

## 2023-04-16 PROCEDURE — 84100 ASSAY OF PHOSPHORUS: CPT

## 2023-04-16 PROCEDURE — 83516 IMMUNOASSAY NONANTIBODY: CPT

## 2023-04-16 PROCEDURE — 86255 FLUORESCENT ANTIBODY SCREEN: CPT

## 2023-04-16 PROCEDURE — 84550 ASSAY OF BLOOD/URIC ACID: CPT

## 2023-04-16 PROCEDURE — 86160 COMPLEMENT ANTIGEN: CPT

## 2023-04-16 PROCEDURE — 90935 HEMODIALYSIS ONE EVALUATION: CPT | Performed by: INTERNAL MEDICINE

## 2023-04-16 PROCEDURE — 87340 HEPATITIS B SURFACE AG IA: CPT

## 2023-04-16 PROCEDURE — 93010 ELECTROCARDIOGRAM REPORT: CPT | Performed by: INTERNAL MEDICINE

## 2023-04-16 PROCEDURE — 2709999900 IR FLUORO GUIDED NEEDLE PLACEMENT

## 2023-04-16 PROCEDURE — 86706 HEP B SURFACE ANTIBODY: CPT

## 2023-04-16 PROCEDURE — 6360000002 HC RX W HCPCS: Performed by: PSYCHIATRY & NEUROLOGY

## 2023-04-16 PROCEDURE — 77001 FLUOROGUIDE FOR VEIN DEVICE: CPT

## 2023-04-16 PROCEDURE — 6370000000 HC RX 637 (ALT 250 FOR IP): Performed by: OPHTHALMOLOGY

## 2023-04-16 PROCEDURE — 81001 URINALYSIS AUTO W/SCOPE: CPT

## 2023-04-16 PROCEDURE — 82570 ASSAY OF URINE CREATININE: CPT

## 2023-04-16 PROCEDURE — 82784 ASSAY IGA/IGD/IGG/IGM EACH: CPT

## 2023-04-16 PROCEDURE — 36556 INSERT NON-TUNNEL CV CATH: CPT

## 2023-04-16 PROCEDURE — 0T903ZZ DRAINAGE OF RIGHT KIDNEY, PERCUTANEOUS APPROACH: ICD-10-PCS | Performed by: RADIOLOGY

## 2023-04-16 PROCEDURE — 6360000004 HC RX CONTRAST MEDICATION: Performed by: RADIOLOGY

## 2023-04-16 PROCEDURE — 84133 ASSAY OF URINE POTASSIUM: CPT

## 2023-04-16 PROCEDURE — 82948 REAGENT STRIP/BLOOD GLUCOSE: CPT

## 2023-04-16 PROCEDURE — 6370000000 HC RX 637 (ALT 250 FOR IP): Performed by: INTERNAL MEDICINE

## 2023-04-16 PROCEDURE — 84540 ASSAY OF URINE/UREA-N: CPT

## 2023-04-16 RX ORDER — ACETAMINOPHEN 325 MG/1
650 TABLET ORAL EVERY 4 HOURS PRN
Status: DISCONTINUED | OUTPATIENT
Start: 2023-04-16 | End: 2023-04-19 | Stop reason: HOSPADM

## 2023-04-16 RX ORDER — CALCIUM GLUCONATE 20 MG/ML
2000 INJECTION, SOLUTION INTRAVENOUS ONCE
Status: COMPLETED | OUTPATIENT
Start: 2023-04-16 | End: 2023-04-16

## 2023-04-16 RX ORDER — DEXTROSE MONOHYDRATE 100 MG/ML
INJECTION, SOLUTION INTRAVENOUS CONTINUOUS PRN
Status: DISCONTINUED | OUTPATIENT
Start: 2023-04-16 | End: 2023-04-16 | Stop reason: SDUPTHER

## 2023-04-16 RX ORDER — DEXTROSE MONOHYDRATE 25 G/50ML
25 INJECTION, SOLUTION INTRAVENOUS ONCE
Status: DISCONTINUED | OUTPATIENT
Start: 2023-04-16 | End: 2023-04-16

## 2023-04-16 RX ORDER — SODIUM CHLORIDE 1000 MG
2 TABLET, SOLUBLE MISCELLANEOUS ONCE
Status: DISCONTINUED | OUTPATIENT
Start: 2023-04-16 | End: 2023-04-16

## 2023-04-16 RX ORDER — HEPARIN SODIUM 5000 [USP'U]/ML
5000 INJECTION, SOLUTION INTRAVENOUS; SUBCUTANEOUS EVERY 8 HOURS SCHEDULED
Status: DISCONTINUED | OUTPATIENT
Start: 2023-04-16 | End: 2023-04-19 | Stop reason: HOSPADM

## 2023-04-16 RX ORDER — TAMSULOSIN HYDROCHLORIDE 0.4 MG/1
0.4 CAPSULE ORAL NIGHTLY
Status: DISCONTINUED | OUTPATIENT
Start: 2023-04-16 | End: 2023-04-19 | Stop reason: HOSPADM

## 2023-04-16 RX ORDER — ACETAMINOPHEN 650 MG/1
650 SUPPOSITORY RECTAL EVERY 4 HOURS PRN
Status: DISCONTINUED | OUTPATIENT
Start: 2023-04-16 | End: 2023-04-19 | Stop reason: HOSPADM

## 2023-04-16 RX ORDER — MIDAZOLAM HYDROCHLORIDE 1 MG/ML
INJECTION INTRAMUSCULAR; INTRAVENOUS PRN
Status: COMPLETED | OUTPATIENT
Start: 2023-04-16 | End: 2023-04-16

## 2023-04-16 RX ORDER — POLYVINYL ALCOHOL 14 MG/ML
1 SOLUTION/ DROPS OPHTHALMIC 4 TIMES DAILY
Status: DISCONTINUED | OUTPATIENT
Start: 2023-04-16 | End: 2023-04-19 | Stop reason: HOSPADM

## 2023-04-16 RX ORDER — NEOMYCIN SULFATE, POLYMYXIN B SULFATE, AND DEXAMETHASONE 3.5; 10000; 1 MG/G; [USP'U]/G; MG/G
OINTMENT OPHTHALMIC 2 TIMES DAILY
Status: COMPLETED | OUTPATIENT
Start: 2023-04-16 | End: 2023-04-18

## 2023-04-16 RX ADMIN — MEMANTINE HYDROCHLORIDE 5 MG: 5 TABLET ORAL at 21:00

## 2023-04-16 RX ADMIN — MIDAZOLAM 0.5 MG: 1 INJECTION INTRAMUSCULAR; INTRAVENOUS at 16:25

## 2023-04-16 RX ADMIN — NEOMYCIN AND POLYMYXIN B SULFATES AND DEXAMETHASONE: 3.5; 10000; 1 OINTMENT OPHTHALMIC at 20:59

## 2023-04-16 RX ADMIN — Medication 6 MG: at 20:59

## 2023-04-16 RX ADMIN — POLYVINYL ALCOHOL 1 DROP: 14 SOLUTION/ DROPS OPHTHALMIC at 12:41

## 2023-04-16 RX ADMIN — HYDROMORPHONE HYDROCHLORIDE 0.5 MG: 1 INJECTION, SOLUTION INTRAMUSCULAR; INTRAVENOUS; SUBCUTANEOUS at 16:25

## 2023-04-16 RX ADMIN — ACETAMINOPHEN 650 MG: 325 TABLET ORAL at 20:59

## 2023-04-16 RX ADMIN — POLYVINYL ALCOHOL 1 DROP: 14 SOLUTION/ DROPS OPHTHALMIC at 15:16

## 2023-04-16 RX ADMIN — TAMSULOSIN HYDROCHLORIDE 0.4 MG: 0.4 CAPSULE ORAL at 20:59

## 2023-04-16 RX ADMIN — LEVOTHYROXINE SODIUM 100 MCG: 0.1 TABLET ORAL at 09:28

## 2023-04-16 RX ADMIN — HYDROMORPHONE HYDROCHLORIDE 1 MG: 1 INJECTION, SOLUTION INTRAMUSCULAR; INTRAVENOUS; SUBCUTANEOUS at 16:13

## 2023-04-16 RX ADMIN — MEMANTINE 10 MG: 10 TABLET ORAL at 09:28

## 2023-04-16 RX ADMIN — DEXAMETHASONE SODIUM PHOSPHATE 4 MG: 4 INJECTION, SOLUTION INTRA-ARTICULAR; INTRALESIONAL; INTRAMUSCULAR; INTRAVENOUS; SOFT TISSUE at 23:03

## 2023-04-16 RX ADMIN — IOPAMIDOL 20 ML: 755 INJECTION, SOLUTION INTRAVENOUS at 17:00

## 2023-04-16 RX ADMIN — SODIUM CHLORIDE: 9 INJECTION, SOLUTION INTRAVENOUS at 12:38

## 2023-04-16 RX ADMIN — CALCIUM GLUCONATE 2000 MG: 20 INJECTION, SOLUTION INTRAVENOUS at 05:26

## 2023-04-16 RX ADMIN — SODIUM CHLORIDE: 9 INJECTION, SOLUTION INTRAVENOUS at 07:37

## 2023-04-16 RX ADMIN — MIDAZOLAM 1 MG: 1 INJECTION INTRAMUSCULAR; INTRAVENOUS at 16:13

## 2023-04-16 RX ADMIN — SODIUM CHLORIDE: 9 INJECTION, SOLUTION INTRAVENOUS at 03:35

## 2023-04-16 RX ADMIN — NEOMYCIN AND POLYMYXIN B SULFATES AND DEXAMETHASONE: 3.5; 10000; 1 OINTMENT OPHTHALMIC at 12:42

## 2023-04-16 RX ADMIN — DEXAMETHASONE SODIUM PHOSPHATE 4 MG: 4 INJECTION, SOLUTION INTRA-ARTICULAR; INTRALESIONAL; INTRAMUSCULAR; INTRAVENOUS; SOFT TISSUE at 15:15

## 2023-04-16 RX ADMIN — HYDROMORPHONE HYDROCHLORIDE 0.5 MG: 1 INJECTION, SOLUTION INTRAMUSCULAR; INTRAVENOUS; SUBCUTANEOUS at 16:49

## 2023-04-16 RX ADMIN — POLYVINYL ALCOHOL 1 DROP: 14 SOLUTION/ DROPS OPHTHALMIC at 20:59

## 2023-04-16 RX ADMIN — DEXAMETHASONE SODIUM PHOSPHATE 4 MG: 4 INJECTION, SOLUTION INTRA-ARTICULAR; INTRALESIONAL; INTRAMUSCULAR; INTRAVENOUS; SOFT TISSUE at 10:44

## 2023-04-16 RX ADMIN — ALLOPURINOL 300 MG: 300 TABLET ORAL at 09:28

## 2023-04-16 RX ADMIN — SENNOSIDES 8.6 MG: 8.6 TABLET, FILM COATED ORAL at 20:59

## 2023-04-16 RX ADMIN — INSULIN HUMAN 10 UNITS: 100 INJECTION, SOLUTION PARENTERAL at 05:05

## 2023-04-16 RX ADMIN — DEXAMETHASONE SODIUM PHOSPHATE 4 MG: 4 INJECTION, SOLUTION INTRA-ARTICULAR; INTRALESIONAL; INTRAMUSCULAR; INTRAVENOUS; SOFT TISSUE at 05:06

## 2023-04-16 RX ADMIN — SODIUM BICARBONATE 50 MEQ: 84 INJECTION, SOLUTION INTRAVENOUS at 05:05

## 2023-04-16 RX ADMIN — MIDAZOLAM 0.5 MG: 1 INJECTION INTRAMUSCULAR; INTRAVENOUS at 16:49

## 2023-04-16 RX ADMIN — ACETAMINOPHEN 650 MG: 325 TABLET ORAL at 04:45

## 2023-04-16 RX ADMIN — SODIUM ZIRCONIUM CYCLOSILICATE 15 G: 10 POWDER, FOR SUSPENSION ORAL at 12:43

## 2023-04-16 RX ADMIN — DEXTROSE MONOHYDRATE 250 ML: 100 INJECTION, SOLUTION INTRAVENOUS at 05:05

## 2023-04-16 ASSESSMENT — PAIN SCALES - WONG BAKER
WONGBAKER_NUMERICALRESPONSE: 0
WONGBAKER_NUMERICALRESPONSE: 0

## 2023-04-16 ASSESSMENT — PAIN DESCRIPTION - PAIN TYPE: TYPE: ACUTE PAIN;SURGICAL PAIN

## 2023-04-16 ASSESSMENT — PAIN DESCRIPTION - ONSET: ONSET: GRADUAL

## 2023-04-16 ASSESSMENT — PAIN SCALES - GENERAL
PAINLEVEL_OUTOF10: 6
PAINLEVEL_OUTOF10: 0
PAINLEVEL_OUTOF10: 3
PAINLEVEL_OUTOF10: 0

## 2023-04-16 ASSESSMENT — PAIN DESCRIPTION - ORIENTATION: ORIENTATION: RIGHT;LEFT

## 2023-04-16 ASSESSMENT — PAIN DESCRIPTION - LOCATION
LOCATION: HEAD
LOCATION: FLANK

## 2023-04-16 ASSESSMENT — ENCOUNTER SYMPTOMS
SHORTNESS OF BREATH: 0
EYES NEGATIVE: 1
FACIAL SWELLING: 1
NAUSEA: 0
BACK PAIN: 0
ABDOMINAL PAIN: 0
DIARRHEA: 0
VOMITING: 0
COUGH: 0

## 2023-04-16 ASSESSMENT — PAIN DESCRIPTION - DESCRIPTORS
DESCRIPTORS: ACHING
DESCRIPTORS: ACHING

## 2023-04-16 ASSESSMENT — PAIN - FUNCTIONAL ASSESSMENT: PAIN_FUNCTIONAL_ASSESSMENT: PREVENTS OR INTERFERES SOME ACTIVE ACTIVITIES AND ADLS

## 2023-04-16 ASSESSMENT — PAIN DESCRIPTION - FREQUENCY: FREQUENCY: INTERMITTENT

## 2023-04-17 PROBLEM — C43.9 METASTATIC MELANOMA (HCC): Status: ACTIVE | Noted: 2022-12-28

## 2023-04-17 PROBLEM — D64.9 CHRONIC ANEMIA: Status: ACTIVE | Noted: 2023-01-01

## 2023-04-17 PROBLEM — R53.1 GENERALIZED WEAKNESS: Status: ACTIVE | Noted: 2022-12-28

## 2023-04-17 PROBLEM — I95.9 HYPOTENSION: Status: ACTIVE | Noted: 2023-01-01

## 2023-04-17 PROBLEM — R34 ANURIA: Status: ACTIVE | Noted: 2023-01-01

## 2023-04-17 PROBLEM — E87.1 HYPONATREMIA: Status: ACTIVE | Noted: 2023-04-17

## 2023-04-17 PROBLEM — E11.9 TYPE 2 DIABETES MELLITUS TREATED WITHOUT INSULIN (HCC): Status: ACTIVE | Noted: 2023-01-01

## 2023-04-17 PROBLEM — E83.51 HYPOCALCEMIA: Status: ACTIVE | Noted: 2023-04-17

## 2023-04-17 LAB
ANION GAP SERPL CALC-SCNC: 12 MEQ/L (ref 8–16)
ANION GAP SERPL CALC-SCNC: 14 MEQ/L (ref 8–16)
ANION GAP SERPL CALC-SCNC: 14 MEQ/L (ref 8–16)
BASOPHILS ABSOLUTE: 0 THOU/MM3 (ref 0–0.1)
BASOPHILS NFR BLD AUTO: 0.1 %
BUN SERPL-MCNC: 38 MG/DL (ref 7–22)
BUN SERPL-MCNC: 40 MG/DL (ref 7–22)
BUN SERPL-MCNC: 42 MG/DL (ref 7–22)
C3C SERPL-MCNC: 148 MG/DL (ref 90–180)
C4 SERPL-MCNC: 20 MG/DL (ref 10–40)
CALCIUM SERPL-MCNC: 8.5 MG/DL (ref 8.5–10.5)
CHLORIDE SERPL-SCNC: 102 MEQ/L (ref 98–111)
CHLORIDE SERPL-SCNC: 103 MEQ/L (ref 98–111)
CHLORIDE SERPL-SCNC: 104 MEQ/L (ref 98–111)
CO2 SERPL-SCNC: 22 MEQ/L (ref 23–33)
CO2 SERPL-SCNC: 22 MEQ/L (ref 23–33)
CO2 SERPL-SCNC: 23 MEQ/L (ref 23–33)
CREAT SERPL-MCNC: 1.5 MG/DL (ref 0.4–1.2)
CREAT SERPL-MCNC: 1.7 MG/DL (ref 0.4–1.2)
CREAT SERPL-MCNC: 2.1 MG/DL (ref 0.4–1.2)
DEPRECATED RDW RBC AUTO: 50.5 FL (ref 35–45)
EOSINOPHIL NFR BLD AUTO: 0 %
EOSINOPHILS ABSOLUTE: 0 THOU/MM3 (ref 0–0.4)
ERYTHROCYTE [DISTWIDTH] IN BLOOD BY AUTOMATED COUNT: 16.7 % (ref 11.5–14.5)
GFR SERPL CREATININE-BSD FRML MDRD: 35 ML/MIN/1.73M2
GFR SERPL CREATININE-BSD FRML MDRD: 45 ML/MIN/1.73M2
GFR SERPL CREATININE-BSD FRML MDRD: 52 ML/MIN/1.73M2
GLUCOSE BLD STRIP.AUTO-MCNC: 138 MG/DL (ref 70–108)
GLUCOSE BLD STRIP.AUTO-MCNC: 213 MG/DL (ref 70–108)
GLUCOSE BLD STRIP.AUTO-MCNC: 298 MG/DL (ref 70–108)
GLUCOSE BLD STRIP.AUTO-MCNC: 337 MG/DL (ref 70–108)
GLUCOSE SERPL-MCNC: 124 MG/DL (ref 70–108)
GLUCOSE SERPL-MCNC: 125 MG/DL (ref 70–108)
GLUCOSE SERPL-MCNC: 281 MG/DL (ref 70–108)
HCT VFR BLD AUTO: 27.2 % (ref 42–52)
HGB BLD-MCNC: 8.6 GM/DL (ref 14–18)
IGA SERPL-MCNC: 215 MG/DL (ref 70–400)
IMM GRANULOCYTES # BLD AUTO: 0.26 THOU/MM3 (ref 0–0.07)
IMM GRANULOCYTES NFR BLD AUTO: 2.7 %
LACTATE SERPL-SCNC: 5.5 MMOL/L (ref 0.5–2)
LYMPHOCYTES ABSOLUTE: 0.2 THOU/MM3 (ref 1–4.8)
LYMPHOCYTES NFR BLD AUTO: 1.7 %
MCH RBC QN AUTO: 27.3 PG (ref 26–33)
MCHC RBC AUTO-ENTMCNC: 31.6 GM/DL (ref 32.2–35.5)
MCV RBC AUTO: 86.3 FL (ref 80–94)
MONOCYTES ABSOLUTE: 0.4 THOU/MM3 (ref 0.4–1.3)
MONOCYTES NFR BLD AUTO: 4.3 %
NEUTROPHILS NFR BLD AUTO: 91.2 %
NRBC BLD AUTO-RTO: 0 /100 WBC
PLATELET # BLD AUTO: 310 THOU/MM3 (ref 130–400)
PMV BLD AUTO: 8.2 FL (ref 9.4–12.4)
POTASSIUM SERPL-SCNC: 4.9 MEQ/L (ref 3.5–5.2)
POTASSIUM SERPL-SCNC: 5.1 MEQ/L (ref 3.5–5.2)
POTASSIUM SERPL-SCNC: 5.2 MEQ/L (ref 3.5–5.2)
RBC # BLD AUTO: 3.15 MILL/MM3 (ref 4.7–6.1)
SEGMENTED NEUTROPHILS ABSOLUTE COUNT: 8.8 THOU/MM3 (ref 1.8–7.7)
SODIUM SERPL-SCNC: 138 MEQ/L (ref 135–145)
SODIUM SERPL-SCNC: 138 MEQ/L (ref 135–145)
SODIUM SERPL-SCNC: 140 MEQ/L (ref 135–145)
WBC # BLD AUTO: 9.7 THOU/MM3 (ref 4.8–10.8)

## 2023-04-17 PROCEDURE — 6360000002 HC RX W HCPCS: Performed by: STUDENT IN AN ORGANIZED HEALTH CARE EDUCATION/TRAINING PROGRAM

## 2023-04-17 PROCEDURE — 6370000000 HC RX 637 (ALT 250 FOR IP): Performed by: PSYCHIATRY & NEUROLOGY

## 2023-04-17 PROCEDURE — 2580000003 HC RX 258: Performed by: PSYCHIATRY & NEUROLOGY

## 2023-04-17 PROCEDURE — 82948 REAGENT STRIP/BLOOD GLUCOSE: CPT

## 2023-04-17 PROCEDURE — 99233 SBSQ HOSP IP/OBS HIGH 50: CPT | Performed by: INTERNAL MEDICINE

## 2023-04-17 PROCEDURE — 99232 SBSQ HOSP IP/OBS MODERATE 35: CPT | Performed by: UROLOGY

## 2023-04-17 PROCEDURE — 6370000000 HC RX 637 (ALT 250 FOR IP): Performed by: PHYSICIAN ASSISTANT

## 2023-04-17 PROCEDURE — 6370000000 HC RX 637 (ALT 250 FOR IP)

## 2023-04-17 PROCEDURE — 36415 COLL VENOUS BLD VENIPUNCTURE: CPT

## 2023-04-17 PROCEDURE — 83605 ASSAY OF LACTIC ACID: CPT

## 2023-04-17 PROCEDURE — 6370000000 HC RX 637 (ALT 250 FOR IP): Performed by: NURSE PRACTITIONER

## 2023-04-17 PROCEDURE — 2580000003 HC RX 258: Performed by: PHYSICIAN ASSISTANT

## 2023-04-17 PROCEDURE — 6360000002 HC RX W HCPCS: Performed by: OPHTHALMOLOGY

## 2023-04-17 PROCEDURE — 2140000000 HC CCU INTERMEDIATE R&B

## 2023-04-17 PROCEDURE — 80048 BASIC METABOLIC PNL TOTAL CA: CPT

## 2023-04-17 PROCEDURE — 85025 COMPLETE CBC W/AUTO DIFF WBC: CPT

## 2023-04-17 PROCEDURE — 99233 SBSQ HOSP IP/OBS HIGH 50: CPT | Performed by: FAMILY MEDICINE

## 2023-04-17 PROCEDURE — 2580000003 HC RX 258: Performed by: INTERNAL MEDICINE

## 2023-04-17 RX ORDER — SODIUM CHLORIDE, SODIUM LACTATE, POTASSIUM CHLORIDE, AND CALCIUM CHLORIDE .6; .31; .03; .02 G/100ML; G/100ML; G/100ML; G/100ML
1000 INJECTION, SOLUTION INTRAVENOUS ONCE
Status: COMPLETED | OUTPATIENT
Start: 2023-04-17 | End: 2023-04-17

## 2023-04-17 RX ORDER — INSULIN GLARGINE 100 [IU]/ML
10 INJECTION, SOLUTION SUBCUTANEOUS NIGHTLY
Status: DISCONTINUED | OUTPATIENT
Start: 2023-04-17 | End: 2023-04-19 | Stop reason: HOSPADM

## 2023-04-17 RX ORDER — ACETAMINOPHEN 500 MG
500 TABLET ORAL ONCE
Status: DISCONTINUED | OUTPATIENT
Start: 2023-04-17 | End: 2023-04-19 | Stop reason: HOSPADM

## 2023-04-17 RX ORDER — ACETAMINOPHEN 500 MG
1000 TABLET ORAL EVERY 12 HOURS SCHEDULED
Status: DISCONTINUED | OUTPATIENT
Start: 2023-04-17 | End: 2023-04-19 | Stop reason: HOSPADM

## 2023-04-17 RX ORDER — INSULIN LISPRO 100 [IU]/ML
0-4 INJECTION, SOLUTION INTRAVENOUS; SUBCUTANEOUS NIGHTLY
Status: DISCONTINUED | OUTPATIENT
Start: 2023-04-17 | End: 2023-04-19 | Stop reason: HOSPADM

## 2023-04-17 RX ORDER — DEXTROSE MONOHYDRATE 100 MG/ML
INJECTION, SOLUTION INTRAVENOUS CONTINUOUS PRN
Status: DISCONTINUED | OUTPATIENT
Start: 2023-04-17 | End: 2023-04-19 | Stop reason: HOSPADM

## 2023-04-17 RX ORDER — TRAMADOL HYDROCHLORIDE 50 MG/1
100 TABLET ORAL EVERY 6 HOURS PRN
Status: DISCONTINUED | OUTPATIENT
Start: 2023-04-17 | End: 2023-04-19 | Stop reason: HOSPADM

## 2023-04-17 RX ORDER — TRAMADOL HYDROCHLORIDE 50 MG/1
50 TABLET ORAL EVERY 6 HOURS PRN
Status: DISCONTINUED | OUTPATIENT
Start: 2023-04-17 | End: 2023-04-19 | Stop reason: HOSPADM

## 2023-04-17 RX ORDER — INSULIN LISPRO 100 [IU]/ML
0-8 INJECTION, SOLUTION INTRAVENOUS; SUBCUTANEOUS
Status: DISCONTINUED | OUTPATIENT
Start: 2023-04-18 | End: 2023-04-19 | Stop reason: HOSPADM

## 2023-04-17 RX ORDER — FENTANYL CITRATE 50 UG/ML
25 INJECTION, SOLUTION INTRAMUSCULAR; INTRAVENOUS
Status: DISCONTINUED | OUTPATIENT
Start: 2023-04-17 | End: 2023-04-19 | Stop reason: HOSPADM

## 2023-04-17 RX ADMIN — SODIUM CHLORIDE, PRESERVATIVE FREE 10 ML: 5 INJECTION INTRAVENOUS at 08:09

## 2023-04-17 RX ADMIN — ACETAMINOPHEN 650 MG: 325 TABLET ORAL at 05:47

## 2023-04-17 RX ADMIN — Medication 6 MG: at 21:12

## 2023-04-17 RX ADMIN — TAMSULOSIN HYDROCHLORIDE 0.4 MG: 0.4 CAPSULE ORAL at 21:14

## 2023-04-17 RX ADMIN — POLYVINYL ALCOHOL 1 DROP: 14 SOLUTION/ DROPS OPHTHALMIC at 08:08

## 2023-04-17 RX ADMIN — DEXAMETHASONE SODIUM PHOSPHATE 4 MG: 4 INJECTION, SOLUTION INTRA-ARTICULAR; INTRALESIONAL; INTRAMUSCULAR; INTRAVENOUS; SOFT TISSUE at 17:15

## 2023-04-17 RX ADMIN — DEXAMETHASONE SODIUM PHOSPHATE 4 MG: 4 INJECTION, SOLUTION INTRA-ARTICULAR; INTRALESIONAL; INTRAMUSCULAR; INTRAVENOUS; SOFT TISSUE at 05:34

## 2023-04-17 RX ADMIN — ACETAMINOPHEN 650 MG: 325 TABLET ORAL at 15:01

## 2023-04-17 RX ADMIN — ACETAMINOPHEN 1000 MG: 500 TABLET ORAL at 19:37

## 2023-04-17 RX ADMIN — POLYVINYL ALCOHOL 1 DROP: 14 SOLUTION/ DROPS OPHTHALMIC at 21:12

## 2023-04-17 RX ADMIN — HEPARIN SODIUM 5000 UNITS: 5000 INJECTION INTRAVENOUS; SUBCUTANEOUS at 21:12

## 2023-04-17 RX ADMIN — NEOMYCIN AND POLYMYXIN B SULFATES AND DEXAMETHASONE: 3.5; 10000; 1 OINTMENT OPHTHALMIC at 21:13

## 2023-04-17 RX ADMIN — DEXAMETHASONE SODIUM PHOSPHATE 4 MG: 4 INJECTION, SOLUTION INTRA-ARTICULAR; INTRALESIONAL; INTRAMUSCULAR; INTRAVENOUS; SOFT TISSUE at 23:55

## 2023-04-17 RX ADMIN — POLYVINYL ALCOHOL 1 DROP: 14 SOLUTION/ DROPS OPHTHALMIC at 17:15

## 2023-04-17 RX ADMIN — DEXAMETHASONE SODIUM PHOSPHATE 4 MG: 4 INJECTION, SOLUTION INTRA-ARTICULAR; INTRALESIONAL; INTRAMUSCULAR; INTRAVENOUS; SOFT TISSUE at 11:46

## 2023-04-17 RX ADMIN — HEPARIN SODIUM 5000 UNITS: 5000 INJECTION INTRAVENOUS; SUBCUTANEOUS at 14:10

## 2023-04-17 RX ADMIN — PANTOPRAZOLE SODIUM 40 MG: 40 TABLET, DELAYED RELEASE ORAL at 05:34

## 2023-04-17 RX ADMIN — ATORVASTATIN CALCIUM 20 MG: 20 TABLET, FILM COATED ORAL at 08:08

## 2023-04-17 RX ADMIN — TRAMADOL HYDROCHLORIDE 50 MG: 50 TABLET, COATED ORAL at 21:12

## 2023-04-17 RX ADMIN — SODIUM CHLORIDE: 9 INJECTION, SOLUTION INTRAVENOUS at 09:27

## 2023-04-17 RX ADMIN — LEVOTHYROXINE SODIUM 100 MCG: 0.1 TABLET ORAL at 05:34

## 2023-04-17 RX ADMIN — INSULIN GLARGINE 10 UNITS: 100 INJECTION, SOLUTION SUBCUTANEOUS at 21:10

## 2023-04-17 RX ADMIN — MEMANTINE 10 MG: 10 TABLET ORAL at 08:09

## 2023-04-17 RX ADMIN — NEOMYCIN AND POLYMYXIN B SULFATES AND DEXAMETHASONE: 3.5; 10000; 1 OINTMENT OPHTHALMIC at 08:08

## 2023-04-17 RX ADMIN — SODIUM CHLORIDE: 9 INJECTION, SOLUTION INTRAVENOUS at 17:45

## 2023-04-17 RX ADMIN — SENNOSIDES 8.6 MG: 8.6 TABLET, FILM COATED ORAL at 21:12

## 2023-04-17 RX ADMIN — MEMANTINE HYDROCHLORIDE 5 MG: 5 TABLET ORAL at 21:13

## 2023-04-17 RX ADMIN — ALLOPURINOL 300 MG: 300 TABLET ORAL at 08:08

## 2023-04-17 RX ADMIN — INSULIN LISPRO 4 UNITS: 100 INJECTION, SOLUTION INTRAVENOUS; SUBCUTANEOUS at 21:10

## 2023-04-17 RX ADMIN — SODIUM CHLORIDE, PRESERVATIVE FREE 10 ML: 5 INJECTION INTRAVENOUS at 21:13

## 2023-04-17 RX ADMIN — POLYVINYL ALCOHOL 1 DROP: 14 SOLUTION/ DROPS OPHTHALMIC at 14:10

## 2023-04-17 RX ADMIN — SODIUM CHLORIDE, POTASSIUM CHLORIDE, SODIUM LACTATE AND CALCIUM CHLORIDE 1000 ML: 600; 310; 30; 20 INJECTION, SOLUTION INTRAVENOUS at 21:20

## 2023-04-17 ASSESSMENT — PAIN DESCRIPTION - ORIENTATION
ORIENTATION: RIGHT;LEFT
ORIENTATION: LOWER

## 2023-04-17 ASSESSMENT — PAIN DESCRIPTION - DESCRIPTORS
DESCRIPTORS: ACHING
DESCRIPTORS: ACHING
DESCRIPTORS: SHARP
DESCRIPTORS: ACHING

## 2023-04-17 ASSESSMENT — PAIN DESCRIPTION - ONSET: ONSET: GRADUAL

## 2023-04-17 ASSESSMENT — PAIN DESCRIPTION - LOCATION
LOCATION: BACK
LOCATION: FLANK
LOCATION: BACK
LOCATION: FLANK

## 2023-04-17 ASSESSMENT — PAIN SCALES - GENERAL
PAINLEVEL_OUTOF10: 7
PAINLEVEL_OUTOF10: 3
PAINLEVEL_OUTOF10: 2
PAINLEVEL_OUTOF10: 8

## 2023-04-17 ASSESSMENT — PAIN DESCRIPTION - PAIN TYPE
TYPE: SURGICAL PAIN;ACUTE PAIN
TYPE: ACUTE PAIN

## 2023-04-17 ASSESSMENT — PAIN DESCRIPTION - FREQUENCY: FREQUENCY: CONTINUOUS

## 2023-04-17 NOTE — PLAN OF CARE
Problem: Pain  Goal: Verbalizes/displays adequate comfort level or baseline comfort level  Outcome: Progressing     Problem: Discharge Planning  Goal: Discharge to home or other facility with appropriate resources  Outcome: Progressing  Flowsheets (Taken 4/17/2023 0800)  Discharge to home or other facility with appropriate resources:   Identify barriers to discharge with patient and caregiver   Arrange for needed discharge resources and transportation as appropriate   Identify discharge learning needs (meds, wound care, etc)     Problem: Safety - Adult  Goal: Free from fall injury  Outcome: Progressing

## 2023-04-17 NOTE — FLOWSHEET NOTE
04/16/23 2025   Vital Signs   /72   Temp 99.1 °F (37.3 °C)   Heart Rate (!) 107   Resp 16   SpO2 94 %   Weight 200 lb 2.8 oz (90.8 kg)   Weight Method Bed scale   Percent Weight Change 0   Post-Hemodialysis Assessment   Post-Treatment Procedures Blood returned;Catheter Capped, clamped with Saline x2 ports   Machine Disinfection Process Acid/Vinegar Clean;Heat Disinfect; Exterior Machine Disinfection   Blood Volume Processed (Liters) 29.4 l/min   Dialyzer Clearance Clotted   Duration of Treatment (minutes) 130 minutes   Heparin Amount Administered During Treatment (mL) 0 mL   Hemodialysis Intake (ml) 500 ml   Hemodialysis Output (ml) 269 ml   NET Removed (ml) -231   Tolerated Treatment Good     2 hour and 10 min treatment completed of 3 hour ordered treatment. Patient clotted off dialyzer. No blood loss, Dr. Devorah Herbert notified and OK'd to end treatment. No fluid removed per order. Cath lines flushed with 10 ml of 0.9 NS, clamped and tego secured. Report given to primary RN. Charting printed and placed in bin to be scanned into EMR.

## 2023-04-18 ENCOUNTER — TELEPHONE (OUTPATIENT)
Dept: UROLOGY | Age: 63
End: 2023-04-18

## 2023-04-18 VITALS
HEART RATE: 115 BPM | HEIGHT: 73 IN | RESPIRATION RATE: 18 BRPM | DIASTOLIC BLOOD PRESSURE: 70 MMHG | SYSTOLIC BLOOD PRESSURE: 123 MMHG | WEIGHT: 200.18 LBS | OXYGEN SATURATION: 97 % | TEMPERATURE: 97.5 F | BODY MASS INDEX: 26.53 KG/M2

## 2023-04-18 LAB
ANION GAP SERPL CALC-SCNC: 14 MEQ/L (ref 8–16)
BASOPHILS ABSOLUTE: 0 THOU/MM3 (ref 0–0.1)
BASOPHILS NFR BLD AUTO: 0.3 %
BUN SERPL-MCNC: 29 MG/DL (ref 7–22)
CA-I BLD ISE-SCNC: 1.12 MMOL/L (ref 1.12–1.32)
CALCIUM SERPL-MCNC: 8.5 MG/DL (ref 8.5–10.5)
CHLORIDE SERPL-SCNC: 105 MEQ/L (ref 98–111)
CO2 SERPL-SCNC: 23 MEQ/L (ref 23–33)
CREAT SERPL-MCNC: 0.9 MG/DL (ref 0.4–1.2)
DEPRECATED RDW RBC AUTO: 52.2 FL (ref 35–45)
EOSINOPHIL NFR BLD AUTO: 0 %
EOSINOPHILS ABSOLUTE: 0 THOU/MM3 (ref 0–0.4)
ERYTHROCYTE [DISTWIDTH] IN BLOOD BY AUTOMATED COUNT: 16.8 % (ref 11.5–14.5)
GFR SERPL CREATININE-BSD FRML MDRD: > 60 ML/MIN/1.73M2
GLUCOSE BLD STRIP.AUTO-MCNC: 153 MG/DL (ref 70–108)
GLUCOSE BLD STRIP.AUTO-MCNC: 205 MG/DL (ref 70–108)
GLUCOSE SERPL-MCNC: 147 MG/DL (ref 70–108)
HCT VFR BLD AUTO: 29.7 % (ref 42–52)
HGB BLD-MCNC: 9.2 GM/DL (ref 14–18)
IMM GRANULOCYTES # BLD AUTO: 0.48 THOU/MM3 (ref 0–0.07)
IMM GRANULOCYTES NFR BLD AUTO: 4.4 %
LYMPHOCYTES ABSOLUTE: 0.2 THOU/MM3 (ref 1–4.8)
LYMPHOCYTES NFR BLD AUTO: 1.5 %
MCH RBC QN AUTO: 27.4 PG (ref 26–33)
MCHC RBC AUTO-ENTMCNC: 31 GM/DL (ref 32.2–35.5)
MCV RBC AUTO: 88.4 FL (ref 80–94)
MONOCYTES ABSOLUTE: 0.5 THOU/MM3 (ref 0.4–1.3)
MONOCYTES NFR BLD AUTO: 4.7 %
NEUTROPHILS NFR BLD AUTO: 89.1 %
NRBC BLD AUTO-RTO: 0 /100 WBC
PLATELET # BLD AUTO: 279 THOU/MM3 (ref 130–400)
PMV BLD AUTO: 8.3 FL (ref 9.4–12.4)
POTASSIUM SERPL-SCNC: 4.7 MEQ/L (ref 3.5–5.2)
RBC # BLD AUTO: 3.36 MILL/MM3 (ref 4.7–6.1)
SEGMENTED NEUTROPHILS ABSOLUTE COUNT: 9.8 THOU/MM3 (ref 1.8–7.7)
SODIUM SERPL-SCNC: 142 MEQ/L (ref 135–145)
WBC # BLD AUTO: 11 THOU/MM3 (ref 4.8–10.8)

## 2023-04-18 PROCEDURE — 6360000002 HC RX W HCPCS: Performed by: STUDENT IN AN ORGANIZED HEALTH CARE EDUCATION/TRAINING PROGRAM

## 2023-04-18 PROCEDURE — 6370000000 HC RX 637 (ALT 250 FOR IP): Performed by: PHYSICIAN ASSISTANT

## 2023-04-18 PROCEDURE — 36415 COLL VENOUS BLD VENIPUNCTURE: CPT

## 2023-04-18 PROCEDURE — 99232 SBSQ HOSP IP/OBS MODERATE 35: CPT | Performed by: INTERNAL MEDICINE

## 2023-04-18 PROCEDURE — 6360000002 HC RX W HCPCS: Performed by: OPHTHALMOLOGY

## 2023-04-18 PROCEDURE — 99239 HOSP IP/OBS DSCHRG MGMT >30: CPT | Performed by: FAMILY MEDICINE

## 2023-04-18 PROCEDURE — 6370000000 HC RX 637 (ALT 250 FOR IP): Performed by: FAMILY MEDICINE

## 2023-04-18 PROCEDURE — 82948 REAGENT STRIP/BLOOD GLUCOSE: CPT

## 2023-04-18 PROCEDURE — 6370000000 HC RX 637 (ALT 250 FOR IP): Performed by: PSYCHIATRY & NEUROLOGY

## 2023-04-18 PROCEDURE — 6370000000 HC RX 637 (ALT 250 FOR IP)

## 2023-04-18 PROCEDURE — 6370000000 HC RX 637 (ALT 250 FOR IP): Performed by: NURSE PRACTITIONER

## 2023-04-18 PROCEDURE — 80048 BASIC METABOLIC PNL TOTAL CA: CPT

## 2023-04-18 PROCEDURE — 85025 COMPLETE CBC W/AUTO DIFF WBC: CPT

## 2023-04-18 PROCEDURE — 2580000003 HC RX 258: Performed by: INTERNAL MEDICINE

## 2023-04-18 PROCEDURE — 82330 ASSAY OF CALCIUM: CPT

## 2023-04-18 PROCEDURE — 2580000003 HC RX 258: Performed by: PSYCHIATRY & NEUROLOGY

## 2023-04-18 RX ORDER — LIDOCAINE 4 G/G
2 PATCH TOPICAL DAILY
Status: DISCONTINUED | OUTPATIENT
Start: 2023-04-18 | End: 2023-04-19 | Stop reason: HOSPADM

## 2023-04-18 RX ADMIN — ACETAMINOPHEN 1000 MG: 500 TABLET ORAL at 21:10

## 2023-04-18 RX ADMIN — MEMANTINE 10 MG: 10 TABLET ORAL at 12:13

## 2023-04-18 RX ADMIN — TRAMADOL HYDROCHLORIDE 100 MG: 50 TABLET, COATED ORAL at 01:48

## 2023-04-18 RX ADMIN — MEMANTINE HYDROCHLORIDE 5 MG: 5 TABLET ORAL at 21:10

## 2023-04-18 RX ADMIN — INSULIN LISPRO 2 UNITS: 100 INJECTION, SOLUTION INTRAVENOUS; SUBCUTANEOUS at 17:42

## 2023-04-18 RX ADMIN — TRAMADOL HYDROCHLORIDE 100 MG: 50 TABLET, COATED ORAL at 09:14

## 2023-04-18 RX ADMIN — DEXAMETHASONE SODIUM PHOSPHATE 4 MG: 4 INJECTION, SOLUTION INTRA-ARTICULAR; INTRALESIONAL; INTRAMUSCULAR; INTRAVENOUS; SOFT TISSUE at 12:13

## 2023-04-18 RX ADMIN — DEXAMETHASONE SODIUM PHOSPHATE 4 MG: 4 INJECTION, SOLUTION INTRA-ARTICULAR; INTRALESIONAL; INTRAMUSCULAR; INTRAVENOUS; SOFT TISSUE at 05:21

## 2023-04-18 RX ADMIN — HEPARIN SODIUM 5000 UNITS: 5000 INJECTION INTRAVENOUS; SUBCUTANEOUS at 22:44

## 2023-04-18 RX ADMIN — ALLOPURINOL 300 MG: 300 TABLET ORAL at 09:14

## 2023-04-18 RX ADMIN — ATORVASTATIN CALCIUM 20 MG: 20 TABLET, FILM COATED ORAL at 09:14

## 2023-04-18 RX ADMIN — SODIUM CHLORIDE: 9 INJECTION, SOLUTION INTRAVENOUS at 01:47

## 2023-04-18 RX ADMIN — SENNOSIDES 8.6 MG: 8.6 TABLET, FILM COATED ORAL at 21:10

## 2023-04-18 RX ADMIN — POLYVINYL ALCOHOL 1 DROP: 14 SOLUTION/ DROPS OPHTHALMIC at 21:11

## 2023-04-18 RX ADMIN — DEXAMETHASONE SODIUM PHOSPHATE 4 MG: 4 INJECTION, SOLUTION INTRA-ARTICULAR; INTRALESIONAL; INTRAMUSCULAR; INTRAVENOUS; SOFT TISSUE at 17:35

## 2023-04-18 RX ADMIN — TAMSULOSIN HYDROCHLORIDE 0.4 MG: 0.4 CAPSULE ORAL at 21:09

## 2023-04-18 RX ADMIN — LEVOTHYROXINE SODIUM 100 MCG: 0.1 TABLET ORAL at 09:18

## 2023-04-18 RX ADMIN — INSULIN GLARGINE 10 UNITS: 100 INJECTION, SOLUTION SUBCUTANEOUS at 21:11

## 2023-04-18 RX ADMIN — NEOMYCIN AND POLYMYXIN B SULFATES AND DEXAMETHASONE: 3.5; 10000; 1 OINTMENT OPHTHALMIC at 21:11

## 2023-04-18 RX ADMIN — NEOMYCIN AND POLYMYXIN B SULFATES AND DEXAMETHASONE: 3.5; 10000; 1 OINTMENT OPHTHALMIC at 09:15

## 2023-04-18 RX ADMIN — PANTOPRAZOLE SODIUM 40 MG: 40 TABLET, DELAYED RELEASE ORAL at 09:14

## 2023-04-18 RX ADMIN — POLYVINYL ALCOHOL 1 DROP: 14 SOLUTION/ DROPS OPHTHALMIC at 09:15

## 2023-04-18 RX ADMIN — POLYVINYL ALCOHOL 1 DROP: 14 SOLUTION/ DROPS OPHTHALMIC at 17:35

## 2023-04-18 RX ADMIN — SODIUM CHLORIDE, PRESERVATIVE FREE 10 ML: 5 INJECTION INTRAVENOUS at 22:45

## 2023-04-18 RX ADMIN — POLYVINYL ALCOHOL 1 DROP: 14 SOLUTION/ DROPS OPHTHALMIC at 14:45

## 2023-04-18 RX ADMIN — SODIUM CHLORIDE: 9 INJECTION, SOLUTION INTRAVENOUS at 09:57

## 2023-04-18 RX ADMIN — DEXAMETHASONE SODIUM PHOSPHATE 4 MG: 4 INJECTION, SOLUTION INTRA-ARTICULAR; INTRALESIONAL; INTRAMUSCULAR; INTRAVENOUS; SOFT TISSUE at 22:44

## 2023-04-18 RX ADMIN — ACETAMINOPHEN 1000 MG: 500 TABLET ORAL at 09:15

## 2023-04-18 RX ADMIN — TRAMADOL HYDROCHLORIDE 100 MG: 50 TABLET, COATED ORAL at 21:10

## 2023-04-18 RX ADMIN — HEPARIN SODIUM 5000 UNITS: 5000 INJECTION INTRAVENOUS; SUBCUTANEOUS at 05:21

## 2023-04-18 ASSESSMENT — PAIN SCALES - GENERAL
PAINLEVEL_OUTOF10: 7
PAINLEVEL_OUTOF10: 5
PAINLEVEL_OUTOF10: 7
PAINLEVEL_OUTOF10: 3
PAINLEVEL_OUTOF10: 7
PAINLEVEL_OUTOF10: 6

## 2023-04-18 ASSESSMENT — PAIN DESCRIPTION - DESCRIPTORS
DESCRIPTORS: ACHING
DESCRIPTORS: DULL
DESCRIPTORS: DULL

## 2023-04-18 ASSESSMENT — PAIN DESCRIPTION - FREQUENCY: FREQUENCY: CONTINUOUS

## 2023-04-18 ASSESSMENT — PAIN DESCRIPTION - LOCATION
LOCATION: FLANK

## 2023-04-18 ASSESSMENT — PAIN DESCRIPTION - ORIENTATION
ORIENTATION: RIGHT
ORIENTATION: RIGHT;LEFT
ORIENTATION: RIGHT;LEFT

## 2023-04-18 ASSESSMENT — PAIN DESCRIPTION - ONSET: ONSET: ON-GOING

## 2023-04-18 ASSESSMENT — PAIN DESCRIPTION - PAIN TYPE: TYPE: SURGICAL PAIN;ACUTE PAIN

## 2023-04-18 NOTE — PLAN OF CARE
Problem: Pain  Goal: Verbalizes/displays adequate comfort level or baseline comfort level  Outcome: Progressing     Problem: Discharge Planning  Goal: Discharge to home or other facility with appropriate resources  Outcome: Progressing  Flowsheets (Taken 4/18/2023 4757 by Marguerite Cuevas RN)  Discharge to home or other facility with appropriate resources: Identify barriers to discharge with patient and caregiver     Problem: Safety - Adult  Goal: Free from fall injury  Outcome: Progressing   Care plan reviewed with patient . Patient  verbalize understanding of the plan of care and contribute to goal setting.

## 2023-04-18 NOTE — PROGRESS NOTES
[facility]   Nephrology progress note  4/17/2023, 10:37 AM      Pt Name:    Fartun Velasco  MRN:     [id number]     YOB: 1960  Admit Date:    4/15/2023  3:17 PM    Chief Complaint: Nephrology following for RITU/Hyperkalemia . Subjective:  Patient seen and examined  No chest pain or shortness of breath  Overall better. Having some generalized pain    Objective:  24HR INTAKE/OUTPUT:    Intake/Output Summary (Last 24 hours) at 4/17/2023 1037  Last data filed at 4/17/2023 0800  Gross per 24 hour   Intake 4274.99 ml   Output 4179 ml   Net 95.99 ml      Admission weight: 187 lb (84.8 kg)  Wt Readings from Last 3 Encounters:   04/16/23 200 lb 2.8 oz (90.8 kg)   02/15/23 189 lb 9.6 oz (86 kg)   01/17/23 176 lb 6.4 oz (80 kg)        Vitals :   Vitals:    04/16/23 2303 04/17/23 0345 04/17/23 0800 04/17/23 0933   BP: 113/60 109/64 115/70    Pulse: (!) 105 (!) 105 84    Resp: 16 16     Temp:  98.5 °F (36.9 °C)     TempSrc:  Oral     SpO2: 93% 94%     Weight:       Height:    6' 1\" (1.854 m)       Physical examination  General Appearance:  Well developed.  No distress  Mouth/Throat:  Oral mucosa moist  Neck:  Supple, no JVD  Lungs:  Breath sounds: clear  Heart[de-identified]  S1,S2 heard  Abdomen:  Soft, non - tender  Musculoskeletal:  Edema -no edema    Medications:  Infusion:    sodium chloride 125 mL/hr at 04/17/23 7199    sodium chloride      dextrose       Meds:    calcium replacement protocol   Other RX Placeholder    insulin regular  10 Units IntraVENous Once    polyvinyl alcohol  1 drop Both Eyes 4x Daily    neomycin-polymyxin-dexameth   Left Eye BID    heparin (porcine)  5,000 Units SubCUTAneous 3 times per day    dextrose bolus  250 mL IntraVENous Once    tamsulosin  0.4 mg Oral Nightly    proparacaine  1 drop Left Eye Once    allopurinol  300 mg Oral Daily    melatonin  6 mg Oral Nightly    atorvastatin  20 mg Oral Daily    levothyroxine  100 mcg Oral QAM AC    pantoprazole  40 mg Oral
[facility]   Nephrology progress note  4/18/2023, 10:02 AM      Pt Name:    Blanche Glasgow  MRN:     [id number]     YOB: 1960  Admit Date:    4/15/2023  3:17 PM    Chief Complaint: Nephrology following for RITU/Hyperkalemia . Subjective:  Patient seen and examined  No chest pain or shortness of breath  Overall better. Having some back pain    Objective:  24HR INTAKE/OUTPUT:    Intake/Output Summary (Last 24 hours) at 4/18/2023 1002  Last data filed at 4/18/2023 0857  Gross per 24 hour   Intake 5949.37 ml   Output 6050 ml   Net -100.63 ml        Admission weight: 187 lb (84.8 kg)  Wt Readings from Last 3 Encounters:   04/16/23 200 lb 2.8 oz (90.8 kg)   02/15/23 189 lb 9.6 oz (86 kg)   01/17/23 176 lb 6.4 oz (80 kg)        Vitals :   Vitals:    04/17/23 2050 04/17/23 2355 04/18/23 0515 04/18/23 0845   BP: 136/76 (!) 157/83 (!) 156/87 104/81   Pulse: (!) 114 (!) 107 (!) 112 (!) 120   Resp: 17 20 20 18   Temp: 98.4 °F (36.9 °C) 97.9 °F (36.6 °C) 98.2 °F (36.8 °C) 97.3 °F (36.3 °C)   TempSrc: Oral Oral Oral Oral   SpO2: 94% 96% 97% 93%   Weight:       Height:           Physical examination  General Appearance:  Well developed.  No distress  Mouth/Throat:  Oral mucosa moist  Neck:  Supple, no JVD  Lungs:  Breath sounds: clear  Heart[de-identified]  S1,S2 heard  Abdomen:  Soft, non - tender  Musculoskeletal:  Edema -no edema    Medications:  Infusion:    dextrose      sodium chloride 125 mL/hr at 04/18/23 0957    sodium chloride       Meds:    lidocaine  2 patch TransDERmal Daily    acetaminophen  1,000 mg Oral 2 times per day    acetaminophen  500 mg Oral Once    HYDROmorphone  0.25 mg IntraVENous Once    insulin lispro  0-8 Units SubCUTAneous TID WC    insulin lispro  0-4 Units SubCUTAneous Nightly    insulin glargine  10 Units SubCUTAneous Nightly    calcium replacement protocol   Other RX Placeholder    insulin regular  10 Units IntraVENous Once    polyvinyl alcohol  1 drop Both Eyes 4x
Order received for HD Catheter removal per Dr. Joe Currie .  Patient placed in bed. Transparent dressing removed. Skin accessed appears clean and dry. Sutures removed, area cleaned with chloro prep, sterile vaseline gauze and sterile gauze placed over site, HD Catheter removed with tip intact, pressure held with sterile gauze for 5 minutes. New transparent dressing applied. Educated the patient on remaining in bed for 30 mins, dressing stays on for 24 hours, signs and symptoms of infection and notify primary nurse if any blood or oozing. Francisco Javier WILSON notified.
(Last 24 hours) at 4/17/2023 0930  Last data filed at 4/17/2023 0551  Gross per 24 hour   Intake 4274.99 ml   Output 3729 ml   Net 545.99 ml       Social History     Socioeconomic History    Marital status:      Spouse name: Not on file    Number of children: Not on file    Years of education: Not on file    Highest education level: Not on file   Occupational History    Not on file   Tobacco Use    Smoking status: Never    Smokeless tobacco: Former     Types: Chew, Snuff     Quit date: 09/2022   Vaping Use    Vaping Use: Never used   Substance and Sexual Activity    Alcohol use: Yes     Comment: once per month drinking 2 to 3 cans of beer    Drug use: No    Sexual activity: Not on file   Other Topics Concern    Not on file   Social History Narrative    Not on file     Social Determinants of Health     Financial Resource Strain: Low Risk     Difficulty of Paying Living Expenses: Not hard at all   Food Insecurity: No Food Insecurity    Worried About Running Out of Food in the Last Year: Never true    Ran Out of Food in the Last Year: Never true   Transportation Needs: Not on file   Physical Activity: Not on file   Stress: Not on file   Social Connections: Not on file   Intimate Partner Violence: Not on file   Housing Stability: Not on file     Family History   Problem Relation Age of Onset    High Blood Pressure Mother     High Cholesterol Mother     Lung Cancer Father     Mult Sclerosis Sister     Melanoma Brother      No Known Allergies      Constitutional: Alert and oriented times x3, no acute distress, and cooperative to examination with appropriate mood and affect. HEENT:   Head:               Normocephalic and atraumatic. Mouth/Throat:                Mucous membranes are normal. Lesion to neck  Eyes:               L eye swelling  Nose:               The external appearance of the nose is normal  Ears: The ears appear normal to external inspection.    Cardiovascular:       Normal rate,
Weight Source: Bed Scale  Current BMI (kg/m2): 26.4  Usual Body Weight:  (Per EMR 5/9/22 243 lb 3 oz, 12/31/22 190 lb 10 oz)     Weight Adjustment For: No Adjustment                 BMI Categories: Overweight (BMI 25.0-29. 9)    Estimated Daily Nutrient Needs:  Energy Requirements Based On: Kcal/kg  Weight Used for Energy Requirements: Current (90.8 kg)  Energy (kcal/day): 9721-1108 (25-30 kcal/kg)  Weight Used for Protein Requirements: Ideal (90.8 kg)  Protein (g/day): 109-127 (1.2-1.4 g/kg IBW)      Nutrition Diagnosis:   Unintended weight loss related to catabolic illness as evidenced by  (17.7% weight loss past 11 months)    Nutrition Interventions:   Food and/or Nutrient Delivery: Continue NPO  Nutrition Education/Counseling: No recommendation at this time  Coordination of Nutrition Care: Continue to monitor while inpatient       Goals:     Goals: PO intake 75% or greater, by next RD assessment       Nutrition Monitoring and Evaluation:   Behavioral-Environmental Outcomes: None Identified  Food/Nutrient Intake Outcomes: Food and Nutrient Intake, Diet Advancement/Tolerance  Physical Signs/Symptoms Outcomes: Biochemical Data, GI Status, Fluid Status or Edema, Weight, Skin, Nutrition Focused Physical Findings    Discharge Planning:     Too soon to determine     Seema Cools, [address]  Contact: [phone]
appearance of the nose is normal  Ears: The ears appear normal to external inspection. Cardiovascular:       Normal rate, regular rhythm. Pulmonary/Chest:  Normal respiratory rate and rhthym. No use of accessory muscles. Abdominal:               Soft. Mild L >R CVA tenderness. B/l nepht tubes draining   Musculoskeletal:               Normal range of motion. He exhibits no edema or tenderness of lower extremities. Extremities:               No cyanosis, clubbing, or edema present. Neurological:               Alert and oriented.       Labs:  WBC:    Lab Results   Component Value Date/Time    WBC 11.0 04/18/2023 04:21 AM     Hemoglobin/Hematocrit:    Lab Results   Component Value Date/Time    HGB 9.2 04/18/2023 04:21 AM    HCT 29.7 04/18/2023 04:21 AM     BMP:    Lab Results   Component Value Date/Time     04/18/2023 04:21 AM    K 4.7 04/18/2023 04:21 AM    K 4.4 12/29/2022 06:21 AM     04/18/2023 04:21 AM    CO2 23 04/18/2023 04:21 AM    BUN 29 04/18/2023 04:21 AM    LABALBU 3.5 04/16/2023 03:31 AM    CREATININE 0.9 04/18/2023 04:21 AM    CALCIUM 8.5 04/18/2023 04:21 AM    LABGLOM >60 04/18/2023 04:21 AM       Ney Gibbons, APRN - CNP, APRN  04/18/23 11:53 AM  Urology
removed at 1205. Awaiting transfer to [address] [place] [address], no beds available at this time. Subjective/HPI: Jose Angel Tena feels fair overall. He denies HA, SOB, CP, N/V/D. He endorses significant pain in his flanks due to the nephrostomy tubes placed there, however he notes his left eye swelling has reduced. PMH, SURGICAL HX, FH, SOCIAL HX reviewed and updated as needed. Medications:  Reviewed    Infusion Medications    sodium chloride 125 mL/hr at 04/17/23 0927    sodium chloride      dextrose       Scheduled Medications    calcium replacement protocol   Other RX Placeholder    insulin regular  10 Units IntraVENous Once    polyvinyl alcohol  1 drop Both Eyes 4x Daily    neomycin-polymyxin-dexameth   Left Eye BID    heparin (porcine)  5,000 Units SubCUTAneous 3 times per day    dextrose bolus  250 mL IntraVENous Once    tamsulosin  0.4 mg Oral Nightly    proparacaine  1 drop Left Eye Once    allopurinol  300 mg Oral Daily    melatonin  6 mg Oral Nightly    atorvastatin  20 mg Oral Daily    levothyroxine  100 mcg Oral QAM AC    pantoprazole  40 mg Oral QAM AC    memantine  10 mg Oral Daily    senna  1 tablet Oral Nightly    memantine  5 mg Oral Nightly    sodium chloride flush  5-40 mL IntraVENous 2 times per day    dexamethasone  4 mg IntraVENous Q6H     PRN Meds: acetaminophen **OR** acetaminophen, sodium chloride flush, sodium chloride, ondansetron **OR** ondansetron, polyethylene glycol, glucose, dextrose bolus **OR** dextrose bolus, glucagon (rDNA), dextrose      Intake/Output Summary (Last 24 hours) at 4/17/2023 1458  Last data filed at 4/17/2023 1200  Gross per 24 hour   Intake 3974.99 ml   Output 4769 ml   Net -794.01 ml       Exam:  BP (!) 102/56   Pulse (!) 107   Temp 98.5 °F (36.9 °C) (Oral)   Resp 16   Ht 6' 1\" (1.854 m)   Wt 200 lb 2.8 oz (90.8 kg)   SpO2 96%   BMI 26.41 kg/m²     Physical Exam  Constitutional:       General: He is not in acute distress. Appearance: Normal appearance.  He

## 2023-04-18 NOTE — DISCHARGE SUMMARY
in the right ureter up to 2 cm. Consider follow-up with    CT urogram when patients kidney function improved. 2. Peripherally calcified lesion in the upper pole right kidney, probably    a complex cyst.   3. Indeterminate mid-pole left renal lesion to 2.5 cm. Findings can be    also follow-up with contrast-enhanced CT or MRI. 4. Mild left hydronephrosis. This document has been electronically signed by: Lillie Prather MD on    04/16/2023 01:49 AM             Consults:     Pe[facility] Pandey 761 TO UROLOGY    Disposition:    [] Home       [] TCU       [] Rehab       [] Psych       [] SNF       [] [place]       [x] Other- [address] [facility] at Discharge: Stable    Code Status:  Full Code    Patient Instructions:    Discharge lab work: per outside hospital  Activity: per outside hospital  Diet: ADULT DIET;  Regular; Low Potassium (Less than 3000 mg/day)  ADULT ORAL NUTRITION SUPPLEMENT; Lunch; Renal Oral Supplement      Follow-up visits:   Per [facility] oncology      Medications prior to Discharge:   lidocaine 4 % external patch 2 patch, Daily  fentaNYL (SUBLIMAZE) injection 25 mcg, Q2H PRN  acetaminophen (TYLENOL) tablet 1,000 mg, 2 times per day  acetaminophen (TYLENOL) tablet 500 mg, Once  HYDROmorphone (DILAUDID) injection 0.25 mg, Once  insulin lispro (HUMALOG) injection vial 0-8 Units, TID WC  insulin lispro (HUMALOG) injection vial 0-4 Units, Nightly  insulin glargine (LANTUS) injection vial 10 Units, Nightly  glucose chewable tablet 16 g, PRN  dextrose bolus 10% 125 mL, PRN   Or  dextrose bolus 10% 250 mL, PRN  glucagon (rDNA) injection 1 mg, PRN  dextrose 10 % infusion, Continuous PRN  traMADol (ULTRAM) tablet 50 mg, Q6H PRN   Or  traMADol (ULTRAM) tablet 100 mg, Q6H PRN  acetaminophen (TYLENOL) tablet 650 mg, Q4H PRN   Or  acetaminophen (TYLENOL) suppository 650 mg, Q4H PRN  calcium replacement protocol, RX Placeholder  insulin regular (HUMULIN R;NOVOLIN R) injection 10 Units,

## 2023-04-18 NOTE — CARE COORDINATION
4/18/23, 12:37 PM EDT    DISCHARGE ON GOING [address] day: 3  Location: -25/025-A Reason for admit: Hyperkalemia [E87.5]  RITU (acute kidney injury) ([facility] [address] [N17.9]  Chemosis of left conjunctiva [H11.422]  Malignant melanoma, unspecified site [facility]) [C43.9]   Procedure:   4/15 Renal US: Mild/moderate right hydroureteronephrosis. Questionable lesion versus debris/hemorrhage in the right ureter up to 2 cm. Consider follow-up with CT urogram when patients kidney function improved. Peripherally calcified lesion in the upper pole right kidney, probably   a complex cyst. Indeterminate mid-pole left renal lesion to 2.5 cm. Mild left hydronephrosis. 4/16 CT guided bilateral Nephrostomy tube placement. 4/16 Nontunneled dialysis cath placed: RIJ. Removed 4/18. Barriers to Discharge: Hospitalist, Urology and Nephrology following. Per Urology, plan to discharge with nephrostomy tubes in place. Per Nephrology plan to remove temporary dialysis cath today. Afebrile. Tachycardia 105-120. Bilateral nephrostomy tubes producing urine. IVF, Tylenol q12hr, Zyloprim, Lipitor, Decadron 4mg iv q6hr, Heparin subq q8hr, Diabetes management, Synthroid, Lidocaine patch, Melatonin, Namenda, antibiotic eye ointment, Protonix, Liquifilm eye gtts, Flomax, Ultram prn. PCP: DEBBIE Callahan - CNP  Readmission Risk Score: 18.8%  Patient Goals/Plan/Treatment Preferences: From home alone with good family support. Awaiting bed at [facility].
DC: Yes  Would you like Case Management to discuss the discharge plan with any other family members/significant others, and if so, who? Yes (Family)  Plans to Return to Present Housing: Yes  Other Identified Issues/Barriers to RETURNING to current housing: none  Potential Assistance needed at discharge: Other (Comment) ([facility])            Potential DME:    Patient expects to discharge to: Other (comment) ([facility])  Plan for transportation at discharge: Family    Financial    Payor: Renee Seek / Plan: Renee Seek / Product Type: *No Product type* /     Does insurance require precert for SNF: Yes    Potential assistance Purchasing Medications: No  Meds-to-Beds request: Yes      [id number] [place], [place] - [address] [facility] [phone]  [address] [place], [place] [address]alex Reynaga  Phone: [phone] Fax: [phone]      Notes:    Factors facilitating achievement of predicted outcomes: Family support, Motivated, Cooperative, Pleasant, and Good insight into deficits    Barriers to discharge: Medical complications and waiting bed at [facility]. Additional Case Management Notes: Admitted through ED with left eye pain. 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 [facility] with [facility] for radiation oncology and chemotherapy. Found to have potassium of 6.2 and creatinine of 2.7. Potassium went to high of 6.5 and creatinine high of 4.2. Consulted Urology and Nephrology. IVF. Decadron iv q6hr, Heparin subq q8hr. Procedure:   4/15 Renal US: Mild/moderate right hydroureteronephrosis. Questionable lesion versus debris/hemorrhage in the right ureter up to 2 cm. Consider follow-up with CT urogram when patients kidney function improved. Peripherally calcified lesion in the upper pole right kidney, probably   a complex cyst. Indeterminate mid-pole left renal lesion to 2.5 cm. Mild left hydronephrosis. 4/16 CT guided bilateral Nephrostomy tube placement.    4/16 Nontunneled dialysis cath

## 2023-04-19 LAB
MYELOPEROXIDASE AB SER-ACNC: 0 AU/ML (ref 0–19)
PROTEINASE3 AB SER-ACNC: 0 AU/ML (ref 0–19)

## 2023-04-19 NOTE — TELEPHONE ENCOUNTER
Has b/l npeh tubes due to obstruction was transferrred to [facility] to treat his Melanoma with metastasis to brain, spine, bone, muscle  Need to schedule follow up in 4-6 wks to manage neph tubes with Dr Ezio Rubio and establish care

## 2023-04-19 NOTE — DISCHARGE INSTR - COC
Schedule:  Phone:  Fax:    / signature: {Esignature:[id number]}    PHYSICIAN SECTION    Prognosis: Fair    Condition at Discharge: Stable    Rehab Potential (if transferring to Rehab):  Fair    Recommended Labs or Other Treatments After Discharge:       Physician Certification: I certify the above information and transfer of Meg Cazares  is necessary for the continuing treatment of the diagnosis listed and that he requires     Update Admission H&P: No change in H&P    PHYSICIAN SIGNATURE:  {Esignature:[id number]}

## 2023-04-20 LAB
ANCA AB PATTERN SER IF-IMP: NORMAL
ANCA IGG TITR SER IF: NORMAL {TITER}

## 2023-04-21 LAB
BACTERIA BLD AEROBE CULT: NORMAL
BACTERIA BLD AEROBE CULT: NORMAL
NUCLEAR IGG SER QL IA: NORMAL

## 2023-04-23 LAB
EKG ATRIAL RATE: 96 BPM
EKG P AXIS: 64 DEGREES
EKG P-R INTERVAL: 202 MS
EKG Q-T INTERVAL: 340 MS
EKG QRS DURATION: 98 MS
EKG QTC CALCULATION (BAZETT): 429 MS
EKG R AXIS: 60 DEGREES
EKG T AXIS: 66 DEGREES
EKG VENTRICULAR RATE: 96 BPM

## 2023-04-26 ENCOUNTER — TELEPHONE (OUTPATIENT)
Dept: NEPHROLOGY | Age: 63
End: 2023-04-26

## 2023-04-26 DIAGNOSIS — I10 PRIMARY HYPERTENSION: Primary | ICD-10-CM

## 2023-04-27 ENCOUNTER — NURSE ONLY (OUTPATIENT)
Dept: UROLOGY | Age: 63
End: 2023-04-27

## 2023-04-27 ENCOUNTER — TELEPHONE (OUTPATIENT)
Dept: UROLOGY | Age: 63
End: 2023-04-27

## 2023-04-27 NOTE — PROGRESS NOTES
Pt here today for bilat nephrostomy tube dressing change. Changed dressings with sterile gauze and tape applied to keep dressing in place. Tube insertion site was also washed with sterile water prior to new dressing being applied. Tegederm applied over dressings. Pt given supplies to take with them but will stop at pharmacy to get more. Home health suppose to to be coming but has not showed up to patients home this week per pt wife. Jeremiah Han was in to assist me with these dressing changes.

## 2023-04-27 NOTE — PROGRESS NOTES
Dressings changed today. Bilat neph tubes placed 4/16/23 by IR. Has follow-up with Dr. Karen Marie 5/23/23. Patient and family was instructed to monitor tube output. Change dressings when saturated with help of home health. The patient should go to the ED if he develops fever, chills, nausea, vomiting, chest pain, SOB, calf pain, feelings of incomplete emptying, or should they otherwise feel they need evaluated     I have personally verified, reviewed, and approved these actions.

## 2023-04-27 NOTE — TELEPHONE ENCOUNTER
Patient daughter stated bilateral nephrostomy tubes were saturated and needed changed. They do not have home health yet, and no way to change the dressing. The tubes are patent and draining. Lab visit scheduled today.

## 2023-05-05 ENCOUNTER — HOSPITAL ENCOUNTER (INPATIENT)
Age: 63
LOS: 5 days | Discharge: HOSPICE | DRG: 596 | End: 2023-05-10
Attending: EMERGENCY MEDICINE | Admitting: HOSPITALIST
Payer: COMMERCIAL

## 2023-05-05 ENCOUNTER — APPOINTMENT (OUTPATIENT)
Dept: GENERAL RADIOLOGY | Age: 63
DRG: 596 | End: 2023-05-05
Payer: COMMERCIAL

## 2023-05-05 ENCOUNTER — APPOINTMENT (OUTPATIENT)
Dept: CT IMAGING | Age: 63
DRG: 596 | End: 2023-05-05
Payer: COMMERCIAL

## 2023-05-05 DIAGNOSIS — E87.20 LACTIC ACIDOSIS: ICD-10-CM

## 2023-05-05 DIAGNOSIS — E87.1 HYPONATREMIA: ICD-10-CM

## 2023-05-05 DIAGNOSIS — R65.20 SEPSIS WITH ACUTE ORGAN DYSFUNCTION, DUE TO UNSPECIFIED ORGANISM, UNSPECIFIED TYPE, UNSPECIFIED WHETHER SEPTIC SHOCK PRESENT (HCC): Primary | ICD-10-CM

## 2023-05-05 DIAGNOSIS — C43.9 METASTATIC MELANOMA (HCC): ICD-10-CM

## 2023-05-05 DIAGNOSIS — A41.9 SEPSIS WITH ACUTE ORGAN DYSFUNCTION, DUE TO UNSPECIFIED ORGANISM, UNSPECIFIED TYPE, UNSPECIFIED WHETHER SEPTIC SHOCK PRESENT (HCC): Primary | ICD-10-CM

## 2023-05-05 PROBLEM — R50.9 FEVER OF UNDETERMINED ORIGIN: Status: ACTIVE | Noted: 2023-05-05

## 2023-05-05 LAB
ACB COMPLEX DNA BLD POS QL NAA+NON-PROBE: NOT DETECTED
ALBUMIN SERPL BCG-MCNC: 3.4 G/DL (ref 3.5–5.1)
ALP SERPL-CCNC: 142 U/L (ref 38–126)
ALT SERPL W/O P-5'-P-CCNC: 14 U/L (ref 11–66)
ANION GAP SERPL CALC-SCNC: 17 MEQ/L (ref 8–16)
AST SERPL-CCNC: 62 U/L (ref 5–40)
B FRAGILIS DNA BLD POS QL NAA+NON-PROBE: NOT DETECTED
BACTERIA URNS QL MICRO: ABNORMAL /HPF
BASOPHILS ABSOLUTE: 0 THOU/MM3 (ref 0–0.1)
BASOPHILS NFR BLD AUTO: 0.3 %
BILIRUB SERPL-MCNC: 0.4 MG/DL (ref 0.3–1.2)
BILIRUB UR QL STRIP.AUTO: NEGATIVE
BLACTX-M ISLT/SPM QL: ABNORMAL
BLAIMP ISLT/SPM QL: ABNORMAL
BLAKPC ISLT/SPM QL: ABNORMAL
BLAOXA-48-LIKE ISLT/SPM QL: ABNORMAL
BLAVIM ISLT/SPM QL: ABNORMAL
BOTTLE TYPE: ABNORMAL
BUN SERPL-MCNC: 35 MG/DL (ref 7–22)
C ALBICANS DNA BLD POS QL NAA+NON-PROBE: NOT DETECTED
C AURIS DNA BLD POS QL NAA+NON-PROBE: NOT DETECTED
C GATTII+NEOFOR DNA BLD POS QL NAA+N-PRB: NOT DETECTED
C GLABRATA DNA BLD POS QL NAA+NON-PROBE: NOT DETECTED
C KRUSEI DNA BLD POS QL NAA+NON-PROBE: NOT DETECTED
C PARAP DNA BLD POS QL NAA+NON-PROBE: NOT DETECTED
C TROPICLS DNA BLD POS QL NAA+NON-PROBE: NOT DETECTED
CALCIUM SERPL-MCNC: 9 MG/DL (ref 8.5–10.5)
CASTS #/AREA URNS LPF: ABNORMAL /LPF
CASTS 2: ABNORMAL /LPF
CHARACTER UR: CLEAR
CHLORIDE 24H UR-SRATE: < 20 MEQ/L
CHLORIDE SERPL-SCNC: 88 MEQ/L (ref 98–111)
CO2 SERPL-SCNC: 22 MEQ/L (ref 23–33)
COAG NEG STAPH DNA BLD QL NAA+PROBE: DETECTED
COLISTIN RES MCR-1 ISLT/SPM QL: ABNORMAL
COLOR: YELLOW
CREAT SERPL-MCNC: 0.6 MG/DL (ref 0.4–1.2)
CREAT UR-MCNC: 83.2 MG/DL
CRYSTALS URNS MICRO: ABNORMAL
DEPRECATED RDW RBC AUTO: 54.4 FL (ref 35–45)
E CLOAC COMP DNA BLD POS NAA+NON-PROBE: NOT DETECTED
E COLI DNA BLD POS QL NAA+NON-PROBE: NOT DETECTED
E FAECALIS DNA BLD POS QL NAA+NON-PROBE: NOT DETECTED
E FAECIUM DNA BLD POS QL NAA+NON-PROBE: NOT DETECTED
ENTEROBACTERALES DNA BLD POS NAA+N-PRB: NOT DETECTED
EOSINOPHIL NFR BLD AUTO: 0 %
EOSINOPHILS ABSOLUTE: 0 THOU/MM3 (ref 0–0.4)
EPITHELIAL CELLS, UA: ABNORMAL /HPF
ERYTHROCYTE [DISTWIDTH] IN BLOOD BY AUTOMATED COUNT: 17.7 % (ref 11.5–14.5)
FLUAV RNA RESP QL NAA+PROBE: NOT DETECTED
FLUBV RNA RESP QL NAA+PROBE: NOT DETECTED
GFR SERPL CREATININE-BSD FRML MDRD: > 60 ML/MIN/1.73M2
GLUCOSE SERPL-MCNC: 168 MG/DL (ref 70–108)
GLUCOSE UR QL STRIP.AUTO: NEGATIVE MG/DL
GP B STREP DNA SPEC QL NAA+PROBE: NOT DETECTED
GP B STREP DNA SPEC QL NAA+PROBE: NOT DETECTED
HAEM INFLU DNA BLD POS QL NAA+NON-PROBE: NOT DETECTED
HCT VFR BLD AUTO: 28.9 % (ref 42–52)
HGB BLD-MCNC: 9.4 GM/DL (ref 14–18)
HGB UR QL STRIP.AUTO: ABNORMAL
IMM GRANULOCYTES # BLD AUTO: 0.22 THOU/MM3 (ref 0–0.07)
IMM GRANULOCYTES NFR BLD AUTO: 2 %
INR PPP: 1.28 (ref 0.85–1.13)
K OXYTOCA DNA BLD POS QL NAA+NON-PROBE: NOT DETECTED
K OXYTOCA DNA BLD POS QL NAA+NON-PROBE: NOT DETECTED
KETONES UR QL STRIP.AUTO: NEGATIVE
KLEBSIELLA SP DNA BLD POS QL NAA+NON-PRB: NOT DETECTED
L MONOCYTOG DNA BLD POS QL NAA+NON-PROBE: NOT DETECTED
LACTIC ACID, SEPSIS: 3.2 MMOL/L (ref 0.5–1.9)
LACTIC ACID, SEPSIS: 3.3 MMOL/L (ref 0.5–1.9)
LACTIC ACID, SEPSIS: 3.7 MMOL/L (ref 0.5–1.9)
LACTIC ACID, SEPSIS: 5.3 MMOL/L (ref 0.5–1.9)
LDH SERPL L TO P-CCNC: > 2500 U/L (ref 100–190)
LYMPHOCYTES ABSOLUTE: 0.4 THOU/MM3 (ref 1–4.8)
LYMPHOCYTES NFR BLD AUTO: 3.5 %
MAGNESIUM SERPL-MCNC: 1.6 MG/DL (ref 1.6–2.4)
MCH RBC QN AUTO: 27.6 PG (ref 26–33)
MCHC RBC AUTO-ENTMCNC: 32.5 GM/DL (ref 32.2–35.5)
MCV RBC AUTO: 85 FL (ref 80–94)
MECA ISLT/SPM QL: DETECTED
MECA+MECC+MREJ ISLT/SPM QL: ABNORMAL
MISCELLANEOUS 2: ABNORMAL
MONOCYTES ABSOLUTE: 0.6 THOU/MM3 (ref 0.4–1.3)
MONOCYTES NFR BLD AUTO: 5.7 %
MRSA DNA SPEC QL NAA+PROBE: NEGATIVE
N MEN DNA BLD POS QL NAA+NON-PROBE: NOT DETECTED
NDM: ABNORMAL
NEUTROPHILS NFR BLD AUTO: 88.5 %
NITRITE UR QL STRIP: NEGATIVE
NRBC BLD AUTO-RTO: 0 /100 WBC
OSMOLALITY SERPL CALC.SUM OF ELEC: 267.1 MOSMOL/KG (ref 275–300)
OSMOLALITY UR: 615 MOSMOL/KG (ref 250–750)
P AERUGINOSA DNA BLD POS NAA+NON-PROBE: NOT DETECTED
PH UR STRIP.AUTO: 5.5 [PH] (ref 5–9)
PLATELET # BLD AUTO: 329 THOU/MM3 (ref 130–400)
PMV BLD AUTO: 8.3 FL (ref 9.4–12.4)
POTASSIUM SERPL-SCNC: 4.7 MEQ/L (ref 3.5–5.2)
PROCALCITONIN SERPL IA-MCNC: 0.35 NG/ML (ref 0.01–0.09)
PROT SERPL-MCNC: 6.7 G/DL (ref 6.1–8)
PROT UR STRIP.AUTO-MCNC: 100 MG/DL
PROTEUS SPP: NOT DETECTED
RBC # BLD AUTO: 3.4 MILL/MM3 (ref 4.7–6.1)
RBC URINE: ABNORMAL /HPF
RENAL EPI CELLS #/AREA URNS HPF: ABNORMAL /[HPF]
S AUREUS DNA BLD POS QL NAA+NON-PROBE: NOT DETECTED
S EPIDERMIDIS DNA BLD POS QL NAA+NON-PRB: DETECTED
S LUGDUNENSIS DNA BLD POS QL NAA+NON-PRB: NOT DETECTED
S MALTOPHILIA DNA BLD POS QL NAA+NON-PRB: NOT DETECTED
S MARCESCENS DNA BLD POS NAA+NON-PROBE: NOT DETECTED
S PYO AG THROAT QL: NEGATIVE
S PYO DNA THROAT QL NAA+PROBE: NOT DETECTED
S PYO THROAT QL CULT: NORMAL
SALMONELLA DNA BLD POS QL NAA+NON-PROBE: NOT DETECTED
SARS-COV-2 RNA RESP QL NAA+PROBE: NOT DETECTED
SEGMENTED NEUTROPHILS ABSOLUTE COUNT: 9.6 THOU/MM3 (ref 1.8–7.7)
SODIUM SERPL-SCNC: 127 MEQ/L (ref 135–145)
SODIUM SERPL-SCNC: 132 MEQ/L (ref 135–145)
SODIUM UR-SCNC: < 20 MEQ/L
SOURCE OF BLOOD CULTURE: ABNORMAL
SP GR UR REFRACT.AUTO: 1.02 (ref 1–1.03)
STREPTOCOCCUS DNA BLD QL NAA+PROBE: NOT DETECTED
UROBILINOGEN, URINE: 1 EU/DL (ref 0–1)
VANA+VANB ISLT/SPM QL: ABNORMAL
WBC # BLD AUTO: 10.9 THOU/MM3 (ref 4.8–10.8)
WBC #/AREA URNS HPF: ABNORMAL /HPF
WBC #/AREA URNS HPF: ABNORMAL /[HPF]
YEAST LIKE FUNGI URNS QL MICRO: ABNORMAL

## 2023-05-05 PROCEDURE — 81001 URINALYSIS AUTO W/SCOPE: CPT

## 2023-05-05 PROCEDURE — 99285 EMERGENCY DEPT VISIT HI MDM: CPT

## 2023-05-05 PROCEDURE — 6370000000 HC RX 637 (ALT 250 FOR IP): Performed by: EMERGENCY MEDICINE

## 2023-05-05 PROCEDURE — 71045 X-RAY EXAM CHEST 1 VIEW: CPT

## 2023-05-05 PROCEDURE — 2580000003 HC RX 258

## 2023-05-05 PROCEDURE — 36415 COLL VENOUS BLD VENIPUNCTURE: CPT

## 2023-05-05 PROCEDURE — 96361 HYDRATE IV INFUSION ADD-ON: CPT

## 2023-05-05 PROCEDURE — 85025 COMPLETE CBC W/AUTO DIFF WBC: CPT

## 2023-05-05 PROCEDURE — 83605 ASSAY OF LACTIC ACID: CPT

## 2023-05-05 PROCEDURE — 83615 LACTATE (LD) (LDH) ENZYME: CPT

## 2023-05-05 PROCEDURE — 96375 TX/PRO/DX INJ NEW DRUG ADDON: CPT

## 2023-05-05 PROCEDURE — 83735 ASSAY OF MAGNESIUM: CPT

## 2023-05-05 PROCEDURE — 6370000000 HC RX 637 (ALT 250 FOR IP)

## 2023-05-05 PROCEDURE — 82570 ASSAY OF URINE CREATININE: CPT

## 2023-05-05 PROCEDURE — 87636 SARSCOV2 & INF A&B AMP PRB: CPT

## 2023-05-05 PROCEDURE — 87147 CULTURE TYPE IMMUNOLOGIC: CPT

## 2023-05-05 PROCEDURE — 6360000002 HC RX W HCPCS: Performed by: PHYSICIAN ASSISTANT

## 2023-05-05 PROCEDURE — 84300 ASSAY OF URINE SODIUM: CPT

## 2023-05-05 PROCEDURE — 93005 ELECTROCARDIOGRAM TRACING: CPT | Performed by: EMERGENCY MEDICINE

## 2023-05-05 PROCEDURE — 83935 ASSAY OF URINE OSMOLALITY: CPT

## 2023-05-05 PROCEDURE — 87880 STREP A ASSAY W/OPTIC: CPT

## 2023-05-05 PROCEDURE — 85610 PROTHROMBIN TIME: CPT

## 2023-05-05 PROCEDURE — 2580000003 HC RX 258: Performed by: EMERGENCY MEDICINE

## 2023-05-05 PROCEDURE — 87641 MR-STAPH DNA AMP PROBE: CPT

## 2023-05-05 PROCEDURE — 6360000002 HC RX W HCPCS

## 2023-05-05 PROCEDURE — 87801 DETECT AGNT MULT DNA AMPLI: CPT

## 2023-05-05 PROCEDURE — 87040 BLOOD CULTURE FOR BACTERIA: CPT

## 2023-05-05 PROCEDURE — 84145 PROCALCITONIN (PCT): CPT

## 2023-05-05 PROCEDURE — 2060000000 HC ICU INTERMEDIATE R&B

## 2023-05-05 PROCEDURE — 84295 ASSAY OF SERUM SODIUM: CPT

## 2023-05-05 PROCEDURE — 6360000004 HC RX CONTRAST MEDICATION: Performed by: EMERGENCY MEDICINE

## 2023-05-05 PROCEDURE — 82436 ASSAY OF URINE CHLORIDE: CPT

## 2023-05-05 PROCEDURE — 74177 CT ABD & PELVIS W/CONTRAST: CPT

## 2023-05-05 PROCEDURE — 96365 THER/PROPH/DIAG IV INF INIT: CPT

## 2023-05-05 PROCEDURE — 87070 CULTURE OTHR SPECIMN AEROBIC: CPT

## 2023-05-05 PROCEDURE — 6360000002 HC RX W HCPCS: Performed by: EMERGENCY MEDICINE

## 2023-05-05 PROCEDURE — 80053 COMPREHEN METABOLIC PANEL: CPT

## 2023-05-05 PROCEDURE — 93010 ELECTROCARDIOGRAM REPORT: CPT | Performed by: NUCLEAR MEDICINE

## 2023-05-05 RX ORDER — OXYCODONE HYDROCHLORIDE AND ACETAMINOPHEN 5; 325 MG/1; MG/1
2 TABLET ORAL EVERY 4 HOURS PRN
Status: DISCONTINUED | OUTPATIENT
Start: 2023-05-05 | End: 2023-05-09

## 2023-05-05 RX ORDER — KETOROLAC TROMETHAMINE 30 MG/ML
15 INJECTION, SOLUTION INTRAMUSCULAR; INTRAVENOUS ONCE
Status: COMPLETED | OUTPATIENT
Start: 2023-05-06 | End: 2023-05-05

## 2023-05-05 RX ORDER — OXYCODONE HYDROCHLORIDE AND ACETAMINOPHEN 5; 325 MG/1; MG/1
1 TABLET ORAL EVERY 4 HOURS PRN
Status: DISCONTINUED | OUTPATIENT
Start: 2023-05-05 | End: 2023-05-09

## 2023-05-05 RX ORDER — SODIUM CHLORIDE, SODIUM LACTATE, POTASSIUM CHLORIDE, AND CALCIUM CHLORIDE .6; .31; .03; .02 G/100ML; G/100ML; G/100ML; G/100ML
30 INJECTION, SOLUTION INTRAVENOUS ONCE
Status: COMPLETED | OUTPATIENT
Start: 2023-05-05 | End: 2023-05-05

## 2023-05-05 RX ORDER — TAMSULOSIN HYDROCHLORIDE 0.4 MG/1
0.4 CAPSULE ORAL DAILY
COMMUNITY

## 2023-05-05 RX ORDER — MAGNESIUM SULFATE IN WATER 40 MG/ML
2000 INJECTION, SOLUTION INTRAVENOUS PRN
Status: DISCONTINUED | OUTPATIENT
Start: 2023-05-05 | End: 2023-05-10 | Stop reason: HOSPADM

## 2023-05-05 RX ORDER — ENOXAPARIN SODIUM 100 MG/ML
40 INJECTION SUBCUTANEOUS DAILY
Status: DISCONTINUED | OUTPATIENT
Start: 2023-05-05 | End: 2023-05-10 | Stop reason: HOSPADM

## 2023-05-05 RX ORDER — POLYETHYLENE GLYCOL 3350 17 G/17G
17 POWDER, FOR SOLUTION ORAL DAILY PRN
Status: DISCONTINUED | OUTPATIENT
Start: 2023-05-05 | End: 2023-05-10 | Stop reason: HOSPADM

## 2023-05-05 RX ORDER — ACETAMINOPHEN 500 MG
1000 TABLET ORAL ONCE
Status: COMPLETED | OUTPATIENT
Start: 2023-05-05 | End: 2023-05-05

## 2023-05-05 RX ORDER — MEMANTINE HYDROCHLORIDE 5 MG/1
5 TABLET ORAL 2 TIMES DAILY
COMMUNITY

## 2023-05-05 RX ORDER — SODIUM CHLORIDE 9 MG/ML
INJECTION, SOLUTION INTRAVENOUS PRN
Status: DISCONTINUED | OUTPATIENT
Start: 2023-05-05 | End: 2023-05-10 | Stop reason: HOSPADM

## 2023-05-05 RX ORDER — SODIUM CHLORIDE 9 MG/ML
INJECTION, SOLUTION INTRAVENOUS CONTINUOUS
Status: DISCONTINUED | OUTPATIENT
Start: 2023-05-05 | End: 2023-05-06

## 2023-05-05 RX ORDER — SODIUM CHLORIDE 0.9 % (FLUSH) 0.9 %
5-40 SYRINGE (ML) INJECTION EVERY 12 HOURS SCHEDULED
Status: DISCONTINUED | OUTPATIENT
Start: 2023-05-05 | End: 2023-05-10 | Stop reason: HOSPADM

## 2023-05-05 RX ORDER — ACETAMINOPHEN 650 MG/1
650 SUPPOSITORY RECTAL EVERY 6 HOURS PRN
Status: DISCONTINUED | OUTPATIENT
Start: 2023-05-05 | End: 2023-05-10 | Stop reason: HOSPADM

## 2023-05-05 RX ORDER — POTASSIUM CHLORIDE 20 MEQ/1
40 TABLET, EXTENDED RELEASE ORAL PRN
Status: DISCONTINUED | OUTPATIENT
Start: 2023-05-05 | End: 2023-05-10 | Stop reason: HOSPADM

## 2023-05-05 RX ORDER — ATORVASTATIN CALCIUM 20 MG/1
20 TABLET, FILM COATED ORAL NIGHTLY
Status: DISCONTINUED | OUTPATIENT
Start: 2023-05-05 | End: 2023-05-10 | Stop reason: HOSPADM

## 2023-05-05 RX ORDER — SODIUM CHLORIDE 0.9 % (FLUSH) 0.9 %
5-40 SYRINGE (ML) INJECTION PRN
Status: DISCONTINUED | OUTPATIENT
Start: 2023-05-05 | End: 2023-05-10 | Stop reason: HOSPADM

## 2023-05-05 RX ORDER — POTASSIUM CHLORIDE 7.45 MG/ML
10 INJECTION INTRAVENOUS PRN
Status: DISCONTINUED | OUTPATIENT
Start: 2023-05-05 | End: 2023-05-10 | Stop reason: HOSPADM

## 2023-05-05 RX ORDER — SENNA PLUS 8.6 MG/1
1 TABLET ORAL DAILY PRN
Status: DISCONTINUED | OUTPATIENT
Start: 2023-05-05 | End: 2023-05-10 | Stop reason: HOSPADM

## 2023-05-05 RX ORDER — SULFAMETHOXAZOLE AND TRIMETHOPRIM 800; 160 MG/1; MG/1
1 TABLET ORAL
Status: ON HOLD | COMMUNITY
End: 2023-05-09 | Stop reason: HOSPADM

## 2023-05-05 RX ORDER — LEVOTHYROXINE SODIUM 0.1 MG/1
100 TABLET ORAL
Status: DISCONTINUED | OUTPATIENT
Start: 2023-05-06 | End: 2023-05-10 | Stop reason: HOSPADM

## 2023-05-05 RX ORDER — ACETAMINOPHEN 325 MG/1
650 TABLET ORAL EVERY 6 HOURS PRN
Status: DISCONTINUED | OUTPATIENT
Start: 2023-05-05 | End: 2023-05-10 | Stop reason: HOSPADM

## 2023-05-05 RX ORDER — DEXAMETHASONE 4 MG/1
4 TABLET ORAL 3 TIMES DAILY
COMMUNITY

## 2023-05-05 RX ORDER — FENTANYL CITRATE 50 UG/ML
25 INJECTION, SOLUTION INTRAMUSCULAR; INTRAVENOUS ONCE
Status: COMPLETED | OUTPATIENT
Start: 2023-05-05 | End: 2023-05-05

## 2023-05-05 RX ORDER — ONDANSETRON 2 MG/ML
4 INJECTION INTRAMUSCULAR; INTRAVENOUS EVERY 6 HOURS PRN
Status: DISCONTINUED | OUTPATIENT
Start: 2023-05-05 | End: 2023-05-10 | Stop reason: HOSPADM

## 2023-05-05 RX ORDER — ONDANSETRON 4 MG/1
4 TABLET, ORALLY DISINTEGRATING ORAL EVERY 8 HOURS PRN
Status: DISCONTINUED | OUTPATIENT
Start: 2023-05-05 | End: 2023-05-10 | Stop reason: HOSPADM

## 2023-05-05 RX ADMIN — FENTANYL CITRATE 25 MCG: 50 INJECTION, SOLUTION INTRAMUSCULAR; INTRAVENOUS at 02:05

## 2023-05-05 RX ADMIN — OXYCODONE AND ACETAMINOPHEN 1 TABLET: 5; 325 TABLET ORAL at 16:56

## 2023-05-05 RX ADMIN — IOPAMIDOL 80 ML: 755 INJECTION, SOLUTION INTRAVENOUS at 03:32

## 2023-05-05 RX ADMIN — CEFEPIME 2000 MG: 2 INJECTION, POWDER, FOR SOLUTION INTRAVENOUS at 18:16

## 2023-05-05 RX ADMIN — ATORVASTATIN CALCIUM 20 MG: 20 TABLET, FILM COATED ORAL at 20:59

## 2023-05-05 RX ADMIN — SODIUM CHLORIDE: 9 INJECTION, SOLUTION INTRAVENOUS at 05:38

## 2023-05-05 RX ADMIN — OXYCODONE AND ACETAMINOPHEN 1 TABLET: 5; 325 TABLET ORAL at 20:59

## 2023-05-05 RX ADMIN — OXYCODONE AND ACETAMINOPHEN 1 TABLET: 5; 325 TABLET ORAL at 09:16

## 2023-05-05 RX ADMIN — ACETAMINOPHEN 1000 MG: 500 TABLET ORAL at 01:25

## 2023-05-05 RX ADMIN — SODIUM CHLORIDE, POTASSIUM CHLORIDE, SODIUM LACTATE AND CALCIUM CHLORIDE 2328 ML: 600; 310; 30; 20 INJECTION, SOLUTION INTRAVENOUS at 01:23

## 2023-05-05 RX ADMIN — ACETAMINOPHEN 650 MG: 325 TABLET ORAL at 11:43

## 2023-05-05 RX ADMIN — CEFEPIME 2000 MG: 2 INJECTION, POWDER, FOR SOLUTION INTRAVENOUS at 02:39

## 2023-05-05 RX ADMIN — CEFEPIME 2000 MG: 2 INJECTION, POWDER, FOR SOLUTION INTRAVENOUS at 11:41

## 2023-05-05 RX ADMIN — MAGNESIUM SULFATE HEPTAHYDRATE 2000 MG: 40 INJECTION, SOLUTION INTRAVENOUS at 23:39

## 2023-05-05 RX ADMIN — ENOXAPARIN SODIUM 40 MG: 100 INJECTION SUBCUTANEOUS at 09:17

## 2023-05-05 RX ADMIN — KETOROLAC TROMETHAMINE 15 MG: 30 INJECTION, SOLUTION INTRAMUSCULAR; INTRAVENOUS at 23:53

## 2023-05-05 ASSESSMENT — PAIN DESCRIPTION - LOCATION
LOCATION: BACK
LOCATION: COCCYX
LOCATION: BACK

## 2023-05-05 ASSESSMENT — PAIN SCALES - GENERAL
PAINLEVEL_OUTOF10: 0
PAINLEVEL_OUTOF10: 6
PAINLEVEL_OUTOF10: 0
PAINLEVEL_OUTOF10: 6
PAINLEVEL_OUTOF10: 0
PAINLEVEL_OUTOF10: 6

## 2023-05-05 ASSESSMENT — PAIN DESCRIPTION - ORIENTATION
ORIENTATION: LOWER
ORIENTATION: LOWER

## 2023-05-05 ASSESSMENT — ENCOUNTER SYMPTOMS
ROS SKIN COMMENTS: SACRAL DECUBITUS
WHEEZING: 0
RHINORRHEA: 1
NAUSEA: 0
SHORTNESS OF BREATH: 0
VOMITING: 0
COUGH: 0
BACK PAIN: 0

## 2023-05-05 ASSESSMENT — PAIN - FUNCTIONAL ASSESSMENT: PAIN_FUNCTIONAL_ASSESSMENT: 0-10

## 2023-05-05 ASSESSMENT — PAIN DESCRIPTION - DESCRIPTORS
DESCRIPTORS: ACHING
DESCRIPTORS: ACHING

## 2023-05-06 LAB
ALBUMIN SERPL BCG-MCNC: 2.8 G/DL (ref 3.5–5.1)
ALP SERPL-CCNC: 97 U/L (ref 38–126)
ALT SERPL W/O P-5'-P-CCNC: 13 U/L (ref 11–66)
ANION GAP SERPL CALC-SCNC: 11 MEQ/L (ref 8–16)
AST SERPL-CCNC: 67 U/L (ref 5–40)
BACTERIA BLD AEROBE CULT: ABNORMAL
BILIRUB SERPL-MCNC: 0.3 MG/DL (ref 0.3–1.2)
BUN SERPL-MCNC: 24 MG/DL (ref 7–22)
CALCIUM SERPL-MCNC: 8.2 MG/DL (ref 8.5–10.5)
CHLORIDE SERPL-SCNC: 94 MEQ/L (ref 98–111)
CO2 SERPL-SCNC: 26 MEQ/L (ref 23–33)
CREAT SERPL-MCNC: 0.6 MG/DL (ref 0.4–1.2)
DEPRECATED RDW RBC AUTO: 58.9 FL (ref 35–45)
ERYTHROCYTE [DISTWIDTH] IN BLOOD BY AUTOMATED COUNT: 18 % (ref 11.5–14.5)
GFR SERPL CREATININE-BSD FRML MDRD: > 60 ML/MIN/1.73M2
GLUCOSE SERPL-MCNC: 89 MG/DL (ref 70–108)
HCT VFR BLD AUTO: 27.5 % (ref 42–52)
HGB BLD-MCNC: 8.5 GM/DL (ref 14–18)
LACTATE SERPL-SCNC: 1.9 MMOL/L (ref 0.5–2)
MAGNESIUM SERPL-MCNC: 2.3 MG/DL (ref 1.6–2.4)
MCH RBC QN AUTO: 27.7 PG (ref 26–33)
MCHC RBC AUTO-ENTMCNC: 30.9 GM/DL (ref 32.2–35.5)
MCV RBC AUTO: 89.6 FL (ref 80–94)
ORGANISM: ABNORMAL
PLATELET # BLD AUTO: 227 THOU/MM3 (ref 130–400)
PMV BLD AUTO: 8.5 FL (ref 9.4–12.4)
POTASSIUM SERPL-SCNC: 3.9 MEQ/L (ref 3.5–5.2)
PROT SERPL-MCNC: 5 G/DL (ref 6.1–8)
RBC # BLD AUTO: 3.07 MILL/MM3 (ref 4.7–6.1)
SODIUM SERPL-SCNC: 130 MEQ/L (ref 135–145)
SODIUM SERPL-SCNC: 131 MEQ/L (ref 135–145)
SODIUM SERPL-SCNC: 136 MEQ/L (ref 135–145)
WBC # BLD AUTO: 4.6 THOU/MM3 (ref 4.8–10.8)

## 2023-05-06 PROCEDURE — 99222 1ST HOSP IP/OBS MODERATE 55: CPT | Performed by: HOSPITALIST

## 2023-05-06 PROCEDURE — 84295 ASSAY OF SERUM SODIUM: CPT

## 2023-05-06 PROCEDURE — 2580000003 HC RX 258

## 2023-05-06 PROCEDURE — 2060000000 HC ICU INTERMEDIATE R&B

## 2023-05-06 PROCEDURE — 83735 ASSAY OF MAGNESIUM: CPT

## 2023-05-06 PROCEDURE — 80053 COMPREHEN METABOLIC PANEL: CPT

## 2023-05-06 PROCEDURE — 83605 ASSAY OF LACTIC ACID: CPT

## 2023-05-06 PROCEDURE — 6360000002 HC RX W HCPCS: Performed by: HOSPITALIST

## 2023-05-06 PROCEDURE — 36415 COLL VENOUS BLD VENIPUNCTURE: CPT

## 2023-05-06 PROCEDURE — 85027 COMPLETE CBC AUTOMATED: CPT

## 2023-05-06 PROCEDURE — 2580000003 HC RX 258: Performed by: HOSPITALIST

## 2023-05-06 PROCEDURE — 6360000002 HC RX W HCPCS

## 2023-05-06 PROCEDURE — 6370000000 HC RX 637 (ALT 250 FOR IP): Performed by: HOSPITALIST

## 2023-05-06 PROCEDURE — 6370000000 HC RX 637 (ALT 250 FOR IP)

## 2023-05-06 PROCEDURE — 2580000003 HC RX 258: Performed by: PHYSICIAN ASSISTANT

## 2023-05-06 RX ORDER — SODIUM CHLORIDE 9 MG/ML
INJECTION, SOLUTION INTRAVENOUS CONTINUOUS
Status: ACTIVE | OUTPATIENT
Start: 2023-05-06 | End: 2023-05-06

## 2023-05-06 RX ORDER — 0.9 % SODIUM CHLORIDE 0.9 %
500 INTRAVENOUS SOLUTION INTRAVENOUS ONCE
Status: COMPLETED | OUTPATIENT
Start: 2023-05-06 | End: 2023-05-06

## 2023-05-06 RX ORDER — POLYVINYL ALCOHOL 14 MG/ML
1 SOLUTION/ DROPS OPHTHALMIC EVERY 4 HOURS PRN
Status: DISCONTINUED | OUTPATIENT
Start: 2023-05-06 | End: 2023-05-10 | Stop reason: HOSPADM

## 2023-05-06 RX ORDER — LIDOCAINE 4 G/G
1 PATCH TOPICAL DAILY
Status: DISCONTINUED | OUTPATIENT
Start: 2023-05-06 | End: 2023-05-10 | Stop reason: HOSPADM

## 2023-05-06 RX ORDER — MIDODRINE HYDROCHLORIDE 10 MG/1
10 TABLET ORAL
Status: DISCONTINUED | OUTPATIENT
Start: 2023-05-06 | End: 2023-05-10 | Stop reason: HOSPADM

## 2023-05-06 RX ORDER — DEXAMETHASONE 4 MG/1
4 TABLET ORAL EVERY 12 HOURS SCHEDULED
Status: DISCONTINUED | OUTPATIENT
Start: 2023-05-06 | End: 2023-05-10 | Stop reason: HOSPADM

## 2023-05-06 RX ADMIN — VANCOMYCIN HYDROCHLORIDE 1750 MG: 5 INJECTION, POWDER, LYOPHILIZED, FOR SOLUTION INTRAVENOUS at 07:56

## 2023-05-06 RX ADMIN — DEXAMETHASONE 4 MG: 4 TABLET ORAL at 20:45

## 2023-05-06 RX ADMIN — MEROPENEM 1000 MG: 1 INJECTION, POWDER, FOR SOLUTION INTRAVENOUS at 17:38

## 2023-05-06 RX ADMIN — ENOXAPARIN SODIUM 40 MG: 100 INJECTION SUBCUTANEOUS at 08:03

## 2023-05-06 RX ADMIN — MIDODRINE HYDROCHLORIDE 10 MG: 10 TABLET ORAL at 14:11

## 2023-05-06 RX ADMIN — OXYCODONE AND ACETAMINOPHEN 1 TABLET: 5; 325 TABLET ORAL at 17:35

## 2023-05-06 RX ADMIN — ATORVASTATIN CALCIUM 20 MG: 20 TABLET, FILM COATED ORAL at 20:45

## 2023-05-06 RX ADMIN — SODIUM CHLORIDE, PRESERVATIVE FREE 10 ML: 5 INJECTION INTRAVENOUS at 20:46

## 2023-05-06 RX ADMIN — MEROPENEM 1000 MG: 1 INJECTION, POWDER, FOR SOLUTION INTRAVENOUS at 10:57

## 2023-05-06 RX ADMIN — Medication 1500 MG: at 20:47

## 2023-05-06 RX ADMIN — MIDODRINE HYDROCHLORIDE 10 MG: 10 TABLET ORAL at 17:31

## 2023-05-06 RX ADMIN — LEVOTHYROXINE SODIUM 100 MCG: 0.1 TABLET ORAL at 06:41

## 2023-05-06 RX ADMIN — CEFEPIME 2000 MG: 2 INJECTION, POWDER, FOR SOLUTION INTRAVENOUS at 02:33

## 2023-05-06 RX ADMIN — ACETAMINOPHEN 650 MG: 325 TABLET ORAL at 08:03

## 2023-05-06 RX ADMIN — SODIUM CHLORIDE 500 ML: 9 INJECTION, SOLUTION INTRAVENOUS at 10:24

## 2023-05-06 RX ADMIN — SODIUM CHLORIDE: 9 INJECTION, SOLUTION INTRAVENOUS at 02:32

## 2023-05-06 RX ADMIN — POLYVINYL ALCOHOL 1 DROP: 14 SOLUTION/ DROPS OPHTHALMIC at 17:27

## 2023-05-06 ASSESSMENT — PAIN SCALES - GENERAL
PAINLEVEL_OUTOF10: 6
PAINLEVEL_OUTOF10: 6

## 2023-05-06 ASSESSMENT — PAIN DESCRIPTION - DESCRIPTORS
DESCRIPTORS: ACHING
DESCRIPTORS: ACHING

## 2023-05-06 ASSESSMENT — PAIN DESCRIPTION - LOCATION
LOCATION: BACK
LOCATION: BACK

## 2023-05-06 ASSESSMENT — PAIN DESCRIPTION - ORIENTATION
ORIENTATION: LOWER
ORIENTATION: LOWER

## 2023-05-07 LAB
ALBUMIN SERPL BCG-MCNC: 2.5 G/DL (ref 3.5–5.1)
ALP SERPL-CCNC: 106 U/L (ref 38–126)
ALT SERPL W/O P-5'-P-CCNC: 20 U/L (ref 11–66)
ANION GAP SERPL CALC-SCNC: 10 MEQ/L (ref 8–16)
AST SERPL-CCNC: 90 U/L (ref 5–40)
BACTERIA THROAT AEROBE CULT: ABNORMAL
BACTERIA THROAT AEROBE CULT: ABNORMAL
BILIRUB SERPL-MCNC: 0.3 MG/DL (ref 0.3–1.2)
BUN SERPL-MCNC: 20 MG/DL (ref 7–22)
CALCIUM SERPL-MCNC: 8 MG/DL (ref 8.5–10.5)
CHLORIDE SERPL-SCNC: 99 MEQ/L (ref 98–111)
CO2 SERPL-SCNC: 25 MEQ/L (ref 23–33)
CREAT SERPL-MCNC: 0.6 MG/DL (ref 0.4–1.2)
DEPRECATED RDW RBC AUTO: 55.9 FL (ref 35–45)
ERYTHROCYTE [DISTWIDTH] IN BLOOD BY AUTOMATED COUNT: 17.7 % (ref 11.5–14.5)
GFR SERPL CREATININE-BSD FRML MDRD: > 60 ML/MIN/1.73M2
GLUCOSE SERPL-MCNC: 121 MG/DL (ref 70–108)
HCT VFR BLD AUTO: 25.8 % (ref 42–52)
HGB BLD-MCNC: 8.2 GM/DL (ref 14–18)
MCH RBC QN AUTO: 27.4 PG (ref 26–33)
MCHC RBC AUTO-ENTMCNC: 31.8 GM/DL (ref 32.2–35.5)
MCV RBC AUTO: 86.3 FL (ref 80–94)
ORGANISM: ABNORMAL
PLATELET # BLD AUTO: 221 THOU/MM3 (ref 130–400)
PMV BLD AUTO: 8.5 FL (ref 9.4–12.4)
POTASSIUM SERPL-SCNC: 4.4 MEQ/L (ref 3.5–5.2)
PROT SERPL-MCNC: 4.8 G/DL (ref 6.1–8)
RBC # BLD AUTO: 2.99 MILL/MM3 (ref 4.7–6.1)
SODIUM SERPL-SCNC: 134 MEQ/L (ref 135–145)
VANCOMYCIN SERPL-MCNC: 14 UG/ML (ref 0.1–39.9)
WBC # BLD AUTO: 3.1 THOU/MM3 (ref 4.8–10.8)

## 2023-05-07 PROCEDURE — 6370000000 HC RX 637 (ALT 250 FOR IP): Performed by: HOSPITALIST

## 2023-05-07 PROCEDURE — 2580000003 HC RX 258: Performed by: HOSPITALIST

## 2023-05-07 PROCEDURE — 84295 ASSAY OF SERUM SODIUM: CPT

## 2023-05-07 PROCEDURE — 6360000002 HC RX W HCPCS

## 2023-05-07 PROCEDURE — 2060000000 HC ICU INTERMEDIATE R&B

## 2023-05-07 PROCEDURE — 85027 COMPLETE CBC AUTOMATED: CPT

## 2023-05-07 PROCEDURE — 80053 COMPREHEN METABOLIC PANEL: CPT

## 2023-05-07 PROCEDURE — 99222 1ST HOSP IP/OBS MODERATE 55: CPT | Performed by: HOSPITALIST

## 2023-05-07 PROCEDURE — 36415 COLL VENOUS BLD VENIPUNCTURE: CPT

## 2023-05-07 PROCEDURE — 2580000003 HC RX 258

## 2023-05-07 PROCEDURE — 6360000002 HC RX W HCPCS: Performed by: HOSPITALIST

## 2023-05-07 PROCEDURE — 80202 ASSAY OF VANCOMYCIN: CPT

## 2023-05-07 PROCEDURE — 6370000000 HC RX 637 (ALT 250 FOR IP)

## 2023-05-07 RX ADMIN — SODIUM CHLORIDE, PRESERVATIVE FREE 10 ML: 5 INJECTION INTRAVENOUS at 21:11

## 2023-05-07 RX ADMIN — ENOXAPARIN SODIUM 40 MG: 100 INJECTION SUBCUTANEOUS at 08:11

## 2023-05-07 RX ADMIN — Medication 1500 MG: at 09:49

## 2023-05-07 RX ADMIN — OXYCODONE AND ACETAMINOPHEN 1 TABLET: 5; 325 TABLET ORAL at 00:47

## 2023-05-07 RX ADMIN — MEROPENEM 1000 MG: 1 INJECTION, POWDER, FOR SOLUTION INTRAVENOUS at 03:47

## 2023-05-07 RX ADMIN — Medication 1500 MG: at 21:14

## 2023-05-07 RX ADMIN — DEXAMETHASONE 4 MG: 4 TABLET ORAL at 08:11

## 2023-05-07 RX ADMIN — ATORVASTATIN CALCIUM 20 MG: 20 TABLET, FILM COATED ORAL at 21:11

## 2023-05-07 RX ADMIN — MEROPENEM 1000 MG: 1 INJECTION, POWDER, FOR SOLUTION INTRAVENOUS at 18:39

## 2023-05-07 RX ADMIN — MEROPENEM 1000 MG: 1 INJECTION, POWDER, FOR SOLUTION INTRAVENOUS at 12:11

## 2023-05-07 RX ADMIN — MIDODRINE HYDROCHLORIDE 10 MG: 10 TABLET ORAL at 16:34

## 2023-05-07 RX ADMIN — OXYCODONE AND ACETAMINOPHEN 1 TABLET: 5; 325 TABLET ORAL at 16:34

## 2023-05-07 RX ADMIN — MIDODRINE HYDROCHLORIDE 10 MG: 10 TABLET ORAL at 12:23

## 2023-05-07 RX ADMIN — MIDODRINE HYDROCHLORIDE 10 MG: 10 TABLET ORAL at 08:11

## 2023-05-07 RX ADMIN — LEVOTHYROXINE SODIUM 100 MCG: 0.1 TABLET ORAL at 06:22

## 2023-05-07 RX ADMIN — DEXAMETHASONE 4 MG: 4 TABLET ORAL at 21:10

## 2023-05-07 ASSESSMENT — PAIN DESCRIPTION - ORIENTATION: ORIENTATION: LOWER

## 2023-05-07 ASSESSMENT — PAIN DESCRIPTION - LOCATION: LOCATION: BACK

## 2023-05-07 ASSESSMENT — PAIN SCALES - GENERAL
PAINLEVEL_OUTOF10: 6
PAINLEVEL_OUTOF10: 6

## 2023-05-07 ASSESSMENT — PAIN DESCRIPTION - DESCRIPTORS: DESCRIPTORS: ACHING;SORE

## 2023-05-08 PROBLEM — A41.9 SEPSIS WITH ACUTE ORGAN DYSFUNCTION (HCC): Status: ACTIVE | Noted: 2023-01-01

## 2023-05-08 PROBLEM — R65.20 SEPSIS WITH ACUTE ORGAN DYSFUNCTION (HCC): Status: ACTIVE | Noted: 2023-01-01

## 2023-05-08 LAB
ALBUMIN SERPL BCG-MCNC: 2.6 G/DL (ref 3.5–5.1)
ALP SERPL-CCNC: 109 U/L (ref 38–126)
ALT SERPL W/O P-5'-P-CCNC: 35 U/L (ref 11–66)
ANION GAP SERPL CALC-SCNC: 14 MEQ/L (ref 8–16)
AST SERPL-CCNC: 94 U/L (ref 5–40)
BILIRUB SERPL-MCNC: 0.2 MG/DL (ref 0.3–1.2)
BUN SERPL-MCNC: 18 MG/DL (ref 7–22)
CALCIUM SERPL-MCNC: 8.3 MG/DL (ref 8.5–10.5)
CHLORIDE SERPL-SCNC: 98 MEQ/L (ref 98–111)
CO2 SERPL-SCNC: 22 MEQ/L (ref 23–33)
CREAT SERPL-MCNC: 0.5 MG/DL (ref 0.4–1.2)
DEPRECATED RDW RBC AUTO: 57.5 FL (ref 35–45)
ERYTHROCYTE [DISTWIDTH] IN BLOOD BY AUTOMATED COUNT: 17.5 % (ref 11.5–14.5)
GFR SERPL CREATININE-BSD FRML MDRD: > 60 ML/MIN/1.73M2
GLUCOSE SERPL-MCNC: 139 MG/DL (ref 70–108)
HCT VFR BLD AUTO: 26.4 % (ref 42–52)
HGB BLD-MCNC: 8.2 GM/DL (ref 14–18)
MCH RBC QN AUTO: 27.9 PG (ref 26–33)
MCHC RBC AUTO-ENTMCNC: 31.1 GM/DL (ref 32.2–35.5)
MCV RBC AUTO: 89.8 FL (ref 80–94)
PLATELET # BLD AUTO: 260 THOU/MM3 (ref 130–400)
PMV BLD AUTO: 8.6 FL (ref 9.4–12.4)
POTASSIUM SERPL-SCNC: 4.4 MEQ/L (ref 3.5–5.2)
PROT SERPL-MCNC: 5.3 G/DL (ref 6.1–8)
RBC # BLD AUTO: 2.94 MILL/MM3 (ref 4.7–6.1)
SODIUM SERPL-SCNC: 134 MEQ/L (ref 135–145)
WBC # BLD AUTO: 4.5 THOU/MM3 (ref 4.8–10.8)

## 2023-05-08 PROCEDURE — 6360000002 HC RX W HCPCS: Performed by: HOSPITALIST

## 2023-05-08 PROCEDURE — 87086 URINE CULTURE/COLONY COUNT: CPT

## 2023-05-08 PROCEDURE — 2060000000 HC ICU INTERMEDIATE R&B

## 2023-05-08 PROCEDURE — 97166 OT EVAL MOD COMPLEX 45 MIN: CPT

## 2023-05-08 PROCEDURE — 6360000002 HC RX W HCPCS

## 2023-05-08 PROCEDURE — 2580000003 HC RX 258

## 2023-05-08 PROCEDURE — 99233 SBSQ HOSP IP/OBS HIGH 50: CPT | Performed by: HOSPITALIST

## 2023-05-08 PROCEDURE — 6370000000 HC RX 637 (ALT 250 FOR IP): Performed by: PSYCHIATRY & NEUROLOGY

## 2023-05-08 PROCEDURE — 6370000000 HC RX 637 (ALT 250 FOR IP): Performed by: HOSPITALIST

## 2023-05-08 PROCEDURE — 36415 COLL VENOUS BLD VENIPUNCTURE: CPT

## 2023-05-08 PROCEDURE — 6370000000 HC RX 637 (ALT 250 FOR IP)

## 2023-05-08 PROCEDURE — 97110 THERAPEUTIC EXERCISES: CPT

## 2023-05-08 PROCEDURE — 2580000003 HC RX 258: Performed by: HOSPITALIST

## 2023-05-08 PROCEDURE — 85027 COMPLETE CBC AUTOMATED: CPT

## 2023-05-08 PROCEDURE — 80053 COMPREHEN METABOLIC PANEL: CPT

## 2023-05-08 PROCEDURE — 97530 THERAPEUTIC ACTIVITIES: CPT

## 2023-05-08 RX ORDER — DRONABINOL 2.5 MG/1
2.5 CAPSULE ORAL 2 TIMES DAILY
Status: DISCONTINUED | OUTPATIENT
Start: 2023-05-08 | End: 2023-05-10 | Stop reason: HOSPADM

## 2023-05-08 RX ORDER — BISACODYL 10 MG
10 SUPPOSITORY, RECTAL RECTAL DAILY PRN
Status: DISCONTINUED | OUTPATIENT
Start: 2023-05-08 | End: 2023-05-10 | Stop reason: HOSPADM

## 2023-05-08 RX ADMIN — SODIUM CHLORIDE, PRESERVATIVE FREE 10 ML: 5 INJECTION INTRAVENOUS at 21:05

## 2023-05-08 RX ADMIN — Medication 1500 MG: at 10:48

## 2023-05-08 RX ADMIN — OXYCODONE AND ACETAMINOPHEN 2 TABLET: 5; 325 TABLET ORAL at 13:01

## 2023-05-08 RX ADMIN — OXYCODONE AND ACETAMINOPHEN 2 TABLET: 5; 325 TABLET ORAL at 23:34

## 2023-05-08 RX ADMIN — LEVOTHYROXINE SODIUM 100 MCG: 0.1 TABLET ORAL at 06:46

## 2023-05-08 RX ADMIN — MEROPENEM 1000 MG: 1 INJECTION, POWDER, FOR SOLUTION INTRAVENOUS at 03:51

## 2023-05-08 RX ADMIN — MIDODRINE HYDROCHLORIDE 10 MG: 10 TABLET ORAL at 18:13

## 2023-05-08 RX ADMIN — POLYETHYLENE GLYCOL 3350 17 G: 17 POWDER, FOR SOLUTION ORAL at 10:49

## 2023-05-08 RX ADMIN — DEXAMETHASONE 4 MG: 4 TABLET ORAL at 10:49

## 2023-05-08 RX ADMIN — OXYCODONE AND ACETAMINOPHEN 1 TABLET: 5; 325 TABLET ORAL at 04:21

## 2023-05-08 RX ADMIN — OXYCODONE AND ACETAMINOPHEN 1 TABLET: 5; 325 TABLET ORAL at 18:15

## 2023-05-08 RX ADMIN — SENNOSIDES 8.6 MG: 8.6 TABLET, FILM COATED ORAL at 06:45

## 2023-05-08 RX ADMIN — ENOXAPARIN SODIUM 40 MG: 100 INJECTION SUBCUTANEOUS at 09:13

## 2023-05-08 RX ADMIN — MIDODRINE HYDROCHLORIDE 10 MG: 10 TABLET ORAL at 10:49

## 2023-05-08 RX ADMIN — ATORVASTATIN CALCIUM 20 MG: 20 TABLET, FILM COATED ORAL at 21:05

## 2023-05-08 RX ADMIN — DRONABINOL 2.5 MG: 2.5 CAPSULE ORAL at 21:05

## 2023-05-08 RX ADMIN — SODIUM CHLORIDE, PRESERVATIVE FREE 10 ML: 5 INJECTION INTRAVENOUS at 10:47

## 2023-05-08 RX ADMIN — DEXAMETHASONE 4 MG: 4 TABLET ORAL at 21:05

## 2023-05-08 RX ADMIN — MIDODRINE HYDROCHLORIDE 10 MG: 10 TABLET ORAL at 12:57

## 2023-05-08 ASSESSMENT — PAIN DESCRIPTION - LOCATION
LOCATION: ARM
LOCATION: BACK

## 2023-05-08 ASSESSMENT — PAIN DESCRIPTION - FREQUENCY
FREQUENCY: CONTINUOUS

## 2023-05-08 ASSESSMENT — PAIN SCALES - GENERAL
PAINLEVEL_OUTOF10: 8
PAINLEVEL_OUTOF10: 7
PAINLEVEL_OUTOF10: 8
PAINLEVEL_OUTOF10: 0
PAINLEVEL_OUTOF10: 7
PAINLEVEL_OUTOF10: 6

## 2023-05-08 ASSESSMENT — PAIN DESCRIPTION - DESCRIPTORS
DESCRIPTORS: ACHING
DESCRIPTORS: ACHING;DISCOMFORT

## 2023-05-08 ASSESSMENT — PAIN DESCRIPTION - ONSET
ONSET: ON-GOING

## 2023-05-08 ASSESSMENT — PAIN DESCRIPTION - PAIN TYPE
TYPE: ACUTE PAIN

## 2023-05-08 ASSESSMENT — PAIN DESCRIPTION - ORIENTATION
ORIENTATION: LOWER
ORIENTATION: LOWER;POSTERIOR
ORIENTATION: LOWER;POSTERIOR

## 2023-05-08 NOTE — CARE COORDINATION
5/8/23, 2:22 PM EDT    DISCHARGE ON GOING [address] day: 3  Location: -05/005-A Reason for admit: Lactic acidosis [E87.20]  Hyponatremia [E87.1]  Fever of undetermined origin [R50.9]  Sepsis with acute organ dysfunction, due to unspecified organism, unspecified type, unspecified whether septic shock present ([facility] [address] [A41.9, R65.20]   Procedure:   5/5 CXR: IMPRESSION:  1. Linear opacities within the left upper lobe which are more conspicuous   on the current examination compared to prior. Finding likely represents to   a region of focal scarring, however, development of left upper lobe   infectious process/infiltrate could also give this appearance. Some   densities of the right lung base likely atelectasis. 2. Low lung volumes which partially accentuate the heart size. 5/5 CT abdomen/pelvis: 1. Diffuse lymphadenopathy throughout the abdomen and pelvis most   identifiable within the crystal hepatis, retroperitoneum, peritoneal lining,   pelvis, pelvic sidewalls and external iliac chains consistent with   extensive metastatic lymphadenopathy. If not recently performed recommend   PET-CT for complete evaluation. The largest lymph nodes within each region   are detailed above. 2. A percutaneous right nephrostomy tube with distal tip coiled at the   right renal collecting system. Mild right hydronephrosis. A few locules of   within the right renal collecting system may be related to nephrostomy,   however, gas-forming organism is not excluded. 3. Left-sided percutaneous nephrostomy tube with distal tip coiled at the   left renal collecting system. At least moderate left hydronephrosis. Heterogeneous fluid within the bilateral renal collecting systems at the   site of the distal nephrostomy tubes. Recommend correlating with urinary   analysis.  Lymphadenopathy within the bilateral retroperitoneum results in   compressive effect upon the distal bilateral ureters with resulting

## 2023-05-09 LAB
ANION GAP SERPL CALC-SCNC: 12 MEQ/L (ref 8–16)
BUN SERPL-MCNC: 13 MG/DL (ref 7–22)
CALCIUM SERPL-MCNC: 8.5 MG/DL (ref 8.5–10.5)
CHLORIDE SERPL-SCNC: 98 MEQ/L (ref 98–111)
CO2 SERPL-SCNC: 26 MEQ/L (ref 23–33)
CREAT SERPL-MCNC: 0.3 MG/DL (ref 0.4–1.2)
DEPRECATED RDW RBC AUTO: 56 FL (ref 35–45)
ERYTHROCYTE [DISTWIDTH] IN BLOOD BY AUTOMATED COUNT: 17.6 % (ref 11.5–14.5)
GFR SERPL CREATININE-BSD FRML MDRD: > 60 ML/MIN/1.73M2
GLUCOSE SERPL-MCNC: 92 MG/DL (ref 70–108)
HCT VFR BLD AUTO: 25.4 % (ref 42–52)
HGB BLD-MCNC: 8.1 GM/DL (ref 14–18)
MCH RBC QN AUTO: 27.7 PG (ref 26–33)
MCHC RBC AUTO-ENTMCNC: 31.9 GM/DL (ref 32.2–35.5)
MCV RBC AUTO: 87 FL (ref 80–94)
PLATELET # BLD AUTO: 300 THOU/MM3 (ref 130–400)
PMV BLD AUTO: 8.7 FL (ref 9.4–12.4)
POTASSIUM SERPL-SCNC: 4.5 MEQ/L (ref 3.5–5.2)
RBC # BLD AUTO: 2.92 MILL/MM3 (ref 4.7–6.1)
SODIUM SERPL-SCNC: 136 MEQ/L (ref 135–145)
WBC # BLD AUTO: 5.5 THOU/MM3 (ref 4.8–10.8)

## 2023-05-09 PROCEDURE — 99239 HOSP IP/OBS DSCHRG MGMT >30: CPT | Performed by: HOSPITALIST

## 2023-05-09 PROCEDURE — 6360000002 HC RX W HCPCS

## 2023-05-09 PROCEDURE — 80048 BASIC METABOLIC PNL TOTAL CA: CPT

## 2023-05-09 PROCEDURE — 6370000000 HC RX 637 (ALT 250 FOR IP): Performed by: PSYCHIATRY & NEUROLOGY

## 2023-05-09 PROCEDURE — 85027 COMPLETE CBC AUTOMATED: CPT

## 2023-05-09 PROCEDURE — 2060000000 HC ICU INTERMEDIATE R&B

## 2023-05-09 PROCEDURE — 6370000000 HC RX 637 (ALT 250 FOR IP): Performed by: HOSPITALIST

## 2023-05-09 PROCEDURE — 2580000003 HC RX 258

## 2023-05-09 PROCEDURE — 6370000000 HC RX 637 (ALT 250 FOR IP)

## 2023-05-09 PROCEDURE — 6360000002 HC RX W HCPCS: Performed by: HOSPITALIST

## 2023-05-09 PROCEDURE — 36415 COLL VENOUS BLD VENIPUNCTURE: CPT

## 2023-05-09 RX ORDER — OXYCODONE HYDROCHLORIDE 15 MG/1
15 TABLET ORAL
Status: DISCONTINUED | OUTPATIENT
Start: 2023-05-09 | End: 2023-05-10 | Stop reason: HOSPADM

## 2023-05-09 RX ORDER — CYCLOBENZAPRINE HCL 10 MG
10 TABLET ORAL 3 TIMES DAILY PRN
Status: DISCONTINUED | OUTPATIENT
Start: 2023-05-09 | End: 2023-05-10 | Stop reason: HOSPADM

## 2023-05-09 RX ORDER — OXYCODONE HYDROCHLORIDE 5 MG/1
10 TABLET ORAL
Status: DISCONTINUED | OUTPATIENT
Start: 2023-05-09 | End: 2023-05-10 | Stop reason: HOSPADM

## 2023-05-09 RX ORDER — DRONABINOL 2.5 MG/1
2.5 CAPSULE ORAL 2 TIMES DAILY
Qty: 60 CAPSULE | Refills: 0 | Status: SHIPPED | OUTPATIENT
Start: 2023-05-09 | End: 2023-06-08

## 2023-05-09 RX ORDER — ONDANSETRON 4 MG/1
4 TABLET, ORALLY DISINTEGRATING ORAL EVERY 8 HOURS PRN
Qty: 30 TABLET | Refills: 0 | Status: SHIPPED | OUTPATIENT
Start: 2023-05-09

## 2023-05-09 RX ORDER — OXYCODONE HYDROCHLORIDE AND ACETAMINOPHEN 5; 325 MG/1; MG/1
1 TABLET ORAL EVERY 4 HOURS PRN
Qty: 9 TABLET | Refills: 0 | Status: SHIPPED | OUTPATIENT
Start: 2023-05-09 | End: 2023-05-09 | Stop reason: HOSPADM

## 2023-05-09 RX ADMIN — CYCLOBENZAPRINE 10 MG: 10 TABLET, FILM COATED ORAL at 14:27

## 2023-05-09 RX ADMIN — SODIUM CHLORIDE, PRESERVATIVE FREE 10 ML: 5 INJECTION INTRAVENOUS at 09:31

## 2023-05-09 RX ADMIN — MIDODRINE HYDROCHLORIDE 10 MG: 10 TABLET ORAL at 16:49

## 2023-05-09 RX ADMIN — DRONABINOL 2.5 MG: 2.5 CAPSULE ORAL at 12:26

## 2023-05-09 RX ADMIN — ENOXAPARIN SODIUM 40 MG: 100 INJECTION SUBCUTANEOUS at 09:25

## 2023-05-09 RX ADMIN — DEXAMETHASONE 4 MG: 4 TABLET ORAL at 21:39

## 2023-05-09 RX ADMIN — OXYCODONE AND ACETAMINOPHEN 2 TABLET: 5; 325 TABLET ORAL at 10:22

## 2023-05-09 RX ADMIN — ATORVASTATIN CALCIUM 20 MG: 20 TABLET, FILM COATED ORAL at 21:39

## 2023-05-09 RX ADMIN — DRONABINOL 2.5 MG: 2.5 CAPSULE ORAL at 22:22

## 2023-05-09 RX ADMIN — MIDODRINE HYDROCHLORIDE 10 MG: 10 TABLET ORAL at 12:26

## 2023-05-09 RX ADMIN — SENNOSIDES 8.6 MG: 8.6 TABLET, FILM COATED ORAL at 09:24

## 2023-05-09 RX ADMIN — OXYCODONE AND ACETAMINOPHEN 2 TABLET: 5; 325 TABLET ORAL at 14:27

## 2023-05-09 RX ADMIN — SODIUM CHLORIDE, PRESERVATIVE FREE 10 ML: 5 INJECTION INTRAVENOUS at 21:39

## 2023-05-09 RX ADMIN — DEXAMETHASONE 4 MG: 4 TABLET ORAL at 09:24

## 2023-05-09 RX ADMIN — OXYCODONE AND ACETAMINOPHEN 2 TABLET: 5; 325 TABLET ORAL at 06:21

## 2023-05-09 RX ADMIN — LEVOTHYROXINE SODIUM 100 MCG: 0.1 TABLET ORAL at 06:22

## 2023-05-09 RX ADMIN — POLYVINYL ALCOHOL 1 DROP: 14 SOLUTION/ DROPS OPHTHALMIC at 17:25

## 2023-05-09 RX ADMIN — MIDODRINE HYDROCHLORIDE 10 MG: 10 TABLET ORAL at 09:24

## 2023-05-09 RX ADMIN — OXYCODONE HYDROCHLORIDE 15 MG: 15 TABLET ORAL at 21:38

## 2023-05-09 RX ADMIN — OXYCODONE HYDROCHLORIDE 10 MG: 5 TABLET ORAL at 18:29

## 2023-05-09 ASSESSMENT — PAIN DESCRIPTION - PAIN TYPE
TYPE: ACUTE PAIN

## 2023-05-09 ASSESSMENT — PAIN SCALES - GENERAL
PAINLEVEL_OUTOF10: 8
PAINLEVEL_OUTOF10: 8
PAINLEVEL_OUTOF10: 3
PAINLEVEL_OUTOF10: 6
PAINLEVEL_OUTOF10: 7
PAINLEVEL_OUTOF10: 4
PAINLEVEL_OUTOF10: 7
PAINLEVEL_OUTOF10: 3
PAINLEVEL_OUTOF10: 7
PAINLEVEL_OUTOF10: 9
PAINLEVEL_OUTOF10: 7
PAINLEVEL_OUTOF10: 4

## 2023-05-09 ASSESSMENT — PAIN DESCRIPTION - ORIENTATION
ORIENTATION: LOWER;MID
ORIENTATION: RIGHT
ORIENTATION: RIGHT;LOWER
ORIENTATION: RIGHT;LOWER
ORIENTATION: LOWER
ORIENTATION: LOWER
ORIENTATION: RIGHT

## 2023-05-09 ASSESSMENT — PAIN DESCRIPTION - ONSET
ONSET: ON-GOING

## 2023-05-09 ASSESSMENT — PAIN DESCRIPTION - DESCRIPTORS
DESCRIPTORS: ACHING;DISCOMFORT
DESCRIPTORS: ACHING;DISCOMFORT
DESCRIPTORS: ACHING
DESCRIPTORS: ACHING;CRAMPING;DISCOMFORT
DESCRIPTORS: ACHING;DISCOMFORT
DESCRIPTORS: ACHING
DESCRIPTORS: ACHING;DISCOMFORT

## 2023-05-09 ASSESSMENT — PAIN DESCRIPTION - LOCATION
LOCATION: BACK;LEG
LOCATION: BACK
LOCATION: LEG;KNEE
LOCATION: BACK;LEG
LOCATION: BACK;BUTTOCKS;LEG
LOCATION: BACK
LOCATION: BACK

## 2023-05-09 ASSESSMENT — PAIN - FUNCTIONAL ASSESSMENT
PAIN_FUNCTIONAL_ASSESSMENT: ACTIVITIES ARE NOT PREVENTED
PAIN_FUNCTIONAL_ASSESSMENT: ACTIVITIES ARE NOT PREVENTED
PAIN_FUNCTIONAL_ASSESSMENT: PREVENTS OR INTERFERES SOME ACTIVE ACTIVITIES AND ADLS
PAIN_FUNCTIONAL_ASSESSMENT: ACTIVITIES ARE NOT PREVENTED

## 2023-05-09 ASSESSMENT — PAIN DESCRIPTION - FREQUENCY
FREQUENCY: CONTINUOUS

## 2023-05-09 NOTE — FLOWSHEET NOTE
05/09/23 1018   Safe Environment   Safety Measures Call light within reach; Fall prevention (comment); Family at bedside;Standard Safety Measures  (Virtual nurse safety round completed.)     Patient is awake and  alert and answers questions. Referred patient to page 13 of the handbook for fall prevention review. Call light in reach.

## 2023-05-09 NOTE — CARE COORDINATION
5/9/23, 2:36 PM EDT    DISCHARGE ON GOING [address] day: 4  Location: [facility]05/005-A Reason for admit: Lactic acidosis [E87.20]  Hyponatremia [E87.1]  Fever of undetermined origin [R50.9]  Sepsis with acute organ dysfunction, due to unspecified organism, unspecified type, unspecified whether septic shock present ([facility] [address] [A41.9, R65.20]   Barriers to Discharge: Hospitalist following. Per report, no plans for long-term atb. Hospice following. PCP: Nicky Sharma, APRN - CNP  Readmission Risk Score: 24.2%  Patient Goals/Plan/Treatment Preferences: Plan discharge tomorrow likely with hospice.

## 2023-05-09 NOTE — DISCHARGE SUMMARY
pantoprazole 40 MG tablet  Commonly known as: PROTONIX  Take 1 tablet by mouth every morning (before breakfast)     tamsulosin 0.4 MG capsule  Commonly known as: FLOMAX     Trametinib Dimethyl Sulfoxide 2 MG Tabs  Take 2 mg by mouth nightly            STOP taking these medications      atorvastatin 20 MG tablet  Commonly known as: LIPITOR     Bactrim -160 MG per tablet  Generic drug: sulfamethoxazole-trimethoprim     melatonin 3 MG Tabs tablet     metFORMIN 500 MG tablet  Commonly known as: GLUCOPHAGE     potassium chloride 20 MEQ extended release tablet  Commonly known as: KLOR-CON M     sildenafil 50 MG tablet  Commonly known as: VIAGRA               Where to Get Your Medications        These medications were sent to [address] [place] [address] , [address] [place] [address]  [address], [address] [address]      Phone: [phone]   dronabinol 2.5 MG capsule  ondansetron 4 MG disintegrating tablet              Time Spent on discharge is 60 minutes in the examination, evaluation, counseling and review of medications and discharge plan. Thank you DEBBIE Renteria - ALAN for the opportunity to be involved in this patient's care.       Signed:    Electronically signed by Yanely Maria DO on 5/9/23 at 7:31 PM EDT     Case was discussed with Attending, Dr. Sidney Sacks, MD

## 2023-05-10 VITALS
WEIGHT: 190.4 LBS | HEART RATE: 143 BPM | HEIGHT: 72 IN | DIASTOLIC BLOOD PRESSURE: 67 MMHG | RESPIRATION RATE: 20 BRPM | BODY MASS INDEX: 25.79 KG/M2 | OXYGEN SATURATION: 92 % | TEMPERATURE: 98.2 F | SYSTOLIC BLOOD PRESSURE: 107 MMHG

## 2023-05-10 LAB
ANION GAP SERPL CALC-SCNC: 14 MEQ/L (ref 8–16)
BACTERIA BLD AEROBE CULT: NORMAL
BACTERIA UR CULT: NORMAL
BUN SERPL-MCNC: 12 MG/DL (ref 7–22)
CALCIUM SERPL-MCNC: 8.7 MG/DL (ref 8.5–10.5)
CHLORIDE SERPL-SCNC: 93 MEQ/L (ref 98–111)
CO2 SERPL-SCNC: 26 MEQ/L (ref 23–33)
CREAT SERPL-MCNC: 0.4 MG/DL (ref 0.4–1.2)
DEPRECATED RDW RBC AUTO: 54.2 FL (ref 35–45)
EKG ATRIAL RATE: 135 BPM
EKG P AXIS: 48 DEGREES
EKG P-R INTERVAL: 166 MS
EKG Q-T INTERVAL: 272 MS
EKG QRS DURATION: 94 MS
EKG QTC CALCULATION (BAZETT): 408 MS
EKG R AXIS: 32 DEGREES
EKG T AXIS: 28 DEGREES
EKG VENTRICULAR RATE: 135 BPM
ERYTHROCYTE [DISTWIDTH] IN BLOOD BY AUTOMATED COUNT: 17.3 % (ref 11.5–14.5)
GFR SERPL CREATININE-BSD FRML MDRD: > 60 ML/MIN/1.73M2
GLUCOSE SERPL-MCNC: 146 MG/DL (ref 70–108)
HCT VFR BLD AUTO: 27.9 % (ref 42–52)
HGB BLD-MCNC: 8.8 GM/DL (ref 14–18)
MCH RBC QN AUTO: 27.4 PG (ref 26–33)
MCHC RBC AUTO-ENTMCNC: 31.5 GM/DL (ref 32.2–35.5)
MCV RBC AUTO: 86.9 FL (ref 80–94)
PLATELET # BLD AUTO: 334 THOU/MM3 (ref 130–400)
PMV BLD AUTO: 8.6 FL (ref 9.4–12.4)
POTASSIUM SERPL-SCNC: 4.8 MEQ/L (ref 3.5–5.2)
RBC # BLD AUTO: 3.21 MILL/MM3 (ref 4.7–6.1)
SODIUM SERPL-SCNC: 133 MEQ/L (ref 135–145)
WBC # BLD AUTO: 7.9 THOU/MM3 (ref 4.8–10.8)

## 2023-05-10 PROCEDURE — 6370000000 HC RX 637 (ALT 250 FOR IP): Performed by: HOSPITALIST

## 2023-05-10 PROCEDURE — 80048 BASIC METABOLIC PNL TOTAL CA: CPT

## 2023-05-10 PROCEDURE — 36415 COLL VENOUS BLD VENIPUNCTURE: CPT

## 2023-05-10 PROCEDURE — 6360000002 HC RX W HCPCS: Performed by: HOSPITALIST

## 2023-05-10 PROCEDURE — 85027 COMPLETE CBC AUTOMATED: CPT

## 2023-05-10 PROCEDURE — 2580000003 HC RX 258

## 2023-05-10 PROCEDURE — 6370000000 HC RX 637 (ALT 250 FOR IP)

## 2023-05-10 PROCEDURE — 6370000000 HC RX 637 (ALT 250 FOR IP): Performed by: PSYCHIATRY & NEUROLOGY

## 2023-05-10 PROCEDURE — 6360000002 HC RX W HCPCS

## 2023-05-10 RX ORDER — METOPROLOL SUCCINATE 25 MG/1
25 TABLET, EXTENDED RELEASE ORAL DAILY
Status: DISCONTINUED | OUTPATIENT
Start: 2023-05-10 | End: 2023-05-10 | Stop reason: HOSPADM

## 2023-05-10 RX ADMIN — ENOXAPARIN SODIUM 40 MG: 100 INJECTION SUBCUTANEOUS at 08:13

## 2023-05-10 RX ADMIN — LEVOTHYROXINE SODIUM 100 MCG: 0.1 TABLET ORAL at 06:18

## 2023-05-10 RX ADMIN — SODIUM CHLORIDE, PRESERVATIVE FREE 10 ML: 5 INJECTION INTRAVENOUS at 08:21

## 2023-05-10 RX ADMIN — OXYCODONE HYDROCHLORIDE 15 MG: 15 TABLET ORAL at 08:21

## 2023-05-10 RX ADMIN — METOPROLOL SUCCINATE 25 MG: 25 TABLET, EXTENDED RELEASE ORAL at 10:42

## 2023-05-10 RX ADMIN — SENNOSIDES 8.6 MG: 8.6 TABLET, FILM COATED ORAL at 08:21

## 2023-05-10 RX ADMIN — DRONABINOL 2.5 MG: 2.5 CAPSULE ORAL at 08:20

## 2023-05-10 RX ADMIN — OXYCODONE HYDROCHLORIDE 15 MG: 15 TABLET ORAL at 03:59

## 2023-05-10 RX ADMIN — DEXAMETHASONE 4 MG: 4 TABLET ORAL at 08:20

## 2023-05-10 RX ADMIN — MIDODRINE HYDROCHLORIDE 10 MG: 10 TABLET ORAL at 08:21

## 2023-05-10 ASSESSMENT — PAIN DESCRIPTION - LOCATION
LOCATION: LEG
LOCATION: BACK;LEG

## 2023-05-10 ASSESSMENT — PAIN DESCRIPTION - ORIENTATION
ORIENTATION: LOWER;RIGHT
ORIENTATION: RIGHT;LOWER

## 2023-05-10 ASSESSMENT — PAIN DESCRIPTION - PAIN TYPE: TYPE: ACUTE PAIN

## 2023-05-10 ASSESSMENT — PAIN DESCRIPTION - DESCRIPTORS
DESCRIPTORS: ACHING;DISCOMFORT
DESCRIPTORS: ACHING;DISCOMFORT

## 2023-05-10 ASSESSMENT — PAIN DESCRIPTION - ONSET: ONSET: ON-GOING

## 2023-05-10 ASSESSMENT — PAIN SCALES - GENERAL
PAINLEVEL_OUTOF10: 7
PAINLEVEL_OUTOF10: 0
PAINLEVEL_OUTOF10: 8
PAINLEVEL_OUTOF10: 0
PAINLEVEL_OUTOF10: 0

## 2023-05-10 ASSESSMENT — PAIN DESCRIPTION - FREQUENCY: FREQUENCY: CONTINUOUS

## 2023-05-10 NOTE — CARE COORDINATION
5/10/23, 11:49 AM EDT    Patient goals/plan/ treatment preferences discussed by  and . Patient goals/plan/ treatment preferences reviewed with patient/ family. Patient/ family verbalize understanding of discharge plan and are in agreement with goal/plan/treatment preferences. Understanding was demonstrated using the teach back method. AVS provided by RN at time of discharge, which includes all necessary medical information pertaining to the patients current course of illness, treatment, post-discharge goals of care, and treatment preferences. Discharging home with  hospice. P WILIAM Rodgers updated.      Services At/After Discharge: Hospice

## 2023-05-10 NOTE — FLOWSHEET NOTE
Virtual RN safety rounds completed. Pt lying in bed, staff and family at bedside, call light in reach.

## 2023-05-10 NOTE — PROGRESS NOTES
Changed left nephrostomy dressing, 3x3 gauze, 4x4 clear occlusive dressing.
Hospice visit made as Loc Diaz from [facility] had contacted nurse updating that patient and family wishing to go home with hospice. Shauna Sims sitting up recliner, complained of pain of right knew and rated at level of 8 on scale of 0-10. Asked if he would like for nurse to discuss with provider to see if medication able to be adjusted in hospital. Denied at this time, feels he is doing \"okay\" with current regimen. Family inquired about increase swelling in legs as well as right knee pain. Discussed lymphadenopathy and compression that may be causing. Voiced understanding. Asked about going home with hospice. Patient agreeable, he and family would like for discharge sukumar 05. 0.2023 to allow for arrangement of home and deliver of equipment for patient care. After discussion will have bed, tray table, commode, wheelchair, shower chair, and prn oxygen delivered to home. Family plans on transporting and will wish to  around 1000 am on 05.10.2023. Did discuss code status with explanation of current code status of DNR-CCA vs DNR-CC. Patient voiced he has already been told no further treatments available and does not wish to return to hospital is nothing else that can be done for him. Wishes to change this to [facility]. Comfort scripts and DNR-CC will be filled out and left on floor for provider to sign. Updated Dr. Sweetie Juan of plan. He did sign oxygen order for it to be delivered. Primary nurse Derrick Adler RN updated of plan home with hospice admission in home.
Inder Bojorquez 60  OCCUPATIONAL THERAPY MISSED TREATMENT NOTE  [facility] ICU STEPDOWN TELEMETRY 4K  4K-005-A      Date: 2023  Patient Name: Kimber Damon        CSN: [id number]   : 1960  (58 y.o.)  Gender: male   Referring Practitioner: Dr. Mei England MD  Diagnosis: Lactic Acidosis         REASON FOR MISSED TREATMENT: Patient Unavailable attempt 1: patient starting breakfast attempt 2: nursing reports recently returned to bed. Will attempt next available time.
Internal Medicine Resident Progress Note    Patient:  Franklyn Sparks    YOB: 1960  Unit/Bed:4K-05/005-A  MRN: [id number]    Acct: [de-identified]   PCP: DEBBIE Leos CNP    Date of Admission: 5/5/2023    Code Status: DNR-CCA    Assessment/Plan:  Acute febrile illness  Patient had febrile occurrence of fever Tmax of 103.5, with subsequent fevers every night. Likely 2/2 neoplastic cause. Less likely due to infectious cause. Patient was pancultured with negative respiratory culture, negative aerobic anaerobic culture, only blood 1 culture positive for Staphylococcus coagulase-negative, likely contaminant, throat culture displayed normal johnie with Candida albicans colony. COVID-19 and influenza combo negative, MRSA by PCR negative. Patient became febrile every night despite antibiotic coverage. Sent urine culture, discontinued antibiotics, panculture when patient becomes febrile again. Lactic acidosis type B, resolved  Likely 2/2 salvation ketoacidosis and metformin. Initial lactic acid 5.3, currently <2. Hypovolemic hyponatremia, improved  Encourage eating diet  Cachexia  2/2 metastatic melanoma. Albumin 2.6. Ordered Marinol to stimulate hunger  History of HTN, currently hypotensive, controlled  Continue midodrine 10 mg 3 times daily  History of NIDDM 2  2/2023 A1c 5.3%. On metformin at home, held during admission. Blood sugars are currently controlled. History of hypothyroidism  3/21/2023 TSH 0.940. Continue Synthroid 100 mcg  History of metastic melanoma  Patient has history of multiple recurrent melanoma being 1996, 1990, 2013 with G3 B91 a M0 stage IIIb melanoma of left flank R ALND and SLN dissection 1996. Patient experienced recurrent disease of the left intrapectoral lymph nodes in 2015 underwent adjuvant radiation to left axilla and supraclavicular fossa in 2015.   CT scans in 2017 displayed disease progression in the lung, spleen, lymphadenopathy in the mediastinum
Physician Progress Note      PATIENT:               Nino Harman  CSN #:                  [id number]  :                       1960  ADMIT DATE:       2023 12:59 AM  DISCH DATE:  Tommie Escobedo  PROVIDER #:        Sierra Louis          QUERY TEXT:    Pt admitted with fever and has encephalopathy documented. If possible, please   document in progress notes and discharge summary further specificity regarding   the type of encephalopathy:    The medical record reflects the following:  Risk Factors: metastatic cancer  Clinical Indicators: AMS, confusion, does not remember events leading up to   admit; resolved in ED post fluid resuscitation  Treatment: Labs, imaging, monitoring, supportive treatment  Options provided:  -- Metabolic encephalopathy  -- Septic encephalopathy  -- Toxic metabolic encephalopathy  -- Other - I will add my own diagnosis  -- Disagree - Not applicable / Not valid  -- Disagree - Clinically unable to determine / Unknown  -- Refer to Clinical Documentation Reviewer    PROVIDER RESPONSE TEXT:    Provider disagreed with this query.   No Encephalopathy    Query created by: Vinnie Wolf on 2023 12:22 PM      Electronically signed by:  Sierra Louis 5/10/2023 7:32 AM
RN went over all discharge instructions with daughter d/t patient sleeping. Patient discharged home with hospice. RN answered all questions with no further questions at this time. Patient discharged with all personal belongings and prescription medications. Patient's daughter acknowledged RN. Patient discharged home via [facility] with family.
Return visit made as nurse had been updated by primary nurse Carley WILSON of patient and family being upset as meds to bed had been delivered. Medication discussion of hospice plan of care for comfort were different than the Percocet that he had been taking. Primary nurse had medications that had been delivered, she will call pharmacy to see what they would like done with medications as hospice will supply medications from [address] . Discharge had been completed and Percocet had been continued as well as Zofran and Marinol. These medications will be supplied by hospice, scripts were sent to pharmacy due to being new mediations. Went to room Johann Espino lying in bed with right knee propped up due to pain and swelling increase in legs. Explained that mix up happened as Dr. Shyanne Tamayo had completed discharge orders prior to being able to adjust orders that were recommended by hospice. Told them that medications that had been discussed were hand wrote out and provider agreeable to signed. Shared with them that medications reviewed on discharge as well. All voiced understanding and at ease after discussion    Hospice scripts had not been signed as Dr. Shyanne Tamayo completing admission and will come around to sign when able. Primary nurse had contacted attending and Dr. Kandy Guajardo arrived with primary nurse and this attempting to update of situation and frustrations voiced by family. Dr. Kandy Guajardo told nurses that will have Dr. Shyanne Tamayo come when done with admission. Dr. Shyanne Tamayo arrived to unit and signed scripts, explained the situations on medication with family being upset. Also shared with him the perception of family yesterday with visits provided by him. He shared that he will visit with them now to see if any questions for him.
Visit made to assist with patient discharge home with hospice. Spoke with primary nurse Derrick Adler RN and everything ready for patient to discharge. Asked her to fax AVS to hospice office at [phone] and call hospice office at [phone] when patient actually leaving the hospital.   Memory Bustard to room to see if any questions for hospice nurse. Patient pain not controlled by oxycodone increase do 15 mg. Shared with them that nurse had updated admitting nurse Luke Hernández RN that dose will need increased per signed orders, for patient comfort. Reviewed plan for admission in home, with comfort medications being brought and educated on. They did ask about education on assisting with mobility and if [facility]HA able to visit to assist with care this week. Contacted hospice admitting nurse and she will educated on assisting with mobility and make sure patient down for HHA visit this week x 1. Family denied further needs or questions. Did obtain DNR-CC form from hospice office as one not on floor and updated that patient being transported by [facility] due to increase weakness with fear of inability to tolerate in and out of care into home.
Visit made to see if Dr. Evaristo Ann had stopped to clarify with patient and family about ATB discussion that he had this morning with them about IV ATB. Kaley Nacho voiced that he had not seen provider and brother that had been present this afternoon had not seen either, sister present now and having questions. Shared that nurse uncertain but may be change in thoughts after cultures and testing, that Kaley Griffith may not need ATB but wish to have provider clarify from conversation with patient/ family this morning. Spoke to primary nurse Andrez WILSON and she had not seen provider go back into room this afternoon. Messaged Dr. Evaristo Ann and he stated he \"had spoke with mother at noon\". Did send another note to provider that patient did not remember talking with him and they are still questioning about care. Unable to prepare hospice plan of care until clarification given to patient,  he is unclear on what is available or not available. Want to go home with hospice if no ATB needed but discussion this morning with provider they had been told something about \"need for PICC for week or two of ATB\". Shared that they need to talk to provider to clarify this. They voiced, this includes Kaley Griffith, that they do want to talk with provider.
Visit made to see if patient and family having questions after hospice consult being completed. Darius Fenton lying in bed, denied pain at time of visit. Mother, a daughter, and other loved ones at bedside, deny qusestions about hospice as well as patient denied. Darius Fenton confirms for wish of home with hospice after course of IV ATB complete. Dr. Chio Peterson had said something to patient and family about possible placement of PICC for ATB IV or transition to oral ATB . Shared with family that hospice does not allow for IV ATB as it is considered aggressive. Family questioning whether doing IV should be explored, and what course to treat in hospital would be. Shared with them that nurse will contact provider to return at sometime today to answer questions. Dr. Chio Peterson and Dr. Pretty Davis updated of family wish to ask questions about ATB. Asked that Dr. Chio Peterson updated this nurse when aware of patient/family wishes for care moving forward and when being discharged if with hospice. Spoke with  and - insurance would have to approve ATB and home health would teach family how to do but would not come daily to complete,shared with family. Did share with them also, of doing PICC line and IV ATB that could go home with home health and when finished can transition to hospice in home without return to hospital. Patient does not wish to return to hospital but aware if issues with health with home health and in middle of night would have to come to hospital.   Will await for providers to speak with patient and family, for them to make decision. Did let provider know that hospice will need day to get things in line for admission in home. Information collected incase decision of hospice at home. Updated primary nurse Netta of above.
to past medical, social and functional history, completion of standardized testing, formal and informal observation of tasks, assessment of data and development of plan of care and goals. Treatment time included skilled education and facilitation of tasks to increase safety and independence with ADL's for improved functional independence and quality of life. Pt discussed his goal for therapy. He expressed that he has been spending too much time in the hospital bed and that he feels like he keeps getting weaker each day. Pt was motivated to work with therapy. He did get fatigued during the exercises. Encouraged pt to do some exercises with a towel on the table so that he can loosen up his shoulders. Suggestions were made for installation of grabbars in his shower area. Pt verbalized understanding. He stated that he had installed the shower himself and could find the studs in the wall. Pt also had asked about suction cup grabbars. It was not recommended as they are unreliable. Discharge Recommendations:  Continue to assess pending progress    Patient Education:     Patient Education  Education Given To: Patient, Family  Education Provided: Role of Therapy, Plan of Care  Education Provided Comments: Benefit of having a routine and planning activities of his day according to when he feels line he has the most energy; UE ROM exercises while on a bedside table.   Education Method: Verbal  Barriers to Learning: None  Education Outcome: Verbalized understanding    Equipment Recommendations:  Equipment Needed: Yes  Other: grabbars installed in his shower at home; elevated toilet seat or commode; pt is planning on having a hospital bed    Plan:  Times Per Week: 5x  Current Treatment Recommendations: ROM, Self-Care / ADL, Safety education & training, Strengthening, Endurance training, Functional mobility training  Additional Comments: Pt would benefit from continued skilled OT services when medically stable and

## 2023-05-10 NOTE — PLAN OF CARE
Care plan reviewed with patient. Patient verbalizes understanding of the care plan and contributed to goal setting.      Problem: Discharge Planning  Goal: Discharge to home or other facility with appropriate resources  Outcome: Progressing  Flowsheets (Taken 5/7/2023 2045)  Discharge to home or other facility with appropriate resources:   Identify barriers to discharge with patient and caregiver   Arrange for needed discharge resources and transportation as appropriate   Identify discharge learning needs (meds, wound care, etc)   Arrange for interpreters to assist at discharge as needed   Refer to discharge planning if patient needs post-hospital services based on physician order or complex needs related to functional status, cognitive ability or social support system     Problem: Safety - Adult  Goal: Free from fall injury  Outcome: Progressing  Flowsheets (Taken 5/7/2023 2045)  Free From Fall Injury: Instruct family/caregiver on patient safety     Problem: Chronic Conditions and Co-morbidities  Goal: Patient's chronic conditions and co-morbidity symptoms are monitored and maintained or improved  Outcome: Progressing  Flowsheets (Taken 5/7/2023 2045)  Care Plan - Patient's Chronic Conditions and Co-Morbidity Symptoms are Monitored and Maintained or Improved:   Monitor and assess patient's chronic conditions and comorbid symptoms for stability, deterioration, or improvement   Collaborate with multidisciplinary team to address chronic and comorbid conditions and prevent exacerbation or deterioration   Update acute care plan with appropriate goals if chronic or comorbid symptoms are exacerbated and prevent overall improvement and discharge     Problem: Nutrition Deficit:  Goal: Optimize nutritional status  Outcome: Progressing  Flowsheets (Taken 5/6/2023 1323 by Mau Hanson, WOLFGANG, LD)  Nutrient intake appropriate for improving, restoring, or maintaining nutritional needs:   Assess nutritional status and recommend
Problem: Discharge Planning  Goal: Discharge to home or other facility with appropriate resources  5/9/2023 1459 by Rossy Hidalgo RN  Outcome: Progressing  Flowsheets (Taken 5/9/2023 1459)  Discharge to home or other facility with appropriate resources:   Identify barriers to discharge with patient and caregiver   Identify discharge learning needs (meds, wound care, etc)   Refer to discharge planning if patient needs post-hospital services based on physician order or complex needs related to functional status, cognitive ability or social support system   Arrange for needed discharge resources and transportation as appropriate     Problem: Safety - Adult  Goal: Free from fall injury  5/9/2023 2240 by Desirae Santiago RN  Note: Patient will remain free from falls  5/9/2023 1459 by Rossy Hidalgo RN  Outcome: Progressing  Flowsheets (Taken 5/9/2023 1459)  Free From Fall Injury:   Instruct family/caregiver on patient safety   Based on caregiver fall risk screen, instruct family/caregiver to ask for assistance with transferring infant if caregiver noted to have fall risk factors     Problem: Chronic Conditions and Co-morbidities  Goal: Patient's chronic conditions and co-morbidity symptoms are monitored and maintained or improved  5/9/2023 2240 by Desirae Santiago RN  Flowsheets (Taken 5/9/2023 2240)  Care Plan - Patient's Chronic Conditions and Co-Morbidity Symptoms are Monitored and Maintained or Improved: Monitor and assess patient's chronic conditions and comorbid symptoms for stability, deterioration, or improvement  5/9/2023 1459 by Rossy Hidalgo RN  Outcome: Progressing  Flowsheets (Taken 5/9/2023 1459)  Care Plan - Patient's Chronic Conditions and Co-Morbidity Symptoms are Monitored and Maintained or Improved:   Monitor and assess patient's chronic conditions and comorbid symptoms for stability, deterioration, or improvement   Collaborate with multidisciplinary team to address chronic and comorbid
Problem: Discharge Planning  Goal: Discharge to home or other facility with appropriate resources  Outcome: Progressing     Problem: Safety - Adult  Goal: Free from fall injury  Outcome: Progressing     Problem: Chronic Conditions and Co-morbidities  Goal: Patient's chronic conditions and co-morbidity symptoms are monitored and maintained or improved  Outcome: Progressing     Problem: Nutrition Deficit:  Goal: Optimize nutritional status  Outcome: Progressing     Problem: Pain  Goal: Verbalizes/displays adequate comfort level or baseline comfort level  Outcome: Progressing     Care plan reviewed with patient and family. Patient and family verbalize understanding of the plan of care and contribute to goal setting.
to increase flexion toward goal  Goal: Maintain proper alignment of affected body part  Outcome: Progressing  Flowsheets (Taken 5/9/2023 1459)  Maintain proper alignment of affected body part:   Support and protect limb and body alignment per provider's orders   Instruct and reinforce with patient and family use of appropriate assistive device and precautions (e.g. spinal or hip dislocation precautions)  Goal: Return ADL status to a safe level of function  Outcome: Progressing  Flowsheets (Taken 5/9/2023 1459)  Return ADL Status to a Safe Level of Function:   Administer medication as ordered   Assess activities of daily living deficits and provide assistive devices as needed   Obtain physical therapy/occupational therapy consults as needed   Assist and instruct patient to increase activity and self care as tolerated     Problem: Genitourinary - Adult  Goal: Urinary catheter remains patent  Outcome: Progressing  Note: Bilateral nephrostomy tubes.      Problem: Metabolic/Fluid and Electrolytes - Adult  Goal: Electrolytes maintained within normal limits  Outcome: Progressing  Flowsheets (Taken 5/9/2023 1459)  Electrolytes maintained within normal limits:   Monitor labs and assess patient for signs and symptoms of electrolyte imbalances   Monitor response to electrolyte replacements, including repeat lab results as appropriate   Instruct patient on fluid and nutrition restrictions as appropriate   Administer electrolyte replacement as ordered  Goal: Hemodynamic stability and optimal renal function maintained  Outcome: Progressing  Flowsheets (Taken 5/9/2023 1459)  Hemodynamic stability and optimal renal function maintained:   Monitor labs and assess for signs and symptoms of volume excess or deficit   Monitor intake, output and patient weight   Encourage oral intake as appropriate   Monitor urine specific gravity, serum osmolarity and serum sodium as indicated or ordered   Instruct patient on fluid and nutrition

## 2023-05-11 ENCOUNTER — TELEPHONE (OUTPATIENT)
Dept: FAMILY MEDICINE CLINIC | Age: 63
End: 2023-05-11

## 2023-05-11 NOTE — TELEPHONE ENCOUNTER
Care Transitions Initial Follow Up Call    Outreach made within 2 business days of discharge: Yes    Patient: Armaan Rodriguez Patient : 1960   MRN: [id number]  Reason for Admission: There are no discharge diagnoses documented for the most recent discharge. Discharge Date: 5/10/23       Spoke with: Jenny Rose    Discharge department/facility: [facility] Interactive Patient Contact:  Was patient able to fill all prescriptions:   Was patient instructed to bring all medications to the follow-up visit:   Is patient taking all medications as directed in the discharge summary? Does patient understand their discharge instructions:   Does patient have questions or concerns that need addressed prior to 7-14 day follow up office visit:     Patient's daughter stated the patient was sent home on Hospice and requested to have his hospital follow up appointment canceled.      Follow Up  Future Appointments   Date Time Provider Mariano Mitchell   5/15/2023  1:30  [facility], APRN -  [facility]   2023  2:40 PM Brielle Loya APRN - [id number] [address] [place] [address]   2023  2:15 PM MD Yolanda Delaney [place] [address]   2024  9:00 AM Monserrat Lamar MD [facility] [place], [place]

## 2023-05-15 ENCOUNTER — HOSPITAL ENCOUNTER (OUTPATIENT)
Dept: WOUND CARE | Age: 63
Discharge: HOME | End: 2023-05-15

## (undated) DEVICE — COTTON BALL ST

## (undated) DEVICE — GLOVE ORANGE PI 7   MSG9070

## (undated) DEVICE — SUTURE MCRYL SZ 4-0 L18IN ABSRB UD P-3 L13MM 3/8 CIR PRIM Y494G

## (undated) DEVICE — PACK PROCEDURE SURG PLAS SC MIN SRHP LF

## (undated) DEVICE — SUTURE NONABSORBABLE BRAIDED 4-0 SH 30 IN BLK PERMA HND K831H

## (undated) DEVICE — GLOVE SURG SZ 8 L11.77IN FNGR THK9.8MIL STRW LTX POLYMER

## (undated) DEVICE — BANDAGE,GAUZE,4.5"X4.1YD,STERILE,LF: Brand: MEDLINE

## (undated) DEVICE — GOWN,SIRUS,NON REINFRCD,LARGE,SET IN SL: Brand: MEDLINE